# Patient Record
Sex: FEMALE | Race: WHITE | Employment: OTHER | URBAN - METROPOLITAN AREA
[De-identification: names, ages, dates, MRNs, and addresses within clinical notes are randomized per-mention and may not be internally consistent; named-entity substitution may affect disease eponyms.]

---

## 2017-01-03 DIAGNOSIS — F41.9 ANXIETY: ICD-10-CM

## 2017-01-04 DIAGNOSIS — F41.9 ANXIETY: ICD-10-CM

## 2017-01-05 RX ORDER — ESCITALOPRAM OXALATE 5 MG/1
TABLET ORAL
Qty: 30 TAB | Refills: 5 | Status: SHIPPED | OUTPATIENT
Start: 2017-01-05 | End: 2017-05-04 | Stop reason: SDUPTHER

## 2017-01-12 RX ORDER — ESCITALOPRAM OXALATE 5 MG/1
TABLET ORAL
Qty: 30 TAB | Refills: 3 | Status: SHIPPED | OUTPATIENT
Start: 2017-01-12 | End: 2017-01-24 | Stop reason: ALTCHOICE

## 2017-01-24 ENCOUNTER — HOSPITAL ENCOUNTER (OUTPATIENT)
Dept: LAB | Age: 72
Discharge: HOME | End: 2017-01-24

## 2017-01-24 ENCOUNTER — OFFICE VISIT (OUTPATIENT)
Dept: FAMILY MEDICINE CLINIC | Age: 72
End: 2017-01-24

## 2017-01-24 VITALS
HEIGHT: 61 IN | OXYGEN SATURATION: 96 % | WEIGHT: 146 LBS | HEART RATE: 90 BPM | DIASTOLIC BLOOD PRESSURE: 85 MMHG | BODY MASS INDEX: 27.56 KG/M2 | SYSTOLIC BLOOD PRESSURE: 146 MMHG | RESPIRATION RATE: 16 BRPM | TEMPERATURE: 97.4 F

## 2017-01-24 DIAGNOSIS — G25.81 RLS (RESTLESS LEGS SYNDROME): ICD-10-CM

## 2017-01-24 DIAGNOSIS — M81.0 OSTEOPOROSIS: Primary | ICD-10-CM

## 2017-01-24 DIAGNOSIS — M62.830 MUSCLE SPASM OF BACK: ICD-10-CM

## 2017-01-24 PROCEDURE — 99001 SPECIMEN HANDLING PT-LAB: CPT | Performed by: FAMILY MEDICINE

## 2017-01-24 RX ORDER — CYCLOBENZAPRINE HCL 10 MG
5 TABLET ORAL
Qty: 30 TAB | Refills: 5 | Status: SHIPPED | OUTPATIENT
Start: 2017-01-24 | End: 2017-06-14 | Stop reason: SDUPTHER

## 2017-01-24 RX ORDER — ROPINIROLE 0.5 MG/1
0.5 TABLET, FILM COATED ORAL
Qty: 90 TAB | Refills: 1 | Status: SHIPPED | OUTPATIENT
Start: 2017-01-24 | End: 2017-05-04 | Stop reason: SDUPTHER

## 2017-01-24 NOTE — MR AVS SNAPSHOT
Visit Information Date & Time Provider Department Dept. Phone Encounter #  
 1/24/2017 11:40 AM Lisy Johnson[address]  [id number] Follow-up Instructions Return in about 3 months (around 4/24/2017). Your Appointments 2/6/2017 11:30 AM  
URODYNAMICS with [facility] URODYNAMICS [facility]. Thomas [address] ([address]) Appt Note: Per Yevgeniy sampson/ George, no auth is required for urodynamics. Referral is on file. No deductible. $4500 OOP, $0 met. $45 copay. Calendar year policy. Call ref # [id number]  -mcf 1/10/17  
 [address] [place] [address] [address] [id number]  
  
    
 2/22/2017  1:00 PM  
ESTABLISHED PATIENT with Clarissa Sethi MD  
[facility]. [address] [facility] [facility]) Appt Note: fu review UDS; rescheduled UDS and f/u due to weather [address] [address]  
[phone]  
  
   
 [place] [id number] Upcoming Health Maintenance Date Due DTaP/Tdap/Td series (1 - Tdap) 8/9/1964 BREAST CANCER SCRN MAMMOGRAM 8/9/1993 ZOSTER VACCINE AGE 60> 8/9/2003 GLAUCOMA SCREENING Q2Y 8/9/2008 INFLUENZA AGE 9 TO ADULT 8/1/2016 FOBT Q 1 YEAR AGE 50-75 3/27/2017 MEDICARE YEARLY EXAM 9/13/2017 Allergies as of 1/24/2017  Review Complete On: 1/24/2017 By: Lisy Johnson, DO No Known Allergies Current Immunizations  Reviewed on 10/11/2016 Name Date Pneumococcal Conjugate (PCV-13) 10/11/2016 Pneumococcal Vaccine (Unspecified Type) 9/1/2014 Not reviewed this visit You Were Diagnosed With   
  
 Codes Comments Osteoporosis    -  Primary ICD-10-CM: M81.0 ICD-9-CM: 733.00   
 RLS (restless legs syndrome)     ICD-10-CM: G25.81 ICD-9-CM: 333.94 Muscle spasm of back     ICD-10-CM: I86.987 ICD-9-CM: 724.8 Vitals BP Pulse Temp Resp Height(growth percentile) Weight(growth percentile) 146/85 (BP 1 Location: Right arm, BP Patient Position: Sitting) 90 97.4 °F (36.3 °C) (Oral) 16 5' 1\" (1.549 m) 146 lb (66.2 kg) SpO2 BMI OB Status Smoking Status 96% 27.59 kg/m2 Postmenopausal Former Smoker BMI and BSA Data Body Mass Index Body Surface Area  
 27.59 kg/m 2 1.69 m 2 Preferred Pharmacy Pharmacy Name Phone Lefty Cortes [address] [phone] Your Updated Medication List  
  
   
This list is accurate as of: 1/24/17 12:47 PM.  Always use your most recent med list.  
  
  
  
  
 clonazePAM 0.5 mg tablet Commonly known as:  Dorcas Kava Take 1 Tab by mouth nightly. Max Daily Amount: 0.5 mg.  
  
 cyclobenzaprine 10 mg tablet Commonly known as:  FLEXERIL Take 0.5 Tabs by mouth three (3) times daily as needed for Muscle Spasm(s). docusate sodium 100 mg capsule Commonly known as:  Luwayvonne Ocampo Take 100 mg by mouth two (2) times a day. escitalopram oxalate 5 mg tablet Commonly known as:  Kingsley Montana TAKE 1 TABLET BY MOUTH EVERY EVENING  
  
 ferrous gluconate 324 mg (38 mg iron) tablet Take 1 Tab by mouth Every Mon, Wed & Sun.  
  
 gabapentin 100 mg capsule Commonly known as:  NEURONTIN Take 2 Caps by mouth three (3) times daily. lidocaine 5 % Commonly known as:  Alphonso Gomez Attach one to lower back and leg daily NYSTOP powder Generic drug:  nystatin  
  
 rOPINIRole 0.5 mg tablet Commonly known as:  Kendra Boyle Take 1 Tab by mouth nightly. Prescriptions Sent to Pharmacy Refills  
 rOPINIRole (REQUIP) 0.5 mg tablet 1 Sig: Take 1 Tab by mouth nightly. Class: Normal  
 Pharmacy: [facility] [address]. [place] Ph #: [phone]  Route: Oral  
 cyclobenzaprine (FLEXERIL) 10 mg tablet 5  
 Sig: Take 0.5 Tabs by mouth three (3) times daily as needed for Muscle Spasm(s). Class: Normal  
 Pharmacy: [facility] [address] [place] [address] [address]  #: [phone] Route: Oral  
  
Follow-up Instructions Return in about 3 months (around 4/24/2017). To-Do List   
 01/24/2017 Lab:  VITAMIN D, 25 HYDROXY Patient Instructions Dr. Colleen Maurer  
[phone] Introducing [facility] & [facility] SERVICES! Dear Neisha Love: Thank you for requesting a [employer] account. Our records indicate that you already have an active [employer] account. You can access your account anytime at [email or web address] Did you know that you can access your hospital and ER discharge instructions at any time in [employer]? You can also review all of your test results from your hospital stay or ER visit. Additional Information If you have questions, please visit the Frequently Asked Questions section of the [employer] website at [email or web address] Remember, [employer] is NOT to be used for urgent needs. For medical emergencies, dial 911. Now available from your iPhone and Android! Please provide this summary of care documentation to your next provider. Your primary care clinician is listed as Veronica Bourgeois. If you have any questions after today's visit, please call [phone].

## 2017-01-24 NOTE — PROGRESS NOTES
1. Have you been to the ER, urgent care clinic since your last visit? Hospitalized since your last visit? No    2. Have you seen or consulted any other health care providers outside of the [address] [place] [address] since your last visit? Include any pap smears or colon screening.  No

## 2017-01-24 NOTE — PROGRESS NOTES
Subjective:     HPI:  Martha Bautista is a 66 y.o. female who presents to the office for follow up on restless leg syndrome, muscle spasms, vision changes, and weight gain. Restless Leg Syndrome: Patient requests refill on Requip. She reports good relief of restless leg syndrome with Requip. No medication side effects noted. Muscle Spasms: Patient reports good relief of upper thigh muscle spasms with taking Flexeril. No medication side effects noted. Vision Changes: Patient reports limited field of vision in her left eye. She requests referral to Opthalmology. Weight Gain: Patient reports that since she was released from the hospital she has finally been able to gain weight. Patient has gained 33 lbs since last office visit on 10/11/16 and reached weight goal. She will begin working on weight maintenance. Wt Readings from Last 3 Encounters:   01/24/17 146 lb (66.2 kg)   10/11/16 113 lb (51.3 kg)   09/12/16 113 lb (51.3 kg)     Patient has started taking a calcium and vitamin D supplement for osteopenia and osteoporosis. Patient request ear flush at next office visit. Patient reports she has a nurse coming to her home to collect a urine culture on Thursday and ensure the UTI has resolved. She has appointments with the urologist and urodynamics in February. Current Outpatient Prescriptions   Medication Sig Dispense Refill    rOPINIRole (REQUIP) 0.5 mg tablet Take 1 Tab by mouth nightly. 90 Tab 1    cyclobenzaprine (FLEXERIL) 10 mg tablet Take 0.5 Tabs by mouth three (3) times daily as needed for Muscle Spasm(s). 30 Tab 5    escitalopram oxalate (LEXAPRO) 5 mg tablet TAKE 1 TABLET BY MOUTH EVERY EVENING 30 Tab 5    gabapentin (NEURONTIN) 100 mg capsule Take 2 Caps by mouth three (3) times daily. 180 Cap 5    clonazePAM (KLONOPIN) 0.5 mg tablet Take 1 Tab by mouth nightly.  Max Daily Amount: 0.5 mg. 30 Tab 0    ferrous gluconate 324 mg (38 mg iron) tablet Take 1 Tab by mouth Every Mon, Wed & Sun. 30 Tab 5    NYSTOP powder   0    docusate sodium (COLACE) 100 mg capsule Take 100 mg by mouth two (2) times a day. 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  Vascular surgery procedure unlist  march 2016     IVC filter. History reviewed. No pertinent family history. Social History     Social History    Marital status:      Spouse name: N/A    Number of children: N/A    Years of education: N/A     Occupational History    Not on file. Social History Main Topics    Smoking status: Former Smoker     Packs/day: 1.00     Years: 5.00    Smokeless tobacco: Never Used      Comment: quit in the 60's    Alcohol use No    Drug use: No    Sexual activity: Not Currently     Other Topics Concern    Not on file     Social History Narrative       REVIEW OF SYSTEM:  Review of Systems   Constitutional: Negative for chills and fever. Eyes: Negative for blurred vision. Respiratory: Negative for shortness of breath. Cardiovascular: Negative for chest pain, palpitations and leg swelling. Gastrointestinal: Negative for constipation, diarrhea, nausea and vomiting. Neurological: Negative for headaches.        Objective:     Visit Vitals    /85 (BP 1 Location: Right arm, BP Patient Position: Sitting)    Pulse 90    Temp 97.4 °F (36.3 °C) (Oral)    Resp 16    Ht 5' 1\" (1.549 m)    Wt 146 lb (66.2 kg)    SpO2 96%    BMI 27.59 kg/m2       PHYSICAL EXAM:  Physical Exam   Constitutional: She is oriented to person, place, and time and well-developed, well-nourished, and in no distress. HENT:   Right Ear: Tympanic membrane, external ear and ear canal normal.   Left Ear: Tympanic membrane, external ear and ear canal normal.   Ears:    Nose: Nose normal.   Mouth/Throat: Oropharynx is clear and moist.   Eyes: Pupils are equal, round, and reactive to light. Neck: Normal range of motion. Neck supple. No thyromegaly present. Cardiovascular: Normal rate, regular rhythm, normal heart sounds and intact distal pulses. No murmur heard. Pulmonary/Chest: Effort normal and breath sounds normal. She has no wheezes. Neurological: She is alert and oriented to person, place, and time. Skin: Skin is warm and dry. Vitals reviewed. Assessment/Plan:       ICD-10-CM ICD-9-CM    1. Osteoporosis M81.0 733.00 VITAMIN D, 25 HYDROXY   2. RLS (restless legs syndrome) G25.81 333.94 rOPINIRole (REQUIP) 0.5 mg tablet   3. Muscle spasm of back M62.830 724.8 cyclobenzaprine (FLEXERIL) 10 mg tablet      Patient will have non-fasting labs drawn in office today. Decrease salt in diet to help reduce blood pressure. Will continue to monitor blood pressure. Will perform ear flush at patient's next office visit. Continue with calcium and vitamin D supplement. 1500 mg Calcium and 500 mg Vitamin D. Patient was previously referred to an Opthalmologist. Patient given phone number to contact and schedule an appointment. Patient given opportunity to ask questions. Questions answered. Patient understands plan of care. Follow-up Disposition:  Return in about 3 months (around 4/24/2017).         Written by Nilda Hartman, as dictated by Belgica Parks DO.

## 2017-01-25 LAB
25(OH)D3+25(OH)D2 SERPL-MCNC: 77.1 NG/ML (ref 30–100)
ALBUMIN SERPL-MCNC: 4.3 G/DL (ref 3.5–4.8)
ALBUMIN/GLOB SERPL: 1.3 {RATIO} (ref 1.1–2.5)
ALP SERPL-CCNC: 90 IU/L (ref 39–117)
ALT SERPL-CCNC: 13 IU/L (ref 0–32)
AST SERPL-CCNC: 19 IU/L (ref 0–40)
BILIRUB SERPL-MCNC: 0.3 MG/DL (ref 0–1.2)
BUN SERPL-MCNC: 24 MG/DL (ref 8–27)
BUN/CREAT SERPL: 26 (ref 11–26)
CALCIUM SERPL-MCNC: 10 MG/DL (ref 8.7–10.3)
CHLORIDE SERPL-SCNC: 97 MMOL/L (ref 96–106)
CHOLEST SERPL-MCNC: 244 MG/DL (ref 100–199)
CO2 SERPL-SCNC: 27 MMOL/L (ref 18–29)
CREAT SERPL-MCNC: 0.94 MG/DL (ref 0.57–1)
GLOBULIN SER CALC-MCNC: 3.3 G/DL (ref 1.5–4.5)
GLUCOSE SERPL-MCNC: 87 MG/DL (ref 65–99)
HDLC SERPL-MCNC: 63 MG/DL
INTERPRETATION, 910389: NORMAL
LDLC SERPL CALC-MCNC: 146 MG/DL (ref 0–99)
POTASSIUM SERPL-SCNC: 4.4 MMOL/L (ref 3.5–5.2)
PROT SERPL-MCNC: 7.6 G/DL (ref 6–8.5)
SODIUM SERPL-SCNC: 140 MMOL/L (ref 134–144)
TRIGL SERPL-MCNC: 174 MG/DL (ref 0–149)
VLDLC SERPL CALC-MCNC: 35 MG/DL (ref 5–40)

## 2017-03-04 DIAGNOSIS — M62.830 MUSCLE SPASM OF BACK: ICD-10-CM

## 2017-03-07 RX ORDER — CYCLOBENZAPRINE HCL 10 MG
TABLET ORAL
Qty: 30 TAB | Refills: 2 | Status: SHIPPED | OUTPATIENT
Start: 2017-03-07 | End: 2017-06-14 | Stop reason: SDUPTHER

## 2017-04-17 DIAGNOSIS — M62.830 MUSCLE SPASM OF BACK: ICD-10-CM

## 2017-04-19 DIAGNOSIS — M46.20 PARASPINAL ABSCESS (HCC): ICD-10-CM

## 2017-04-19 DIAGNOSIS — G25.81 RLS (RESTLESS LEGS SYNDROME): ICD-10-CM

## 2017-04-20 RX ORDER — CYCLOBENZAPRINE HCL 10 MG
TABLET ORAL
Qty: 30 TAB | Refills: 1 | Status: SHIPPED | OUTPATIENT
Start: 2017-04-20 | End: 2017-06-14 | Stop reason: SDUPTHER

## 2017-04-22 RX ORDER — GABAPENTIN 100 MG/1
CAPSULE ORAL
Qty: 540 CAP | Refills: 0 | Status: SHIPPED | OUTPATIENT
Start: 2017-04-22 | End: 2017-07-27 | Stop reason: SDUPTHER

## 2017-05-04 ENCOUNTER — OFFICE VISIT (OUTPATIENT)
Dept: FAMILY MEDICINE CLINIC | Age: 72
End: 2017-05-04

## 2017-05-04 VITALS
BODY MASS INDEX: 28.66 KG/M2 | DIASTOLIC BLOOD PRESSURE: 66 MMHG | RESPIRATION RATE: 20 BRPM | TEMPERATURE: 97.3 F | WEIGHT: 151.8 LBS | SYSTOLIC BLOOD PRESSURE: 143 MMHG | OXYGEN SATURATION: 97 % | HEART RATE: 76 BPM | HEIGHT: 61 IN

## 2017-05-04 DIAGNOSIS — F41.9 ANXIETY: ICD-10-CM

## 2017-05-04 DIAGNOSIS — G25.81 RLS (RESTLESS LEGS SYNDROME): ICD-10-CM

## 2017-05-04 DIAGNOSIS — Z78.9 FULL CODE STATUS: Primary | ICD-10-CM

## 2017-05-04 RX ORDER — ROPINIROLE 1 MG/1
1 TABLET, FILM COATED ORAL
Qty: 90 TAB | Refills: 1 | Status: SHIPPED | OUTPATIENT
Start: 2017-05-04 | End: 2017-09-20 | Stop reason: SDUPTHER

## 2017-05-04 RX ORDER — ESCITALOPRAM OXALATE 5 MG/1
TABLET ORAL
Qty: 30 TAB | Refills: 5 | Status: SHIPPED | OUTPATIENT
Start: 2017-05-04 | End: 2017-05-10 | Stop reason: SDUPTHER

## 2017-05-04 NOTE — MR AVS SNAPSHOT
Visit Information Date & Time Provider Department Dept. Phone Encounter #  
 5/4/2017 12:40 PM Maria Del Carmen Alonzo [facility] [phone] [id number] Follow-up Instructions Return in about 2 months (around 7/4/2017). Your Appointments 5/24/2017 11:45 AM  
ESTABLISHED PATIENT with Bhavin Moreno MD  
[facility] ([address]) Appt Note: 3MO F/UP RETENTION  
 [address] [place] [address]  
[phone]  
  
   
 [place] [id number] Upcoming Health Maintenance Date Due DTaP/Tdap/Td series (1 - Tdap) 8/9/1964 BREAST CANCER SCRN MAMMOGRAM 8/9/1993 ZOSTER VACCINE AGE 60> 8/9/2003 FOBT Q 1 YEAR AGE 50-75 3/27/2017 INFLUENZA AGE 9 TO ADULT 8/1/2017 MEDICARE YEARLY EXAM 9/13/2017 GLAUCOMA SCREENING Q2Y 3/2/2019 Allergies as of 5/4/2017  Review Complete On: 5/4/2017 By: Keisha Quan LPN No Known Allergies Current Immunizations  Reviewed on 10/11/2016 Name Date Pneumococcal Conjugate (PCV-13) 10/11/2016 Pneumococcal Vaccine (Unspecified Type) 9/1/2014 Not reviewed this visit You Were Diagnosed With   
  
 Codes Comments Full code status    -  Primary ICD-10-CM: Z78.9 ICD-9-CM: V49.89 Anxiety     ICD-10-CM: F41.9 ICD-9-CM: 300.00   
 RLS (restless legs syndrome)     ICD-10-CM: G25.81 ICD-9-CM: 333.94 Vitals BP Pulse Temp Resp Height(growth percentile) Weight(growth percentile) 143/66 76 97.3 °F (36.3 °C) (Oral) 20 5' 1\" (1.549 m) 151 lb 12.8 oz (68.9 kg) SpO2 BMI OB Status Smoking Status 97% 28.68 kg/m2 Postmenopausal Former Smoker Vitals History BMI and BSA Data Body Mass Index Body Surface Area  
 28.68 kg/m 2 1.72 m 2 Preferred Pharmacy Pharmacy Name Phone [address] [phone] Your Updated Medication List  
  
   
This list is accurate as of: 5/4/17  2:03 PM.  Always use your most recent med list.  
  
  
  
  
 * cyclobenzaprine 10 mg tablet Commonly known as:  FLEXERIL Take 0.5 Tabs by mouth three (3) times daily as needed for Muscle Spasm(s). * cyclobenzaprine 10 mg tablet Commonly known as:  FLEXERIL  
TAKE 1/2 TABLET BY MOUTH THREE TIMES DAILY AS NEEDED FOR MUSCLE SPASMS * cyclobenzaprine 10 mg tablet Commonly known as:  FLEXERIL  
TAKE 1/2 TABLET BY MOUTH THREE TIMES DAILY AS NEEDED FOR MUSCLE SPASMS  
  
 docusate sodium 100 mg capsule Commonly known as:  Gemma Batters Take 100 mg by mouth two (2) times a day. escitalopram oxalate 5 mg tablet Commonly known as:  Kasey Peak TAKE 1 TABLET BY MOUTH EVERY EVENING  
  
 ferrous gluconate 324 mg (38 mg iron) tablet Take 1 Tab by mouth Every Mon, Wed & Sun.  
  
 gabapentin 100 mg capsule Commonly known as:  NEURONTIN  
TAKE 2 CAPSULES BY MOUTH THREE TIMES DAILY  
  
 gentamicin 80 mg/2ml Commonly known as:  GARAMYCIN  
80 mg by Bladder Instillation route every other day. lidocaine 5 % Commonly known as:  Channpatti Ask Attach one to lower back and leg daily PROBIOTIC 4X 10-15 mg Tbec Generic drug:  B.infantis-B.ani-B.long-B.bifi Take  by mouth. rOPINIRole 1 mg tablet Commonly known as:  [place] Jose Take 1 Tab by mouth nightly.  
  
 sodium chloride irrigation 0.9 % irrigation 1,000 mL by Irrigation route every fourty-eight (48) hours. * Notice: This list has 3 medication(s) that are the same as other medications prescribed for you. Read the directions carefully, and ask your doctor or other care provider to review them with you. Prescriptions Sent to Pharmacy Refills  
 escitalopram oxalate (LEXAPRO) 5 mg tablet 5 Sig: TAKE 1 TABLET BY MOUTH EVERY EVENING  Class: Normal  
 Pharmacy: [facility] [address] [place] [address] [address] Ph #: [phone]  
 rOPINIRole (REQUIP) 1 mg tablet 1 Sig: Take 1 Tab by mouth nightly. Class: Normal  
 Pharmacy: [facility] [address] [place] [address] [address] Ph #: [phone] Route: Oral  
  
We Performed the Following FULL CODE [COD2 Custom] Follow-up Instructions Return in about 2 months (around 7/4/2017). Introducing [facility] & [facility] SERVICES! Dear Guanakito Matters: Thank you for requesting a [employer] account. Our records indicate that you already have an active [employer] account. You can access your account anytime at [email or web address] Did you know that you can access your hospital and ER discharge instructions at any time in [employer]? You can also review all of your test results from your hospital stay or ER visit. Additional Information If you have questions, please visit the Frequently Asked Questions section of the [employer] website at [email or web address] Remember, [employer] is NOT to be used for urgent needs. For medical emergencies, dial 911. Now available from your iPhone and Android! Please provide this summary of care documentation to your next provider. Your primary care clinician is listed as Leatrinity Arenas. If you have any questions after today's visit, please call [phone].

## 2017-05-04 NOTE — PROGRESS NOTES
Subjective:     HPI:  Andreas Martinez is a 66 y.o. female who presents to the office for follow up on restless leg syndrome, anxiety, weight gain, urology treatments, and eye specialist treatments, c/o hearing loss. Restless Leg Syndrome: Patient reports her symptoms of restless leg syndrome have begun starting in the afternoon instead of just at night. She has been taking Ropinirole once daily at bedtime with good relief of restless leg symptoms while sleeping. Patient states she does not go to bed until around 5 am and sleeps until 2 pm.     Anxiety: Patient is taking Lexapro as prescribed with good relief of symptoms. No medication side effects noted. Weight Gain: Patient reports she is getting regular meals during the day and is trying to eat a balanced diet. She reports good appetite. Wt Readings from Last 3 Encounters:   05/04/17 151 lb 12.8 oz (68.9 kg)   02/17/17 146 lb (66.2 kg)   01/24/17 146 lb (66.2 kg)     Urology: Patient underwent Urodynamics testing on 2/17/17 which showed patient was not fully voiding her bladder. Urologist ordered self-catheterization and sent someone to teach patient and caregiver procedure. Patient started a 90 day treatment on 4/3/17 of injecting sterile water and gentamycin mixture into the bladder for 30 minutes every other day. Urologist ordered treatment in attempt to reduce UTI frequency. Eye Specialists: Patient reports she underwent surgery on 4/21/17 with retinal specialist, Dr. Alberto Clark, on her left eye to remove a blood clot in the eye. She states she still needs to undergo cataract [facility] on both [facility] Cerumen Build-up: Patient reports gradual decrease in hearing which she attributed to wax build-up in ears. Labs Reviewed:  Patient states she has modified her diet and started eating healthier foods since labs were drawn. She reports she was trying to reduce cholesterol with dietary changes.      Lab Results Component Value Date/Time    Sodium 140 01/24/2017 12:53 PM    Potassium 4.4 01/24/2017 12:53 PM    Chloride 97 01/24/2017 12:53 PM    CO2 27 01/24/2017 12:53 PM    Anion gap 13 05/21/2016 05:43 PM    Glucose 87 01/24/2017 12:53 PM    BUN 24 01/24/2017 12:53 PM    Creatinine 0.94 01/24/2017 12:53 PM    BUN/Creatinine ratio 26 01/24/2017 12:53 PM    GFR est AA 70 01/24/2017 12:53 PM    GFR est non-AA 60 01/24/2017 12:53 PM    Calcium 10.0 01/24/2017 12:53 PM    Bilirubin, total 0.3 01/24/2017 12:53 PM    AST (SGOT) 19 01/24/2017 12:53 PM    Alk. phosphatase 90 01/24/2017 12:53 PM    Protein, total 7.6 01/24/2017 12:53 PM    Albumin 4.3 01/24/2017 12:53 PM    Globulin 6.7 05/21/2016 05:43 PM    A-G Ratio 1.3 01/24/2017 12:53 PM    ALT (SGPT) 13 01/24/2017 12:53 PM     Lab Results   Component Value Date/Time    Cholesterol, total 244 01/24/2017 12:53 PM    HDL Cholesterol 63 01/24/2017 12:53 PM    LDL, calculated 146 01/24/2017 12:53 PM    VLDL, calculated 35 01/24/2017 12:53 PM    Triglyceride 174 01/24/2017 12:53 PM     Lab Results   Component Value Date/Time    Vitamin D 25-Hydroxy 72.0 03/22/2016 06:30 AM    VITAMIN D, 25-HYDROXY 77.1 01/24/2017 12:53 PM         Current Outpatient Prescriptions   Medication Sig Dispense Refill    escitalopram oxalate (LEXAPRO) 5 mg tablet TAKE 1 TABLET BY MOUTH EVERY EVENING 30 Tab 5    rOPINIRole (REQUIP) 1 mg tablet Take 1 Tab by mouth nightly. 90 Tab 1    gabapentin (NEURONTIN) 100 mg capsule TAKE 2 CAPSULES BY MOUTH THREE TIMES DAILY 540 Cap 0    cyclobenzaprine (FLEXERIL) 10 mg tablet TAKE 1/2 TABLET BY MOUTH THREE TIMES DAILY AS NEEDED FOR MUSCLE SPASMS 30 Tab 1    cyclobenzaprine (FLEXERIL) 10 mg tablet TAKE 1/2 TABLET BY MOUTH THREE TIMES DAILY AS NEEDED FOR MUSCLE SPASMS 30 Tab 2    B.infantis-B.ani-B.long-B.bifi (PROBIOTIC 4X) 10-15 mg TbEC Take  by mouth.       cyclobenzaprine (FLEXERIL) 10 mg tablet Take 0.5 Tabs by mouth three (3) times daily as needed for Muscle Spasm(s). 30 Tab 5    ferrous gluconate 324 mg (38 mg iron) tablet Take 1 Tab by mouth Every Mon, Wed & Sun. 30 Tab 5    gentamicin (GARAMYCIN) 80 mg/2ml 80 mg by Bladder Instillation route every other day. 4 Syringe 11    sodium chloride irrigation 0.9 % irrigation 1,000 mL by Irrigation route every fourty-eight (48) hours. 1000 mL 4    docusate sodium (COLACE) 100 mg capsule Take 100 mg by mouth two (2) times a day.  lidocaine (LIDODERM) 5 % Attach one to lower back and leg daily 1 Each 0        No Known Allergies    Past Medical History:   Diagnosis Date    Bladder stones     [facility] of leg (deep venous thrombosis) ([facility] [address]     Female genital prolapse     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  VASCULAR SURGERY PROCEDURE UNLIST  march 2016    IVC filter. History reviewed. No pertinent family history. Social History     Social History    Marital status:      Spouse name: N/A    Number of children: N/A    Years of education: N/A     Occupational History    Not on file. Social History Main Topics    Smoking status: Former Smoker     Packs/day: 1.00     Years: 5.00     Types: Cigarettes    Smokeless tobacco: Never Used      Comment: quit in the 60's    Alcohol use No    Drug use: No    Sexual activity: Not Currently     Other Topics Concern    Not on file     Social History Narrative       REVIEW OF SYSTEM:  Review of Systems   Constitutional: Negative for chills and fever. HENT: Positive for hearing loss (mild). Respiratory: Positive for cough (when lying flat on her back). Negative for shortness of breath. Cardiovascular: Negative for chest pain, palpitations and leg swelling.    Gastrointestinal: Negative for constipation, diarrhea, nausea and vomiting. Neurological: Negative for headaches. Objective:     Visit Vitals    /66    Pulse 76    Temp 97.3 °F (36.3 °C) (Oral)    Resp 20    Ht 5' 1\" (1.549 m)    Wt 151 lb 12.8 oz (68.9 kg)    SpO2 97%    BMI 28.68 kg/m2       PHYSICAL EXAM:  Physical Exam   Constitutional: She is oriented to person, place, and time and well-developed, well-nourished, and in no distress. HENT:   Right Ear: Tympanic membrane, external ear and ear canal normal.   Left Ear: Tympanic membrane, external ear and ear canal normal.   Ears:    Nose: Nose normal.       Mouth/Throat: Oropharynx is clear and moist.   Eyes: Pupils are equal, round, and reactive to light. Neck: Normal range of motion. Neck supple. No thyromegaly present. Cardiovascular: Normal rate, regular rhythm, normal heart sounds and intact distal pulses. No murmur heard. Pulmonary/Chest: Effort normal and breath sounds normal. She has no wheezes. Neurological: She is alert and oriented to person, place, and time. Skin: Skin is warm and dry. Vitals reviewed. Assessment/Plan:       ICD-10-CM ICD-9-CM    1. Full code status Z78.9 V49.89 FULL CODE   2. Anxiety F41.9 300.00 DISCONTINUED: escitalopram oxalate (LEXAPRO) 5 mg tablet   3. RLS (restless legs syndrome) G25.81 333.94 rOPINIRole (REQUIP) 1 mg tablet      Patient has a copy of medical directives with her today. Will scan to patient's chart. Patient desires to change code status to Full Code Status. Patient would like to continue with lifestyle changes for cholesterol and blood pressure management. Restless Leg Syndrome not fully controlled on current therapy. Dose of Ropinirole increased to 1 mg daily with instructions to increase to 2 mg daily if needed. Patient will have fasting labs drawn prior to next rov. Patient given opportunity to ask questions. Questions answered. Patient understands plan of care.    Follow-up Disposition:  Return in about 2 months (around 7/4/2017). Written by Miguel Barker, as dictated by Garfield Kim DO.    I, Dr. Garfield Kim, confirm that all documentation is accurate.

## 2017-05-04 NOTE — PROGRESS NOTES
Keisha Maxwell is a 66 y.o. female who presents today for/with c/o 3 month follow up    1. Have you been to the ER, urgent care clinic since your last visit? Hospitalized since your last visit? NO    2. Have you seen or consulted any other health care providers outside of the [address] [place] [address] since your last visit? Include any pap smears or colon screening.  No    Health Maintenance reviewed - Yes    Health Maintenance Due   Topic Date Due    DTaP/Tdap/Td series (1 - Tdap) 08/09/1964    BREAST CANCER SCRN MAMMOGRAM  08/09/1993    ZOSTER VACCINE AGE 60>  08/09/2003    FOBT Q 1 YEAR AGE 50-75  03/27/2017

## 2017-05-10 DIAGNOSIS — F41.9 ANXIETY: ICD-10-CM

## 2017-05-12 RX ORDER — ESCITALOPRAM OXALATE 5 MG/1
TABLET ORAL
Qty: 90 TAB | Refills: 2 | Status: SHIPPED | OUTPATIENT
Start: 2017-05-12 | End: 2017-06-14 | Stop reason: SDUPTHER

## 2017-05-19 DIAGNOSIS — F41.9 ANXIETY: ICD-10-CM

## 2017-05-23 RX ORDER — ESCITALOPRAM OXALATE 5 MG/1
TABLET ORAL
Qty: 90 TAB | Refills: 1 | Status: SHIPPED | OUTPATIENT
Start: 2017-05-23 | End: 2017-10-31 | Stop reason: SDUPTHER

## 2017-06-14 ENCOUNTER — OFFICE VISIT (OUTPATIENT)
Dept: FAMILY MEDICINE CLINIC | Age: 72
End: 2017-06-14

## 2017-06-14 VITALS
HEIGHT: 61 IN | WEIGHT: 153 LBS | BODY MASS INDEX: 28.89 KG/M2 | TEMPERATURE: 96.9 F | DIASTOLIC BLOOD PRESSURE: 68 MMHG | HEART RATE: 79 BPM | SYSTOLIC BLOOD PRESSURE: 120 MMHG | RESPIRATION RATE: 20 BRPM | OXYGEN SATURATION: 98 %

## 2017-06-14 DIAGNOSIS — Z01.818 PRE-OP EXAMINATION: ICD-10-CM

## 2017-06-14 DIAGNOSIS — H53.8 BLURRY VISION, LEFT EYE: Primary | ICD-10-CM

## 2017-06-14 DIAGNOSIS — M62.830 MUSCLE SPASM OF BACK: ICD-10-CM

## 2017-06-14 RX ORDER — CYCLOBENZAPRINE HCL 10 MG
TABLET ORAL
Qty: 45 TAB | Refills: 1 | Status: SHIPPED | OUTPATIENT
Start: 2017-06-14 | End: 2017-09-21 | Stop reason: SDUPTHER

## 2017-06-14 NOTE — PROGRESS NOTES
HISTORY OF PRESENT ILLNESS    Emily Nava is a 66 y.o.  female who presents today for surgical clearance for left cataract extraction with Dr. Serena Sears scheduled for 06/22/2017 at [facility]. Last seen in the office : 05/04/2017  States there have been no changes since last visit. EKG: unchanged from previous tracings, normal sinus rhythm, LBBB. Pt states that she had a blood clot around her left eye, which she describes as a \"black cloud\". Patient reports she underwent surgery on 4/21/17 with retinal specialist, Dr. Yuniel Walls, on her left eye to remove a blood clot in the eye. Current Outpatient Prescriptions   Medication Sig Dispense Refill    cyclobenzaprine (FLEXERIL) 10 mg tablet TAKE 1/2 TABLET BY MOUTH THREE TIMES DAILY AS NEEDED FOR MUSCLE SPASMS 45 Tab 1    escitalopram oxalate (LEXAPRO) 5 mg tablet TAKE 1 TABLET BY MOUTH EVERY EVENING 90 Tab 1    rOPINIRole (REQUIP) 1 mg tablet Take 1 Tab by mouth nightly. 90 Tab 1    gabapentin (NEURONTIN) 100 mg capsule TAKE 2 CAPSULES BY MOUTH THREE TIMES DAILY 540 Cap 0    gentamicin (GARAMYCIN) 80 mg/2ml 80 mg by Bladder Instillation route every other day. 4 Syringe 11    sodium chloride irrigation 0.9 % irrigation 1,000 mL by Irrigation route every fourty-eight (48) hours. 1000 mL 4    B.infantis-B.ani-B.long-B.bifi (PROBIOTIC 4X) 10-15 mg TbEC Take  by mouth.  ferrous gluconate 324 mg (38 mg iron) tablet Take 1 Tab by mouth Every Mon, Wed & Sun. 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      VASCULAR SURGERY PROCEDURE UNLIST  march 2016    IVC filter. No family history on file. Social History     Social History    Marital status:      Spouse name: N/A    Number of children: N/A    Years of education: N/A     Occupational History    Not on file. Social History Main Topics    Smoking status: Former Smoker     Packs/day: 1.00     Years: 5.00     Types: Cigarettes    Smokeless tobacco: Never Used      Comment: quit in the 60's    Alcohol use No    Drug use: No    Sexual activity: Not Currently     Other Topics Concern    Not on file     Social History Narrative       Review of Systems   Constitutional: Negative for chills and fever. Eyes: Positive for blurred vision. Respiratory: Negative for shortness of breath. Cardiovascular: Negative for chest pain, palpitations and leg swelling. Gastrointestinal: Negative for constipation, diarrhea, nausea and vomiting. Musculoskeletal: Negative for joint pain. Neurological: Negative for headaches. OBJECTIVE  Visit Vitals    /68  Comment: manually    Pulse 79    Temp 96.9 °F (36.1 °C) (Oral)    Resp 20    Ht 5' 1\" (1.549 m)    Wt 153 lb (69.4 kg)    SpO2 98%    BMI 28.91 kg/m2       Physical Exam   Constitutional: She is oriented to person, place, and time and well-developed, well-nourished, and in no distress. HENT:   Right Ear: Tympanic membrane, external ear and ear canal normal.   Left Ear: External ear and ear canal normal.   Nose: Nose normal.   Mouth/Throat: Oropharynx is clear and moist.   Cerumen build-up in left ear   Eyes: Pupils are equal, round, and reactive to light. Neck: Normal range of motion. Neck supple. No thyromegaly present. Cardiovascular: Normal rate, regular rhythm, normal heart sounds and intact distal pulses. No murmur heard. Pulmonary/Chest: Effort normal and breath sounds normal. She has no wheezes. Neurological: She is alert and oriented to person, place, and time. GCS score is 15.    Skin: Skin is warm and dry. Vitals reviewed. ASSESSMENT and PLAN    ICD-10-CM ICD-9-CM    1. Blurry vision, left eye H53.8 368.8    2. Pre-op examination Z01.818 V72.84    3. Muscle spasm of back M62.830 724.8 cyclobenzaprine (FLEXERIL) 10 mg tablet   Patient requests Flexeril prescription with 45 tabs. She takes 1/2 tab TID. Patient will return to office for left ear flush. Patient given opportunity to ask questions. Questions answered. Patient understands plan of care. Follow-up Disposition:  Return for as scheduled. .      Patient is mild surgical risk for the planned procedure: left cataract extraction with Dr. Jai Bar [facility]. Written by Christy Castro, as dictated by Aliya Son DO.    I, Dr. Aliya Son, confirm that all documentation is accurate.

## 2017-06-14 NOTE — MR AVS SNAPSHOT
Visit Information Date & Time Provider Department Dept. Phone Encounter #  
 6/14/2017 10:00 AM Erica Coyle, No [facility]  [id number] Follow-up Instructions Return for as scheduled. .  
  
Your Appointments 7/6/2017 12:40 PM  
Follow Up with [place]él[place] [address] [facility] [address] [place] [address]) Appt Note: Return in about 2 months (around 7/4/2017). [address] [facility] [address]  
  
   
 [address]  
  
    
 7/24/2017 11:45 AM  
ESTABLISHED PATIENT with Toy Felty, MD  
[facility] of [facility] [address] [facility]) Appt Note: 3MO F/UP RETENTION/ discuss bladder stone removal  
 [address] [place] [id number]  
[phone]  
  
   
 [place] [id number] Upcoming Health Maintenance Date Due DTaP/Tdap/Td series (1 - Tdap) 8/9/1964 BREAST CANCER SCRN MAMMOGRAM 8/9/1993 ZOSTER VACCINE AGE 60> 8/9/2003 FOBT Q 1 YEAR AGE 50-75 3/27/2017 INFLUENZA AGE 9 TO ADULT 8/1/2017 MEDICARE YEARLY EXAM 9/13/2017 GLAUCOMA SCREENING Q2Y 3/2/2019 Allergies as of 6/14/2017  Review Complete On: 6/14/2017 By: Erica Coyle DO No Known Allergies Current Immunizations  Reviewed on 10/11/2016 Name Date Pneumococcal Conjugate (PCV-13) 10/11/2016 Pneumococcal Vaccine (Unspecified Type) 9/1/2014 Not reviewed this visit You Were Diagnosed With   
  
 Codes Comments Blurry vision, left eye    -  Primary ICD-10-CM: H53.8 ICD-9-CM: 368.8 Pre-op examination     ICD-10-CM: Q48.335 ICD-9-CM: V72.84 Muscle spasm of back     ICD-10-CM: H82.340 ICD-9-CM: 724.8 Vitals BP Pulse Temp Resp Height(growth percentile) Weight(growth percentile) 120/68 79 96.9 °F (36.1 °C) (Oral) 20 5' 1\" (1.549 m) 153 lb (69.4 kg) SpO2 BMI OB Status Smoking Status 98% 28.91 kg/m2 Postmenopausal Former Smoker Vitals History BMI and BSA Data Body Mass Index Body Surface Area  
 28.91 kg/m 2 1.73 m 2 Preferred Pharmacy Pharmacy Name Phone [facility] [address] [place] [address]. [address] [phone] Your Updated Medication List  
  
   
This list is accurate as of: 6/14/17 10:41 AM.  Always use your most recent med list.  
  
  
  
  
 cyclobenzaprine 10 mg tablet Commonly known as:  FLEXERIL  
TAKE 1/2 TABLET BY MOUTH THREE TIMES DAILY AS NEEDED FOR MUSCLE SPASMS  
  
 escitalopram oxalate 5 mg tablet Commonly known as:  Keith Leeds TAKE 1 TABLET BY MOUTH EVERY EVENING  
  
 ferrous gluconate 324 mg (38 mg iron) tablet Take 1 Tab by mouth Every Mon, Wed & Sun.  
  
 gabapentin 100 mg capsule Commonly known as:  NEURONTIN  
TAKE 2 CAPSULES BY MOUTH THREE TIMES DAILY  
  
 gentamicin 80 mg/2ml Commonly known as:  GARAMYCIN  
80 mg by Bladder Instillation route every other day. PROBIOTIC 4X 10-15 mg Tbec Generic drug:  B.infantis-B.ani-B.long-B.bifi Take  by mouth. rOPINIRole 1 mg tablet Commonly known as:  Kyler Kocher Take 1 Tab by mouth nightly.  
  
 sodium chloride irrigation 0.9 % irrigation 1,000 mL by Irrigation route every fourty-eight (48) hours. Prescriptions Sent to Pharmacy Refills  
 cyclobenzaprine (FLEXERIL) 10 mg tablet 1 Sig: TAKE 1/2 TABLET BY MOUTH THREE TIMES DAILY AS NEEDED FOR MUSCLE SPASMS Class: Normal  
 Pharmacy: [facility] [address] [place] [address]. [address] Ph #: [phone] We Performed the Following AMB POC EKG ROUTINE W/ 12 LEADS, INTER & REP [06233 CPT(R)] Follow-up Instructions Return for as scheduled. .  
  
  
Introducing [facility]! Dear Davida Jones: Thank you for requesting a MyChart account.   Our records indicate that you already have an active [employer] account. You can access your account anytime at [email or web address] Did you know that you can access your hospital and ER discharge instructions at any time in [employer]? You can also review all of your test results from your hospital stay or ER visit. Additional Information If you have questions, please visit the Frequently Asked Questions section of the [employer] website at [email or web address] Remember, [employer] is NOT to be used for urgent needs. For medical emergencies, dial 911. Now available from your iPhone and Android! Please provide this summary of care documentation to your next provider. Your primary care clinician is listed as Destiny Mercado. If you have any questions after today's visit, please call [phone].

## 2017-06-14 NOTE — PROGRESS NOTES
1. Have you been to the ER, urgent care clinic since your last visit? Hospitalized since your last visit? No    2. Have you seen or consulted any other health care providers outside of the [address] [place] [address] since your last visit? Include any pap smears or colon screening.  No

## 2017-06-30 DIAGNOSIS — M62.830 MUSCLE SPASM OF BACK: ICD-10-CM

## 2017-07-03 RX ORDER — CYCLOBENZAPRINE HCL 10 MG
TABLET ORAL
Qty: 30 TAB | Refills: 3 | Status: SHIPPED | OUTPATIENT
Start: 2017-07-03 | End: 2017-10-31 | Stop reason: SDUPTHER

## 2017-07-06 ENCOUNTER — OFFICE VISIT (OUTPATIENT)
Dept: FAMILY MEDICINE CLINIC | Age: 72
End: 2017-07-06

## 2017-07-06 VITALS
HEIGHT: 60 IN | OXYGEN SATURATION: 96 % | RESPIRATION RATE: 20 BRPM | TEMPERATURE: 96.9 F | SYSTOLIC BLOOD PRESSURE: 138 MMHG | DIASTOLIC BLOOD PRESSURE: 67 MMHG | HEART RATE: 71 BPM

## 2017-07-06 DIAGNOSIS — H61.23 BILATERAL IMPACTED CERUMEN: Primary | ICD-10-CM

## 2017-07-06 DIAGNOSIS — H66.92 OTITIS, LEFT: ICD-10-CM

## 2017-07-06 RX ORDER — NEOMYCIN SULFATE, POLYMYXIN B SULFATE AND HYDROCORTISONE 10; 3.5; 1 MG/ML; MG/ML; [USP'U]/ML
3 SUSPENSION/ DROPS AURICULAR (OTIC) 3 TIMES DAILY
Qty: 10 ML | Refills: 0 | Status: SHIPPED | OUTPATIENT
Start: 2017-07-06 | End: 2017-07-11

## 2017-07-06 NOTE — PROGRESS NOTES
Subjective:     HPI:  Salvador Hartman is a 66 y.o. female who presents to the office today to follow up on  issues and her recent eye surgery. Left Eye cataract surgery: Pt had a surgery on 06/22/2017 for age-related nuclear cataract of left eye complex. Pt states that if the cataract is present in the central portion of the lens then it will have to be extracted but reports that if it is in the outer layer then the cataract will dissolve it self. Pt states that that if dissolved, her vision will improve itself but it will not be perfect. Pt has an appointment with her ophthalmologist on 07/18/2017 and another appointment with a retinal specialist on 07/19/2017 for further evaluation of her left eye.  issues: Pt is following up with urologist for urinary issues. Pt is currently on a catheter and is following a 90 day regimen with Azithromycin. Pt states that she is seeing urologist on 07/24/2017 of this month. Pt states that she is anxious to have a hysterectomy so that she can walk w/o worrying about her uterus falling out. Pt reports that the surgeon is not comfortable doing surgery with her urinary issues and the inserted catheter. Pt currently has a catheter due to reoccurring urinary tract infections. Of note,   Pt states that she used to eat soy. Pt is taking multivitamins. Pt states that when she returned from the hospital she was drinking ensure and eating yogurt but it gave her diarrhea. Pt states that she has bowel movement all the time. Current Outpatient Prescriptions   Medication Sig Dispense Refill    neomycin-polymyxin-hydrocortisone, buffered, (PEDIOTIC) 3.5-10,000-1 mg/mL-unit/mL-% otic suspension Administer 3 Drops in left ear three (3) times daily for 5 days.  10 mL 0    cyclobenzaprine (FLEXERIL) 10 mg tablet TAKE 1/2 TABLET BY MOUTH THREE TIMES DAILY AS NEEDED FOR MUSCLE SPASMS 30 Tab 3    cyclobenzaprine (FLEXERIL) 10 mg tablet TAKE 1/2 TABLET BY MOUTH THREE TIMES DAILY AS NEEDED FOR MUSCLE SPASMS 45 Tab 1    escitalopram oxalate (LEXAPRO) 5 mg tablet TAKE 1 TABLET BY MOUTH EVERY EVENING 90 Tab 1    rOPINIRole (REQUIP) 1 mg tablet Take 1 Tab by mouth nightly. 90 Tab 1    gabapentin (NEURONTIN) 100 mg capsule TAKE 2 CAPSULES BY MOUTH THREE TIMES DAILY 540 Cap 0    gentamicin (GARAMYCIN) 80 mg/2ml 80 mg by Bladder Instillation route every other day. 4 Syringe 11    B.infantis-B.ani-B.long-B.bifi (PROBIOTIC 4X) 10-15 mg TbEC Take  by mouth.  ferrous gluconate 324 mg (38 mg iron) tablet Take 1 Tab by mouth Every Mon, Wed & Sun. 30 Tab 5    sodium chloride irrigation 0.9 % irrigation 1,000 mL by Irrigation route every fourty-eight (48) hours. 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  VASCULAR SURGERY PROCEDURE UNLIST  march 2016    IVC filter. No family history on file. Social History     Social History    Marital status:      Spouse name: N/A    Number of children: N/A    Years of education: N/A     Occupational History    Not on file. Social History Main Topics    Smoking status: Former Smoker     Packs/day: 1.00     Years: 5.00     Types: Cigarettes    Smokeless tobacco: Never Used      Comment: quit in the 60's    Alcohol use No    Drug use: No    Sexual activity: Not Currently     Other Topics Concern    Not on file     Social History Narrative       REVIEW OF SYSTEM:  Review of Systems   Constitutional: Negative for chills and fever.    Eyes: Positive for blurred vision. Respiratory: Negative for shortness of breath. Cardiovascular: Negative for chest pain, palpitations and leg swelling. Gastrointestinal: Positive for diarrhea. Negative for constipation, nausea and vomiting. Genitourinary: Positive for frequency. Musculoskeletal: Negative for joint pain. Neurological: Negative for headaches. Objective:     Visit Vitals    /67    Pulse 71    Temp 96.9 °F (36.1 °C) (Oral)    Resp 20    Ht 5' (1.524 m)    SpO2 96%       PHYSICAL EXAM:  Physical Exam   Constitutional: She is oriented to person, place, and time and well-developed, well-nourished, and in no distress. HENT:   Right Ear: External ear normal.   Left Ear: External ear normal.   Ears:    Nose: Nose normal.   Mouth/Throat: Oropharynx is clear and moist.   Eyes: Pupils are equal, round, and reactive to light. Neck: Normal range of motion. Neck supple. No thyromegaly present. Cardiovascular: Normal rate, regular rhythm, normal heart sounds and intact distal pulses. No murmur heard. Pulmonary/Chest: Effort normal and breath sounds normal. She has no wheezes. Neurological: She is alert and oriented to person, place, and time. GCS score is 15. Skin: Skin is warm and dry. Vitals reviewed. Ear Irrigation: Cerumen successfully removed from both ears. Right ear was dislodged easily. Used Debrox to soften wax in left ear then lavage and currettage, . Injected left tympanic membrane noted after removal of earwax. antibiotic eardrops given. Assessment/Plan:       ICD-10-CM ICD-9-CM    1. Bilateral impacted cerumen H61.23 380.4 REMOVE IMPACTED EAR WAX      neomycin-polymyxin-hydrocortisone, buffered, (PEDIOTIC) 3.5-10,000-1 mg/mL-unit/mL-% otic suspension   2. Otitis, left H66.92 382.9 neomycin-polymyxin-hydrocortisone, buffered, (PEDIOTIC) 3.5-10,000-1 mg/mL-unit/mL-% otic suspension     Patient given opportunity to ask questions. Questions answered.   Patient understands plan of care. Follow-up Disposition:  Return in about 2 months (around 9/6/2017). Written by Anneliese Barone, as dictated by DO. FRIEDA Estrella, Dr. Rebeca Tadeo, confirm that all documentation is accurate.

## 2017-07-06 NOTE — PATIENT INSTRUCTIONS
Swimmer's Ear: Care Instructions  Your Care Instructions    Swimmer's ear (otitis externa) is inflammation or infection of the ear canal. This is the passage that leads from the outer ear to the eardrum. Any water, sand, or other debris that gets into the ear canal and stays there can cause swimmer's ear. Putting cotton swabs or other items in the ear to clean it can also cause this problem. Swimmer's ear can be very painful. But you can treat the pain and infection with medicines. You should feel better in a few days. Follow-up care is a key part of your treatment and safety. Be sure to make and go to all appointments, and call your doctor if you are having problems. It's also a good idea to know your test results and keep a list of the medicines you take. How can you care for yourself at home? Cleaning and care  · Use antibiotic drops as your doctor directs. · Do not insert ear drops (other than the antibiotic ear drops) or anything else into the ear unless your doctor has told you to. · Avoid getting water in the ear until the problem clears up. Use cotton lightly coated with petroleum jelly as an earplug. Do not use plastic earplugs. · Use a hair dryer set on low to carefully dry the ear after you shower. · To ease ear pain, hold a warm washcloth against your ear. · Take pain medicines exactly as directed. ¨ If the doctor gave you a prescription medicine for pain, take it as prescribed. ¨ If you are not taking a prescription pain medicine, ask your doctor if you can take an over-the-counter medicine. Inserting ear drops  · Warm the drops to body temperature by rolling the container in your hands. Or you can place it in a cup of warm water for a few minutes. · Lie down, with your ear facing up. · Place drops inside the ear. Follow your doctor's instructions (or the directions on the label) for how many drops to use.  Gently wiggle the outer ear or pull the ear up and back to help the drops get into the ear. · It's important to keep the liquid in the ear canal for 3 to 5 minutes. When should you call for help? Call your doctor now or seek immediate medical care if:  · You have a new or higher fever. · You have new or worse pain, swelling, warmth, or redness around or behind your ear. · You have new or increasing pus or blood draining from your ear. Watch closely for changes in your health, and be sure to contact your doctor if:  · You are not getting better after 2 days (48 hours). Where can you learn more? Go to http://reji-meir.info/. Enter C706 in the search box to learn more about \"Swimmer's Ear: Care Instructions. \"  Current as of: July 29, 2016  Content Version: 11.3  © 5627-5241 [employer]. Care instructions adapted under license by [employer] (which disclaims liability or warranty for this information). If you have questions about a medical condition or this instruction, always ask your healthcare professional. [place] [address] any warranty or liability for your use of this information. Earwax Blockage: Care Instructions  Your Care Instructions    Earwax is a natural substance that protects the ear canal. Normally, earwax drains from the ears and does not cause problems. Sometimes earwax builds up and hardens. Earwax blockage (also called cerumen impaction) can cause some loss of hearing and pain. When wax is tightly packed, you will need to have your doctor remove it. Follow-up care is a key part of your treatment and safety. Be sure to make and go to all appointments, and call your doctor if you are having problems. Its also a good idea to know your test results and keep a list of the medicines you take. How can you care for yourself at home? · Do not try to remove earwax with cotton swabs, fingers, or other objects. This can make the blockage worse and damage the eardrum.   · If your doctor recommends that you try to remove earwax at home:  ¨ Soften and loosen the earwax with warm mineral oil. You also can try hydrogen peroxide mixed with an equal amount of room temperature water. Place 2 drops of the fluid, warmed to body temperature, in the ear two times a day for up to 5 days. ¨ Once the wax is loose and soft, all that is usually needed to remove it from the ear canal is a gentle, warm shower. Direct the water into the ear, then tip your head to let the earwax drain out. Dry your ear thoroughly with a hair dryer set on low. Hold the dryer several inches from your ear. ¨ If the warm mineral oil and shower do not work, use an over-the-counter wax softener followed by gentle flushing with an ear syringe each night for a week or two. Make sure the flushing solution is body temperature. Cool or hot fluids in the ear can cause dizziness. When should you call for help? Call your doctor now or seek immediate medical care if:  · Pus or blood drains from your ear. · Your ears are ringing or feel full. · You have a loss of hearing. Watch closely for changes in your health, and be sure to contact your doctor if:  · You have pain or reduced hearing after 1 week of home treatment. · You have any new symptoms, such as nausea or balance problems. Where can you learn more? Go to http://reji-meir.info/. Enter X437 in the search box to learn more about \"Earwax Blockage: Care Instructions. \"  Current as of: March 20, 2017  Content Version: 11.3  © 9578-6540 [employer]. Care instructions adapted under license by [employer] (which disclaims liability or warranty for this information). If you have questions about a medical condition or this instruction, always ask your healthcare professional. [place] [address] any warranty or liability for your use of this information.

## 2017-07-06 NOTE — PROGRESS NOTES
1. Have you been to the ER, urgent care clinic since your last visit? Hospitalized since your last visit? No    2. Have you seen or consulted any other health care providers outside of the [address] [place] [address] since your last visit? Include any pap smears or colon screening.  No

## 2017-07-06 NOTE — MR AVS SNAPSHOT
Visit Information Date & Time Provider Department Dept. Phone Encounter #  
 7/6/2017 12:40 PM Saurav Melendez 6 [phone] [id number] Follow-up Instructions Return in about 2 months (around 9/6/2017). Your Appointments 7/24/2017 11:45 AM  
ESTABLISHED PATIENT with Rosita Silvestre MD  
[facility] [place]) Appt Note: 3MO F/UP RETENTION/ discuss bladder stone removal  
 [address] [place] [address]  
[phone]  
  
   
 [place] [address] Upcoming Health Maintenance Date Due DTaP/Tdap/Td series (1 - Tdap) 8/9/1964 BREAST CANCER SCRN MAMMOGRAM 8/9/1993 ZOSTER VACCINE AGE 60> 8/9/2003 FOBT Q 1 YEAR AGE 50-75 3/27/2017 INFLUENZA AGE 9 TO ADULT 8/1/2017 MEDICARE YEARLY EXAM 9/13/2017 GLAUCOMA SCREENING Q2Y 3/2/2019 Allergies as of 7/6/2017  Review Complete On: 7/6/2017 By: Karoline Porter, DO No Known Allergies Current Immunizations  Reviewed on 10/11/2016 Name Date Pneumococcal Conjugate (PCV-13) 10/11/2016 Pneumococcal Vaccine (Unspecified Type) 9/1/2014 Not reviewed this visit You Were Diagnosed With   
  
 Codes Comments Bilateral impacted cerumen    -  Primary ICD-10-CM: G93.50 
ICD-9-CM: 380.4 Otitis, left     ICD-10-CM: H66.92 
ICD-9-CM: 382. 9 Vitals BP Pulse Temp Resp Height(growth percentile) SpO2  
 138/67 71 96.9 °F (36.1 °C) (Oral) 20 5' (1.524 m) 96% OB Status Smoking Status Postmenopausal Former Smoker Preferred Pharmacy Pharmacy Name Phone [facility] [address] [place] [address]. [address] [phone] Your Updated Medication List  
  
   
This list is accurate as of: 7/6/17  2:25 PM.  Always use your most recent med list.  
  
  
  
  
 * cyclobenzaprine 10 mg tablet Commonly known as:  FLEXERIL  
 TAKE 1/2 TABLET BY MOUTH THREE TIMES DAILY AS NEEDED FOR MUSCLE SPASMS * cyclobenzaprine 10 mg tablet Commonly known as:  FLEXERIL  
TAKE 1/2 TABLET BY MOUTH THREE TIMES DAILY AS NEEDED FOR MUSCLE SPASMS  
  
 escitalopram oxalate 5 mg tablet Commonly known as:  Peñaloza Abts TAKE 1 TABLET BY MOUTH EVERY EVENING  
  
 ferrous gluconate 324 mg (38 mg iron) tablet Take 1 Tab by mouth Every Mon, Wed & Sun.  
  
 gabapentin 100 mg capsule Commonly known as:  NEURONTIN  
TAKE 2 CAPSULES BY MOUTH THREE TIMES DAILY  
  
 gentamicin 80 mg/2ml Commonly known as:  GARAMYCIN  
80 mg by Bladder Instillation route every other day. neomycin-polymyxin-hydrocortisone (buffered) 3.5-10,000-1 mg/mL-unit/mL-% otic suspension Commonly known as:  Coleman Chiquita Administer 3 Drops in left ear three (3) times daily for 5 days. PROBIOTIC 4X 10-15 mg Tbec Generic drug:  B.infantis-B.ani-B.long-B.bifi Take  by mouth. rOPINIRole 1 mg tablet Commonly known as:  Eran Starsofia Take 1 Tab by mouth nightly.  
  
 sodium chloride irrigation 0.9 % irrigation 1,000 mL by Irrigation route every fourty-eight (48) hours. * Notice: This list has 2 medication(s) that are the same as other medications prescribed for you. Read the directions carefully, and ask your doctor or other care provider to review them with you. Prescriptions Sent to Pharmacy Refills  
 neomycin-polymyxin-hydrocortisone, buffered, (PEDIOTIC) 3.5-10,000-1 mg/mL-unit/mL-% otic suspension 0 Sig: Administer 3 Drops in left ear three (3) times daily for 5 days. Class: Normal  
 Pharmacy: [facility] [address] [place] [address]. [address]  #: [phone] Route: Left Ear We Performed the Following REMOVE IMPACTED EAR WAX [13536 CPT(R)] Follow-up Instructions Return in about 2 months (around 9/6/2017). Patient Instructions Swimmer's Ear: Care Instructions Your Care Instructions Swimmer's ear (otitis externa) is inflammation or infection of the ear canal. This is the passage that leads from the outer ear to the eardrum. Any water, sand, or other debris that gets into the ear canal and stays there can cause swimmer's ear. Putting cotton swabs or other items in the ear to clean it can also cause this problem. Swimmer's ear can be very painful. But you can treat the pain and infection with medicines. You should feel better in a few days. Follow-up care is a key part of your treatment and safety. Be sure to make and go to all appointments, and call your doctor if you are having problems. It's also a good idea to know your test results and keep a list of the medicines you take. How can you care for yourself at home? Cleaning and care · Use antibiotic drops as your doctor directs. · Do not insert ear drops (other than the antibiotic ear drops) or anything else into the ear unless your doctor has told you to. · Avoid getting water in the ear until the problem clears up. Use cotton lightly coated with petroleum jelly as an earplug. Do not use plastic earplugs. · Use a hair dryer set on low to carefully dry the ear after you shower. · To ease ear pain, hold a warm washcloth against your ear. · Take pain medicines exactly as directed. ¨ If the doctor gave you a prescription medicine for pain, take it as prescribed. ¨ If you are not taking a prescription pain medicine, ask your doctor if you can take an over-the-counter medicine. Inserting ear drops · Warm the drops to body temperature by rolling the container in your hands. Or you can place it in a cup of warm water for a few minutes. · Lie down, with your ear facing up. · Place drops inside the ear. Follow your doctor's instructions (or the directions on the label) for how many drops to use.  Gently wiggle the outer ear or pull the ear up and back to help the drops get into the ear. · It's important to keep the liquid in the ear canal for 3 to 5 minutes. When should you call for help? Call your doctor now or seek immediate medical care if: 
· You have a new or higher fever. · You have new or worse pain, swelling, warmth, or redness around or behind your ear. · You have new or increasing pus or blood draining from your ear. Watch closely for changes in your health, and be sure to contact your doctor if: 
· You are not getting better after 2 days (48 hours). Where can you learn more? Go to http://reji-meir.info/. Enter C706 in the search box to learn more about \"Swimmer's Ear: Care Instructions. \" Current as of: July 29, 2016 Content Version: 11.3 © 7827-4767 [employer]. Care instructions adapted under license by [employer] (which disclaims liability or warranty for this information). If you have questions about a medical condition or this instruction, always ask your healthcare professional. [place] [address] any warranty or liability for your use of this information. Earwax Blockage: Care Instructions Your Care Instructions Earwax is a natural substance that protects the ear canal. Normally, earwax drains from the ears and does not cause problems. Sometimes earwax builds up and hardens. Earwax blockage (also called cerumen impaction) can cause some loss of hearing and pain. When wax is tightly packed, you will need to have your doctor remove it. Follow-up care is a key part of your treatment and safety. Be sure to make and go to all appointments, and call your doctor if you are having problems. Its also a good idea to know your test results and keep a list of the medicines you take. How can you care for yourself at home?  
· Do not try to remove earwax with cotton swabs, fingers, or other objects. This can make the blockage worse and damage the eardrum. · If your doctor recommends that you try to remove earwax at home: ¨ Soften and loosen the earwax with warm mineral oil. You also can try hydrogen peroxide mixed with an equal amount of room temperature water. Place 2 drops of the fluid, warmed to body temperature, in the ear two times a day for up to 5 days. ¨ Once the wax is loose and soft, all that is usually needed to remove it from the ear canal is a gentle, warm shower. Direct the water into the ear, then tip your head to let the earwax drain out. Dry your ear thoroughly with a hair dryer set on low. Hold the dryer several inches from your ear. ¨ If the warm mineral oil and shower do not work, use an over-the-counter wax softener followed by gentle flushing with an ear syringe each night for a week or two. Make sure the flushing solution is body temperature. Cool or hot fluids in the ear can cause dizziness. When should you call for help? Call your doctor now or seek immediate medical care if: · Pus or blood drains from your ear. · Your ears are ringing or feel full. · You have a loss of hearing. Watch closely for changes in your health, and be sure to contact your doctor if: 
· You have pain or reduced hearing after 1 week of home treatment. · You have any new symptoms, such as nausea or balance problems. Where can you learn more? Go to http://reji-meir.info/. Enter H557 in the search box to learn more about \"Earwax Blockage: Care Instructions. \" Current as of: March 20, 2017 Content Version: 11.3 © 3047-8278 [employer]. Care instructions adapted under license by [employer] (which disclaims liability or warranty for this information).  If you have questions about a medical condition or this instruction, always ask your healthcare professional. [address]  any warranty or liability for your use of this information. Introducing [facility] & HEALTH SERVICES! Dear Ami Castañeda: Thank you for requesting a [employer] account. Our records indicate that you already have an active [employer] account. You can access your account anytime at [email or web address]://[employer]. Where I've Been/[employer] Did you know that you can access your hospital and ER discharge instructions at any time in [employer]? You can also review all of your test results from your hospital stay or ER visit. Additional Information If you have questions, please visit the Frequently Asked Questions section of the [employer] website at [email or web address]. Where I've Been/[employer]/. Remember, [employer] is NOT to be used for urgent needs. For medical emergencies, dial 911. Now available from your iPhone and Android! Please provide this summary of care documentation to your next provider. Your primary care clinician is listed as Josette Rudolph. If you have any questions after today's visit, please call [phone].

## 2017-07-27 DIAGNOSIS — G25.81 RLS (RESTLESS LEGS SYNDROME): ICD-10-CM

## 2017-07-27 DIAGNOSIS — M46.20 PARASPINAL ABSCESS (HCC): ICD-10-CM

## 2017-07-30 RX ORDER — GABAPENTIN 100 MG/1
CAPSULE ORAL
Qty: 540 CAP | Refills: 1 | Status: SHIPPED | OUTPATIENT
Start: 2017-07-30 | End: 2018-04-12 | Stop reason: SDUPTHER

## 2017-08-31 RX ORDER — FERROUS GLUCONATE 324(38)MG
TABLET ORAL
Qty: 30 TAB | Refills: 0 | Status: SHIPPED | OUTPATIENT
Start: 2017-08-31 | End: 2017-09-27 | Stop reason: SDUPTHER

## 2017-08-31 RX ORDER — FERROUS GLUCONATE 324(38)MG
TABLET ORAL
Qty: 90 TAB | Refills: 3 | Status: SHIPPED | OUTPATIENT
Start: 2017-08-31 | End: 2018-11-24 | Stop reason: SDUPTHER

## 2017-09-13 ENCOUNTER — TELEPHONE (OUTPATIENT)
Dept: FAMILY MEDICINE CLINIC | Age: 72
End: 2017-09-13

## 2017-09-13 DIAGNOSIS — G25.81 RLS (RESTLESS LEGS SYNDROME): ICD-10-CM

## 2017-09-15 ENCOUNTER — TELEPHONE (OUTPATIENT)
Dept: FAMILY MEDICINE CLINIC | Age: 72
End: 2017-09-15

## 2017-09-15 NOTE — TELEPHONE ENCOUNTER
After hours contact from patient reporting that she has been requesting a medication refill all week with no response from PCP's office, wanting me to contact her PCP-advised that I have no way to do that, recommended she contact PCP's office Monday

## 2017-09-18 DIAGNOSIS — G25.81 RLS (RESTLESS LEGS SYNDROME): ICD-10-CM

## 2017-09-18 RX ORDER — ROPINIROLE 1 MG/1
1 TABLET, FILM COATED ORAL
Qty: 90 TAB | Refills: 1 | Status: CANCELLED | OUTPATIENT
Start: 2017-09-18

## 2017-09-18 NOTE — TELEPHONE ENCOUNTER
From: Mely Bravo  To: [address] [place] [address]  Sent: 9/18/2017 3:53 AM EDT  Subject: Medication Renewal Request    Original authorizing provider: [address] [place] [address]    Mely Bravo would like a refill of the following medications:  rOPINIRole (REQUIP) 1 mg tablet [address] [place] [address]]    Preferred pharmacy: [facility], [address] AT [address] [facility] [address]    Comment:

## 2017-09-19 DIAGNOSIS — G25.81 RLS (RESTLESS LEGS SYNDROME): ICD-10-CM

## 2017-09-19 DIAGNOSIS — M62.830 MUSCLE SPASM OF BACK: ICD-10-CM

## 2017-09-20 RX ORDER — ROPINIROLE 0.5 MG/1
TABLET, FILM COATED ORAL
Qty: 90 TAB | Refills: 0 | Status: SHIPPED | OUTPATIENT
Start: 2017-09-20 | End: 2017-11-30 | Stop reason: SDUPTHER

## 2017-09-20 RX ORDER — ROPINIROLE 0.5 MG/1
TABLET, FILM COATED ORAL
Qty: 90 TAB | Refills: 0 | OUTPATIENT
Start: 2017-09-20

## 2017-09-21 DIAGNOSIS — M62.830 MUSCLE SPASM OF BACK: ICD-10-CM

## 2017-09-21 RX ORDER — ROPINIROLE 1 MG/1
1 TABLET, FILM COATED ORAL
Qty: 90 TAB | Refills: 1 | Status: SHIPPED | OUTPATIENT
Start: 2017-09-21 | End: 2017-09-27 | Stop reason: DRUGHIGH

## 2017-09-21 RX ORDER — CYCLOBENZAPRINE HCL 10 MG
TABLET ORAL
Qty: 45 TAB | Refills: 0 | Status: SHIPPED | OUTPATIENT
Start: 2017-09-21 | End: 2017-09-27 | Stop reason: SDUPTHER

## 2017-09-21 RX ORDER — CYCLOBENZAPRINE HCL 10 MG
TABLET ORAL
Qty: 45 TAB | Refills: 0 | OUTPATIENT
Start: 2017-09-21

## 2017-10-31 ENCOUNTER — OFFICE VISIT (OUTPATIENT)
Dept: FAMILY MEDICINE CLINIC | Age: 72
End: 2017-10-31

## 2017-10-31 VITALS
SYSTOLIC BLOOD PRESSURE: 131 MMHG | OXYGEN SATURATION: 98 % | RESPIRATION RATE: 18 BRPM | WEIGHT: 153 LBS | DIASTOLIC BLOOD PRESSURE: 63 MMHG | HEIGHT: 60 IN | HEART RATE: 75 BPM | TEMPERATURE: 98 F | BODY MASS INDEX: 30.04 KG/M2

## 2017-10-31 DIAGNOSIS — G25.81 RLS (RESTLESS LEGS SYNDROME): ICD-10-CM

## 2017-10-31 DIAGNOSIS — F41.9 ANXIETY: ICD-10-CM

## 2017-10-31 DIAGNOSIS — I47.1 SVT (SUPRAVENTRICULAR TACHYCARDIA) (HCC): ICD-10-CM

## 2017-10-31 DIAGNOSIS — M62.830 MUSCLE SPASM OF BACK: ICD-10-CM

## 2017-10-31 DIAGNOSIS — R29.898 MUSCULAR DECONDITIONING: Primary | ICD-10-CM

## 2017-10-31 DIAGNOSIS — L89.600 DECUBITUS ULCER OF HEEL, UNSTAGEABLE, UNSPECIFIED LATERALITY: ICD-10-CM

## 2017-10-31 RX ORDER — CYCLOBENZAPRINE HCL 10 MG
TABLET ORAL
Qty: 30 TAB | Refills: 3 | Status: SHIPPED | OUTPATIENT
Start: 2017-10-31 | End: 2017-12-21 | Stop reason: SDUPTHER

## 2017-10-31 RX ORDER — ESCITALOPRAM OXALATE 5 MG/1
TABLET ORAL
Qty: 90 TAB | Refills: 1 | Status: SHIPPED | OUTPATIENT
Start: 2017-10-31 | End: 2018-05-03 | Stop reason: SDUPTHER

## 2017-10-31 NOTE — PROGRESS NOTES
Subjective:     HPI:  Radha George is a 74 y.o. female who presents to the office to follow up on RLS and anxiety. 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 She reports she was having multiple side effects. she then reduced her dose to 200 mg twice a day instead of three times a day. She states the symptoms gradually improved with that reduced dose. She was taking less pills as she was running out of the tablets. She started experiencing withdrawal symptoms including breast swelling, rapid weight gain, on and off cough, dry mouth, tremors, and involuntary jerky movements She states that the symptoms have subsided now. She desires to continue taking 400 mg Gabapentin. She reports that when when ran out of her requip for restless leg syndrome she noted that the gabapentin helped. Wt Readings from Last 3 Encounters:   10/31/17 153 lb (69.4 kg)   09/29/17 146 lb (66.2 kg)   09/27/17 146 lb (66.2 kg)      Follow-up: patient reports she was evaluated by uro/gyn. Pessary was placed. Patient states after that she was able to walk around without having to worry about her uterus falling through her vagina. She followed up with urology to have catheter removed, she was not able to master self catheterization. Also reports she now has a neurogenic bladder. Uro/Gyn reports since she has to have the catheter she will not do the pessary due to her risk of developing a uterovaginal fistula. Uro gyn did report if patient has another episode of uterine prolapse she will consider hysterectomy. However patient has not had prolapse with walking nor with using the bathroom. She is following up with Urologist on 11/07/2017. Physical deconditioning: Due to her uterine prolapse patient had spent majority of her time sitting in the wheel chair and moving only for transfers.   Since she is no longer having prolapse will start patient on home physical therapy to help strengthen her legs so that she can ambulate independently or with a Rolator. Heel ulcers:  Her heal ulcers have healed. NO further wound care has been needed. Ophthalmology FU: Patient has an appointment with Dr. Jadon Shukla, Ophthalmologist Tomorrow. She also has an appointment with Dr. Angel Walden Specialist on 11/20/2017    Anxiety: Pt states that Lexapro is working well to keep her anxiety under control. No medication side effects noted. SVTachycardia:  Patient states she has not had any symptoms with SVT's she reports she does have occasional palpitations but these symptoms are transient. Of note,   Pt states that she is able to stand up without her Uterus prolapsing. She is able to walk around the house. She does feel weak when standing for long time. She has lack for strength. Pt declines flu shot due to allergic reaction last year. Patient states that her nightly medications put her in deep sleep. Current Outpatient Prescriptions   Medication Sig Dispense Refill    escitalopram oxalate (LEXAPRO) 5 mg tablet TAKE 1 TABLET BY MOUTH EVERY EVENING 90 Tab 1    cyclobenzaprine (FLEXERIL) 10 mg tablet TAKE 1/2 TABLET BY MOUTH THREE TIMES DAILY AS NEEDED FOR MUSCLE SPASMS 30 Tab 3    rOPINIRole (REQUIP) 0.5 mg tablet TAKE 1 TABLET BY MOUTH EVERY NIGHT 90 Tab 0    ferrous gluconate 324 mg (38 mg iron) tablet TAKE ONE TABLET BY MOUTH EVERY MONDAY, WEDNESDAY, AND SUNDAY 90 Tab 3    gabapentin (NEURONTIN) 100 mg capsule TAKE 2 CAPSULES BY MOUTH THREE TIMES DAILY (Patient taking differently: TAKE 2 CAPSULES BY MOUTH two TIMES DAILY) 540 Cap 1    B.infantis-B.ani-B.long-B.bifi (PROBIOTIC 4X) 10-15 mg TbEC Take  by mouth.  sodium chloride irrigation 0.9 % irrigation 1,000 mL by Irrigation route every fourty-eight (48) hours.  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  HX TONSILLECTOMY      HX VITRECTOMY      VASCULAR SURGERY PROCEDURE UNLIST  march 2016    IVC filter. No family history on file. Social History     Social History    Marital status:      Spouse name: N/A    Number of children: N/A    Years of education: N/A     Occupational History    Not on file. Social History Main Topics    Smoking status: Former Smoker     Packs/day: 1.00     Years: 15.00     Types: Cigarettes    Smokeless tobacco: Never Used      Comment: quit in the 60's    Alcohol use No    Drug use: No    Sexual activity: Not Currently     Other Topics Concern    Not on file     Social History Narrative       REVIEW OF SYSTEM:  Review of Systems   Constitutional: Negative for chills and fever. Eyes: Positive for blurred vision. Respiratory: Negative for shortness of breath. Cardiovascular: Negative for chest pain, palpitations and leg swelling. Gastrointestinal: Negative for constipation, diarrhea, nausea and vomiting. Musculoskeletal: Negative for joint pain. Neurological: Negative for headaches.        Objective:     Visit Vitals    /63 (BP 1 Location: Right arm, BP Patient Position: Sitting)    Pulse 75    Temp 98 °F (36.7 °C) (Oral)    Resp 18    Ht 5' (1.524 m)    Wt 153 lb (69.4 kg)    SpO2 98%    BMI 29.88 kg/m2       PHYSICAL EXAM:  Physical Exam   Constitutional: She is oriented to person, place, and time and well-developed, well-nourished, and in no distress. HENT:   Right Ear: Tympanic membrane, external ear and ear canal normal.   Left Ear: Tympanic membrane, external ear and ear canal normal.   Nose: Nose normal.   Mouth/Throat: Oropharynx is clear and moist.   Eyes: Pupils are equal, round, and reactive to light. Neck: Normal range of motion. Neck supple. No thyromegaly present. Cardiovascular: Normal rate, regular rhythm, normal heart sounds and intact distal pulses. No murmur heard. Pulmonary/Chest: Effort normal and breath sounds normal. She has no wheezes. Neurological: She is alert and oriented to person, place, and time. GCS score is 15. Skin: Skin is warm and dry. Vitals reviewed. Assessment/Plan:       ICD-10-CM ICD-9-CM    1. Muscular deconditioning R29.898 781.99 REFERRAL TO HOME HEALTH   2. Anxiety F41.9 300.00 escitalopram oxalate (LEXAPRO) 5 mg tablet   3. Muscle spasm of back M62.830 724.8 cyclobenzaprine (FLEXERIL) 10 mg tablet   4. RLS (restless legs syndrome) G25.81 333.94    5. Decubitus ulcer of heel, unstageable, unspecified laterality ([facility]) L89.600 707.07      707.25    6. SVT (supraventricular tachycardia) ([facility]) I47.1 427.89      Patient given opportunity to ask questions. Questions answered. Patient will initially start with home care as she feels stronger with mobility she would like to transition to outpatient physical therapy to continue with strengthening. [facility] - [phone]  Patient understands plan of care. Follow-up Disposition:  Return in about 1 month (around 11/30/2017) for medicare wellness. .          Written by Sabine Diez, as dictated by Belgica Parks DO.    I, Dr. Belgica Parks, confirm that all documentation is accurate.

## 2017-10-31 NOTE — MR AVS SNAPSHOT
Visit Information Date & Time Provider Department Dept. Phone Encounter #  
 10/31/2017 10:40 AM Demetrius Rodriguez, [address] [place] [address]  [id number] Follow-up Instructions Return in about 1 month (around 11/30/2017) for medicare wellness. .  
  
Your Appointments 4/11/2018 11:15 AM  
ESTABLISHED PATIENT with Mary Lou Meraz MD  
[facility]. [place] [address] ([facility]) Appt Note: Return in about 6 months (around 3/27/2018) for follow up for [facility] , Urinary retention. [address] [place] [address]  
[phone]  
  
   
 [place] [id number] Upcoming Health Maintenance Date Due DTaP/Tdap/Td series (1 - Tdap) 8/9/1964 ZOSTER VACCINE AGE 60> 6/9/2003 FOBT Q 1 YEAR AGE 50-75 3/27/2017 INFLUENZA AGE 9 TO ADULT 8/1/2017 MEDICARE YEARLY EXAM 9/13/2017 GLAUCOMA SCREENING Q2Y 3/2/2019 Allergies as of 10/31/2017  Review Complete On: 10/31/2017 By: Demetrius Rodriguez, DO No Known Allergies Current Immunizations  Reviewed on 10/11/2016 Name Date Pneumococcal Conjugate (PCV-13) 10/11/2016 Pneumococcal Vaccine (Unspecified Type) 9/1/2014 Not reviewed this visit You Were Diagnosed With   
  
 Codes Comments Muscular deconditioning    -  Primary ICD-10-CM: L89.497 ICD-9-CM: 781.99 Anxiety     ICD-10-CM: F41.9 ICD-9-CM: 300.00 Muscle spasm of back     ICD-10-CM: K99.509 ICD-9-CM: 724.8   
 RLS (restless legs syndrome)     ICD-10-CM: G25.81 ICD-9-CM: 333.94 Vitals BP Pulse Temp Resp Height(growth percentile) Weight(growth percentile) 131/63 (BP 1 Location: Right arm, BP Patient Position: Sitting) 75 98 °F (36.7 °C) (Oral) 18 5' (1.524 m) 153 lb (69.4 kg) SpO2 BMI OB Status Smoking Status 98% 29.88 kg/m2 Postmenopausal Former Smoker BMI and BSA Data  Body Mass Index Body Surface Area  
 29.88 kg/m 2 1.71 m 2  
  
  
 Preferred Pharmacy Pharmacy Name Phone [facility] [address] [facility], [address] [place] [address]. [address] [phone] Your Updated Medication List  
  
   
This list is accurate as of: 10/31/17 11:48 AM.  Always use your most recent med list.  
  
  
  
  
 cyclobenzaprine 10 mg tablet Commonly known as:  FLEXERIL  
TAKE 1/2 TABLET BY MOUTH THREE TIMES DAILY AS NEEDED FOR MUSCLE SPASMS  
  
 escitalopram oxalate 5 mg tablet Commonly known as:  Georgiann Bares TAKE 1 TABLET BY MOUTH EVERY EVENING  
  
 ferrous gluconate 324 mg (38 mg iron) tablet TAKE ONE TABLET BY MOUTH EVERY MONDAY, WEDNESDAY, AND SUNDAY  
  
 gabapentin 100 mg capsule Commonly known as:  NEURONTIN  
TAKE 2 CAPSULES BY MOUTH THREE TIMES DAILY PROBIOTIC 4X 10-15 mg Tbec Generic drug:  B.infantis-B.ani-B.long-B.bifi Take  by mouth. rOPINIRole 0.5 mg tablet Commonly known as:  REQUIP  
TAKE 1 TABLET BY MOUTH EVERY NIGHT  
  
 sodium chloride irrigation 0.9 % irrigation 1,000 mL by Irrigation route every fourty-eight (48) hours. Prescriptions Sent to Pharmacy Refills  
 escitalopram oxalate (LEXAPRO) 5 mg tablet 1 Sig: TAKE 1 TABLET BY MOUTH EVERY EVENING Class: Normal  
 Pharmacy: [facility] [address] [place] [address], [address] [place] [address]  AT . [address] Ph #: [phone]  
 cyclobenzaprine (FLEXERIL) 10 mg tablet 3 Sig: TAKE 1/2 TABLET BY MOUTH THREE TIMES DAILY AS NEEDED FOR MUSCLE SPASMS Class: Normal  
 Pharmacy: [facility] [address] [place] [address]. [address] Ph #: [phone] We Performed the Following [address] [place] [address] Comments:  
 Please evaluate patient for physical therapy at home. [address] [phone] Follow-up Instructions Return in about 1 month (around 11/30/2017) for medicare wellness. .  
  
  
Referral Information Referral ID Referred By Referred To  
  
 [id number] Cali FIELD Not Available Visits Status Start Date End Date 1 New Request 10/31/17 10/31/18 If your referral has a status of pending review or denied, additional information will be sent to support the outcome of this decision. Introducing \[facility]\"" & HEALTH SERVICES! Dear Flako Schneider: Thank you for requesting a [employer] account. Our records indicate that you already have an active [employer] account. You can access your account anytime at https://[employer]) Did you know that you can access your hospital and ER discharge instructions at any time in [employer]? You can also review all of your test results from your hospital stay or ER visit. Additional Information If you have questions, please visit the Frequently Asked Questions section of the [employer] website at [email or web address])/. Remember, [employer] is NOT to be used for urgent needs. For medical emergencies, dial 911. Now available from your iPhone and Android! Please provide this summary of care documentation to your next provider. Your primary care clinician is listed as Jony Kramer. If you have any questions after today's visit, please call [phone].

## 2017-11-30 ENCOUNTER — TELEPHONE (OUTPATIENT)
Dept: FAMILY MEDICINE CLINIC | Age: 72
End: 2017-11-30

## 2017-11-30 ENCOUNTER — OFFICE VISIT (OUTPATIENT)
Dept: FAMILY MEDICINE CLINIC | Age: 72
End: 2017-11-30

## 2017-11-30 VITALS
RESPIRATION RATE: 16 BRPM | HEIGHT: 60 IN | TEMPERATURE: 97.1 F | OXYGEN SATURATION: 96 % | HEART RATE: 85 BPM | DIASTOLIC BLOOD PRESSURE: 77 MMHG | WEIGHT: 158 LBS | BODY MASS INDEX: 31.02 KG/M2 | SYSTOLIC BLOOD PRESSURE: 143 MMHG

## 2017-11-30 DIAGNOSIS — G25.81 RLS (RESTLESS LEGS SYNDROME): ICD-10-CM

## 2017-11-30 DIAGNOSIS — R53.81 PHYSICAL DECONDITIONING: ICD-10-CM

## 2017-11-30 DIAGNOSIS — N31.9 NEUROGENIC BLADDER: ICD-10-CM

## 2017-11-30 DIAGNOSIS — R19.5 POSITIVE FIT (FECAL IMMUNOCHEMICAL TEST): ICD-10-CM

## 2017-11-30 DIAGNOSIS — Z00.00 MEDICARE ANNUAL WELLNESS VISIT, SUBSEQUENT: Primary | ICD-10-CM

## 2017-11-30 RX ORDER — DORZOLAMIDE HYDROCHLORIDE AND TIMOLOL MALEATE 20; 5 MG/ML; MG/ML
1 SOLUTION/ DROPS OPHTHALMIC 2 TIMES DAILY
COMMUNITY
Start: 2017-11-20 | End: 2018-06-14 | Stop reason: ALTCHOICE

## 2017-11-30 RX ORDER — ROPINIROLE 0.5 MG/1
TABLET, FILM COATED ORAL
Qty: 90 TAB | Refills: 2 | Status: SHIPPED | OUTPATIENT
Start: 2017-11-30 | End: 2018-10-19 | Stop reason: SDUPTHER

## 2017-11-30 NOTE — PROGRESS NOTES
1. Have you been to the ER, urgent care clinic since your last visit? Hospitalized since your last visit? No    2. Have you seen or consulted any other health care providers outside of the [address] [place] [address] since your last visit? Include any pap smears or colon screening.  No

## 2017-11-30 NOTE — TELEPHONE ENCOUNTER
Patient just want to let Dr. Art Shafer know that she contacted her urologist about having pelvic floor thereapy. Patient was told that she will know within a week. Patient also stated that Dr. Art Shafer suppose to give her an eardrop (didnt know the name) but it was never receive by her [address]. Asking to be called back.

## 2017-11-30 NOTE — PATIENT INSTRUCTIONS
Medicare Part B Preventive Services Limitations Recommendation Scheduled   Bone Mass Measurement  (age 72 & older, biennial) Requires diagnosis related to osteoporosis or estrogen deficiency. Biennial benefit unless patient has history of long-term glucocorticoid tx or baseline is needed because initial test was by other method     Cardiovascular Screening Blood Tests (every 5 years)  Total cholesterol, HDL, Triglycerides Order as a panel if possible     Colorectal Cancer Screening  -Fecal occult blood test (annual)  -Flexible sigmoidoscopy (5y)  -Screening colonoscopy (10y)  -Barium Enema      Counseling to Prevent Tobacco Use (up to 8 sessions per year)  - Counseling greater than 3 and up to 10 minutes  - Counseling greater than 10 minutes Patients must be asymptomatic of tobacco-related conditions to receive as preventive service     Diabetes Screening Tests (at least every 3 years, Medicare covers annually or at 6-month intervals for prediabetic patients)    Fasting blood sugar (FBS) or glucose tolerance test (GTT) Patient must be diagnosed with one of the following:  -Hypertension, Dyslipidemia, obesity, previous impaired FBS or GTT  Or any two of the following: overweight, FH of diabetes, age ? 72, history of gestational diabetes, birth of baby weighing more than 9 pounds     Diabetes Self-Management Training (DSMT) (no USPSTF recommendation) Requires referral by treating physician for patient with diabetes or renal disease. 10 hours of initial DSMT session of no less than 30 minutes each in a continuous 12-month period. 2 hours of follow-up DSMT in subsequent years.      Glaucoma Screening (no USPSTF recommendation) Diabetes mellitus, family history, , age 48 or over,  American, age 72 or over     Human Immunodeficiency Virus (HIV) Screening (annually for increased risk patients)  HIV-1 and HIV-2 by EIA, HIREN, rapid antibody test, or oral mucosa transudate Patient must be at increased risk for HIV infection per USPSTF guidelines or pregnant. Tests covered annually for patients at increased risk. Pregnant patients may receive up to 3 test during pregnancy. Medical Nutrition Therapy (MNT) (for diabetes or renal disease not recommended schedule) Requires referral by treating physician for patient with diabetes or renal disease. Can be provided in same year as diabetes self-management training (DSMT), and CMS recommends medical nutrition therapy take place after DSMT. Up to 3 hours for initial year and 2 hours in subsequent years. Shingles Vaccination A shingles vaccine is also recommended once in a lifetime after age 61     Seasonal Influenza Vaccination (annually)      Pneumococcal Vaccination (once after 72)      Hepatitis B Vaccinations (if medium/high risk) Medium/high risk factors:  End-stage renal disease,  Hemophiliacs who received Factor VIII or IX concentrates, Clients of institutions for the mentally retarded, Persons who live in the same house as a HepB virus carrier, Homosexual men, Illicit injectable drug abusers. Screening Mammography (biennial age 54-69) Annually (age 36 or over)     Screening Pap Tests and Pelvic Examination (up to age 79 and after 79 if unknown history or abnormal study last 10 years) Every 25 months except high risk     Ultrasound Screening for Abdominal Aortic Aneurysm (AAA) (once) Patient must be referred through [facility] and not have had a screening for abdominal aortic aneurysm before under Medicare.   Limited to patients who meet one of the following criteria:  - Men who are 73-68 years old and have smoked more than 100 cigarettes in their lifetime.  -Anyone with a FH of AAA  -Anyone recommended for screening by USPSTF

## 2017-11-30 NOTE — MR AVS SNAPSHOT
Visit Information Date & Time Provider Department Dept. Phone Encounter #  
 11/30/2017 12:00 PM Misbah Whitlock, [address] [id number] Follow-up Instructions Return in about 2 months (around 1/30/2018) for fasting laabs. .  
  
Your Appointments 4/11/2018 11:15 AM  
ESTABLISHED PATIENT with Jemma Alves MD  
[facility] Appt Note: Return in about 6 months (around 3/27/2018) for follow up for [facility] , Urinary retention. [address] [place], [place] [id number]  
[phone]  
  
   
 [place] [id number] Upcoming Health Maintenance Date Due DTaP/Tdap/Td series (1 - Tdap) 8/9/1964 ZOSTER VACCINE AGE 60> 6/9/2003 FOBT Q 1 YEAR AGE 50-75 3/27/2017 Influenza Age 5 to Adult 8/1/2017 MEDICARE YEARLY EXAM 9/13/2017 GLAUCOMA SCREENING Q2Y 3/2/2019 Allergies as of 11/30/2017  Review Complete On: 11/30/2017 By: Misbah Whitlock, DO No Known Allergies Current Immunizations  Reviewed on 10/11/2016 Name Date Pneumococcal Conjugate (PCV-13) 10/11/2016 Pneumococcal Vaccine (Unspecified Type) 9/1/2014 Not reviewed this visit You Were Diagnosed With   
  
 Codes Comments Medicare annual wellness visit, subsequent    -  Primary ICD-10-CM: Z00.00 ICD-9-CM: V70.0 Physical deconditioning     ICD-10-CM: R53.81 ICD-9-CM: 799.3 Positive FIT (fecal immunochemical test)     ICD-10-CM: R19.5 ICD-9-CM: 792.1 RLS (restless legs syndrome)     ICD-10-CM: G25.81 ICD-9-CM: 333.94 Neurogenic bladder     ICD-10-CM: N31.9 ICD-9-CM: 596.54 Vitals BP Pulse Temp Resp Height(growth percentile) Weight(growth percentile) 143/77 (BP 1 Location: Right arm, BP Patient Position: Sitting) 85 97.1 °F (36.2 °C) (Oral) 16 5' (1.524 m) 158 lb (71.7 kg) SpO2 BMI OB Status Smoking Status 96% 30.86 kg/m2 Postmenopausal Former Smoker BMI and BSA Data Body Mass Index Body Surface Area  
 30.86 kg/m 2 1.74 m 2 Preferred Pharmacy Pharmacy Name Phone [facility] [address] [phone] Your Updated Medication List  
  
   
This list is accurate as of: 11/30/17  1:30 PM.  Always use your most recent med list.  
  
  
  
  
 cyclobenzaprine 10 mg tablet Commonly known as:  FLEXERIL  
TAKE 1/2 TABLET BY MOUTH THREE TIMES DAILY AS NEEDED FOR MUSCLE SPASMS  
  
 dorzolamide-timolol 22.3-6.8 mg/mL ophthalmic solution Commonly known as:  COSOPT Administer 1 Drop to left eye two (2) times a day. Indications: Ocular Hypertension  
  
 escitalopram oxalate 5 mg tablet Commonly known as:  Tuan Basket TAKE 1 TABLET BY MOUTH EVERY EVENING  
  
 ferrous gluconate 324 mg (38 mg iron) tablet TAKE ONE TABLET BY MOUTH EVERY MONDAY, WEDNESDAY, AND SUNDAY  
  
 gabapentin 100 mg capsule Commonly known as:  NEURONTIN  
TAKE 2 CAPSULES BY MOUTH THREE TIMES DAILY  
  
 lactobacillus comb no. 10 20 billion cell Cap Commonly known as:  PROBIOTIC Take 2 Caplet by mouth daily. PROBIOTIC 4X 10-15 mg Tbec Generic drug:  B.infantis-B.ani-B.long-B.bifi Take  by mouth. rOPINIRole 0.5 mg tablet Commonly known as:  REQUIP  
TAKE 1 TABLET BY MOUTH EVERY NIGHT Prescriptions Sent to Pharmacy Refills  
 rOPINIRole (REQUIP) 0.5 mg tablet 2 Sig: TAKE 1 TABLET BY MOUTH EVERY NIGHT Class: Normal  
 Pharmacy: [facility] [address] [place] [address], [address] [place] [address] AT . [address] Ph #: [phone]  
 lactobacillus comb no.10 (PROBIOTIC) 20 billion cell cap 5 Sig: Take 2 Caplet by mouth daily. Class: Normal  
 Pharmacy: [facility] [address] [place] [address]. [address] Ph #: [phone]  Route: Oral  
  
 We Performed the Following REFERRAL TO GASTROENTEROLOGY [[id number] Custom] [address] Comments:  
 Please evaluate patient for physical decondition for in home physical therapy and occupational therapy. Please evaluate fall risks and need for mobility devices [address], [place], [place] Phone: [phone] Follow-up Instructions Return in about 2 months (around 1/30/2018) for fasting laabs. .  
  
  
Referral Information Referral ID Referred By Referred To  
  
 [id number] Thalia Zimmermanvine [address] Phone: [phone] Visits Status Start Date End Date 1 New Request 11/30/17 11/30/18 If your referral has a status of pending review or denied, additional information will be sent to support the outcome of this decision. Referral ID Referred By Referred To  
 [id number] BENY NAYLOR MD  
   [address] [place] [address] Phone: [phone] Fax: [phone] Visits Status Start Date End Date 1 New Request 11/30/17 11/30/18 If your referral has a status of pending review or denied, additional information will be sent to support the outcome of this decision. Patient Instructions Medicare Part B Preventive Services Limitations Recommendation Scheduled Bone Mass Measurement 
(age 72 & older, biennial) Requires diagnosis related to osteoporosis or estrogen deficiency. Biennial benefit unless patient has history of long-term glucocorticoid tx or baseline is needed because initial test was by other method Cardiovascular Screening Blood Tests (every 5 years) Total cholesterol, HDL, Triglycerides Order as a panel if possible Colorectal Cancer Screening 
-Fecal occult blood test (annual) -Flexible sigmoidoscopy (5y) 
-Screening colonoscopy (10y) -Barium Enema Counseling to Prevent Tobacco Use (up to 8 sessions per year) - Counseling greater than 3 and up to 10 minutes - Counseling greater than 10 minutes Patients must be asymptomatic of tobacco-related conditions to receive as preventive service Diabetes Screening Tests (at least every 3 years, Medicare covers annually or at 6-month intervals for prediabetic patients) Fasting blood sugar (FBS) or glucose tolerance test (GTT) Patient must be diagnosed with one of the following: 
-Hypertension, Dyslipidemia, obesity, previous impaired FBS or GTT 
Or any two of the following: overweight, FH of diabetes, age ? 72, history of gestational diabetes, birth of baby weighing more than 9 pounds Diabetes Self-Management Training (DSMT) (no USPSTF recommendation) Requires referral by treating physician for patient with diabetes or renal disease. 10 hours of initial DSMT session of no less than 30 minutes each in a continuous 12-month period. 2 hours of follow-up DSMT in subsequent years. Glaucoma Screening (no USPSTF recommendation) Diabetes mellitus, family history, , age 48 or over,  American, age 72 or over Human Immunodeficiency Virus (HIV) Screening (annually for increased risk patients) HIV-1 and HIV-2 by EIA, HIREN, rapid antibody test, or oral mucosa transudate Patient must be at increased risk for HIV infection per USPSTF guidelines or pregnant. Tests covered annually for patients at increased risk. Pregnant patients may receive up to 3 test during pregnancy. Medical Nutrition Therapy (MNT) (for diabetes or renal disease not recommended schedule) Requires referral by treating physician for patient with diabetes or renal disease. Can be provided in same year as diabetes self-management training (DSMT), and CMS recommends medical nutrition therapy take place after DSMT. Up to 3 hours for initial year and 2 hours in subsequent years. Shingles Vaccination A shingles vaccine is also recommended once in a lifetime after age 61 Seasonal Influenza Vaccination (annually) Pneumococcal Vaccination (once after 65) Hepatitis B Vaccinations (if medium/high risk) Medium/high risk factors:  End-stage renal disease, Hemophiliacs who received Factor VIII or IX concentrates, Clients of institutions for the mentally retarded, Persons who live in the same house as a HepB virus carrier, Homosexual men, Illicit injectable drug abusers. Screening Mammography (biennial age 54-69) Annually (age 36 or over) Screening Pap Tests and Pelvic Examination (up to age 79 and after 79 if unknown history or abnormal study last 10 years) Every 24 months except high risk Ultrasound Screening for Abdominal Aortic Aneurysm (AAA) (once) Patient must be referred through [facility] and not have had a screening for abdominal aortic aneurysm before under Medicare. Limited to patients who meet one of the following criteria: 
- Men who are 73-68 years old and have smoked more than 100 cigarettes in their lifetime. 
-Anyone with a FH of AAA 
-Anyone recommended for screening by USPSTF Introducing [facility] SERVICES! Dear Kamini Franco: Thank you for requesting a [employer] account. Our records indicate that you already have an active [employer] account. You can access your account anytime at https://[facility]. [employer]/[facility] Did you know that you can access your hospital and ER discharge instructions at any time in [employer]? You can also review all of your test results from your hospital stay or ER visit. Additional Information If you have questions, please visit the Frequently Asked Questions section of the [employer] website at [email or web address]. [employer]/[facility]/. Remember, [employer] is NOT to be used for urgent needs. For medical emergencies, dial 911. Now available from your iPhone and Android! Please provide this summary of care documentation to your next provider. Your primary care clinician is listed as Ramona Lewis. If you have any questions after today's visit, please call [phone].

## 2017-11-30 NOTE — ACP (ADVANCE CARE PLANNING)
Advance Care Planning (ACP) Provider Conversation Snapshot    Date of ACP Conversation: 11/30/17  Persons included in Conversation:  patient and POA  Sister Daisy Patterson  Length of ACP Conversation in minutes:  16 minutes    Authorized Decision Maker (if patient is incapable of making informed decisions): This person is:   sister Yo Leaven          For Patients with Decision Making Capacity:   Values/Goals: Exploration of values, goals, and preferences if recovery is not expected, even with continued medical treatment in the event of:  Imminent death  Severe, permanent brain injury  \"In these circumstances, what matters most to you? \"  Care focused more on comfort or quality of life. discuss decisions with her sister.      Conversation Outcomes / Follow-Up Plan:   Reviewed existing Advance Directive

## 2017-11-30 NOTE — PROGRESS NOTES
Subjective:     HPI:  Radha George is a 74 y.o. female who presents to the office complaining of wheezing, physical deconditioning, and decreased hearing. Physical deconditioning: Due to her uterine prolapse patient had spent majority of her time sitting in the wheel chair and moving only for transfers. Since she is no longer having prolapse will start patient on home physical therapy to help strengthen her legs so that she can ambulate independently or with a Rolator. Patient reports that she has not heard back from [address] [place] [address] regarding the physical therapy referral.     Hearing: Patient reports gradual decrease in hearing which she attributed to wax build-up in ears. Ophthalmology FU: Pt followed-up with Dr. Alfa Ge on 11/20/2017. She reports that blood clot and cataract in there left eye was removed. Her vision in left eye is still not improved and she reports that it may not get better. 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 She is also taking Requip for restless leg syndrome. Pt states that Gabapentin and Requip is causing her to wheeze. She also reports that Gabapentin is causing her to gain weight but she doesn't want to stop taking it as it gives her good relief of neuropathic pain. She is currently taking 2 tablets of Gabapentin twice a day. She desires to use spirometer to improve functioning of her lungs. Pt does not have history of COPD or asthma. Urology FU: Patient states that she emailed and called Urology to consult about pelvic floor therapy. Patient has not heard back from Urology. Anxiety: Patient is taking Lexapro as prescribed with good relief of symptoms. No medication side effects noted. Of note,  Patient states that she was taking antibiotics and was also taking probiotics as prophylactic treatment. She desires to continue taking probiotics.  Pt complains of abdominal cramps similar to menstrual cramps, onset few months ago after she stopped taking her probiotics. Current Outpatient Prescriptions   Medication Sig Dispense Refill    rOPINIRole (REQUIP) 0.5 mg tablet TAKE 1 TABLET BY MOUTH EVERY NIGHT 90 Tab 2    lactobacillus comb no.10 (PROBIOTIC) 20 billion cell cap Take 2 Caplet by mouth daily. 60 Cap 5    escitalopram oxalate (LEXAPRO) 5 mg tablet TAKE 1 TABLET BY MOUTH EVERY EVENING 90 Tab 1    cyclobenzaprine (FLEXERIL) 10 mg tablet TAKE 1/2 TABLET BY MOUTH THREE TIMES DAILY AS NEEDED FOR MUSCLE SPASMS 30 Tab 3    ferrous gluconate 324 mg (38 mg iron) tablet TAKE ONE TABLET BY MOUTH EVERY MONDAY, WEDNESDAY, AND SUNDAY 90 Tab 3    gabapentin (NEURONTIN) 100 mg capsule TAKE 2 CAPSULES BY MOUTH THREE TIMES DAILY (Patient taking differently: TAKE 2 CAPSULES BY MOUTH two TIMES DAILY) 540 Cap 1    B.infantis-B.ani-B.long-B.bifi (PROBIOTIC 4X) 10-15 mg TbEC Take  by mouth.  dorzolamide-timolol (COSOPT) 22.3-6.8 mg/mL ophthalmic solution Administer 1 Drop to left eye two (2) times a day. 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  HX TONSILLECTOMY      HX VITRECTOMY      VASCULAR SURGERY PROCEDURE UNLIST  march 2016    IVC filter. History reviewed.  No pertinent family history. Social History     Social History    Marital status:      Spouse name: N/A    Number of children: N/A    Years of education: N/A     Occupational History    Not on file. Social History Main Topics    Smoking status: Former Smoker     Packs/day: 1.00     Years: 15.00     Types: Cigarettes    Smokeless tobacco: Never Used      Comment: quit in the 60's    Alcohol use No    Drug use: No    Sexual activity: Not Currently     Other Topics Concern    Not on file     Social History Narrative       REVIEW OF SYSTEM:  Review of Systems   Constitutional: Negative for chills and fever. Eyes: Positive for blurred vision. Respiratory: Positive for wheezing. Negative for shortness of breath. Cardiovascular: Negative for chest pain, palpitations and leg swelling. Gastrointestinal: Negative for constipation, diarrhea, nausea and vomiting. Musculoskeletal: Positive for myalgias. Negative for joint pain. Neurological: Negative for headaches. Objective:     Visit Vitals    /77 (BP 1 Location: Right arm, BP Patient Position: Sitting)    Pulse 85    Temp 97.1 °F (36.2 °C) (Oral)    Resp 16    Ht 5' (1.524 m)    Wt 158 lb (71.7 kg)    SpO2 96%    BMI 30.86 kg/m2       PHYSICAL EXAM:  Physical Exam   Constitutional: She is oriented to person, place, and time and well-developed, well-nourished, and in no distress. HENT:   Right Ear: Tympanic membrane, external ear and ear canal normal.   Left Ear: Tympanic membrane, external ear and ear canal normal.   Nose: Nose normal.   Mouth/Throat: Oropharynx is clear and moist.   Eyes: Pupils are equal, round, and reactive to light. Neck: Normal range of motion. Neck supple. No thyromegaly present. Cardiovascular: Normal rate, regular rhythm, normal heart sounds and intact distal pulses. No murmur heard. Pulmonary/Chest: Effort normal and breath sounds normal. She has no wheezes.    Neurological: She is alert and oriented to person, place, and time. GCS score is 15. Skin: Skin is warm and dry. Vitals reviewed. Pt ambulates in a wheel chair. Assessment/Plan:       ICD-10-CM ICD-9-CM    1. Medicare annual wellness visit, subsequent Z00.00 V70.0 ADVANCE CARE PLANNING FIRST 30 MINS   2. Physical deconditioning R53.81 799.3 REFERRAL TO [address]   3. Positive FIT (fecal immunochemical test) R19.5 792.1 REFERRAL TO GASTROENTEROLOGY   4. RLS (restless legs syndrome) G25.81 333.94 rOPINIRole (REQUIP) 0.5 mg tablet   5. Neurogenic bladder N31.9 596.54 lactobacillus comb no.10 (PROBIOTIC) 20 billion cell cap     Patient given opportunity to ask questions. Questions answered. [facility] for [facility]. Chata has had physical deconditioning secondary to uterine prolapse which had her wheel chair bound for nearly 16 months. Patient had a positive FIT. Will order colonoscopy. Suggested patient that she should be able to reach 1500 ml jr in the spirometer. Patient understands plan of care. Follow-up Disposition:  Return in about 2 months (around 1/30/2018) for fasting laabs. .      Written by Armaan Alvarado, as dictated by Radames Sandy DO.    I, Dr. Radames Sandy, confirm that all documentation is accurate.

## 2017-11-30 NOTE — PROGRESS NOTES
This is a Subsequent Medicare Annual Wellness Exam (AWV) (Performed 12 months after IPPE or effective date of Medicare Part B enrollment)    I have reviewed the patient's medical history in detail and updated the computerized patient record. 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  HX TONSILLECTOMY      HX VITRECTOMY      VASCULAR SURGERY PROCEDURE UNLIST  march 2016    IVC filter. Current Outpatient Prescriptions   Medication Sig Dispense Refill    rOPINIRole (REQUIP) 0.5 mg tablet TAKE 1 TABLET BY MOUTH EVERY NIGHT 90 Tab 2    escitalopram oxalate (LEXAPRO) 5 mg tablet TAKE 1 TABLET BY MOUTH EVERY EVENING 90 Tab 1    cyclobenzaprine (FLEXERIL) 10 mg tablet TAKE 1/2 TABLET BY MOUTH THREE TIMES DAILY AS NEEDED FOR MUSCLE SPASMS 30 Tab 3    ferrous gluconate 324 mg (38 mg iron) tablet TAKE ONE TABLET BY MOUTH EVERY MONDAY, WEDNESDAY, AND SUNDAY 90 Tab 3    gabapentin (NEURONTIN) 100 mg capsule TAKE 2 CAPSULES BY MOUTH THREE TIMES DAILY (Patient taking differently: TAKE 2 CAPSULES BY MOUTH two TIMES DAILY) 540 Cap 1    B.infantis-B.ani-B.long-B.bifi (PROBIOTIC 4X) 10-15 mg TbEC Take  by mouth.       dorzolamide-timolol (COSOPT) 22.3-6.8 mg/mL ophthalmic solution Administer 1 Drop to left eye two (2) times a day. Indications: Ocular Hypertension       No Known Allergies  History reviewed. No pertinent family history. Social History   Substance Use Topics    Smoking status: Former Smoker     Packs/day: 1.00     Years: 15.00     Types: Cigarettes    Smokeless tobacco: Never Used      Comment: quit in the 61's    Alcohol use No     Patient Active Problem List   Diagnosis Code    Anemia D64.9    Procidentia of uterus N81.3    Visual changes H53.9    Urinary retention R33.9    Back pain M54.9    RLS (restless legs syndrome) G25.81    Bladder stones N21.0    Uterus prolapse N81.4       Depression Risk Factor Screening:     PHQ over the last two weeks 10/31/2017   Little interest or pleasure in doing things Not at all   Feeling down, depressed or hopeless Not at all   Total Score PHQ 2 0     Alcohol Risk Factor Screening: You do not drink alcohol or very rarely. Functional Ability and Level of Safety:   Hearing Loss  Patient reports gradual decrease in hearing which she attributed to wax build-up in ears. She reports decreased sense of vision in left eye. Hearing Screening    125Hz 250Hz 500Hz 1000Hz 2000Hz 3000Hz 4000Hz 6000Hz 8000Hz   Right ear: Fail Fail Pass  Fail     Left ear: Fail Fail Fail  Pass        Visual Acuity Screening    Right eye Left eye Both eyes   Without correction: 2/30 20/200 20/30   With correction:          Activities of Daily Living  The home contains: no safety equipment. Pt lives with her sister. Sister accompanies her at appointments and takes care of her. Patient needs help with:  transportation, preparing meals and walking    Fall Risk  Fall Risk Assessment, last 12 mths 10/31/2017   Able to walk? Yes   Fall in past 12 months? No       Abuse Screen  Patient is not abused    Physical Exam   Constitutional: She is oriented to person, place, and time and well-developed, well-nourished, and in no distress.    HENT:   Right Ear: Tympanic membrane, external ear and ear canal normal.   Left Ear: Tympanic membrane, external ear and ear canal normal.   Nose: Nose normal.   Mouth/Throat: Oropharynx is clear and moist.   Eyes: Pupils are equal, round, and reactive to light. Neck: Normal range of motion. Neck supple. No thyromegaly present. Cardiovascular: Normal rate, regular rhythm, normal heart sounds and intact distal pulses. No murmur heard. Pulmonary/Chest: Effort normal and breath sounds normal. She has no wheezes. Neurological: She is alert and oriented to person, place, and time. GCS score is 15. Skin: Skin is warm and dry. Vitals reviewed. Cognitive Screening   Evaluation of Cognitive Function:  Has your family/caregiver stated any concerns about your memory: no  Normal    Patient Care Team   Patient Care Team:  Megan Neri DO as PCP - General (Family Practice)  Stefani Serrano as Nurse Judi Del Rosario MD as Physician (Urology)  Teresa Garcia MD as Physician (Obstetrics & Gynecology)  Erich Light NP as Nurse Practitioner (Nurse Practitioner)  Vj Moreno DPM as Physician ([facility])  Nghia Watkins MD as Physician (Neurosurgery)  Gillian Arora MD as Physician (Infectious Diseases)  Charli Chavez MD as Physician (Vascular Surgery)    Assessment/Plan   Education and counseling provided (15+ minutes):  End of Life Counseling (with patient's permission) - Advanced directives and code status discussed with patient. Advanced directive paperwork updated in the system. Pneumococcal Vaccine - up to date   Influenza Vaccine - Patient declines. She had asthma symptoms after taking it few years ago. Screening Pap and pelvic (covered once every 2 years)  Colorectal cancer screening tests - Pt had positive FIT. Will refer for regular colonoscopy.    Bone mass measurement (DEXA) - 10/7/2016, osteopenic at lumbar   Screening for glaucoma - Pt is following up with ophthalmologist.     Diagnoses and all orders for this visit: 1. Physical deconditioning  -     REFERRAL TO HOME HEALTH    2. Positive FIT (fecal immunochemical test)  -     [facility] [address]    3. Medicare annual wellness visit, subsequent    4. RLS (restless legs syndrome)  -     rOPINIRole (REQUIP) 0.5 mg tablet; TAKE 1 TABLET BY MOUTH EVERY NIGHT    5. Neurogenic bladder      Health Maintenance Due   Topic Date Due    DTaP/Tdap/Td series (1 - Tdap) 08/09/1964    ZOSTER VACCINE AGE 60>  06/09/2003    FOBT Q 1 YEAR AGE 50-75  03/27/2017    Influenza Age 9 to Adult  08/01/2017    MEDICARE YEARLY EXAM  09/13/2017       Patient given opportunity to ask questions. Questions answered. Pt had positive FIT. Will refer for regular colonoscopy. Patient understands plan of care. Follow-up Disposition: Not on File    Written by Delfino Valera, as dictated by Gillian Lo DO.    I, Dr. Gillian Lo, confirm that all documentation is accurate.

## 2017-12-21 DIAGNOSIS — M62.830 MUSCLE SPASM OF BACK: ICD-10-CM

## 2017-12-22 RX ORDER — CYCLOBENZAPRINE HCL 10 MG
TABLET ORAL
Qty: 30 TAB | Refills: 3 | Status: SHIPPED | OUTPATIENT
Start: 2017-12-22 | End: 2018-05-03 | Stop reason: SDUPTHER

## 2018-01-02 ENCOUNTER — OFFICE VISIT (OUTPATIENT)
Dept: OBGYN CLINIC | Age: 73
End: 2018-01-02

## 2018-01-02 VITALS
DIASTOLIC BLOOD PRESSURE: 87 MMHG | HEART RATE: 91 BPM | WEIGHT: 158 LBS | BODY MASS INDEX: 30.86 KG/M2 | SYSTOLIC BLOOD PRESSURE: 153 MMHG

## 2018-01-02 DIAGNOSIS — N93.9 VAGINAL BLEEDING: Primary | ICD-10-CM

## 2018-01-02 NOTE — PROGRESS NOTES
75 y/o G4 presents to the office for follow-up. She has many medical problems including procidentia. She has seen both urology and uro-genecology. She has a indwelling urinary catheter and the plan is to have it removed and have the patient self catherize. During self cath training, the team [facility] was unable to insert the temp catheter. Since that time, she has been fitted for a pessary by Dr. Henry Oakes at [facility]. The prolapse has now been taken care of, but she noted vaginal bleeding a week ago after the pessary was removed. She attempted to manually replace the uterus and noted bright red blood x 4 wipes. She hasn't had any since that time (12/20/17). She called Dr. Henry Oakes who recommended she have a EMB to r/o endometrial pathology. She denies any bleeding or pain since that time.     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    Visual changes 03/30/2016    Urinary retention 03/31/2016    Back pain 03/31/2016    RLS (restless legs syndrome) 04/04/2016    Bladder stones     Uterus prolapse      Resolved Ambulatory Problems     Diagnosis Date Noted    Paraspinal abscess ([address] 03/11/2016    UTI (urinary tract infection) 03/11/2016    GATO (acute kidney injury) ([address] 03/11/2016    Paraplegia ([address] 03/11/2016    CAP (community acquired pneumonia) 03/11/2016    Weight loss 03/11/2016    Hyponatremia 03/11/2016    Vaginal candida 03/16/2016    Protein calorie malnutrition ([address] 03/24/2016    Decubitus ulcer of buttock, stage 2 03/26/2016    Vertebral osteomyelitis ([address] 03/30/2016    DVT of lower extremity, bilateral ([address] 03/31/2016    UTI (urinary tract infection) 07/20/2016    DVT of leg (deep venous thrombosis) (HCC)     Vaginal candida 03/16/2016    Decubitus ulcer of heel, unstageable ([address] 10/31/2017     Past Medical History:   Diagnosis Date    Anemia march 2016    Arthritis march 2016    Arthropathy     Bladder stones     Cardiac arrhythmia     Chronic UTI (urinary tract infection)     [facility]) [facility]     Female genital prolapse     FHx: SVT (supraventricular tachycardia)     Frequent UTI     Hypocontractile bladder     Osteopenia     Osteoporosis     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 SVE: NEFG, cervix appears normal. No blood noted. BME: mobile uterus, small, NT    A/P:  75 y/o with symptomatic uterine prolapse. Pessary has been effective. Exam is normal, but difficult due to decreased mobility. Will order TV-U/S. If endometrium normal, will defer EMB,  Bleeding most likely due to pessary use. The plan of care has been discussed with the patient. She has been given the opportunity to ask questions, which seem to have been answered satisfactorily.

## 2018-01-02 NOTE — PATIENT INSTRUCTIONS
Endometrial Biopsy: About This Test  What is it? An endometrial biopsy is a way for your doctor to take a small sample of the lining of the uterus (endometrium). The sample is looked at under a microscope for abnormal cells. An endometrial biopsy helps your doctor find problems in the endometrium. Why is this test done? An endometrial biopsy is done to check for cancer of the uterus. The test is also done if you have abnormal bleeding from your uterus or are having problems getting pregnant. The test results show how your body's hormones are affecting the lining of the uterus. How can you prepare for the test?  Talk to your doctor about all your health conditions before the test. For example, tell your doctor if you:  · Are or might be pregnant. An endometrial biopsy is not done during pregnancy. · Are taking any medicines. · Are allergic to any medicines. · Have had bleeding problems, or if you take aspirin or some other blood thinner. · Have been treated for an infection in your pelvic area. · Have any heart or lung problems. Other ways to prepare:  · Do not douche, use tampons, or use vaginal medicines for 24 hours before the test.  · Ask your doctor if you should take a pain reliever, such as ibuprofen (Advil or Motrin), 30 to 60 minutes before the test. This can help reduce any cramping pain that the test can cause. · Talk to your doctor if you have concerns about the need for the test, its risks, how it will be done, or what the results may mean. What happens before the test?  · You will empty your bladder just before the test.  · You will be asked to sign a consent form that says you understand the risks of the test and agree to have it done. What happens during the test?  · You will lie on an exam table. Your feet will be in stirrups. · The doctor may use a spray or injection to numb your cervix. The cervix is the opening to the uterus.   · The doctor will use a tool called a speculum to see the cervix. · Then the doctor will pass a thin tube through the cervix to take a sample of the uterus lining. You may feel a sharp cramp when the doctor collects the sample. · The sample is sent to a lab. How long does the test take? The test will take about 5 to 15 minutes. What happens after the test?  · You will probably be able to go home right away. · You likely will have mild vaginal bleeding and may have cramps for a few days after the test. The cramps may feel like bad menstrual cramps. · Ask your doctor if you can take an over-the-counter pain medicine, such as acetaminophen (Tylenol), ibuprofen (Advil, Motrin), or naproxen (Aleve). Be safe with medicines. Read and follow all instructions on the label. · Do not have sex, use tampons, or douche until the spotting stops. Use pads for vaginal bleeding or discharge. · Do not do strenuous exercise or heavy lifting for one day after your biopsy. Follow-up care is a key part of your treatment and safety. Be sure to make and go to all appointments, and call your doctor if you are having problems. It's also a good idea to keep a list of the medicines you take. Ask your doctor when you can expect to have your test results. Where can you learn more? Go to http://reji-meir.info/. Enter [phone] in the search box to learn more about \"Endometrial Biopsy: About This Test.\"  Current as of: October 13, 2016  Content Version: 11.4  © 2863-9031 [employer]. Care instructions adapted under license by [employer] (which disclaims liability or warranty for this information). If you have questions about a medical condition or this instruction, always ask your healthcare professional. [address] any warranty or liability for your use of this information.

## 2018-01-15 ENCOUNTER — HOSPITAL ENCOUNTER (OUTPATIENT)
Dept: ULTRASOUND IMAGING | Age: 73
Discharge: HOME | End: 2018-01-15
Attending: OBSTETRICS & GYNECOLOGY
Payer: MEDICARE

## 2018-01-15 DIAGNOSIS — N93.9 VAGINAL BLEEDING: ICD-10-CM

## 2018-01-15 PROCEDURE — 76830 TRANSVAGINAL US NON-OB: CPT

## 2018-01-18 ENCOUNTER — TELEPHONE (OUTPATIENT)
Dept: OBGYN CLINIC | Age: 73
End: 2018-01-18

## 2018-02-27 ENCOUNTER — OFFICE VISIT (OUTPATIENT)
Dept: FAMILY MEDICINE CLINIC | Age: 73
End: 2018-02-27

## 2018-02-27 ENCOUNTER — HOSPITAL ENCOUNTER (OUTPATIENT)
Dept: LAB | Age: 73
Discharge: HOME | End: 2018-02-27

## 2018-02-27 VITALS
DIASTOLIC BLOOD PRESSURE: 80 MMHG | WEIGHT: 157 LBS | RESPIRATION RATE: 16 BRPM | BODY MASS INDEX: 30.82 KG/M2 | TEMPERATURE: 97.4 F | HEIGHT: 60 IN | HEART RATE: 77 BPM | OXYGEN SATURATION: 97 % | SYSTOLIC BLOOD PRESSURE: 138 MMHG

## 2018-02-27 DIAGNOSIS — Z13.220 SCREENING CHOLESTEROL LEVEL: ICD-10-CM

## 2018-02-27 DIAGNOSIS — N81.3 PROCIDENTIA OF UTERUS: ICD-10-CM

## 2018-02-27 DIAGNOSIS — G62.9 NEUROPATHY: ICD-10-CM

## 2018-02-27 DIAGNOSIS — R39.81 FUNCTIONAL URINARY INCONTINENCE: Primary | ICD-10-CM

## 2018-02-27 DIAGNOSIS — N81.4 UTERUS PROLAPSE: ICD-10-CM

## 2018-02-27 DIAGNOSIS — F41.9 ANXIETY: ICD-10-CM

## 2018-02-27 DIAGNOSIS — R03.0 ELEVATED BLOOD PRESSURE READING: ICD-10-CM

## 2018-02-27 PROCEDURE — 99001 SPECIMEN HANDLING PT-LAB: CPT | Performed by: FAMILY MEDICINE

## 2018-02-27 NOTE — PROGRESS NOTES
1. Have you been to the ER, urgent care clinic since your last visit? Hospitalized since your last visit? No    2. Have you seen or consulted any other health care providers outside of the [address] [place] [address] since your last visit? Include any pap smears or colon screening.  No

## 2018-02-27 NOTE — PROGRESS NOTES
Subjective:     HPI:  Anna Greenberg is a 74 y.o. female who presents to the office for routine follow-up. Urology FU: Pt has followed up with urology and uro-gynecology. She had pessary fitted for uterine prolapse by Dr. Yusra Spring at [facility]. With placement of the pessary the urinary catheter was removed. Uterine prolapse is taken care of, however patient is still using wheelchair to ambulate. Pt reports that she is having urinary incontinence every time she stands-up. She is walking around in the house, but does not leave house w/o wheel chair. She is wears a diaper all the time. Pt states that 50% of the time she has urge to urinate and other 50% of the time she voids without control. She report she says due to this she does not usually wake-up during the night. Reports many times she urinates through the adult diaper and onto the bed. She has not started physical therapy yet as she doesn't have control of bladder muscles. She is scheduled to start pelvic floor physical therapy and hopes after the completion of this she can better do physical therapy for her decreased endurance. Elevated blood pressure: The patient presents for follow up of hypertension. Pt's BP is 151/76 in the office today. Pt states that BP is well-controlled at home. Cardiovascular ROS: no current medications. no TIA's, no chest pain on exertion, no dyspnea on exertion, no swelling of ankles. Anxiety: Pt is taking Lexapro as needed for anxiety. Stable on current therapy. Neuropathy: Pt is taking Gabapentin BID for neuropathic pain. Side effects: weight gain. Of note,   Pt has appointment with Dr. Frederic Gomez at the [facility] of March for colonoscopy. Next appointment with Urology is on April 11. Next appointment with ophthalmologist is may 25. Pt states that she received blood transfusions at ED due to internal bleeding. Source of bleeding is still unknown.  She will schedule colonoscopy during next appointment with GI. Labs Reviewed:   Lab Results   Component Value Date/Time    Cholesterol, total 244 (H) 01/24/2017 12:53 PM    HDL Cholesterol 63 01/24/2017 12:53 PM    LDL, calculated 146 (H) 01/24/2017 12:53 PM    VLDL, calculated 35 01/24/2017 12:53 PM    Triglyceride 174 (H) 01/24/2017 12:53 PM     Lab Results   Component Value Date/Time    Vitamin D 25-Hydroxy 72.0 03/22/2016 06:30 AM    VITAMIN D, 25-HYDROXY 77.1 01/24/2017 12:53 PM      Lab Results   Component Value Date/Time    Sodium 140 01/24/2017 12:53 PM    Potassium 4.4 01/24/2017 12:53 PM    Chloride 97 01/24/2017 12:53 PM    CO2 27 01/24/2017 12:53 PM    Anion gap 13 05/21/2016 05:43 PM    Glucose 87 01/24/2017 12:53 PM    BUN 24 01/24/2017 12:53 PM    Creatinine 0.94 01/24/2017 12:53 PM    BUN/Creatinine ratio 26 01/24/2017 12:53 PM    GFR est AA 70 01/24/2017 12:53 PM    GFR est non-AA 60 01/24/2017 12:53 PM    Calcium 10.0 01/24/2017 12:53 PM    Bilirubin, total 0.3 01/24/2017 12:53 PM    AST (SGOT) 19 01/24/2017 12:53 PM    Alk. phosphatase 90 01/24/2017 12:53 PM    Protein, total 7.6 01/24/2017 12:53 PM    Albumin 4.3 01/24/2017 12:53 PM    Globulin 6.7 (H) 05/21/2016 05:43 PM    A-G Ratio 1.3 01/24/2017 12:53 PM    ALT (SGPT) 13 01/24/2017 12:53 PM         Current Outpatient Prescriptions   Medication Sig Dispense Refill    bimatoprost (LUMIGAN) 0.01 % ophthalmic drops Administer 1 Drop to both eyes every evening.  cyclobenzaprine (FLEXERIL) 10 mg tablet TAKE 1/2 TABLET BY MOUTH THREE TIMES DAILY AS NEEDED FOR MUSCLE SPASMS 30 Tab 3    rOPINIRole (REQUIP) 0.5 mg tablet TAKE 1 TABLET BY MOUTH EVERY NIGHT 90 Tab 2    dorzolamide-timolol (COSOPT) 22.3-6.8 mg/mL ophthalmic solution Administer 1 Drop to left eye two (2) times a day. Indications: Ocular Hypertension      lactobacillus comb no.10 (PROBIOTIC) 20 billion cell cap Take 2 Caplet by mouth daily.  60 Cap 5    escitalopram oxalate (LEXAPRO) 5 mg tablet TAKE 1 TABLET BY MOUTH EVERY EVENING 90 Tab 1    ferrous gluconate 324 mg (38 mg iron) tablet TAKE ONE TABLET BY MOUTH EVERY MONDAY, WEDNESDAY, AND SUNDAY 90 Tab 3    gabapentin (NEURONTIN) 100 mg capsule TAKE 2 CAPSULES BY MOUTH THREE TIMES DAILY (Patient taking differently: TAKE 2 CAPSULES BY MOUTH two TIMES DAILY) 540 Cap 1    B.infantis-B.ani-B.long-B.bifi (PROBIOTIC 4X) 10-15 mg TbEC Take  by mouth. 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  HX TONSILLECTOMY      HX VITRECTOMY      VASCULAR SURGERY PROCEDURE UNLIST  march 2016    IVC filter. No family history on file. Social History     Social History    Marital status:      Spouse name: N/A    Number of children: N/A    Years of education: N/A     Occupational History    Not on file. Social History Main Topics    Smoking status: Former Smoker     Packs/day: 1.00     Years: 15.00     Types: Cigarettes    Smokeless tobacco: Never Used      Comment: quit in the 60's    Alcohol use No    Drug use: No    Sexual activity: Not Currently     Other Topics Concern    Not on file     Social History Narrative       REVIEW OF SYSTEM:  Review of Systems   Constitutional: Negative for chills and fever. Eyes: Negative for blurred vision.    Respiratory: Negative for shortness of breath. Cardiovascular: Negative for chest pain, palpitations and leg swelling. Gastrointestinal: Negative for constipation, diarrhea, nausea and vomiting. Genitourinary:        Urinary incontinence. Musculoskeletal: Negative for joint pain. Neurological: Positive for tingling. Negative for headaches. Review of Systems -   General ROS: negative for - chills or fever  Respiratory ROS: negative for cough and shortness of breath  Cardiovascular ROS: no chest pain or dyspnea on exertion  Gastrointestinal ROS: no abdominal pain, no nausea or vomiting, change in bowel habits, or black or bloody stools  Genito-Urinary ROS: no dysuria, trouble voiding, or hematuria  Musculoskeletal ROS: negative for - gait disturbance or muscular weakness  Dermatological ROS: negative for - dry skin or rash  Neurological ROS: positive for - bowel and bladder control changes and numbness/tingling      Objective:     Visit Vitals    /76 (BP 1 Location: Right arm, BP Patient Position: Sitting)    Pulse 77    Temp 97.4 °F (36.3 °C) (Oral)    Resp 16    Ht 5' (1.524 m)    Wt 157 lb (71.2 kg)    SpO2 97%    BMI 30.66 kg/m2       PHYSICAL EXAM:  Physical Exam   Constitutional: She is oriented to person, place, and time and well-developed, well-nourished, and in no distress. HENT:   Head: Normocephalic and atraumatic. Right Ear: Tympanic membrane, external ear and ear canal normal.   Left Ear: Tympanic membrane, external ear and ear canal normal.   Nose: Nose normal.   Mouth/Throat: Uvula is midline, oropharynx is clear and moist and mucous membranes are normal.   Cardiovascular: Normal rate, regular rhythm and normal heart sounds. Pulmonary/Chest: Effort normal and breath sounds normal.   Musculoskeletal:        Legs:  Patient is still dependent on wheel chair for long distance ambulation. Neurological: She is alert and oriented to person, place, and time. Skin: Skin is warm and dry.    Stasis changes to right posterior lower leg. Psychiatric: Affect and judgment normal.       Assessment/Plan:       ICD-10-CM ICD-9-CM    1. Functional urinary incontinence R39.81 788.91    2. Screening cholesterol level Z13.220 V77.91 LIPID PANEL      METABOLIC PANEL, COMPREHENSIVE   3. Elevated blood pressure reading R03.0 796.2    4. Anxiety F41.9 300.00    5. Neuropathy G62.9 355.9      Patient given opportunity to ask questions. Questions answered. Supraventricular Tachycardia noted in patient's chart in October, 2017. Pt is not symptomatic and currently not taking any medications for heart arrhythmias. Fasting labs drawn in office today. Patient understands plan of care. Follow-up Disposition:  Return in about 3 months (around 5/27/2018). Written by Nohelia Lee, as dictated by Ya Ovalle DO.    FRIEDA, Dr. Ya Ovalle, confirm that all documentation is accurate.

## 2018-02-27 NOTE — MR AVS SNAPSHOT
[facility] 
 
 
 [address] [facility] [address] [place] [address] 
[phone] Patient: Jessica Magaña MRN: [id number] NJT:2/5/6776 Visit Information Date & Time Provider Department Dept. Phone Encounter #  
 2/27/2018  1:20 PM Franny Leroy[address]  [id number] Follow-up Instructions Return in about 3 months (around 5/27/2018). Your Appointments 4/11/2018 11:15 AM  
ESTABLISHED PATIENT with Kodak Patel MD  
[facility]. Thomas Shannon [facility] ([facility]) Appt Note: Return in about 6 months (around 3/27/2018) for follow up for [facility] , Urinary retention. [address] [place], [place] [id number]  
[phone]  
  
   
 [place] [id number] Upcoming Health Maintenance Date Due DTaP/Tdap/Td series (1 - Tdap) 8/9/1964 BREAST CANCER SCRN MAMMOGRAM 8/9/1993 ZOSTER VACCINE AGE 60> 6/9/2003 FOBT Q 1 YEAR AGE 50-75 11/20/2018 MEDICARE YEARLY EXAM 12/1/2018 GLAUCOMA SCREENING Q2Y 3/2/2019 Allergies as of 2/27/2018  Review Complete On: 2/27/2018 By: Franny Leroy, DO No Known Allergies Current Immunizations  Reviewed on 10/11/2016 Name Date Pneumococcal Conjugate (PCV-13) 10/11/2016 Pneumococcal Vaccine (Unspecified Type) 9/1/2014 Not reviewed this visit You Were Diagnosed With   
  
 Codes Comments Screening cholesterol level    -  Primary ICD-10-CM: M97.996 ICD-9-CM: V77.91 Vitals BP Pulse Temp Resp Height(growth percentile) Weight(growth percentile) 151/76 (BP 1 Location: Right arm, BP Patient Position: Sitting) 77 97.4 °F (36.3 °C) (Oral) 16 5' (1.524 m) 157 lb (71.2 kg) SpO2 BMI OB Status Smoking Status 97% 30.66 kg/m2 Postmenopausal Former Smoker Vitals History BMI and BSA Data Body Mass Index Body Surface Area  
 30.66 kg/m 2 1.74 m 2 Preferred Pharmacy Pharmacy Name Phone [facility] [address] [place], [place] [address] [phone] Your Updated Medication List  
  
   
This list is accurate as of 2/27/18  2:00 PM.  Always use your most recent med list.  
  
  
  
  
 bimatoprost 0.01 % ophthalmic drops Commonly known as:  LUMIGAN Administer 1 Drop to both eyes every evening. cyclobenzaprine 10 mg tablet Commonly known as:  FLEXERIL  
TAKE 1/2 TABLET BY MOUTH THREE TIMES DAILY AS NEEDED FOR MUSCLE SPASMS  
  
 dorzolamide-timolol 22.3-6.8 mg/mL ophthalmic solution Commonly known as:  COSOPT Administer 1 Drop to left eye two (2) times a day. Indications: Ocular Hypertension  
  
 escitalopram oxalate 5 mg tablet Commonly known as:  Shirley Apo TAKE 1 TABLET BY MOUTH EVERY EVENING  
  
 ferrous gluconate 324 mg (38 mg iron) tablet TAKE ONE TABLET BY MOUTH EVERY MONDAY, WEDNESDAY, AND SUNDAY  
  
 gabapentin 100 mg capsule Commonly known as:  NEURONTIN  
TAKE 2 CAPSULES BY MOUTH THREE TIMES DAILY  
  
 lactobacillus comb no. 10 20 billion cell Cap Commonly known as:  PROBIOTIC Take 2 Caplet by mouth daily. PROBIOTIC 4X 10-15 mg Tbec Generic drug:  B.infantis-B.ani-B.long-B.bifi Take  by mouth. rOPINIRole 0.5 mg tablet Commonly known as:  REQUIP  
TAKE 1 TABLET BY MOUTH EVERY NIGHT Follow-up Instructions Return in about 3 months (around 5/27/2018). To-Do List   
 02/27/2018 Lab:  LIPID PANEL   
  
 02/27/2018 Lab:  METABOLIC PANEL, COMPREHENSIVE [facility]! Dear Zohreh Hammer: Thank you for requesting a [employer] account. Our records indicate that you already have an active [employer] account. You can access your account anytime at [email or web address] Did you know that you can access your hospital and ER discharge instructions at any time in [employer]?   You can also review all of your test results from your hospital stay or ER visit. Additional Information If you have questions, please visit the Frequently Asked Questions section of the [email or web address] website at [email or web address] Remember, [email or web address] is NOT to be used for urgent needs. For medical emergencies, dial 911. Now available from your iPhone and Android! Please provide this summary of care documentation to your next provider. Your primary care clinician is listed as Elvia Dooley. If you have any questions after today's visit, please call [phone].

## 2018-02-28 LAB
ALBUMIN SERPL-MCNC: 4.5 G/DL (ref 3.5–4.8)
ALBUMIN/GLOB SERPL: 1.4 {RATIO} (ref 1.2–2.2)
ALP SERPL-CCNC: 84 IU/L (ref 39–117)
ALT SERPL-CCNC: 26 IU/L (ref 0–32)
AST SERPL-CCNC: 26 IU/L (ref 0–40)
BILIRUB SERPL-MCNC: 0.3 MG/DL (ref 0–1.2)
BUN SERPL-MCNC: 19 MG/DL (ref 8–27)
BUN/CREAT SERPL: 17 (ref 12–28)
CALCIUM SERPL-MCNC: 10 MG/DL (ref 8.7–10.3)
CHLORIDE SERPL-SCNC: 102 MMOL/L (ref 96–106)
CHOLEST SERPL-MCNC: 181 MG/DL (ref 100–199)
CO2 SERPL-SCNC: 25 MMOL/L (ref 18–29)
CREAT SERPL-MCNC: 1.15 MG/DL (ref 0.57–1)
GFR SERPLBLD CREATININE-BSD FMLA CKD-EPI: 47 ML/MIN/1.73
GFR SERPLBLD CREATININE-BSD FMLA CKD-EPI: 54 ML/MIN/1.73
GLOBULIN SER CALC-MCNC: 3.2 G/DL (ref 1.5–4.5)
GLUCOSE SERPL-MCNC: 84 MG/DL (ref 65–99)
HDLC SERPL-MCNC: 65 MG/DL
INTERPRETATION, 910389: NORMAL
INTERPRETATION: NORMAL
LDLC SERPL CALC-MCNC: 101 MG/DL (ref 0–99)
PDF IMAGE, 910387: NORMAL
POTASSIUM SERPL-SCNC: 4 MMOL/L (ref 3.5–5.2)
PROT SERPL-MCNC: 7.7 G/DL (ref 6–8.5)
SODIUM SERPL-SCNC: 143 MMOL/L (ref 134–144)
TRIGL SERPL-MCNC: 76 MG/DL (ref 0–149)
VLDLC SERPL CALC-MCNC: 15 MG/DL (ref 5–40)

## 2018-03-22 ENCOUNTER — TELEPHONE (OUTPATIENT)
Dept: FAMILY MEDICINE CLINIC | Age: 73
End: 2018-03-22

## 2018-03-22 PROBLEM — Z13.220 SCREENING CHOLESTEROL LEVEL: Status: ACTIVE | Noted: 2018-03-22

## 2018-03-22 NOTE — TELEPHONE ENCOUNTER
Vazquez Joe from [facility] called in and stated that pt. Is requesting physical therapy for strengthening for and upcoming urogynecology procedure. Freda Bowser is asking for an orde, but Dr. Noemi Park is currently out of office. Can another provider fax over the order? Fax number is [phone]. Freda Bowser is asking to be called back if there are any further questions.

## 2018-03-23 ENCOUNTER — HOME HEALTH ADMISSION (OUTPATIENT)
Dept: HOME HEALTH SERVICES | Facility: HOME HEALTH | Age: 73
End: 2018-03-23

## 2018-03-23 ENCOUNTER — TELEPHONE (OUTPATIENT)
Dept: FAMILY MEDICINE CLINIC | Age: 73
End: 2018-03-23

## 2018-03-23 NOTE — TELEPHONE ENCOUNTER
Veena from [facility] stated that they won't be able to treat her until Monday for PT. Wanted to know if it was ok.

## 2018-03-26 ENCOUNTER — TELEPHONE (OUTPATIENT)
Dept: OBGYN CLINIC | Age: 73
End: 2018-03-26

## 2018-03-26 ENCOUNTER — HOME CARE VISIT (OUTPATIENT)
Dept: HOME HEALTH SERVICES | Facility: HOME HEALTH | Age: 73
End: 2018-03-26

## 2018-03-26 NOTE — TELEPHONE ENCOUNTER
SPOKE WITH THIS PATIENT CONCERNING SCHEDULING A PAP SMEAR AND ENDOMETRIAL BIOPSY , PER DESHAUN  SHE WILL CALL THIS PATIENT BACK CONCERNING SCHEDULING APPOINTMENT

## 2018-04-12 DIAGNOSIS — G25.81 RLS (RESTLESS LEGS SYNDROME): ICD-10-CM

## 2018-04-12 DIAGNOSIS — M46.20 PARASPINAL ABSCESS (HCC): ICD-10-CM

## 2018-04-17 RX ORDER — GABAPENTIN 100 MG/1
CAPSULE ORAL
Qty: 540 CAP | Refills: 0 | Status: SHIPPED | OUTPATIENT
Start: 2018-04-17 | End: 2018-09-10 | Stop reason: SDUPTHER

## 2018-05-03 DIAGNOSIS — F41.9 ANXIETY: ICD-10-CM

## 2018-05-03 DIAGNOSIS — M62.830 MUSCLE SPASM OF BACK: ICD-10-CM

## 2018-05-09 ENCOUNTER — TELEPHONE (OUTPATIENT)
Dept: FAMILY MEDICINE CLINIC | Age: 73
End: 2018-05-09

## 2018-05-09 RX ORDER — ESCITALOPRAM OXALATE 5 MG/1
TABLET ORAL
Qty: 90 TAB | Refills: 1 | Status: SHIPPED | OUTPATIENT
Start: 2018-05-09 | End: 2018-09-07 | Stop reason: ALTCHOICE

## 2018-05-09 RX ORDER — CYCLOBENZAPRINE HCL 10 MG
TABLET ORAL
Qty: 45 TAB | Refills: 3 | Status: SHIPPED | OUTPATIENT
Start: 2018-05-09 | End: 2018-06-14 | Stop reason: SDUPTHER

## 2018-05-09 RX ORDER — CYCLOBENZAPRINE HCL 10 MG
TABLET ORAL
Qty: 45 TAB | Refills: 5 | Status: SHIPPED | OUTPATIENT
Start: 2018-05-09 | End: 2018-10-19 | Stop reason: SDUPTHER

## 2018-05-09 NOTE — TELEPHONE ENCOUNTER
Called patient on the listed number. Verified using two identifiers. Informed patient that both requested medications Flexeril and Lexapro have been sent to the listed [facility] in her chart. No other concerns at this time. All questions answered. Pt states understanding and will comply.

## 2018-05-09 NOTE — TELEPHONE ENCOUNTER
Pt calling asking to speak to Smitha Littlejohn. Pt put in a request for her Cyclobenzaprine to be refilled a week ago and she has now run out.  Can this please be called in?

## 2018-06-11 DIAGNOSIS — F41.9 ANXIETY: ICD-10-CM

## 2018-06-12 ENCOUNTER — OFFICE VISIT (OUTPATIENT)
Dept: FAMILY MEDICINE CLINIC | Age: 73
End: 2018-06-12

## 2018-06-12 ENCOUNTER — HOSPITAL ENCOUNTER (OUTPATIENT)
Dept: LAB | Age: 73
Discharge: HOME | End: 2018-06-12
Payer: MEDICARE

## 2018-06-12 VITALS
HEART RATE: 77 BPM | BODY MASS INDEX: 29.84 KG/M2 | SYSTOLIC BLOOD PRESSURE: 134 MMHG | HEIGHT: 60 IN | DIASTOLIC BLOOD PRESSURE: 76 MMHG | WEIGHT: 152 LBS | RESPIRATION RATE: 16 BRPM | OXYGEN SATURATION: 97 % | TEMPERATURE: 97.5 F

## 2018-06-12 DIAGNOSIS — Z01.818 PRE-OP EXAMINATION: ICD-10-CM

## 2018-06-12 DIAGNOSIS — N81.4 UTERUS PROLAPSE: Primary | ICD-10-CM

## 2018-06-12 DIAGNOSIS — I47.1 SVT (SUPRAVENTRICULAR TACHYCARDIA) (HCC): ICD-10-CM

## 2018-06-12 DIAGNOSIS — R94.31 ABNORMAL EKG: ICD-10-CM

## 2018-06-12 LAB
ALBUMIN SERPL-MCNC: 4.2 G/DL (ref 3.4–5)
ALBUMIN/GLOB SERPL: 1 {RATIO} (ref 0.8–1.7)
ALP SERPL-CCNC: 88 U/L (ref 45–117)
ALT SERPL-CCNC: 27 U/L (ref 13–56)
ANION GAP SERPL CALC-SCNC: 7 MMOL/L (ref 3–18)
AST SERPL-CCNC: 23 U/L (ref 15–37)
BASOPHILS # BLD: 0 K/UL (ref 0–0.06)
BASOPHILS NFR BLD: 0 % (ref 0–2)
BILIRUB SERPL-MCNC: 0.8 MG/DL (ref 0.2–1)
BUN SERPL-MCNC: 23 MG/DL (ref 7–18)
BUN/CREAT SERPL: 21 (ref 12–20)
CALCIUM SERPL-MCNC: 9.4 MG/DL (ref 8.5–10.1)
CHLORIDE SERPL-SCNC: 105 MMOL/L (ref 100–108)
CO2 SERPL-SCNC: 28 MMOL/L (ref 21–32)
CREAT SERPL-MCNC: 1.12 MG/DL (ref 0.6–1.3)
DIFFERENTIAL METHOD BLD: ABNORMAL
EOSINOPHIL # BLD: 0.1 K/UL (ref 0–0.4)
EOSINOPHIL NFR BLD: 2 % (ref 0–5)
ERYTHROCYTE [DISTWIDTH] IN BLOOD BY AUTOMATED COUNT: 13.6 % (ref 11.6–14.5)
GLOBULIN SER CALC-MCNC: 4.1 G/DL (ref 2–4)
GLUCOSE SERPL-MCNC: 101 MG/DL (ref 74–99)
HCT VFR BLD AUTO: 37.3 % (ref 35–45)
HGB BLD-MCNC: 12.1 G/DL (ref 12–16)
INR PPP: 1 (ref 0.8–1.2)
LYMPHOCYTES # BLD: 0.8 K/UL (ref 0.9–3.6)
LYMPHOCYTES NFR BLD: 30 % (ref 21–52)
MCH RBC QN AUTO: 30.6 PG (ref 24–34)
MCHC RBC AUTO-ENTMCNC: 32.4 G/DL (ref 31–37)
MCV RBC AUTO: 94.2 FL (ref 74–97)
MONOCYTES # BLD: 0.3 K/UL (ref 0.05–1.2)
MONOCYTES NFR BLD: 10 % (ref 3–10)
NEUTS SEG # BLD: 1.5 K/UL (ref 1.8–8)
NEUTS SEG NFR BLD: 58 % (ref 40–73)
PLATELET # BLD AUTO: 188 K/UL (ref 135–420)
PMV BLD AUTO: 11.9 FL (ref 9.2–11.8)
POTASSIUM SERPL-SCNC: 4 MMOL/L (ref 3.5–5.5)
PROT SERPL-MCNC: 8.3 G/DL (ref 6.4–8.2)
PROTHROMBIN TIME: 12.3 SEC (ref 11.5–15.2)
RBC # BLD AUTO: 3.96 M/UL (ref 4.2–5.3)
SODIUM SERPL-SCNC: 140 MMOL/L (ref 136–145)
WBC # BLD AUTO: 2.6 K/UL (ref 4.6–13.2)

## 2018-06-12 PROCEDURE — 85610 PROTHROMBIN TIME: CPT | Performed by: FAMILY MEDICINE

## 2018-06-12 PROCEDURE — 85025 COMPLETE CBC W/AUTO DIFF WBC: CPT | Performed by: FAMILY MEDICINE

## 2018-06-12 PROCEDURE — 36415 COLL VENOUS BLD VENIPUNCTURE: CPT | Performed by: FAMILY MEDICINE

## 2018-06-12 PROCEDURE — 80053 COMPREHEN METABOLIC PANEL: CPT | Performed by: FAMILY MEDICINE

## 2018-06-12 RX ORDER — ESCITALOPRAM OXALATE 5 MG/1
TABLET ORAL
Qty: 90 TAB | Refills: 1 | Status: SHIPPED | OUTPATIENT
Start: 2018-06-12 | End: 2018-06-14 | Stop reason: SDUPTHER

## 2018-06-12 NOTE — PROGRESS NOTES
HISTORY OF PRESENT ILLNESS    Bárbara Bhat is a 74 y.o.  female presents today for surgical clearance for GYN surgery colpocleisis surgery on July 3 2018    Last seen in the office : 2/27/2018  Changes since last visit:  As noted below. 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 She had a pessary placed and catheter removed and was able to walk with the assistance of a walker. She states while walking her BP was elevated. She states the more she exercised the more her BP went up. She states she had to discontinue exercise because her uterus prolapsed and her pessary fell out. She states now she is back with a indwelling catheter and is scheduled surgery to prevent prolapse. She states before her uterus fell she was doing well in physical therapy and was able to get to the bathroom by herself more. Current Outpatient Prescriptions   Medication Sig Dispense Refill    escitalopram oxalate (LEXAPRO) 5 mg tablet TAKE 1 TABLET BY MOUTH EVERY EVENING 90 Tab 1    cyclobenzaprine (FLEXERIL) 10 mg tablet TAKE 1/2 TABLET BY MOUTH THREE TIMES DAILY AS NEEDED FOR MUSCLE SPASMS 45 Tab 5    cyclobenzaprine (FLEXERIL) 10 mg tablet TAKE 1/2 TABLET BY MOUTH THREE TIMES DAILY AS NEEDED FOR MUSCLE SPASMS 45 Tab 3    escitalopram oxalate (LEXAPRO) 5 mg tablet TAKE 1 TABLET BY MOUTH EVERY EVENING 90 Tab 1    gabapentin (NEURONTIN) 100 mg capsule TAKE 2 CAPSULES BY MOUTH THREE TIMES DAILY 540 Cap 0    bimatoprost (LUMIGAN) 0.01 % ophthalmic drops Administer 1 Drop to both eyes every evening.  rOPINIRole (REQUIP) 0.5 mg tablet TAKE 1 TABLET BY MOUTH EVERY NIGHT 90 Tab 2    dorzolamide-timolol (COSOPT) 22.3-6.8 mg/mL ophthalmic solution Administer 1 Drop to left eye two (2) times a day. Indications: Ocular Hypertension      lactobacillus comb no.10 (PROBIOTIC) 20 billion cell cap Take 2 Caplet by mouth daily.  60 Cap 5    ferrous gluconate 324 mg (38 mg iron) tablet TAKE ONE TABLET BY MOUTH EVERY MONDAY, WEDNESDAY, AND SUNDAY 90 Tab 3    B.infantis-B.ani-B.long-B.bifi (PROBIOTIC 4X) 10-15 mg TbEC Take  by mouth. Allergies   Allergen Reactions    Bactrim [Sulfamethoprim] Nausea Only and Other (comments)     Headache, Joint pain, loss of appetite        Past Medical History:   Diagnosis Date    Anemia march 2016    in [address] [facility]ritis march 2016    in [facility]    Arthropathy     Bladder stones     Cardiac arrhythmia     Chronic UTI (urinary tract infection)     DVT of leg (deep venous thrombosis) ([facility] [address]     Female genital prolapse     FHx: SVT (supraventricular tachycardia)     Frequent UTI     Hypocontractile bladder     Osteopenia     Osteoporosis     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  HX TONSILLECTOMY      HX VITRECTOMY      VASCULAR SURGERY PROCEDURE UNLIST  march 2016    IVC filter. History reviewed. No pertinent family history. Social History     Social History    Marital status:      Spouse name: N/A    Number of children: N/A    Years of education: N/A     Occupational History    Not on file. Social History Main Topics    Smoking status: Former Smoker     Packs/day: 1.00     Years: 15.00     Types: Cigarettes    Smokeless tobacco: Never Used      Comment: quit in the 60's    Alcohol use No    Drug use: No    Sexual activity: Not Currently     Other Topics Concern    Not on file     Social History Narrative       Review of Systems   Constitutional: Negative for chills and fever. Eyes: Negative for blurred vision. Respiratory: Negative for shortness of breath. Cardiovascular: Negative for chest pain, palpitations and leg swelling. Gastrointestinal: Negative for constipation, diarrhea, nausea and vomiting. Musculoskeletal: Negative for joint pain. Neurological: Negative for headaches. OBJECTIVE  Visit Vitals    /76 (BP 1 Location: Right arm, BP Patient Position: Sitting)    Pulse 77    Temp 97.5 °F (36.4 °C) (Oral)    Resp 16    Ht 5' (1.524 m)    Wt 152 lb (68.9 kg)    SpO2 97%    BMI 29.69 kg/m2       Physical Exam   Constitutional: She is oriented to person, place, and time and well-developed, well-nourished, and in no distress. HENT:   Right Ear: Tympanic membrane, external ear and ear canal normal.   Left Ear: Tympanic membrane, external ear and ear canal normal.   Nose: Nose normal.   Mouth/Throat: Oropharynx is clear and moist.   Eyes: Pupils are equal, round, and reactive to light. Neck: Normal range of motion. Neck supple. No thyromegaly present. Cardiovascular: Normal rate, regular rhythm, normal heart sounds and intact distal pulses. No murmur heard. Pulmonary/Chest: Effort normal and breath sounds normal. She has no wheezes. Neurological: She is alert and oriented to person, place, and time. GCS score is 15. Skin: Skin is warm and dry. Vitals reviewed. EKG: LBBB. ASSESSMENT and PLAN    ICD-10-CM ICD-9-CM    1. Uterus prolapse N81.4 618.1    2. Pre-op examination Z01.818 V72.84 CBC WITH AUTOMATED DIFF      METABOLIC PANEL, COMPREHENSIVE      PROTHROMBIN TIME + INR      AMB POC EKG ROUTINE W/ 12 LEADS, INTER & REP   3. SVT (supraventricular tachycardia) (HCC) I47.1 427.89 AMB POC EKG ROUTINE W/ 12 LEADS, INTER & REP      REFERRAL TO CARDIOLOGY   4. Abnormal EKG R94.31 794.31 REFERRAL TO CARDIOLOGY       Patient is low surgical risk from a medical perspective for the planned procedure:  colpocleisis surgery on July 3 2018  However, Patient has history of SVT, there have been no recent complications. EKG today shows LBBB.     She will need cardiology perspective for surgical risk before proceeding. Written by Lidya Bang, as dictated by DO. FRIEDA Martinez, Dr. Carolyn Finn, confirm that all documentation is accurate.

## 2018-06-12 NOTE — MR AVS SNAPSHOT
[address] [place] [address] [address] [place] [address] [phone] Patient: Cipriano Ambriz MRN: [id number] EPW:2/[birth year]/0753 Visit Information Date & Time Provider Department Dept. Phone Encounter #  
 6/12/2018  1:00 PM Hortensia Lambert[address]  [id number] Upcoming Health Maintenance Date Due DTaP/Tdap/Td series (1 - Tdap) 8/9/1964 BREAST CANCER SCRN MAMMOGRAM 8/9/1993 ZOSTER VACCINE AGE 60> 6/9/2003 Influenza Age 5 to Adult 8/1/2018 FOBT Q 1 YEAR AGE 50-75 11/20/2018 MEDICARE YEARLY EXAM 12/1/2018 GLAUCOMA SCREENING Q2Y 3/2/2019 Allergies as of 6/12/2018  Review Complete On: 6/12/2018 By: Guerline Fuentes LPN Severity Noted Reaction Type Reactions Bactrim [Sulfamethoprim]  04/09/2018    Nausea Only, Other (comments) Headache, Joint pain, loss of appetite Current Immunizations  Reviewed on 10/11/2016 Name Date Pneumococcal Conjugate (PCV-13) 10/11/2016 Pneumococcal Vaccine (Unspecified Type) 9/1/2014 Not reviewed this visit You Were Diagnosed With   
  
 Codes Comments Uterus prolapse    -  Primary ICD-10-CM: N81.4 ICD-9-CM: 618.1 Pre-op examination     ICD-10-CM: K25.991 ICD-9-CM: V72.84 SVT (supraventricular tachycardia) (HCC)     ICD-10-CM: I47.1 ICD-9-CM: 427.89 Abnormal EKG     ICD-10-CM: R94.31 
ICD-9-CM: 794.31 Vitals BP Pulse Temp Resp Height(growth percentile) Weight(growth percentile) 134/76 (BP 1 Location: Right arm, BP Patient Position: Sitting) 77 97.5 °F (36.4 °C) (Oral) 16 5' (1.524 m) 152 lb (68.9 kg) SpO2 BMI OB Status Smoking Status 97% 29.69 kg/m2 Postmenopausal Former Smoker BMI and BSA Data Body Mass Index Body Surface Area  
 29.69 kg/m 2 1.71 m 2 Preferred Pharmacy Pharmacy Name Phone  [phone] [facility] [place], [address] [place] [address] AT [facility] OF [address] [phone] Your Updated Medication List  
  
   
This list is accurate as of 6/12/18  2:58 PM.  Always use your most recent med list.  
  
  
  
  
 bimatoprost 0.01 % ophthalmic drops Commonly known as:  LUMIGAN Administer 1 Drop to both eyes every evening. * cyclobenzaprine 10 mg tablet Commonly known as:  FLEXERIL  
TAKE 1/2 TABLET BY MOUTH THREE TIMES DAILY AS NEEDED FOR MUSCLE SPASMS * cyclobenzaprine 10 mg tablet Commonly known as:  FLEXERIL  
TAKE 1/2 TABLET BY MOUTH THREE TIMES DAILY AS NEEDED FOR MUSCLE SPASMS  
  
 dorzolamide-timolol 22.3-6.8 mg/mL ophthalmic solution Commonly known as:  COSOPT Administer 1 Drop to left eye two (2) times a day. Indications: Ocular Hypertension * escitalopram oxalate 5 mg tablet Commonly known as:  Louvella [place] TAKE 1 TABLET BY MOUTH EVERY EVENING  
  
 * escitalopram oxalate 5 mg tablet Commonly known as:  Louvella [place] TAKE 1 TABLET BY MOUTH EVERY EVENING  
  
 ferrous gluconate 324 mg (38 mg iron) tablet TAKE ONE TABLET BY MOUTH EVERY MONDAY, WEDNESDAY, AND SUNDAY  
  
 gabapentin 100 mg capsule Commonly known as:  NEURONTIN  
TAKE 2 CAPSULES BY MOUTH THREE TIMES DAILY  
  
 lactobacillus comb no. 10 20 billion cell Cap Commonly known as:  PROBIOTIC Take 2 Caplet by mouth daily. PROBIOTIC 4X 10-15 mg Tbec Generic drug:  B.infantis-B.ani-B.long-B.bifi Take  by mouth. rOPINIRole 0.5 mg tablet Commonly known as:  REQUIP  
TAKE 1 TABLET BY MOUTH EVERY NIGHT * Notice: This list has 4 medication(s) that are the same as other medications prescribed for you. Read the directions carefully, and ask your doctor or other care provider to review them with you. We Performed the Following AMB POC EKG ROUTINE W/ 12 LEADS, INTER & REP [44250 CPT(R)] REFERRAL TO CARDIOLOGY [UQA45 Custom] Comments:  
 Abnormal EKG for pre-op clearance. Hx SVT.   Please evaluate for gyn surgery scheduled for 7/3/2018 To-Do List   
 06/12/2018 Lab:  CBC WITH AUTOMATED DIFF   
  
 06/12/2018 Lab:  METABOLIC PANEL, COMPREHENSIVE   
  
 06/12/2018 Lab:  PROTHROMBIN TIME + INR Referral Information Referral ID Referred By Referred To  
  
 [id number] Mitchell Petty MD   
   [facility] [address] Phone: [phone] Fax: [phone] Visits Status Start Date End Date 1 Open 6/12/18 6/12/19 If your referral has a status of pending review or denied, additional information will be sent to support the outcome of this decision. Introducing [facility]! Dear Renee : Thank you for requesting a [email or web address] account. Our records indicate that you already have an active [email or web address] account. You can access your account anytime at [email or web address] Did you know that you can access your hospital and ER discharge instructions at any time in [email or web address]? You can also review all of your test results from your hospital stay or ER visit. Additional Information If you have questions, please visit the Frequently Asked Questions section of the [email or web address] website at [email or web address] Remember, [email or web address] is NOT to be used for urgent needs. For medical emergencies, dial 911. Now available from your iPhone and Android! Please provide this summary of care documentation to your next provider. Your primary care clinician is listed as Brown Markham. If you have any questions after today's visit, please call [phone].

## 2018-06-12 NOTE — PROGRESS NOTES
1. Have you been to the ER, urgent care clinic since your last visit? Hospitalized since your last visit? no    2. Have you seen or consulted any other health care providers outside of the [address] [place] [address] since your last visit? Include any pap smears or colon screening. Yes, Dr. Pramod Cabrera urogyn     Patient presents in office today for preop clearance  Patient concerns: preop clearance for bladder surgery 7/3/18

## 2018-06-12 NOTE — PROGRESS NOTES
Subjective:     HPI:  Marsha Pacheco is a 74 y.o. female who presents to the office for a routine follow-up. 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 She had a pessary placed and catheter removed and was able to walk with the assistance of a walker. She states while walking her BP was elevated. She states the more she exercised the more her BP went up. She states she had to discontinue exercise because her pessary fell out and \"scraped\" her uterus. She states now she is back with a catheter and is scheduled for surgery on July 3, 2018. She states before her uterus fell she was doing well in physical therapy and was able to get to the bathroom by herself more. Pt reports since having pessary removed she has experienced difficulty when replacing catheter. Left ear discomfort: Pt states there is a bump on her left ear. EKG: LBBB. Current Outpatient Prescriptions   Medication Sig Dispense Refill    escitalopram oxalate (LEXAPRO) 5 mg tablet TAKE 1 TABLET BY MOUTH EVERY EVENING 90 Tab 1    cyclobenzaprine (FLEXERIL) 10 mg tablet TAKE 1/2 TABLET BY MOUTH THREE TIMES DAILY AS NEEDED FOR MUSCLE SPASMS 45 Tab 5    cyclobenzaprine (FLEXERIL) 10 mg tablet TAKE 1/2 TABLET BY MOUTH THREE TIMES DAILY AS NEEDED FOR MUSCLE SPASMS 45 Tab 3    escitalopram oxalate (LEXAPRO) 5 mg tablet TAKE 1 TABLET BY MOUTH EVERY EVENING 90 Tab 1    gabapentin (NEURONTIN) 100 mg capsule TAKE 2 CAPSULES BY MOUTH THREE TIMES DAILY 540 Cap 0    bimatoprost (LUMIGAN) 0.01 % ophthalmic drops Administer 1 Drop to both eyes every evening.  rOPINIRole (REQUIP) 0.5 mg tablet TAKE 1 TABLET BY MOUTH EVERY NIGHT 90 Tab 2    dorzolamide-timolol (COSOPT) 22.3-6.8 mg/mL ophthalmic solution Administer 1 Drop to left eye two (2) times a day. Indications: Ocular Hypertension      lactobacillus comb no.10 (PROBIOTIC) 20 billion cell cap Take 2 Caplet by mouth daily.  60 Cap 5    ferrous gluconate 324 mg (38 mg iron) tablet TAKE ONE TABLET BY MOUTH EVERY MONDAY, WEDNESDAY, AND SUNDAY 90 Tab 3    B.infantis-B.ani-B.long-B.bifi (PROBIOTIC 4X) 10-15 mg TbEC Take  by mouth. Allergies   Allergen Reactions    Bactrim [Sulfamethoprim] Nausea Only and Other (comments)     Headache, Joint pain, loss of appetite        Past Medical History:   Diagnosis Date    Anemia march 2016    in [address] Arthritis march 2016    in [facility]    Arthropathy     Bladder stones     Cardiac arrhythmia     Chronic UTI (urinary tract infection)     DVT of leg ([facility] thrombosis) ([facility] [address]     Female genital prolapse     FHx: SVT (supraventricular tachycardia)     Frequent UTI     Hypocontractile bladder     Osteopenia     Osteoporosis     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  HX TONSILLECTOMY      HX VITRECTOMY      VASCULAR SURGERY PROCEDURE UNLIST  march 2016    IVC filter. History reviewed. No pertinent family history. Social History     Social History    Marital status:      Spouse name: N/A    Number of children: N/A    Years of education: N/A     Occupational History    Not on file. Social History Main Topics    Smoking status: Former Smoker     Packs/day: 1.00     Years: 15.00     Types: Cigarettes    Smokeless tobacco: Never Used      Comment: quit in the 60's    Alcohol use No    Drug use: No    Sexual activity: Not Currently     Other Topics Concern    Not on file     Social History Narrative       REVIEW OF SYSTEM:  Review of Systems   Constitutional: Negative for chills and fever. Eyes: Negative for blurred vision. Respiratory: Negative for shortness of breath.     Cardiovascular: Negative for chest pain, palpitations and leg swelling. Gastrointestinal: Negative for constipation, diarrhea, nausea and vomiting. Musculoskeletal: Negative for joint pain. Neurological: Negative for headaches. Objective:     Visit Vitals    /76 (BP 1 Location: Right arm, BP Patient Position: Sitting)    Pulse 77    Temp 97.5 °F (36.4 °C) (Oral)    Resp 16    Ht 5' (1.524 m)    Wt 152 lb (68.9 kg)    SpO2 97%    BMI 29.69 kg/m2       PHYSICAL EXAM:  Physical Exam   Constitutional: She is oriented to person, place, and time and well-developed, well-nourished, and in no distress. HENT:   Right Ear: Tympanic membrane, external ear and ear canal normal.   Left Ear: Tympanic membrane, external ear and ear canal normal.   Nose: Nose normal.   Mouth/Throat: Oropharynx is clear and moist.   Eyes: Pupils are equal, round, and reactive to light. Neck: Normal range of motion. Neck supple. No thyromegaly present. Cardiovascular: Normal rate, regular rhythm, normal heart sounds and intact distal pulses. No murmur heard. Pulmonary/Chest: Effort normal and breath sounds normal. She has no wheezes. Neurological: She is alert and oriented to person, place, and time. GCS score is 15. Skin: Skin is warm and dry. Vitals reviewed. Assessment/Plan:       ICD-10-CM ICD-9-CM    1. Uterus prolapse N81.4 618.1    2. Pre-op examination Z01.818 V72.84 CBC WITH AUTOMATED DIFF      METABOLIC PANEL, COMPREHENSIVE      PROTHROMBIN TIME + INR      AMB POC EKG ROUTINE W/ 12 LEADS, INTER & REP   3. SVT (supraventricular tachycardia) (HCC) I47.1 427.89 AMB POC EKG ROUTINE W/ 12 LEADS, INTER & REP      REFERRAL TO CARDIOLOGY   4. Abnormal EKG R94.31 794.31 REFERRAL TO CARDIOLOGY     Patient given opportunity to ask questions. Questions answered. Patient understands plan of care.    Follow-up Disposition: Not on File    Written by Yasmin Rodriguez, as dictated by Amber Rogers DO.    I, Dr. Madhu Nina, confirm that all documentation is accurate.

## 2018-06-14 ENCOUNTER — OFFICE VISIT (OUTPATIENT)
Dept: CARDIOLOGY CLINIC | Age: 73
End: 2018-06-14

## 2018-06-14 VITALS
OXYGEN SATURATION: 97 % | DIASTOLIC BLOOD PRESSURE: 77 MMHG | WEIGHT: 145 LBS | BODY MASS INDEX: 28.47 KG/M2 | SYSTOLIC BLOOD PRESSURE: 150 MMHG | HEART RATE: 78 BPM | HEIGHT: 60 IN

## 2018-06-14 DIAGNOSIS — I44.7 LBBB (LEFT BUNDLE BRANCH BLOCK): ICD-10-CM

## 2018-06-14 DIAGNOSIS — R94.31 ABNORMAL EKG: Primary | ICD-10-CM

## 2018-06-14 RX ORDER — LATANOPROST 50 UG/ML
1 SOLUTION/ DROPS OPHTHALMIC
COMMUNITY

## 2018-06-14 NOTE — PROGRESS NOTES
1. Have you been to the ER, urgent care clinic since your last visit? Hospitalized since your last visit? NO     2. Have you seen or consulted any other health care providers outside of the [facility] since your last visit? Include any pap smears or colon screening.   Yes; Dr Theresa Price (Eye)

## 2018-06-14 NOTE — PATIENT INSTRUCTIONS
Jacqueline Epstein will call to schedule your testing within 24 hours. If you do not hear from her, then please call her directly at [phone]. Done in [address] on [email or web address]. Call Cristy David [phone] for echo results about 1-2 days after completed.

## 2018-06-14 NOTE — MR AVS SNAPSHOT
Karyle Olp 
 
 
 [facility] [address] [address] 
[phone] Patient: Salvador Hartman MRN: [id number] Visit Information Date & Time Provider Department Dept. Phone Encounter #  
 2018  3:30 PM Tristen Tavarez MD [address] [place] [address] Specialist at [facility] [phone] [id number] Your Appointments 2018  1:00 PM  
Follow Up with Félix [place] [address] [address] [facility] App[facility] Note: Return in 3 months (2018). [address] [facility] [address] [place]  
  
   
 [address] [place] [address] [address] Upcoming Health Maintenance Date Due DTaP/Tdap/Td series (1 - Tdap) 1964 BREAST CANCER SCRN MAMMOGRAM 1993 ZOSTER VACCINE AGE 60> 2003 Influenza Age 5 to Adult 2018 FOBT Q 1 YEAR AGE 50-75 2018 MEDICARE YEARLY EXAM 2018 GLAUCOMA SCREENING Q2Y 3/2/2019 Allergies as of 2018  Review Complete On: 2018 By: Adeel Quiñones LPN Severity Noted Reaction Type Reactions Bactrim [Sulfamethoprim]  2018    Nausea Only, Other (comments) Headache, Joint pain, loss of appetite Current Immunizations  Reviewed on 10/11/2016 Name Date Pneumococcal Conjugate (PCV-13) 10/11/2016 Pneumococcal Vaccine (Unspecified Type) 2014 Not reviewed this visit You Were Diagnosed With   
  
 Codes Comments Abnormal EKG    -  Primary ICD-10-CM: R94.31 
ICD-9-CM: 794.31   
 LBBB (left bundle branch block)     ICD-10-CM: I44.7 ICD-9-CM: 426. 3 Vitals BP Pulse Height(growth percentile) Weight(growth percentile) SpO2 BMI  
 150/77 78 5' (1.524 m) 145 lb (65.8 kg) 97% 28.32 kg/m2 OB Status Smoking Status Postmenopausal Former Smoker BMI and BSA Data Body Mass Index Body Surface Area  
 28.32 kg/m 2 1.67 m 2 Preferred Pharmacy Pharmacy Name Phone [facility] [address] [place], [place] [address] [phone] Your Updated Medication List  
  
   
This list is accurate as of 6/14/18  3:51 PM.  Always use your most recent med list.  
  
  
  
  
 cyclobenzaprine 10 mg tablet Commonly known as:  FLEXERIL  
TAKE 1/2 TABLET BY MOUTH THREE TIMES DAILY AS NEEDED FOR MUSCLE SPASMS  
  
 escitalopram oxalate 5 mg tablet Commonly known as:  Alli Aas TAKE 1 TABLET BY MOUTH EVERY EVENING  
  
 ferrous gluconate 324 mg (38 mg iron) tablet TAKE ONE TABLET BY MOUTH EVERY MONDAY, WEDNESDAY, AND SUNDAY  
  
 gabapentin 100 mg capsule Commonly known as:  NEURONTIN  
TAKE 2 CAPSULES BY MOUTH THREE TIMES DAILY  
  
 lactobacillus comb no. 10 20 billion cell Cap Commonly known as:  PROBIOTIC Take 2 Caplet by mouth daily. latanoprost 0.005 % ophthalmic solution Commonly known as:  Garnell Anis Administer 2 Drops to both eyes nightly. rOPINIRole 0.5 mg tablet Commonly known as:  REQUIP  
TAKE 1 TABLET BY MOUTH EVERY NIGHT Patient Instructions Linda Ramos will call to schedule your testing within 24 hours. If you do not hear from her, then please call her directly at [phone]. Done in [address] on [employer]. Call Spenser Maldonado [phone] for echo results about 1-2 days after completed. Introducing [facility]! Dear Judit Valenzuela: Thank you for requesting a [employer] account. Our records indicate that you already have an active [employer] account. You can access your account anytime at [email or web address] Did you know that you can access your hospital and ER discharge instructions at any time in [employer]? You can also review all of your test results from your hospital stay or ER visit. Additional Information If you have questions, please visit the Frequently Asked Questions section of the [employer] website at [email or web address] Remember, [employer] is NOT to be used for urgent needs. For medical emergencies, dial 911. Now available from your iPhone and Android! Please provide this summary of care documentation to your next provider. Your primary care clinician is listed as Ottoniel Fofana. If you have any questions after today's visit, please call [phone].

## 2018-06-14 NOTE — PROGRESS NOTES
[facility]    Ms. Mehdi Arenas is a 74year old female with a history of left bundle branch block, uterine prolapse, other multiple medical problems. Ms. Mehdi Arenas was asked to come see me for cardiac evaluation and for abnormal EKG. According to Ms. Josephnayeli, she has a significant uterine prolapse, which requires surgery. This uterine prolapse is causing urinary obstruction and she has a catheter at this time. Her surgery is supposed to be scheduled in early part of July. Ms. Mehdi Arenas denies any prior history of myocardial infarction or congestive heart failure. She had an EKG performed which showed left bundle branch block. Because of abnormal EKG she was asked to come see me. She denies any prior MI or CHF. She denies any chest pain or chest tightness. 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 It did cause paraplegia temporarily. At the time she had a DVT, for which she required IVC filter. After that she was able to move her lower extremity once the spinal abscess resolved and she was going through rehab. However now she has a uterine prolapse up to the point that she's not able to walk, which causes worsening of the uterine prolapse. She denies chest pain or chest tightness. She denies PND. She uses one pillow at night. She does not have any lower extremity swelling. She denies palpitations. She said she was told in the past she has SVT, however she never had symptoms related to that. Denies any nausea, vomiting, abdominal pain, fever, chills, sputum production.  No hematuria or other bleeding complaints    Past Medical History:   Diagnosis Date    Anemia 03/2016    [facility] Arthritis     [facility]    Bladder stones     DVT of leg (deep venous thrombosis[facility])     Female genital prolapse     LBBB (left bundle branch block)     Osteopenia     Spinal abscess ([address] 03/2016    [facility]ra[facility]     Uterus prolapse     grade 5    Vaginal candida 03/16/2016         Past Surgical History:   Procedure Laterality Date    HX DILATION AND CURETTAGE      x 4 after having miscarriages.  HX TONSILLECTOMY      HX VITRECTOMY      VASCULAR SURGERY PROCEDURE UNLIST  march 2016    IVC filter. Current Outpatient Prescriptions   Medication Sig    latanoprost (XALATAN) 0.005 % ophthalmic solution Administer 2 Drops to both eyes nightly.  cyclobenzaprine (FLEXERIL) 10 mg tablet TAKE 1/2 TABLET BY MOUTH THREE TIMES DAILY AS NEEDED FOR MUSCLE SPASMS    escitalopram oxalate (LEXAPRO) 5 mg tablet TAKE 1 TABLET BY MOUTH EVERY EVENING    gabapentin (NEURONTIN) 100 mg capsule TAKE 2 CAPSULES BY MOUTH THREE TIMES DAILY    rOPINIRole (REQUIP) 0.5 mg tablet TAKE 1 TABLET BY MOUTH EVERY NIGHT    lactobacillus comb no.10 (PROBIOTIC) 20 billion cell cap Take 2 Caplet by mouth daily.  ferrous gluconate 324 mg (38 mg iron) tablet TAKE ONE TABLET BY MOUTH EVERY MONDAY, WEDNESDAY, AND SUNDAY     No current facility-administered medications for this visit. Allergies and Sensitivities:  Allergies   Allergen Reactions    Bactrim [Sulfamethoprim] Nausea Only and Other (comments)     Headache, Joint pain, loss of appetite        Family History:  No family history on file. Social History:  Social History   Substance Use Topics    Smoking status: Former Smoker     Packs/day: 1.00     Years: 15.00     Types: Cigarettes    Smokeless tobacco: Never Used      Comment: quit in the 60's    Alcohol use No     She  reports that she has quit smoking. Her smoking use included Cigarettes. She has a 15.00 pack-year smoking history. She has never used smokeless tobacco.  She  reports that she does not drink alcohol.     Review of Systems:  Cardiac symptoms as noted above in HPI. All others negative. Denies fatigue, malaise, skin rash, blurring vision, photophobia, neck pain, hemoptysis, chronic cough, nausea, vomiting, hematuria, burning micturition, BRBPR, chronic headaches. Physical Exam:  BP Readings from Last 3 Encounters:   06/14/18 150/77   06/12/18 134/76   02/27/18 138/80         Pulse Readings from Last 3 Encounters:   06/14/18 78   06/12/18 77   02/27/18 77          Wt Readings from Last 3 Encounters:   06/14/18 145 lb (65.8 kg)   06/12/18 152 lb (68.9 kg)   02/27/18 157 lb (71.2 kg)       Constitutional: Oriented to person, place, and time. HENT: Head: Normocephalic and atraumatic. Neck: No JVD present. Carotid bruit is not appreciated. Cardiovascular: Regular rhythm. No murmur, gallop or rubs appreciated  Lung: Breath sounds normal. No respiratory distress. No ronchi or rales appreciated  Abdominal: No tenderness. No rebound and no guarding. Musculoskeletal: There is no lower extremity edema. No cynosis  Lymphadenopathy:  No cervical or supraclavicular adenopathy appriciated. Neurological: No gross motor deficit noted. Skin: No visible skin rash noted. No Ear discharge noted  Psychiatric: Normal mood and affect.    Good distal pulse    Review of Data  LABS:   Lab Results   Component Value Date/Time    Sodium 140 06/12/2018 03:15 PM    Potassium 4.0 06/12/2018 03:15 PM    Chloride 105 06/12/2018 03:15 PM    CO2 28 06/12/2018 03:15 PM    Glucose 101 (H) 06/12/2018 03:15 PM    BUN 23 (H) 06/12/2018 03:15 PM    Creatinine 1.12 06/12/2018 03:15 PM     Lipids Latest Ref Rng & Units 2/27/2018 1/24/2017   Chol, Total 100 - 199 mg/dL 181 244(H)   HDL >39 mg/dL 65 63   LDL 0 - 99 mg/dL 101(H) 146(H)   Trig 0 - 149 mg/dL 76 174(H)     Lab Results   Component Value Date/Time    ALT (SGPT) 27 06/12/2018 03:15 PM     No results found for: HBA1C, HGBE8, GII9QOHG, IKK6DBVL    EKG  (2018) LBBB, sinus rhythm    ECHO      IMPRESSION & PLAN:  Ms. Amairani Nicole is a 74year old female with multiple medical problems. Left bundle branch block:  Ms. Carroll Nuñez had an EKG at PCP's office for the preoperative workup. It showed left bundle branch block. Her previous EKG in 2016 also showed left bundle branch block. Currently she does not have any fluid overload on exam.  An echocardiogram will be ordered because of abnormal EKG to make sure she does not have any LV dysfunction from left bundle branch block. Uterine prolapse:  Ms. Carroll Nuñez has uterine prolapse. She is supposed to undergo surgery for that. Because of abnormal EKG cardiology recommendation has been requested. An echocardiogram has been ordered to make sure she does not have any LV dysfunction in the setting of abnormal EKG. She does not appear to have any unstable coronary syndrome or decompensated heart failure with limited amount of exertion she can perform. If the echocardiogram is unremarkable she probably would not require any further cardiac workup. On exam she does not have any fluid overload. This plan was discussed with patient who is in agreement. Thank you for allowing me to participate in patient care. Please feel free to call me if you have any question or concern. Brennan Nelson MD  Please note: This document has been produced using voice recognition software. Unrecognized errors in transcription may be present.

## 2018-06-15 ENCOUNTER — TELEPHONE (OUTPATIENT)
Dept: CARDIOLOGY CLINIC | Age: 73
End: 2018-06-15

## 2018-06-15 NOTE — TELEPHONE ENCOUNTER
Incoming from pt. Two patient Identifiers confirmed. Pt stated she hap spoken with someone int he echo department and they had advised josefa had no opening for an echo around 7/3/2018 and they advised her to contact our office. Advised pt I did see where the  Conner Alvarado had already contact the ir dept and lvm inquiring and would call us if she had any issues. Pt verbalized understanding.

## 2018-06-20 ENCOUNTER — TELEPHONE (OUTPATIENT)
Dept: CARDIOLOGY CLINIC | Age: 73
End: 2018-06-20

## 2018-06-20 NOTE — TELEPHONE ENCOUNTER
Emma stephens from Dr. Anam Boogie (OBGYN) office to see if patient cleared for surgery. Her echo was moved up to June 25, awaiting echo results for clearance. Surgery date is July 3rd. Advised once we have report will send clearance if echo ok.    Fax # [phone]

## 2018-06-25 ENCOUNTER — HOSPITAL ENCOUNTER (OUTPATIENT)
Dept: NON INVASIVE DIAGNOSTICS | Age: 73
Discharge: HOME | End: 2018-06-25
Attending: INTERNAL MEDICINE
Payer: MEDICARE

## 2018-06-25 VITALS
WEIGHT: 156 LBS | SYSTOLIC BLOOD PRESSURE: 153 MMHG | HEIGHT: 60 IN | BODY MASS INDEX: 30.63 KG/M2 | DIASTOLIC BLOOD PRESSURE: 78 MMHG

## 2018-06-25 DIAGNOSIS — R94.31 ABNORMAL EKG: ICD-10-CM

## 2018-06-25 DIAGNOSIS — I44.7 LBBB (LEFT BUNDLE BRANCH BLOCK): ICD-10-CM

## 2018-06-25 PROCEDURE — 93306 TTE W/DOPPLER COMPLETE: CPT

## 2018-06-26 NOTE — TELEPHONE ENCOUNTER
Verbal order and read back per Brooke Gutierrez MD    Echo ok -patient may proceed with upcoming surgery     Patient aware of results and letter faxed

## 2018-06-27 ENCOUNTER — TELEPHONE (OUTPATIENT)
Dept: FAMILY MEDICINE CLINIC | Age: 73
End: 2018-06-27

## 2018-06-27 NOTE — TELEPHONE ENCOUNTER
Spoke with Surgeon. Regarding low WBC. This is a chronic problem for Ms. Patterson. Also discussed post of coagulation. Though no previous source of bleed was found. Patient okay to have post op anticoagulation until she is up and moving about.

## 2018-06-27 NOTE — TELEPHONE ENCOUNTER
Manuel Love from Dr. Argueta Began office is calling for update on patient's pre op clearance. They stated they received a clearance from cardiologist. Request return phone call as soon as possible, as patient is scheduled for surgery on July 3, 2018.

## 2018-09-07 ENCOUNTER — OFFICE VISIT (OUTPATIENT)
Dept: FAMILY MEDICINE CLINIC | Age: 73
End: 2018-09-07

## 2018-09-07 VITALS
WEIGHT: 152.2 LBS | RESPIRATION RATE: 16 BRPM | HEART RATE: 86 BPM | BODY MASS INDEX: 29.88 KG/M2 | TEMPERATURE: 97.6 F | OXYGEN SATURATION: 96 % | HEIGHT: 60 IN | DIASTOLIC BLOOD PRESSURE: 71 MMHG | SYSTOLIC BLOOD PRESSURE: 143 MMHG

## 2018-09-07 DIAGNOSIS — R05.9 COUGH: ICD-10-CM

## 2018-09-07 DIAGNOSIS — J45.30 MILD PERSISTENT REACTIVE AIRWAY DISEASE WITHOUT COMPLICATION: Primary | ICD-10-CM

## 2018-09-07 DIAGNOSIS — J45.30 MILD PERSISTENT REACTIVE AIRWAY DISEASE WITHOUT COMPLICATION: ICD-10-CM

## 2018-09-07 LAB — SPIROMETRY INTERPRETATION, SPITPOCT: NORMAL

## 2018-09-07 RX ORDER — ALBUTEROL SULFATE 90 UG/1
1 AEROSOL, METERED RESPIRATORY (INHALATION)
Qty: 1 INHALER | Refills: 0 | Status: SHIPPED | OUTPATIENT
Start: 2018-09-07 | End: 2018-10-19 | Stop reason: SDUPTHER

## 2018-09-07 RX ORDER — BENZONATATE 100 MG/1
100 CAPSULE ORAL
Qty: 30 CAP | Refills: 0 | Status: SHIPPED | OUTPATIENT
Start: 2018-09-07 | End: 2018-09-17

## 2018-09-07 RX ORDER — ALBUTEROL SULFATE 0.83 MG/ML
2.5 SOLUTION RESPIRATORY (INHALATION) ONCE
Qty: 1 EACH | Refills: 0
Start: 2018-09-07 | End: 2018-09-07

## 2018-09-07 RX ORDER — MONTELUKAST SODIUM 10 MG/1
10 TABLET ORAL DAILY
Qty: 30 TAB | Refills: 0 | Status: SHIPPED | OUTPATIENT
Start: 2018-09-07 | End: 2018-09-07 | Stop reason: SDUPTHER

## 2018-09-07 NOTE — PROGRESS NOTES
Janie Ordoñez is a 74 y.o. female presented to clinic for a routine f/u for post surgery. Pt denies any pain or concerns at this time. 1. Have you been to the ER, urgent care clinic since your last visit? Hospitalized since your last visit? No    2. Have you seen or consulted any other health care providers outside of the [address] [place] [address] since your last visit? Include any pap smears or colon screening. No     No flowsheet data found. Health Maintenance Due   Topic Date Due    DTaP/Tdap/Td series (1 - Tdap) 08/09/1964    BREAST CANCER SCRN MAMMOGRAM  08/09/1993    ZOSTER VACCINE AGE 60>  06/09/2003    Influenza Age 9 to Adult  08/01/2018        Patient verbally agrees to permit the Students working in [address] [place] [address] office to observe and participate in medical care during the appointment today, including, where appropriate, providing direct medical care to patient under the physicians direct supervision. Patient agrees that he/she have been given the opportunity to refuse to give such consent and may withdraw consent at any time during appointment.

## 2018-09-07 NOTE — MR AVS SNAPSHOT
[address] [facility] 
 
 
 [address] [facility] [address] [place] [phone] 
[phone] Patient: Darlene Anglin MRN: [id number] [id number] Visit Information Date & Time Provider Department Dept. Phone Encounter #  
 9/7/2018 11:40 AM Whit Medina[address]  [id number] Follow-up Instructions Return in about 1 month (around 10/7/2018). Your Appointments 9/27/2018  1:00 PM  
Follow Up with Félix Gregorio DO [address] [address] [facility]) Appt Note: Return in 3 months (9/27/2018). [address] [address] [place] [address] [facility] 83 222 [facility]  
  
   
 [address] [address] [place] [address] Upcoming Health Maintenance Date Due DTaP/Tdap/Td series (1 - Tdap) 8/9/1966 BREAST CANCER SCRN MAMMOGRAM 8/9/1993 ZOSTER VACCINE AGE 60> 6/9/2003 Influenza Age 5 to Adult 8/1/2018 FOBT Q 1 YEAR AGE 50-75 11/20/2018 MEDICARE YEARLY EXAM 12/1/2018 GLAUCOMA SCREENING Q2Y 3/2/2019 Allergies as of 9/7/2018  Review Complete On: 6/26/2018 By: Whit Medina DO Severity Noted Reaction Type Reactions Bactrim [Sulfamethoprim]  04/09/2018    Nausea Only, Other (comments) Headache, Joint pain, loss of appetite Current Immunizations  Reviewed on 10/11/2016 Name Date Pneumococcal Conjugate (PCV-13) 10/11/2016 Pneumococcal Vaccine (Unspecified Type) 9/1/2014 Not reviewed this visit You Were Diagnosed With   
  
 Codes Comments Mild persistent reactive airway disease without complication    -  Primary ICD-10-CM: J45.30 ICD-9-CM: 493.90 Cough     ICD-10-CM: R05 ICD-9-CM: 894. 2 Vitals BP Pulse Temp Resp Height(growth percentile) Weight(growth percentile) 143/71 (BP 1 Location: Right arm, BP Patient Position: Sitting) 86 97.6 °F (36.4 °C) (Oral) 16 5' (1.524 m) 152 lb 3.2 oz (69 kg) SpO2 BMI OB Status Smoking Status 96% 29.72 kg/m2 Postmenopausal Former Smoker Vitals History BMI and BSA Data Body Mass Index Body Surface Area  
 29.72 kg/m 2 1.71 m 2 Preferred Pharmacy Pharmacy Name Phone [facility] [address] [place], [place] [address] [phone] Your Updated Medication List  
  
   
This list is accurate as of 9/7/18  2:15 PM.  Always use your most recent med list.  
  
  
  
  
 * albuterol 2.5 mg /3 mL (0.083 %) nebulizer solution Commonly known as:  PROVENTIL VENTOLIN  
3 mL by Nebulization route once for 1 dose. * albuterol 90 mcg/actuation inhaler Commonly known as:  PROVENTIL HFA, VENTOLIN HFA, PROAIR HFA Take 1 Puff by inhalation every four (4) hours as needed (reactive airway symptoms including cough). benzonatate 100 mg capsule Commonly known as:  TESSALON Take 1 Cap by mouth three (3) times daily as needed for Cough for up to 10 days. cyclobenzaprine 10 mg tablet Commonly known as:  FLEXERIL  
TAKE 1/2 TABLET BY MOUTH THREE TIMES DAILY AS NEEDED FOR MUSCLE SPASMS  
  
 ferrous gluconate 324 mg (38 mg iron) tablet TAKE ONE TABLET BY MOUTH EVERY MONDAY, WEDNESDAY, AND SUNDAY  
  
 gabapentin 100 mg capsule Commonly known as:  NEURONTIN  
TAKE 2 CAPSULES BY MOUTH THREE TIMES DAILY  
  
 lactobacillus comb no. 10 20 billion cell Cap Commonly known as:  PROBIOTIC Take 2 Caplet by mouth daily. latanoprost 0.005 % ophthalmic solution Commonly known as:  Richardine Solum Administer 2 Drops to both eyes nightly. montelukast 10 mg tablet Commonly known as:  SINGULAIR Take 1 Tab by mouth daily. rOPINIRole 0.5 mg tablet Commonly known as:  REQUIP  
TAKE 1 TABLET BY MOUTH EVERY NIGHT * Notice: This list has 2 medication(s) that are the same as other medications prescribed for you. Read the directions carefully, and ask your doctor or other care provider to review them with you. Prescriptions Sent to Pharmacy Refills  
 benzonatate (TESSALON) 100 mg capsule 0 Sig: Take 1 Cap by mouth three (3) times daily as needed for Cough for up to 10 days. Class: Normal  
 Pharmacy: [address] Ph #: [phone] Route: Oral  
 montelukast (SINGULAIR) 10 mg tablet 0 Sig: Take 1 Tab by mouth daily. Class: Normal  
 Pharmacy: [address] Ph #: [phone] Route: Oral  
 albuterol (PROVENTIL HFA, VENTOLIN HFA, PROAIR HFA) 90 mcg/actuation inhaler 0 Sig: Take 1 Puff by inhalation every four (4) hours as needed (reactive airway symptoms including cough). Class: Normal  
 Pharmacy: [address] Ph #: [phone] Route: Inhalation We Performed the Following ALBUTEROL, INHAL. SOL., FDA-APPROVED FINAL, NON-COMPOUND UNIT DOSE, 1 MG [ [facility]] AMB POC SPIROMETRY REVIEW/INTERP [id number] CPT(R)] AMB POC SPIROMETRY W/BRONCHODILATOR [57054 CPT(R)] INHAL RX, AIRWAY OBST/DX SPUTUM INDUCT [id number] CPT(R)] Follow-up Instructions Return in about 1 month (around 10/7/2018). Introducing [facility]! Dear Bernard Led: Thank you for requesting a [employer] account. Our records indicate that you already have an active [email or web address] account. You can access your account anytime at [email or web address] Did you know that you can access your hospital and ER discharge instructions at any time in [email or web address]? You can also review all of your test results from your hospital stay or ER visit. Additional Information If you have questions, please visit the Frequently Asked Questions section of the [email or web address] website at [email or web address] Remember, MyChart is NOT to be used for urgent needs. For medical emergencies, dial 911. Now available from your iPhone and Android! Please provide this summary of care documentation to your next provider. Your primary care clinician is listed as Awilda Solis. If you have any questions after today's visit, please call [phone].

## 2018-09-07 NOTE — PROGRESS NOTES
Subjective:     HPI:  Raghav Castillo is a 74 y.o. female who presents to the office today for post surgery follow-up and routine care. Post surgery: Pt had colpopexy vaginal intra-peritoneal surgery on July 3, 2018. She reports that the surgery went well and she is feeling much better. She is no longer wearing the catheter. She is voiding on her own. She states she is not having episodes of the prolapse. Pt states she is very satisfied with the surgeon's performance. She reports that the surgeon had to do two sets of stitches due to the changes that had occurred due to the prolonged prolapse. She states due to the other part of the procedure she has delayed going to physical therapy so that she can finish healing. Pt reports she starts PT next week. She states she has been mobile around her house and using the walker. She reports to the office in a wheel chair today. Anxiety: Pt reports she discontinued taking Lexapro. She states that she has not noticed any worsening of her anxiety. She states she has noticed that her hot flashes have returned. She does not desire to restart the medication. She prefers homeopathic therapies. Cough: Pt complains of a cough that have started a week ago. Pt reports she has taken cough/asthma homeopathic and it helps the cough. She has also been doing breathing therapy with incentive spirometry at home. She states it is a dry, deep, spasmatic cough. She reports that she also has post-nasal drip. She states she was intubated during her surgery and wonders if that could be contributing. She reports that it is worse at night. Pt admits a history of reactive airway disease. Pt reports she feels as if her cough originates in her throat. Current Outpatient Prescriptions   Medication Sig Dispense Refill    albuterol (PROVENTIL VENTOLIN) 2.5 mg /3 mL (0.083 %) nebulizer solution 3 mL by Nebulization route once for 1 dose.  1 Each 0    benzonatate (TESSALON) 100 mg capsule Take 1 Cap by mouth three (3) times daily as needed for Cough for up to 10 days. 30 Cap 0    montelukast (SINGULAIR) 10 mg tablet Take 1 Tab by mouth daily. 30 Tab 0    albuterol (PROVENTIL HFA, VENTOLIN HFA, PROAIR HFA) 90 mcg/actuation inhaler Take 1 Puff by inhalation every four (4) hours as needed (reactive airway symptoms including cough). 1 Inhaler 0    latanoprost (XALATAN) 0.005 % ophthalmic solution Administer 2 Drops to both eyes nightly.  cyclobenzaprine (FLEXERIL) 10 mg tablet TAKE 1/2 TABLET BY MOUTH THREE TIMES DAILY AS NEEDED FOR MUSCLE SPASMS 45 Tab 5    gabapentin (NEURONTIN) 100 mg capsule TAKE 2 CAPSULES BY MOUTH THREE TIMES DAILY 540 Cap 0    rOPINIRole (REQUIP) 0.5 mg tablet TAKE 1 TABLET BY MOUTH EVERY NIGHT 90 Tab 2    lactobacillus comb no.10 (PROBIOTIC) 20 billion cell cap Take 2 Caplet by mouth daily. 60 Cap 5    ferrous gluconate 324 mg (38 mg iron) tablet TAKE ONE TABLET BY MOUTH EVERY MONDAY, WEDNESDAY, AND SUNDAY 90 Tab 3        Allergies   Allergen Reactions    Bactrim [Sulfamethoprim] Nausea Only and Other (comments)     Headache, Joint pain, loss of appetite        Past Medical History:   Diagnosis Date    Anemia 03/2016    in [address] [facility]     in [facility]    Bladder stones     DVT (deep venous thrombosis) ([facility] [address] [facility]     S/P IVC filter 2016. 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  [facility]     Uterus prolapse     grade 5    Vaginal candida 03/16/2016        Past Surgical History:   Procedure Laterality Date    HX DILATION AND CURETTAGE      x 4 after having miscarriages.  HX TONSILLECTOMY      HX VITRECTOMY      VASCULAR SURGERY PROCEDURE UNLIST  march 2016    IVC filter. No family history on file.     Social History     Social History    Marital status:      Spouse name: N/A    Number of children: N/A    Years of education: N/A     Occupational History    Not on file. Social History Main Topics    Smoking status: Former Smoker     Packs/day: 1.00     Years: 15.00     Types: Cigarettes    Smokeless tobacco: Never Used      Comment: quit in the 60's    Alcohol use No    Drug use: No    Sexual activity: Not Currently     Other Topics Concern    Not on file     Social History Narrative       REVIEW OF SYSTEM:  Review of Systems   Constitutional: Negative for chills and fever. Eyes: Negative for blurred vision. Respiratory: Positive for cough. Negative for shortness of breath. Cardiovascular: Negative for chest pain, palpitations and leg swelling. Gastrointestinal: Negative for constipation, diarrhea, nausea and vomiting. Genitourinary: Negative for dysuria and frequency. Musculoskeletal: Negative for joint pain. Neurological: Negative for headaches. Objective:     Visit Vitals    /71 (BP 1 Location: Right arm, BP Patient Position: Sitting)    Pulse 86    Temp 97.6 °F (36.4 °C) (Oral)    Resp 16    Ht 5' (1.524 m)    Wt 152 lb 3.2 oz (69 kg)    SpO2 96%    BMI 29.72 kg/m2       PHYSICAL EXAM:  Physical Exam   Constitutional: She is oriented to person, place, and time and well-developed, well-nourished, and in no distress. HENT:   Right Ear: Tympanic membrane, external ear and ear canal normal.   Left Ear: Tympanic membrane, external ear and ear canal normal.   Nose: Nose normal.   Mouth/Throat: Oropharynx is clear and moist.   Eyes: Pupils are equal, round, and reactive to light. Neck: Normal range of motion. Neck supple. No thyromegaly present. Cardiovascular: Normal rate, regular rhythm, normal heart sounds and intact distal pulses. No murmur heard. Pulmonary/Chest: Effort normal and breath sounds normal. She has no wheezes.    Neurological: She is alert and oriented to person, place, and time. GCS score is 15. Skin: Skin is warm and dry. Vitals reviewed. Assessment/Plan:       ICD-10-CM ICD-9-CM    1. Mild persistent reactive airway disease without complication H12.79 921.53 albuterol (PROVENTIL VENTOLIN) 2.5 mg /3 mL (0.083 %) nebulizer solution      ALBUTEROL, INHAL. SOL., FDA-APPROVED FINAL, NON-COMPOUND UNIT DOSE, 1 MG      INHAL RX, AIRWAY OBST/DX SPUTUM INDUCT      AMB POC SPIROMETRY W/BRONCHODILATOR      AMB POC SPIROMETRY REVIEW/INTERP      montelukast (SINGULAIR) 10 mg tablet      albuterol (PROVENTIL HFA, VENTOLIN HFA, PROAIR HFA) 90 mcg/actuation inhaler   2. Cough R05 786.2 benzonatate (TESSALON) 100 mg capsule      albuterol (PROVENTIL HFA, VENTOLIN HFA, PROAIR HFA) 90 mcg/actuation inhaler     Patient given opportunity to ask questions. Questions answered. Patient given spirometry, then bronchodilator therapy, and then spirometry again. She had some mild improvement in her FEV1 however she reports she does not really feel different. Discussed spirometry results with patient. She is reluctant to take antibiotics today. Will treat patient symptoms with cough medicine as needed and allergy medicine daily   More than 50% of today's office visit was spent with discussion of care and medications. Patient understands plan of care. Follow-up Disposition:  Return in about 1 month (around 10/7/2018). Written by Macey Lawrence, as dictated by Getachew Orosco DO.    I, Dr. Getachew Orosco, confirm that all documentation is accurate.

## 2018-09-10 DIAGNOSIS — G25.81 RLS (RESTLESS LEGS SYNDROME): ICD-10-CM

## 2018-09-10 DIAGNOSIS — M46.20 PARASPINAL ABSCESS (HCC): ICD-10-CM

## 2018-09-10 RX ORDER — MONTELUKAST SODIUM 10 MG/1
TABLET ORAL
Qty: 90 TAB | Refills: 0 | Status: SHIPPED | OUTPATIENT
Start: 2018-09-10 | End: 2018-11-15 | Stop reason: SDUPTHER

## 2018-09-11 ENCOUNTER — TELEPHONE (OUTPATIENT)
Dept: FAMILY MEDICINE CLINIC | Age: 73
End: 2018-09-11

## 2018-09-11 DIAGNOSIS — J40 BRONCHITIS: Primary | ICD-10-CM

## 2018-09-11 DIAGNOSIS — Z86.19 HISTORY OF CLOSTRIDIUM DIFFICILE COLITIS: ICD-10-CM

## 2018-09-11 RX ORDER — GABAPENTIN 100 MG/1
CAPSULE ORAL
Qty: 540 CAP | Refills: 1 | Status: SHIPPED | OUTPATIENT
Start: 2018-09-11 | End: 2018-10-19 | Stop reason: SDUPTHER

## 2018-09-11 NOTE — TELEPHONE ENCOUNTER
Patient requesting hutson in medication. States her cough seems to be increasing and asking for a cough suppressant.

## 2018-09-12 RX ORDER — SAME BUTANEDISULFONATE/BETAINE 400-600 MG
250 POWDER IN PACKET (EA) ORAL 2 TIMES DAILY
Qty: 14 CAP | Refills: 0 | Status: SHIPPED | OUTPATIENT
Start: 2018-09-12 | End: 2018-09-19

## 2018-09-12 RX ORDER — AZITHROMYCIN 250 MG/1
TABLET, FILM COATED ORAL
Qty: 6 TAB | Refills: 0 | Status: SHIPPED | OUTPATIENT
Start: 2018-09-12 | End: 2019-05-30 | Stop reason: SDUPTHER

## 2018-09-12 NOTE — TELEPHONE ENCOUNTER
Called patient she reports she continues to cough. Reports while taking singulair she developed a fever that resolved once she discontinued. Advised patient I will give abx and probiotic (due to her history of c. Diff) I strongly advised patient start medication due to the worsening of her symptoms. *will consider CXR if not improving.

## 2018-09-12 NOTE — TELEPHONE ENCOUNTER
Patient called in this morning to advise she is having a reaction to Singular. States she is lethargic, fever and sight headache.   Please advise

## 2018-10-19 ENCOUNTER — OFFICE VISIT (OUTPATIENT)
Dept: FAMILY MEDICINE CLINIC | Age: 73
End: 2018-10-19

## 2018-10-19 VITALS
BODY MASS INDEX: 29.41 KG/M2 | HEIGHT: 60 IN | OXYGEN SATURATION: 96 % | TEMPERATURE: 96.4 F | DIASTOLIC BLOOD PRESSURE: 73 MMHG | RESPIRATION RATE: 16 BRPM | HEART RATE: 77 BPM | WEIGHT: 149.8 LBS | SYSTOLIC BLOOD PRESSURE: 157 MMHG

## 2018-10-19 DIAGNOSIS — M46.20 PARASPINAL ABSCESS (HCC): ICD-10-CM

## 2018-10-19 DIAGNOSIS — R53.81 PHYSICAL DECONDITIONING: Primary | ICD-10-CM

## 2018-10-19 DIAGNOSIS — M62.830 MUSCLE SPASM OF BACK: ICD-10-CM

## 2018-10-19 DIAGNOSIS — J45.30 MILD PERSISTENT REACTIVE AIRWAY DISEASE WITHOUT COMPLICATION: ICD-10-CM

## 2018-10-19 DIAGNOSIS — N31.9 NEUROGENIC BLADDER: ICD-10-CM

## 2018-10-19 DIAGNOSIS — I44.7 LBBB (LEFT BUNDLE BRANCH BLOCK): ICD-10-CM

## 2018-10-19 DIAGNOSIS — R05.9 COUGH: ICD-10-CM

## 2018-10-19 DIAGNOSIS — G25.81 RLS (RESTLESS LEGS SYNDROME): ICD-10-CM

## 2018-10-19 PROBLEM — R19.7 DIARRHEA: Status: ACTIVE | Noted: 2018-10-19

## 2018-10-19 PROBLEM — N81.4 UTEROVAGINAL PROLAPSE: Status: ACTIVE | Noted: 2018-07-03

## 2018-10-19 PROBLEM — R52 PAIN AT REST: Status: ACTIVE | Noted: 2018-10-19

## 2018-10-19 PROBLEM — I82.409 DEEP VENOUS THROMBOSIS (HCC): Status: ACTIVE | Noted: 2018-10-19

## 2018-10-19 RX ORDER — LATANOPROST 50 UG/ML
2 SOLUTION/ DROPS OPHTHALMIC
Status: CANCELLED | OUTPATIENT
Start: 2018-10-19

## 2018-10-19 RX ORDER — GABAPENTIN 100 MG/1
CAPSULE ORAL
Qty: 540 CAP | Refills: 2 | Status: SHIPPED | OUTPATIENT
Start: 2018-10-19 | End: 2019-07-08 | Stop reason: SDUPTHER

## 2018-10-19 RX ORDER — ALBUTEROL SULFATE 90 UG/1
1 AEROSOL, METERED RESPIRATORY (INHALATION)
Qty: 1 INHALER | Refills: 0 | Status: SHIPPED | OUTPATIENT
Start: 2018-10-19 | End: 2019-10-28 | Stop reason: SDUPTHER

## 2018-10-19 RX ORDER — ROPINIROLE 0.5 MG/1
TABLET, FILM COATED ORAL
Qty: 90 TAB | Refills: 2 | Status: SHIPPED | OUTPATIENT
Start: 2018-10-19 | End: 2019-10-28 | Stop reason: SDUPTHER

## 2018-10-19 RX ORDER — CYCLOBENZAPRINE HCL 10 MG
TABLET ORAL
Qty: 45 TAB | Refills: 5 | Status: SHIPPED | OUTPATIENT
Start: 2018-10-19 | End: 2019-04-29 | Stop reason: ALTCHOICE

## 2018-10-19 RX ORDER — FERROUS GLUCONATE 324(38)MG
TABLET ORAL
Qty: 90 TAB | Refills: 3 | Status: CANCELLED | OUTPATIENT
Start: 2018-10-19

## 2018-10-19 RX ORDER — SAME BUTANEDISULFONATE/BETAINE 400-600 MG
250 POWDER IN PACKET (EA) ORAL 2 TIMES DAILY
Qty: 60 CAP | Refills: 2 | Status: SHIPPED | OUTPATIENT
Start: 2018-10-19 | End: 2018-10-26

## 2018-10-19 RX ORDER — MONTELUKAST SODIUM 10 MG/1
TABLET ORAL
Qty: 90 TAB | Refills: 0 | Status: CANCELLED | OUTPATIENT
Start: 2018-10-19

## 2018-10-19 NOTE — PROGRESS NOTES
Subjective:     HPI:  Carly Sandy is a 74 y.o. female who presents to the office today for routine care. Cough: Pt reports she is still coughing. She states that the cough is \"aggravating and agitating. \" She states she finished taking the probiotic, which seem to work the first day, but she didn't notice any affects after that day. She reports she has been taking Nyquill cold and flu at night before bed for 16 days, in order to receive a restful sleep. She states that her cough seems to be improving. She states that she has been using holistic methods which have been helping to relieve her symptoms. She reports when she takes her holistic medications they relieve her post-nasal drip and her cough. Pt has been taking two medications from [facility]: Hayfever and Cough Airway. She denies fevers Pt states she is no longer taking probiotics or vitamin c because of financial situations. Post surgery: Pt reports that she has been cleared to begin PT by her surgeon. She states that she does not normally have pain, but when she leans forward while standing up, she experiences pain in her lower back. Pt seen in office by me for this issue June 2016, problem appeared in March 2016. Pt has been diagnosed with a peraspinal abscess and a Uterine prolapse. This has been surgically corrected with vaginal intra-peritoneal surgery July 3, 2018. No complication during or after surgery. Pt has now healed from surgery and is ready for physical therapy for balance and core strengthening. Restless leg: Pt reports her medication has been working well to relieve her symptoms. No medication side effects. Patient reports she likes to take homeopathic medications for relief of her symptoms. Of note, pt followed up with ophthalmologist 10/12/2018.   Current Outpatient Medications   Medication Sig Dispense Refill    cyclobenzaprine (FLEXERIL) 10 mg tablet TAKE 1/2 TABLET BY MOUTH THREE TIMES DAILY AS NEEDED FOR MUSCLE SPASMS 45 Tab 5    rOPINIRole (REQUIP) 0.5 mg tablet TAKE 1 TABLET BY MOUTH EVERY NIGHT 90 Tab 2    lactobacillus comb no.10 (PROBIOTIC) 20 billion cell cap Take 2 Caplet by mouth daily. 60 Cap 5    gabapentin (NEURONTIN) 100 mg capsule TAKE 2 CAPSULES BY MOUTH THREE TIMES DAILY 540 Cap 2    albuterol (PROVENTIL HFA, VENTOLIN HFA, PROAIR HFA) 90 mcg/actuation inhaler Take 1 Puff by inhalation every four (4) hours as needed (reactive airway symptoms including cough). 1 Inhaler 0    Saccharomyces boulardii (FLORASTOR) 250 mg capsule Take 1 Cap by mouth two (2) times a day for 7 days. 60 Cap 2    montelukast (SINGULAIR) 10 mg tablet TAKE 1 TABLET BY MOUTH DAILY 90 Tab 0    latanoprost (XALATAN) 0.005 % ophthalmic solution Administer 2 Drops to both eyes nightly.  ferrous gluconate 324 mg (38 mg iron) tablet TAKE ONE TABLET BY MOUTH EVERY MONDAY, WEDNESDAY, AND SUNDAY 90 Tab 3        Allergies   Allergen Reactions    Bactrim [Sulfamethoprim] Nausea Only and Other (comments)     Headache, Joint pain, loss of appetite     Sulfamethoxazole-Trimethoprim Other (comments)       Past Medical History:   Diagnosis Date    [facility]emia 03/2016    in [facility] [address] [place] [address] - [facility]    Bladder stones     DVT (deep venous thrombosis) ([facility] [address]     S/P IVC filter 2016. 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  [facility]     Uterus prolapse     grade 5    Vaginal candida 03/16/2016        Past Surgical History:   Procedure Laterality Date    HX DILATION AND CURETTAGE      x 4 after having miscarriages.  HX TONSILLECTOMY      HX VITRECTOMY      VASCULAR SURGERY PROCEDURE UNLIST  march 2016    IVC filter. No family history on file.     Social History     Socioeconomic History    Marital status:      Spouse name: Not on file    Number of children: Not on file    Years of education: Not on file    Highest education level: Not on file   Social Needs    Financial resource strain: Not on file    Food insecurity - worry: Not on file    Food insecurity - inability: Not on file    Transportation needs - medical: Not on file   [employer] needs - non-medical: Not on file   Occupational History    Not on file   Tobacco Use    Smoking status: Former Smoker     Packs/day: 1.00     Years: 15.00     Pack years: 15.00     Types: Cigarettes    Smokeless tobacco: Never Used    Tobacco comment: quit in the 60's   Substance and Sexual Activity    Alcohol use: No     Alcohol/week: 0.0 oz    Drug use: No    Sexual activity: Not Currently   Other Topics Concern    Not on file   Social History Narrative    Not on file       REVIEW OF SYSTEM:  Review of Systems   Constitutional: Negative for chills and fever. Eyes: Negative for blurred vision. Respiratory: Positive for cough. Negative for shortness of breath. Cardiovascular: Negative for chest pain, palpitations and leg swelling. Gastrointestinal: Negative for constipation, diarrhea, nausea and vomiting. Musculoskeletal: Positive for back pain. Negative for joint pain. Neurological: Negative for headaches. Objective:     Visit Vitals  /73 (BP 1 Location: Right arm, BP Patient Position: Sitting)   Pulse 77   Temp 96.4 °F (35.8 °C) (Oral)   Resp 16   Ht 5' (1.524 m)   Wt 149 lb 12.8 oz (67.9 kg)   SpO2 96%   BMI 29.26 kg/m²       PHYSICAL EXAM:  Physical Exam   Constitutional: She is oriented to person, place, and time and well-developed, well-nourished, and in no distress.    HENT:   Right Ear: Tympanic membrane, external ear and ear canal normal.   Left Ear: Tympanic membrane, external ear and ear canal normal.   Nose: Nose normal.   Mouth/Throat: Oropharynx is clear and moist.   Eyes: Pupils are equal, round, and reactive to light. Neck: Normal range of motion. Neck supple. No thyromegaly present. Cardiovascular: Normal rate, regular rhythm, normal heart sounds and intact distal pulses. No murmur heard. Pulmonary/Chest: Effort normal and breath sounds normal. She has no wheezes. Neurological: She is alert and oriented to person, place, and time. GCS score is 15. Skin: Skin is warm and dry. Vitals reviewed. Assessment/Plan:       ICD-10-CM ICD-9-CM    1. Physical deconditioning R53.81 799.3 REFERRAL TO HOME HEALTH      AMB SUPPLY ORDER      REFERRAL TO PHYSICAL THERAPY   2. Muscle spasm of back M62.830 724.8 cyclobenzaprine (FLEXERIL) 10 mg tablet      REFERRAL TO HOME HEALTH      AMB SUPPLY ORDER      REFERRAL TO PHYSICAL THERAPY   3. RLS (restless legs syndrome) G25.81 333.94 rOPINIRole (REQUIP) 0.5 mg tablet      gabapentin (NEURONTIN) 100 mg capsule   4. Neurogenic bladder N31.9 596.54 lactobacillus comb no.10 (PROBIOTIC) 20 billion cell cap   5. Mild persistent reactive airway disease without complication A32.38 148.80 albuterol (PROVENTIL HFA, VENTOLIN HFA, PROAIR HFA) 90 mcg/actuation inhaler   6. 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 Cough R05 786.2 albuterol (PROVENTIL HFA, VENTOLIN HFA, PROAIR HFA) 90 mcg/actuation inhaler   8. LBBB (left bundle branch block) I44.7 426.3      Patient given opportunity to ask questions. Questions answered. Patient understands plan of care. Follow-up Disposition:  Return in about 5 months (around 3/19/2019), or sooner if any problems. .    Written by Chen Lemon, as dictated by DO. FRIEDA Prabhakar, Dr. Alli Evans, confirm that all documentation is accurate.

## 2018-10-19 NOTE — PROGRESS NOTES
Patsy Bonds is a 74 y.o. female presented to clinic via wheelchair accompanied by sister for a routine f/u for anemia, Restless leg syndrome, and anxiety. Pt denies any pain at this time but has concerns with a non productive persistent cough that keeps her up at night time. Pt states she takes jamie's night quil to help her sleep at night. 1. Have you been to the ER, urgent care clinic since your last visit? Hospitalized since your last visit? No    2. Have you seen or consulted any other health care providers outside of the [address] [place] [address] since your last visit? Include any pap smears or colon screening. No       No flowsheet data found.     Health Maintenance Due   Topic Date Due    DTaP/Tdap/Td series (1 - Tdap) 08/09/1964    Shingrix Vaccine Age 50> (1 of 2) 08/09/1993    Influenza Age 5 to Adult  08/01/2018

## 2018-11-15 DIAGNOSIS — J45.30 MILD PERSISTENT REACTIVE AIRWAY DISEASE WITHOUT COMPLICATION: ICD-10-CM

## 2018-11-18 RX ORDER — MONTELUKAST SODIUM 10 MG/1
TABLET ORAL
Qty: 90 TAB | Refills: 1 | Status: SHIPPED | OUTPATIENT
Start: 2018-11-18 | End: 2019-05-14 | Stop reason: SDUPTHER

## 2018-11-24 DIAGNOSIS — D50.8 IRON DEFICIENCY ANEMIA SECONDARY TO INADEQUATE DIETARY IRON INTAKE: Primary | ICD-10-CM

## 2018-11-25 RX ORDER — FERROUS GLUCONATE 324(38)MG
TABLET ORAL
Qty: 90 TAB | Refills: 3 | Status: SHIPPED | OUTPATIENT
Start: 2018-11-25 | End: 2019-10-28 | Stop reason: SDUPTHER

## 2018-11-26 RX ORDER — FERROUS GLUCONATE 324(38)MG
324 TABLET ORAL
Qty: 90 TAB | Refills: 3 | Status: SHIPPED | OUTPATIENT
Start: 2018-11-26 | End: 2019-04-29 | Stop reason: SDUPTHER

## 2018-12-11 ENCOUNTER — OFFICE VISIT (OUTPATIENT)
Dept: FAMILY MEDICINE CLINIC | Age: 73
End: 2018-12-11

## 2018-12-11 VITALS
SYSTOLIC BLOOD PRESSURE: 138 MMHG | HEIGHT: 60 IN | TEMPERATURE: 96 F | DIASTOLIC BLOOD PRESSURE: 72 MMHG | OXYGEN SATURATION: 95 % | WEIGHT: 145 LBS | BODY MASS INDEX: 28.47 KG/M2 | RESPIRATION RATE: 16 BRPM | HEART RATE: 74 BPM

## 2018-12-11 DIAGNOSIS — G89.29 CHRONIC MIDLINE LOW BACK PAIN WITHOUT SCIATICA: Primary | ICD-10-CM

## 2018-12-11 DIAGNOSIS — Z78.0 POST-MENOPAUSAL: ICD-10-CM

## 2018-12-11 DIAGNOSIS — M54.50 CHRONIC MIDLINE LOW BACK PAIN WITHOUT SCIATICA: Primary | ICD-10-CM

## 2018-12-11 DIAGNOSIS — M79.18 GLUTEAL PAIN: ICD-10-CM

## 2018-12-11 DIAGNOSIS — M81.0 AGE-RELATED OSTEOPOROSIS WITHOUT CURRENT PATHOLOGICAL FRACTURE: ICD-10-CM

## 2018-12-11 NOTE — PROGRESS NOTES
1. Have you been to the ER, urgent care clinic since your last visit? Hospitalized since your last visit? No    2. Have you seen or consulted any other health care providers outside of the [address] [place] [address] since your last visit? Include any pap smears or colon screening. No     Patient presents in office today for routine care. Patient concerns: can't stand up straight.

## 2018-12-11 NOTE — PROGRESS NOTES
Subjective:     HPI:  Sanchez Tao is a 74 y.o. female who presents to the office today for routine care. Abnormal posture: Pt complains that she is leaning forward whenever she stands. She states that when she walks she cannot stand up straight. She states if she goes to bend down she has pain in her lower back. She reports her left buttock is smaller than her right buttock, and she feels as if her left leg is shorter than her right leg. She states that her symptoms are similar to ones that she had previously when she had a perispinal abscess. Pt reports she has not attended physical therapy. Patient has not been mobile for nearly 2 years due recovering. She desires a referral for DEXA scan. Current Outpatient Medications   Medication Sig Dispense Refill    ferrous gluconate 324 mg (38 mg iron) tablet Take 1 Tab by mouth Daily (before breakfast). 90 Tab 3    ferrous gluconate 324 mg (38 mg iron) tablet TAKE 1 TABLET BY MOUTH EVERY MONDAY, WEDNESDAY, AND SUNDAY 90 Tab 3    montelukast (SINGULAIR) 10 mg tablet TAKE 1 TABLET BY MOUTH DAILY 90 Tab 1    cyclobenzaprine (FLEXERIL) 10 mg tablet TAKE 1/2 TABLET BY MOUTH THREE TIMES DAILY AS NEEDED FOR MUSCLE SPASMS 45 Tab 5    rOPINIRole (REQUIP) 0.5 mg tablet TAKE 1 TABLET BY MOUTH EVERY NIGHT 90 Tab 2    lactobacillus comb no.10 (PROBIOTIC) 20 billion cell cap Take 2 Caplet by mouth daily. 60 Cap 5    gabapentin (NEURONTIN) 100 mg capsule TAKE 2 CAPSULES BY MOUTH THREE TIMES DAILY 540 Cap 2    albuterol (PROVENTIL HFA, VENTOLIN HFA, PROAIR HFA) 90 mcg/actuation inhaler Take 1 Puff by inhalation every four (4) hours as needed (reactive airway symptoms including cough). 1 Inhaler 0    latanoprost (XALATAN) 0.005 % ophthalmic solution Administer 2 Drops to both eyes nightly.           Allergies   Allergen Reactions    Bactrim [Sulfamethoprim] Nausea Only and Other (comments)     Headache, Joint pain, loss of appetite     Sulfamethoxazole-Trimethoprim Other (comments)       Past Medical History:   Diagnosis Date    Anemia 03/2016    in hospital - [address]     in hospital - [facility]    Bladder stones     DVT (deep venous thrombosis) ([facility] [address]     S/P IVC filter 2016. In setting of paraplagia from Spinal abcess    Female genital prolapse     LBBB (left bundle branch block)     Osteopenia     Spinal abscess ([address] 03/2016    Causing paraplegia.  SVT (supraventricular tachycardia) (HCC)     Uterus prolapse     grade 5    Vaginal candida 03/16/2016        Past Surgical History:   Procedure Laterality Date    HX DILATION AND CURETTAGE      x 4 after having miscarriages.  HX TONSILLECTOMY      HX VITRECTOMY      VASCULAR SURGERY PROCEDURE UNLIST  march 2016    IVC filter. History reviewed. No pertinent family history. Social History     Socioeconomic History    Marital status:      Spouse name: Not on file    Number of children: Not on file    Years of education: Not on file    Highest education level: Not on file   Social Needs    Financial resource strain: Not on file    Food insecurity - worry: Not on file    Food insecurity - inability: Not on file    Transportation needs - medical: Not on file   [employer] needs - non-medical: Not on file   Occupational History    Not on file   Tobacco Use    Smoking status: Former Smoker     Packs/day: 1.00     Years: 15.00     Pack years: 15.00     Types: Cigarettes    Smokeless tobacco: Never Used    Tobacco comment: quit in the 60's   Substance and Sexual Activity    Alcohol use: No     Alcohol/week: 0.0 oz    Drug use: No    Sexual activity: Not Currently   Other Topics Concern    Not on file   Social History Narrative    Not on file       REVIEW OF SYSTEM:  Review of Systems   Constitutional: Negative for chills and fever. Eyes: Negative for blurred vision. Respiratory: Negative for shortness of breath.     Cardiovascular: Negative for chest pain, palpitations and leg swelling. Gastrointestinal: Negative for constipation, diarrhea, nausea and vomiting. Musculoskeletal: Positive for back pain. Negative for joint pain. Neurological: Negative for headaches. Objective:     Visit Vitals  /72 (BP 1 Location: Right arm, BP Patient Position: Sitting)   Pulse 74   Temp 96 °F (35.6 °C) (Oral)   Resp 16   Ht 5' (1.524 m)   Wt 145 lb (65.8 kg)   SpO2 95%   BMI 28.32 kg/m²       PHYSICAL EXAM:  Physical Exam   Constitutional: She is oriented to person, place, and time and well-developed, well-nourished, and in no distress. HENT:   Right Ear: Tympanic membrane, external ear and ear canal normal.   Left Ear: Tympanic membrane, external ear and ear canal normal.   Nose: Nose normal.   Mouth/Throat: Oropharynx is clear and moist.   Eyes: Pupils are equal, round, and reactive to light. Neck: Normal range of motion. Neck supple. No thyromegaly present. Cardiovascular: Normal rate, regular rhythm, normal heart sounds and intact distal pulses. No murmur heard. Pulmonary/Chest: Effort normal and breath sounds normal. She has no wheezes. Neurological: She is alert and oriented to person, place, and time. GCS score is 15. Skin: Skin is warm and dry. Vitals reviewed. Assessment/Plan:       ICD-10-CM ICD-9-CM    1. Chronic midline low back pain without sciatica M54.5 724.2 XR SPINE LUMB 2 OR 3 V    G89.29 338.29 XR SACRUM AND COCCYX   2. Gluteal pain M79.18 729.1 XR SPINE LUMB 2 OR 3 V      XR SACRUM AND COCCYX   3. Post-menopausal Z78.0 V49.81 DEXA BONE DENSITY STUDY AXIAL   4. Age-related osteoporosis without current pathological fracture M81.0 733.01 DEXA BONE DENSITY STUDY AXIAL     Patient given opportunity to ask questions. Questions answered. Patient understands plan of care. Follow-up Disposition:  Return for as scheduled or sooner if having problems.  .    Written by Baldo Alicea, as dictated by Hudson Lee DO.    I, Dr. Gupta Comes, confirm that all documentation is accurate.

## 2018-12-13 ENCOUNTER — TELEPHONE (OUTPATIENT)
Dept: FAMILY MEDICINE CLINIC | Age: 73
End: 2018-12-13

## 2018-12-18 ENCOUNTER — HOSPITAL ENCOUNTER (OUTPATIENT)
Dept: GENERAL RADIOLOGY | Age: 73
Discharge: HOME | End: 2018-12-18
Attending: FAMILY MEDICINE
Payer: MEDICARE

## 2018-12-18 DIAGNOSIS — M54.50 CHRONIC MIDLINE LOW BACK PAIN WITHOUT SCIATICA: ICD-10-CM

## 2018-12-18 DIAGNOSIS — M79.18 GLUTEAL PAIN: ICD-10-CM

## 2018-12-18 DIAGNOSIS — Z78.0 POST-MENOPAUSAL: ICD-10-CM

## 2018-12-18 DIAGNOSIS — G89.29 CHRONIC MIDLINE LOW BACK PAIN WITHOUT SCIATICA: ICD-10-CM

## 2018-12-18 DIAGNOSIS — M81.0 AGE-RELATED OSTEOPOROSIS WITHOUT CURRENT PATHOLOGICAL FRACTURE: ICD-10-CM

## 2018-12-18 PROCEDURE — 72220 X-RAY EXAM SACRUM TAILBONE: CPT

## 2018-12-18 PROCEDURE — 72100 X-RAY EXAM L-S SPINE 2/3 VWS: CPT

## 2018-12-18 PROCEDURE — 77080 DXA BONE DENSITY AXIAL: CPT

## 2018-12-24 ENCOUNTER — TELEPHONE (OUTPATIENT)
Dept: FAMILY MEDICINE CLINIC | Age: 73
End: 2018-12-24

## 2018-12-24 NOTE — TELEPHONE ENCOUNTER
Katie in [facility] sent a letter to inform about a delay in care. It states Patient requested would like to start care visits after the week of Tim. Patient requested a call back after the week of Tim to schedule SOC.

## 2019-02-07 ENCOUNTER — TELEPHONE (OUTPATIENT)
Dept: FAMILY MEDICINE CLINIC | Age: 74
End: 2019-02-07

## 2019-02-07 NOTE — TELEPHONE ENCOUNTER
Royer Castaneda from [facility] called stating the patient called this morning to set up an appointment for physical therapy but the order they received in December is no longer valid. Requesting an updated order and recent office visits notes faxed to them at [phone]. Please advise, thank you.

## 2019-04-29 ENCOUNTER — OFFICE VISIT (OUTPATIENT)
Dept: FAMILY MEDICINE CLINIC | Age: 74
End: 2019-04-29

## 2019-04-29 ENCOUNTER — HOSPITAL ENCOUNTER (OUTPATIENT)
Dept: LAB | Age: 74
Discharge: HOME | End: 2019-04-29
Payer: MEDICARE

## 2019-04-29 VITALS
BODY MASS INDEX: 27.48 KG/M2 | HEIGHT: 60 IN | RESPIRATION RATE: 16 BRPM | DIASTOLIC BLOOD PRESSURE: 70 MMHG | OXYGEN SATURATION: 96 % | HEART RATE: 67 BPM | SYSTOLIC BLOOD PRESSURE: 139 MMHG | WEIGHT: 140 LBS | TEMPERATURE: 97.5 F

## 2019-04-29 DIAGNOSIS — M46.20 PARASPINAL ABSCESS (HCC): ICD-10-CM

## 2019-04-29 DIAGNOSIS — Z13.220 SCREENING CHOLESTEROL LEVEL: ICD-10-CM

## 2019-04-29 DIAGNOSIS — R29.898 MUSCULAR DECONDITIONING: Primary | ICD-10-CM

## 2019-04-29 DIAGNOSIS — H91.92 HEARING LOSS OF LEFT EAR, UNSPECIFIED HEARING LOSS TYPE: ICD-10-CM

## 2019-04-29 PROBLEM — I82.409 DEEP VENOUS THROMBOSIS (HCC): Status: RESOLVED | Noted: 2018-10-19 | Resolved: 2019-04-29

## 2019-04-29 LAB
ALBUMIN SERPL-MCNC: 4.4 G/DL (ref 3.4–5)
ALBUMIN/GLOB SERPL: 1.1 {RATIO} (ref 0.8–1.7)
ALP SERPL-CCNC: 81 U/L (ref 45–117)
ALT SERPL-CCNC: 25 U/L (ref 13–56)
ANION GAP SERPL CALC-SCNC: 6 MMOL/L (ref 3–18)
AST SERPL-CCNC: 21 U/L (ref 15–37)
BILIRUB SERPL-MCNC: 0.4 MG/DL (ref 0.2–1)
BUN SERPL-MCNC: 24 MG/DL (ref 7–18)
BUN/CREAT SERPL: 21 (ref 12–20)
CALCIUM SERPL-MCNC: 10.6 MG/DL (ref 8.5–10.1)
CHLORIDE SERPL-SCNC: 103 MMOL/L (ref 100–108)
CHOLEST SERPL-MCNC: 190 MG/DL
CO2 SERPL-SCNC: 30 MMOL/L (ref 21–32)
CREAT SERPL-MCNC: 1.16 MG/DL (ref 0.6–1.3)
GLOBULIN SER CALC-MCNC: 3.9 G/DL (ref 2–4)
GLUCOSE SERPL-MCNC: 80 MG/DL (ref 74–99)
HDLC SERPL-MCNC: 95 MG/DL (ref 40–60)
HDLC SERPL: 2 {RATIO} (ref 0–5)
LDLC SERPL CALC-MCNC: 82.2 MG/DL (ref 0–100)
LIPID PROFILE,FLP: ABNORMAL
POTASSIUM SERPL-SCNC: 4.2 MMOL/L (ref 3.5–5.5)
PROT SERPL-MCNC: 8.3 G/DL (ref 6.4–8.2)
SODIUM SERPL-SCNC: 139 MMOL/L (ref 136–145)
TRIGL SERPL-MCNC: 64 MG/DL (ref ?–150)
VLDLC SERPL CALC-MCNC: 12.8 MG/DL

## 2019-04-29 PROCEDURE — 36415 COLL VENOUS BLD VENIPUNCTURE: CPT

## 2019-04-29 PROCEDURE — 80061 LIPID PANEL: CPT

## 2019-04-29 PROCEDURE — 80053 COMPREHEN METABOLIC PANEL: CPT

## 2019-04-29 NOTE — PROGRESS NOTES
1. Have you been to the ER, urgent care clinic since your last visit? Hospitalized since your last visit? No    2. Have you seen or consulted any other health care providers outside of the [address] [place] [address] since your last visit? Include any pap smears or colon screening. No     Patient presents in office today for routine care.   Patient concerns: check up

## 2019-04-29 NOTE — PROGRESS NOTES
Subjective:     HPI:  Angel Piper is a 74 y.o. female who presents to the office today with her sister for routine follow up. Mobility: Pt reports that she has completed at-home physical therapy. She needs a referral to outpatient PT. Pt has been told that her muscles are very tight and that it will take 6 months-1 year to work it out. She has transitioned from using her wheelchair to using the walker. Pt now uses a walker to move around. She states that walking without her walker hurts her back. Pt denies falls since over the last year. Hypertension  The patient presents for follow up of hypertension. Pt's BP is 152/72 (first reading), 139/70, HR 67 (second reading) in the office today. Pt reports that her BP is usually much lower, like 120s-130s. Home BP Monitoring: is well controlled at home. Cardiovascular ROS: taking medications as instructed, no medication side effects noted, no TIA's, no chest pain on exertion, no dyspnea on exertion, no swelling of ankles. Hearing loss: Pt complains of hearing loss in her left ear. Pt saw vascular surgery on 04/23/19 for follow up of DVT to ensure vessels were patent and it was okay for her to do PT. Pt went to ophthalmology earlier this month. She states that she has no vision in her left eye. Blood work ordered today. Pt ate Cheerios with mixed fruit.       Current Outpatient Medications   Medication Sig Dispense Refill    ferrous gluconate 324 mg (38 mg iron) tablet TAKE 1 TABLET BY MOUTH EVERY MONDAY, WEDNESDAY, AND SUNDAY 90 Tab 3    montelukast (SINGULAIR) 10 mg tablet TAKE 1 TABLET BY MOUTH DAILY 90 Tab 1    rOPINIRole (REQUIP) 0.5 mg tablet TAKE 1 TABLET BY MOUTH EVERY NIGHT 90 Tab 2    gabapentin (NEURONTIN) 100 mg capsule TAKE 2 CAPSULES BY MOUTH THREE TIMES DAILY 540 Cap 2    albuterol (PROVENTIL HFA, VENTOLIN HFA, PROAIR HFA) 90 mcg/actuation inhaler Take 1 Puff by inhalation every four (4) hours as needed (reactive airway symptoms including cough). 1 Inhaler 0    latanoprost (XALATAN) 0.005 % ophthalmic solution Administer 2 Drops to both eyes nightly. Allergies   Allergen Reactions    Bactrim [Sulfamethoprim] Nausea Only and Other (comments)     Headache, Joint pain, loss of appetite     Sulfamethoxazole-Trimethoprim Other (comments)       Past Medical History:   Diagnosis Date    Anemia 03/2016    in hospital - [facility]    Bladder stones     DVT (deep venous thrombosis) ([facility]     S/P IVC filter 2016. 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 patient ambulatory Summer 2018    SVT (supraventricular tachycardia) (HCC)     Uterus prolapse     grade 5. Surgically corrected 7/2018    Vaginal candida 03/16/2016        Past Surgical History:   Procedure Laterality Date    HX DILATION AND CURETTAGE      x 4 after having miscarriages.  HX GYN  07/2018    colpocleisis    HX TONSILLECTOMY      HX VITRECTOMY Left 2017    VASCULAR SURGERY PROCEDURE UNLIST  march 2016    IVC filter. History reviewed. No pertinent family history.     Social History     Socioeconomic History    Marital status:      Spouse name: Not on file    Number of children: Not on file    Years of education: Not on file    Highest education level: Not on file   Occupational History    Not on file   Social Needs    Financial resource strain: Not on file    Food insecurity:     Worry: Not on file     Inability: Not on file    Transportation needs:     Medical: Not on file     Non-medical: Not on file   Tobacco Use    Smoking status: Former Smoker     Packs/day: 1.00     Years: 15.00     Pack years: 15.00     Types: Cigarettes    Smokeless tobacco: Never Used    Tobacco comment: quit in the 70's   Substance and Sexual Activity    Alcohol use: No     Alcohol/week: 0.0 oz     Frequency: Never    Drug use: No    Sexual activity: Not Currently   Lifestyle    Physical activity:     Days per week: Not on file     Minutes per session: Not on file    Stress: Not on file   Relationships    Social connections:     Talks on phone: Not on file     Gets together: Not on file     Attends Sikh service: Not on file     Active member of club or organization: Not on file     Attends meetings of clubs or organizations: Not on file     Relationship status: Not on file    Intimate partner violence:     Fear of current or ex partner: Not on file     Emotionally abused: Not on file     Physically abused: Not on file     Forced sexual activity: Not on file   Other Topics Concern    Not on file   Social History Narrative    Not on file       REVIEW OF SYSTEM:  Review of Systems   Constitutional: Negative for chills and fever. Eyes: Negative for blurred vision. Respiratory: Negative for shortness of breath. Cardiovascular: Negative for chest pain, palpitations and leg swelling. Gastrointestinal: Negative for constipation, diarrhea, nausea and vomiting. Musculoskeletal: Positive for back pain. Negative for falls and joint pain. Neurological: Negative for headaches. Objective:     Visit Vitals  /70   Pulse 67   Temp 97.5 °F (36.4 °C) (Oral)   Resp 16   Ht 5' (1.524 m)   Wt 140 lb (63.5 kg)   SpO2 96%   BMI 27.34 kg/m²       PHYSICAL EXAM:  Physical Exam   Constitutional: She is oriented to person, place, and time and well-developed, well-nourished, and in no distress. HENT:   Right Ear: Tympanic membrane, external ear and ear canal normal.   Left Ear: Tympanic membrane, external ear and ear canal normal.   Nose: Nose normal.   Mouth/Throat: Oropharynx is clear and moist.   Eyes: Pupils are equal, round, and reactive to light. Neck: Normal range of motion. Neck supple. No thyromegaly present.    Cardiovascular: Normal rate, regular rhythm, normal heart sounds and intact distal pulses. No murmur heard. Pulmonary/Chest: Effort normal and breath sounds normal. She has no wheezes. Neurological: She is alert and oriented to person, place, and time. Skin: Skin is warm and dry. Vitals reviewed. Pt ambulates with a walker. Assessment/Plan:       ICD-10-CM ICD-9-CM    1. Muscular deconditioning R29.898 781.99 REFERRAL TO PHYSICAL THERAPY   2. 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 Screening cholesterol level Z13.220 V77.91 LIPID PANEL      METABOLIC PANEL, COMPREHENSIVE   4. Hearing loss of left ear, unspecified hearing loss type H91.92 389.9 REFERRAL TO AUDIOLOGY     Patient given opportunity to ask questions. Questions answered. Patient understands plan of care. Follow-up and Dispositions    · Return in about 1 month (around 5/29/2019) for medicare wellness physical. .       Written by Melissa Brock, as dictated by DO. FRIEDA Keyes, Dr. Cong Trevino, confirm that all documentation is accurate.

## 2019-05-13 ENCOUNTER — TELEPHONE (OUTPATIENT)
Dept: FAMILY MEDICINE CLINIC | Age: 74
End: 2019-05-13

## 2019-05-14 DIAGNOSIS — J45.30 MILD PERSISTENT REACTIVE AIRWAY DISEASE WITHOUT COMPLICATION: ICD-10-CM

## 2019-05-20 ENCOUNTER — HOSPITAL ENCOUNTER (OUTPATIENT)
Dept: PHYSICAL THERAPY | Age: 74
Discharge: HOME | End: 2019-05-20
Payer: MEDICARE

## 2019-05-20 PROCEDURE — 97161 PT EVAL LOW COMPLEX 20 MIN: CPT

## 2019-05-20 NOTE — PROGRESS NOTES
PHYSICAL THERAPY - DAILY TREATMENT NOTE  Patient Name: Rogelio Douglas        Date: 2019  : 1945   [x]  Patient  Verified  Visit #:     Insurance: Payor: Jens Rosen / Plan: [address] [place] [address] HMO / Product Type: Managed Care Medicare /      In time:   2:40          Out time:   3:30 Total Treatment Time (min):   30   Medicare Time Tracking (below)   Total Timed Codes (min):  0 1:1 Treatment Time:  0     SUBJECTIVE    Pain Level (on 0 to 10 scale):  0  / 10   Medication Changes/New allergies or changes in medical history, any new surgeries or procedures? []  No    []  Yes   If yes, update Summary List:    Subjective Functional Status/Changes:  []  No changes reported       HISTORY    Present Symptoms: no pain    Present since:  See chart 2016  [] Improving []  Unchanging []  Worsening        Commenced as a result of: paraspinal abscess, resulting in multiple fractures. All healed, currently deconditioned with pain upon rise without AD. Initially thought it was sciatica/bursitis. After falling 3-4 times, was hospitalized for ~1 month during which patient was dx'd with spinal abscess. Had been close to death. Was told initially would be bedridden, but 2nd opinion had been placed in a plastic cast (LS0). Acute care x 1 month, INR x 5 weeks. Was released to home, but no HHPT. Was in cast for 9 months. At end of 9 months, had fully healed. . 2017 had multiple comorbidity complications resulting in WC use. 2018 had surgeries as well to address uterus/catheter. Started walking with RW in end of  as she no longer had a fallen uterus or had a catheter.    Eventually strengthened with HHPT for 2 months 2019 , and is now in Outpatient PT.     or  []  No apparent reason    Symptoms at onset:  [x] Back/buttock []  Thigh []  Leg     Constant symptoms:  [] Back []  Thigh []  Leg       Intermittent symptoms:  [x] Back []  Thigh []  Leg     Worse:  [x] Stooping forward and sustaining position  [x]  Walking without walker- 10 steps max []  Lying   [] Sitting/ Rising [x]  Standing [] When still   []  Am/ as the day progresses/ pm []  On the move []Other:     Other:      Better:   [x] Bending  []  Walking []  Lying   [] Sitting/ Rising []  Standing [] When still   []  Am/ as the day progresses/ pm []  On the move []Other:       Other: walking with AD    Disturbed sleep: [x] No    [] Yes       Sleeping Postures:  []  Prone []  Supine   [x]  R side [] L side    Current Treatment: recent DC from PT. Gabapentin. SPECIFIC QUESTIONS    [] Cough []  Deep breath []  -ve   [] Sneeze []  + ve      Bladder:  [] Normal [x]  Abnormal     Gait:  [x] Normal []  Abnormal   Reliant on RW    General Health:  [] Good [x]  Fair []  Poor     Imaging:   [x] Yes []  No     Work:  Mechanical Stresses: retired    Leisure: Mechanical Stresses: recreational walking. Functional disability from present episode: pain/discomfort with stooping, walking without AD, standing without AD.       Functional disability score- See FS FOTO score       OBJECTIVE  EXAMINATION  Posture:  Sitting  [x] Good []  Fair []  Poor     Standing:  [] Good [x]  Fair []  Poor     Lordosis:  [x] Red []  Acc []  Normal       Lateral shift:  [] Right []  Left []  Nil   -Relevant ? no    Correction of posture:  [] Better [] Worse [x]  No effect     Other observations:      Neurological:    Motor deficit:   L(0-5) R (0-5) Pain   Hip Flexion (L1,2) 4 4 []   Knee Extension (L3,4) 5 5 []   Ankle Dorsiflexion (L4) 5 5 []   Great Toe Extension (L5) 5 5 []   Ankle Plantarflexion (S1) 5 5 []   Knee Flexion (S1,2) 4 4 []   Gluteus Ahsan 3 3 []   Gluteus Medius 3 3 []     Reflexes:NT  Sensory deficit:WNL    Movement loss Pj Mod Min Nil Pain   Flexion    x NE   Extension   x  NE   Side Gliding R    x NE   Side Gliding L    x NE       Post Treatment Pain Level (on 0 to 10) scale:   0  / 10     ASSESSMENT      min Patient Education:  Shanthi Knowles Reviewed HEP   []  Progressed/Changed HEP based on:          ASSESSMENT    Assessment  Justification for Eval Code Complexity:  Patient History (low 0, mod 1-2, high 3-4): high  Examination (low 1-2, mod 3+, high 4+): mod  Clinical Presentation (low stable or uncomplicated, mod evolving or changing, high unstable or unpredictable): low  Clinical Decision Making (low , mod 26-74, high 1-25): FOTO mod      []  See Progress Note/Recertification   Patient will continue to benefit from skilled PT services to : SEE POC   Progress toward goals / Updated goals:    See POC     PLAN    []  Upgrade activities as tolerated YES Continue plan of care   []  Discharge due to :    [x]  Other: Initiate POC     Therapist: Jozef Mendiola \"RENETTA\" RANULFO Sotomayor, Cert. MDT, CertCarmelina BANG, Cert.  SMT    Date: 5/20/2019 Time: 2:47 PM

## 2019-05-20 NOTE — TELEPHONE ENCOUNTER
Pt called in and was requesting this refill again. She informed me that she really needs it by tomorrow. Please advise.

## 2019-05-21 ENCOUNTER — TELEPHONE (OUTPATIENT)
Dept: FAMILY MEDICINE CLINIC | Age: 74
End: 2019-05-21

## 2019-05-21 NOTE — TELEPHONE ENCOUNTER
Returned call to [facility] w/t [address] [facility]. We have the paperwork and Dr. Bella Chong will fill out during patients next appointment 5/30/19.

## 2019-05-21 NOTE — PROGRESS NOTES
[address] [facility] PHYSICAL THERAPY  [address] [facility], [address] - Phone: [phone]  Fax: [phone] / [facility]  Patient Name: Uma Garcia : 1945   Medical   Diagnosis: Low back pain [M54.5] Treatment Diagnosis: LBP  BLE Deconditioning   Onset Date: 2016     Referral Source: Frankie Borges DO Start of Care [facility]): 2019   Prior Hospitalization: See medical history Provider #: [id number]   Prior Level of Function: Ambulatory without AD. Comorbidities: Heart disease, OA, Osteoporosis, Asthma, prolapsed uterus   Medications: Verified on Patient Summary List   The Plan of Care and following information is based on the information from the initial evaluation.   ===========================================================================================  Assessment / key information: Patient is a 74 y.o. female presenting to clinic with RW with CC LBP and limitations in standing/walking activity tolerance. She reports initial onset ~2016 during which she had a spinal abcsess that led to multiple medical complications that left her WC bound for ~2 years. She has since progressively improved despite other comorbidities that limited her ADL's (prolapsed uterus and catheter use). Patient denies any paraesthesia, having pain generalized around her L/S and buttocks. She is able to alleviate and abolish pain positionally. Pt  will benefit from physical therapist management to address her impairments (listed below),  educate her, and improve her level of function. Due to her deconditioned status, she would also benefit greatly from including aquatherapy in her POC.  Thanks for your referral.   ===========================================================================================  Eval Complexity: History: HIGH Complexity :3+ comorbidities / personal factors will impact the outcome/ POC Exam:MEDIUM Complexity : 3 Standardized tests and measures addressing body structure, function, activity limitation and / or participation in recreation  Presentation: LOW Complexity : Stable, uncomplicated  Clinical Decision Making:MEDIUM Complexity : FOTO score of 26-74Overall Complexity:LOW   Problem List: pain affecting function, decrease ROM, decrease strength, impaired gait/ balance, decrease ADL/ functional abilitiies, decrease activity tolerance, decrease flexibility/ joint mobility and decrease transfer abilities  FOTO score: 51 indicating 49% functional disability  Treatment Plan may include any combination of the following: Therapeutic exercise, Therapeutic activities, Neuromuscular re-education, Physical agent/modality, Gait/balance training, Manual therapy, Aquatic therapy, Patient education, Self Care training, Functional mobility training, Home safety training and Stair training  Patient / Family readiness to learn indicated by: asking questions, trying to perform skills and interest  Persons(s) to be included in education: patient (P)  Barriers to Learning/Limitations: yes;  financial  Measures taken: Early Progression in HEP   Patient Goal (s): \"Walk without the walker\"   Patient self reported health status: good  Rehabilitation Potential: excellent   Short Term Goals: To be accomplished in  4  weeks:  1. Patient to be adherent to HEP to facilitate pain control with ADL's.  2. Patient to tolerate > 30' aquatherapy to facilitate loaded activity tolerance on land for ADL's.   3. Patient to reduce 5 times sit <-->stand test to < 13 seconds to indicate reduced risk for falls    4. Patient to ambulate > 100' on even surfaces with SPC to improve househould ambulatory status and safety.  Long Term Goals: To be accomplished in  8  weeks:  1. Patient to be Safe and Independent with HEP to self-manage/prevent symptoms after DC.   2. Patient to reduce TUG score to < 20\" to indicate increased safety in community mobility. 3. Patient to increase FOTO score to > 60 to indicate improved functional independence. 4. Patient to initiate walking program from [address] [place] [address] with [employer] to promote wellness and community mobility. Frequency / Duration:   Patient to be seen  2  times per week for 8  weeks:  Patient / Caregiver education and instruction: activity modification and exercises. We reviewed our facility's Patient Personal Responsibilities (PPR) form, particularly in regards to compliance towards her appointment time, our attendance policy, and her home exercise program. Patient was informed of possible discharge for non-compliance to our attendance policy per PPR form. We also discussed her POC as deemed appropriate by the treating therapist and physician. Patient verbalized understanding that she must show objective and functional improvement in an appropriate time frame. Patient verbalized understanding that should progress or compliance be lacking, we will contact the referring physician for further consultation to address and attempt to establish alternate treatment strategies as necessary and/or possibly discharge. Therapist Signature: Kingsley Cole \"BJ\" Lidia Soriano DPT, Cert. MDT, Cert. DN, Cert. SMT Date: 6/07/7276   Certification Period: 5/20/19-8/19/19 Time: 9:33 AM   ===========================================================================================  I certify that the above Physical Therapy Services are being furnished while the patient is under my care. I agree with the treatment plan and certify that this therapy is necessary. Physician Signature:        Date:       Time:     Please sign and return to [facility] or you may fax the signed copy to [phone]. Thank you.

## 2019-05-22 ENCOUNTER — HOSPITAL ENCOUNTER (OUTPATIENT)
Dept: PHYSICAL THERAPY | Age: 74
Discharge: HOME | End: 2019-05-22
Payer: MEDICARE

## 2019-05-22 PROCEDURE — 97110 THERAPEUTIC EXERCISES: CPT

## 2019-05-22 RX ORDER — MONTELUKAST SODIUM 10 MG/1
TABLET ORAL
Qty: 90 TAB | Refills: 1 | Status: SHIPPED | OUTPATIENT
Start: 2019-05-22 | End: 2019-10-28 | Stop reason: SDUPTHER

## 2019-05-22 NOTE — PROGRESS NOTES
PHYSICAL THERAPY - DAILY TREATMENT NOTE    Patient Name: Lu Motta        Date: 2019  : 1945   YES Patient  Verified  Visit #:   2     Insurance: Payor: Robi Camara / Plan: [address] [place] [address] HMO / Product Type: Managed Care Medicare /      In time: 126 Out time: 239   Total Treatment Time: 73     Medicare/BCBS Time Tracking (below)   Total Timed Codes (min):  73 1:1 Treatment Time:  39     TREATMENT AREA =  Low back pain [M54.5]    SUBJECTIVE    Pain Level (on 0 to 10 scale):  0  / 10   Medication Changes/New allergies or changes in medical history, any new surgeries or procedures? NO    If yes, update Summary List   Subjective Functional Status/Changes:  []  No changes reported     Pt reports that she has discomfort in her L/S when lays supine with LE's extended. OBJECTIVE  Therapeutic Procedures:  Min Procedure Specifics + Rationale   73(39) [x] Therapeutic Exercise    See Flowsheet   Rationale: increase ROM and increase strength to improve the patients ability to participate in ADL's              min Patient Education:  YES Reviewed HEP         Other Objective/Functional Measures:    See flowsheet for more details  Difficulty with contracted rectus c SB Core 0  Increased fatigue with pallof press  P! with hookliying hip abduction and patient requested DC TB use due to pt reports of brusing easily. Increased reest required between exercises    Mod/Max VC and demos required throughout session for proper form and increased safety    Progressed therex per flowsheet     Post Treatment Pain Level (on 0 to 10) scale:   0  / 10     ASSESSMENT    Assessment/Changes in Function:     Patient tolerated treatment session well and is progressing well towards goals. Pt asked a lot of questions today regarding exercises and was concerned regarding performing exercises properly.       []  See Progress Note/Recertification   Patient will continue to benefit from skilled PT services to analyze,, cue,, progress,, modify,, demonstrate,, instruct, and address, movement patterns,, therapeutic interventions,, postural abnormalities,, soft tissue restrictions,, ROM,, strength,, functional mobility,, body mechanics/ergonomics, and home and community integration, to attain remaining goals. Progress toward goals / Updated goals:    1st session since initial evaluation, no notable progress yet.      PLAN    [x]  Upgrade activities as tolerated YES Continue plan of care   []  Discharge due to :    []  Other:      Therapist: Teresa Bowles DPT    Date: 5/22/2019 Time: 1:26 PM     Future Appointments   Date Time Provider Earnest Mosher   5/22/2019  1:30 PM [facility]   5/30/2019 10:20 AM Yoav Gregorio DO [facility] SCHED   6/6/2019 11:00 AM Radha Louis PT [facility]   6/13/2019  1:00 PM Jazmine Medina MD [address] [place] [address]

## 2019-05-23 ENCOUNTER — APPOINTMENT (OUTPATIENT)
Dept: PHYSICAL THERAPY | Age: 74
End: 2019-05-23
Payer: MEDICARE

## 2019-05-29 ENCOUNTER — APPOINTMENT (OUTPATIENT)
Dept: PHYSICAL THERAPY | Age: 74
End: 2019-05-29
Payer: MEDICARE

## 2019-05-30 ENCOUNTER — OFFICE VISIT (OUTPATIENT)
Dept: FAMILY MEDICINE CLINIC | Age: 74
End: 2019-05-30

## 2019-05-30 VITALS
TEMPERATURE: 96.2 F | OXYGEN SATURATION: 95 % | DIASTOLIC BLOOD PRESSURE: 71 MMHG | HEIGHT: 60 IN | WEIGHT: 138 LBS | HEART RATE: 70 BPM | RESPIRATION RATE: 16 BRPM | SYSTOLIC BLOOD PRESSURE: 146 MMHG | BODY MASS INDEX: 27.09 KG/M2

## 2019-05-30 DIAGNOSIS — Z78.9 FULL CODE STATUS: ICD-10-CM

## 2019-05-30 DIAGNOSIS — H91.92 DECREASED HEARING OF LEFT EAR: ICD-10-CM

## 2019-05-30 DIAGNOSIS — Z00.00 MEDICARE ANNUAL WELLNESS VISIT, SUBSEQUENT: Primary | ICD-10-CM

## 2019-05-30 DIAGNOSIS — Z86.19 HX OF CLOSTRIDIUM DIFFICILE INFECTION: ICD-10-CM

## 2019-05-30 DIAGNOSIS — Z12.11 SCREEN FOR COLON CANCER: ICD-10-CM

## 2019-05-30 DIAGNOSIS — J30.89 NON-SEASONAL ALLERGIC RHINITIS DUE TO OTHER ALLERGIC TRIGGER: ICD-10-CM

## 2019-05-30 DIAGNOSIS — J40 BRONCHITIS: ICD-10-CM

## 2019-05-30 RX ORDER — AZITHROMYCIN 250 MG/1
TABLET, FILM COATED ORAL
Qty: 6 TAB | Refills: 0 | Status: SHIPPED | OUTPATIENT
Start: 2019-05-30 | End: 2019-06-04

## 2019-05-30 RX ORDER — LORATADINE 10 MG/1
10 TABLET ORAL DAILY
Qty: 90 TAB | Refills: 1 | Status: SHIPPED | OUTPATIENT
Start: 2019-05-30 | End: 2019-10-28 | Stop reason: SDUPTHER

## 2019-05-30 NOTE — ACP (ADVANCE CARE PLANNING)
Advance Care Planning (ACP) Provider Conversation Snapshot    Date of ACP Conversation: 05/30/19  Persons included in Conversation:  patient and family  Length of ACP Conversation in minutes:  16 minutes    Authorized Decision Maker (if patient is incapable of making informed decisions): This person is:   Sister LelandJarek Jace            For Patients with Decision Making Capacity:   Values/Goals: Exploration of values, goals, and preferences if recovery is not expected, even with continued medical treatment in the event of:  Imminent death  Severe, permanent brain injury  care focused on quantity of life and changing to quality of life if no improvement after 2 weeks.      Conversation Outcomes / Follow-Up Plan:   Reviewed existing Advance Directive

## 2019-05-30 NOTE — PROGRESS NOTES
1. Have you been to the ER, urgent care clinic since your last visit? Hospitalized since your last visit? No    2. Have you seen or consulted any other health care providers outside of the [address] [place] [address] since your last visit? Include any pap smears or colon screening.  No     Patient presents in office today for annual medicare wellness exam.

## 2019-05-30 NOTE — PROGRESS NOTES
This is the Subsequent Medicare Annual Wellness Exam, performed 12 months or more after the Initial AWV or the last Subsequent AWV    I have reviewed the patient's medical history in detail and updated the computerized patient record. History     Past Medical History:   Diagnosis Date    Anemia 03/2016    in hospital - [address]     in hospital - [facility]    Bladder stones     DVT (deep venous thrombosis) ([facility] [address]     S/P IVC filter 2016. 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 patient ambulatory Summer 2018    SVT (supraventricular tachycardia) (HCC)     Uterus prolapse     grade 5. Surgically corrected 7/2018    Vaginal candida 03/16/2016      Past Surgical History:   Procedure Laterality Date    HX DILATION AND CURETTAGE      x 4 after having miscarriages.  HX GYN  07/2018    colpocleisis    HX TONSILLECTOMY      HX VITRECTOMY Left 2017    VASCULAR SURGERY PROCEDURE UNLIST  march 2016    IVC filter. Current Outpatient Medications   Medication Sig Dispense Refill    Lactobac 40-Bifido 3-S.thermop (PROBIOTIC) 100 billion cell cap Take 1 Cap by mouth daily. 90 Cap 1    montelukast (SINGULAIR) 10 mg tablet TAKE 1 TABLET BY MOUTH DAILY 90 Tab 1    ferrous gluconate 324 mg (38 mg iron) tablet TAKE 1 TABLET BY MOUTH EVERY MONDAY, WEDNESDAY, AND SUNDAY 90 Tab 3    rOPINIRole (REQUIP) 0.5 mg tablet TAKE 1 TABLET BY MOUTH EVERY NIGHT 90 Tab 2    gabapentin (NEURONTIN) 100 mg capsule TAKE 2 CAPSULES BY MOUTH THREE TIMES DAILY 540 Cap 2    albuterol (PROVENTIL HFA, VENTOLIN HFA, PROAIR HFA) 90 mcg/actuation inhaler Take 1 Puff by inhalation every four (4) hours as needed (reactive airway symptoms including cough). 1 Inhaler 0    latanoprost (XALATAN) 0.005 % ophthalmic solution Administer 2 Drops to both eyes nightly. Allergies   Allergen Reactions    Bactrim [Sulfamethoprim] Nausea Only and Other (comments)     Headache, Joint pain, loss of appetite     Sulfamethoxazole-Trimethoprim Other (comments)     History reviewed. No pertinent family history. Social History     Tobacco Use    Smoking status: Former Smoker     Packs/day: 1.00     Years: 15.00     Pack years: 15.00     Types: Cigarettes    Smokeless tobacco: Never Used    Tobacco comment: quit in the 70's   Substance Use Topics    Alcohol use: No     Alcohol/week: 0.0 oz     Frequency: Never     Patient Active Problem List   Diagnosis Code    Anemia D64.9    Procidentia of uterus N81.3    Visual changes H53.9    Back pain M54.9    RLS (restless legs syndrome) G25.81    Bladder stones N21.0    Uterus prolapse N81.4    Functional urinary incontinence R39.81    Screening cholesterol level Z13.220    LBBB (left bundle branch block) I44.7    Uterovaginal prolapse N81.4    Pain at rest R52    Diarrhea R19.7       Depression Risk Factor Screening:     3 most recent PHQ Screens 4/29/2019   Little interest or pleasure in doing things Not at all   Feeling down, depressed, irritable, or hopeless Not at all   Total Score PHQ 2 0     Alcohol Risk Factor Screening: You do not drink alcohol or very rarely. Functional Ability and Level of Safety:   Hearing Loss  Whisper Test done with abnormal results. Decrease hearing in left ear. 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 Activities of Daily Living  The home contains: no safety equipment. Patient does total self care    Fall Risk  Fall Risk Assessment, last 12 mths 10/19/2018   Able to walk? Yes   Fall in past 12 months?  No Abuse Screen  Patient is not abused    Cognitive Screening   Evaluation of Cognitive Function:  Has your family/caregiver stated any concerns about your memory: no  Normal    Patient Care Team   Patient Care Team:  Elvin Ariza DO as PCP - General (Family Practice)  Odell Corrales MD as Physician (Urology)  Zach Parker MD as Physician (Obstetrics & Gynecology)  Noemí German NP as Nurse Practitioner (Nurse Practitioner)  Deanna Stone DPM as Physician (Podiatry)  Eileen Oro MD as Physician (Neurosurgery)  Nemesio Vasquez MD as Physician (Infectious Diseases)  Anjum Hansen MD as Physician (Vascular Surgery)    Assessment/Plan   Education and counseling provided:  End-of-Life planning (with patient's consent)  Pneumococcal Vaccine  - up todate. Influenza Vaccine - declines flu vaccine. Screening Mammography - never. Refuses. Colorectal cancer screening tests - never screened referral completed today. Bone mass measurement (DEXA) - has osteoporosis. Screening for glaucoma - follows with eye specialist regularly due to occlusion of retinal vein in left eye. (central retinal vein occlusion)    Diagnoses and all orders for this visit:    1. Medicare annual wellness visit, subsequent  -     ADVANCE CARE PLANNING FIRST 30 MINS    2. Screen for colon cancer  -     REFERRAL TO GASTROENTEROLOGY    3. Full code status  -     FULL CODE    4. Hx of Clostridium difficile infection  -     Lactobac 40-Bifido 3-S.thermop (PROBIOTIC) 100 billion cell cap; Take 1 Cap by mouth daily. 5. Bronchitis  -     azithromycin (ZITHROMAX) 250 mg tablet; Take 2 tablets today, then take 1 tablet daily    6. Decreased hearing of left ear  -     REFERRAL TO ENT-OTOLARYNGOLOGY    7. Non-seasonal allergic rhinitis due to other allergic trigger  -     loratadine (CLARITIN) 10 mg tablet; Take 1 Tab by mouth daily.         Health Maintenance Due   Topic Date Due    DTaP/Tdap/Td series (1 - Tdap) 08/09/1964    Shingrix Vaccine Age 50> (1 of 2) 08/09/1993    GLAUCOMA SCREENING Q2Y  03/02/2019

## 2019-05-30 NOTE — PATIENT INSTRUCTIONS
Medicare Part B Preventive Services Limitations Recommendation Scheduled   Bone Mass Measurement  (age 72 & older, biennial) A bone mass density test is recommended when a woman turns 65 to screen for osteoporosis. This test is only recommended one time, as a screening. Some providers will use this same test as a disease monitoring tool if you already have osteoporosis. Cardiovascular Screening Blood Tests (every 5 years)  Total cholesterol, HDL, Triglycerides and ECG Order blood work  as a panel if possible and adults with routine risk  an electrocardiogram (ECG) at intervals determined by your doctor. Colorectal Cancer Screening  -Fecal occult blood test (annual)  -Flexible sigmoidoscopy (5y)  -Screening colonoscopy (10y)  -Barium Enema Colorectal cancer screenings should be done for adults age 54-65 with no increased risk factors for colorectal cancer. There are a number of acceptable methods of screening for this type of cancer. Each test has its own benefits and drawbacks. Discuss with your doctor what is most appropriate for you during your annual wellness visit. The different tests include: colonoscopy (considered the best screening method), a fecal occult blood test, a fecal DNA test, and sigmoidoscopy.      Counseling to Prevent Tobacco Use (up to 8 sessions per year)  - Counseling greater than 3 and up to 10 minutes  - Counseling greater than 10 minutes Patients must be asymptomatic of tobacco-related conditions to receive as preventive service     Diabetes Screening Tests (at least every 3 years, Medicare covers annually or at 6-month intervals for prediabetic patients)    Fasting blood sugar (FBS) or glucose tolerance test (GTT) -All adults age 38-68 who are overweight should have a diabetes screening test once every three years.   -Other screening tests and preventive services for persons with diabetes include: an eye exam to screen for diabetic retinopathy, a kidney function test, a foot exam, and stricter control over your cholesterol. Diabetes Self-Management Training (DSMT) (no USPSTF recommendation) Requires referral by treating physician for patient with diabetes or renal disease. 10 hours of initial DSMT session of no less than 30 minutes each in a continuous 12-month period. 2 hours of follow-up DSMT in subsequent years. Glaucoma Screening (no USPSTF recommendation) Diabetes mellitus, family history, , age 48 or over,  American, age 72 or over     Human Immunodeficiency Virus (HIV) Screening (annually for increased risk patients)  HIV-1 and HIV-2 by EIA, HIREN, rapid antibody test, or oral mucosa transudate Patient must be at increased risk for HIV infection per USPSTF guidelines or pregnant. Tests covered annually for patients at increased risk. Pregnant patients may receive up to 3 test during pregnancy. Medical Nutrition Therapy (MNT) (for diabetes or renal disease not recommended schedule) Requires referral by treating physician for patient with diabetes or renal disease. Can be provided in same year as diabetes self-management training (DSMT), and CMS recommends medical nutrition therapy take place after DSMT. Up to 3 hours for initial year and 2 hours in subsequent years. Shingles Vaccination A shingles vaccine is also recommended once in a lifetime after age 61     Seasonal Influenza Vaccination (annually) All adults should have a flu vaccine yearly      Pneumococcal Vaccination (once after 72) All adults over the age of 72 should receive the recommended pneumonia vaccines. Current USPSTF guidelines recommend a series of two vaccines for the best pneumonia protection.       Hepatitis B Vaccinations (if medium/high risk) Medium/high risk factors:  End-stage renal disease,  Hemophiliacs who received Factor VIII or IX concentrates, Clients of institutions for the mentally retarded, Persons who live in the same house as a HepB virus carrier, Homosexual men, Illicit injectable drug abusers. Screening Mammography (biennial age 54-69) Breast cancer screenings are recommended every other year for women of normal risk, age 54-69.      Screening Pap Tests and Pelvic Examination (up to age 79 and after 79 if unknown history or abnormal study last 10 years) Cervical cancer screenings for women over age 72 are only recommended with certain risk factors     Hepatitis C All adults born between 80 and 1965 should be screened once        Tetanus  All adults should have a tetanus vaccine every 10 years

## 2019-05-30 NOTE — PROGRESS NOTES
Subjective:     HPI:  Jose L Bo is a 74 y.o. female who presents for medicare wellness and routine follow up. Patient desires to restart probiotics due to her history of c diff infection. Denies any current symptoms of abdominal pain and diarrhea. Hearing Loss: Patient reports ears feels she can't hear well out of her left ear. She is requesting referral to ENT. Denies drainage and pain. She is concerned about a \"bump\" in the cup of the left ear. Chronic cough: Patient reports  Cough has been present for 8-9 months. She did not take abx when initally prescribed. She states she was concerned about side effects. She states the cough usually starts with phlegm in her throat and she continues to cough until this is cleared. She reports the color is from clear to brownish to blood tinged. She states she has been taking singulair. She thought it may be due to allergies due to mold in her crawl space. No fevers no chills no fatigue. Physical therapy is going well. She is ambulating with a walker. Labs reviewed. Current Outpatient Medications   Medication Sig Dispense Refill    Lactobac 40-Bifido 3-S.thermop (PROBIOTIC) 100 billion cell cap Take 1 Cap by mouth daily. 90 Cap 1    montelukast (SINGULAIR) 10 mg tablet TAKE 1 TABLET BY MOUTH DAILY 90 Tab 1    ferrous gluconate 324 mg (38 mg iron) tablet TAKE 1 TABLET BY MOUTH EVERY MONDAY, WEDNESDAY, AND SUNDAY 90 Tab 3    rOPINIRole (REQUIP) 0.5 mg tablet TAKE 1 TABLET BY MOUTH EVERY NIGHT 90 Tab 2    gabapentin (NEURONTIN) 100 mg capsule TAKE 2 CAPSULES BY MOUTH THREE TIMES DAILY 540 Cap 2    albuterol (PROVENTIL HFA, VENTOLIN HFA, PROAIR HFA) 90 mcg/actuation inhaler Take 1 Puff by inhalation every four (4) hours as needed (reactive airway symptoms including cough). 1 Inhaler 0    latanoprost (XALATAN) 0.005 % ophthalmic solution Administer 2 Drops to both eyes nightly.           Allergies   Allergen Reactions    Bactrim [Sulfamethoprim] Nausea Only and Other (comments)     Headache, Joint pain, loss of appetite     Sulfamethoxazole-Trimethoprim Other (comments)       Past Medical History:   Diagnosis Date    Anemia 03/2016    in hospital - [address] [place] [address]    Bladder stones     DVT (deep venous thrombosis) ([facility] [address]     S/P IVC filter 2016. In setting of paraplagia from Spinal abcess    Female genital prolapse     LBBB (left bundle branch block)     Osteopenia     Spinal abscess ([facility] [address] 03/2016    Caused paraplegia. patient ambulatory Summer 2018    [facility]     Uterus prolapse     grade 5. Surgically corrected 7/2018    Vaginal candida 03/16/2016        Past Surgical History:   Procedure Laterality Date    HX DILATION AND CURETTAGE      x 4 after having miscarriages.  HX GYN  07/2018    colpocleisis    HX TONSILLECTOMY      HX VITRECTOMY Left 2017    VASCULAR SURGERY PROCEDURE UNLIST  march 2016    IVC filter. History reviewed. No pertinent family history.     Social History     Socioeconomic History    Marital status:      Spouse name: Not on file    Number of children: Not on file    Years of education: Not on file    Highest education level: Not on file   Occupational History    Not on file   Social Needs    Financial resource strain: Not on file    Food insecurity:     Worry: Not on file     Inability: Not on file    Transportation needs:     Medical: Not on file     Non-medical: Not on file   Tobacco Use    Smoking status: Former Smoker     Packs/day: 1.00     Years: 15.00     Pack years: 15.00     Types: Cigarettes    Smokeless tobacco: Never Used    Tobacco comment: quit in the 70's   Substance and Sexual Activity    Alcohol use: No     Alcohol/week: 0.0 oz     Frequency: Never    Drug use: No    Sexual activity: Not Currently   Lifestyle    Physical activity:     Days per week: Not on file Minutes per session: Not on file    Stress: Not on file   Relationships    Social connections:     Talks on phone: Not on file     Gets together: Not on file     Attends [employer] service: Not on file     Active member of club or organization: Not on file     Attends meetings of clubs or organizations: Not on file     Relationship status: Not on file    Intimate partner violence:     Fear of current or ex partner: Not on file     Emotionally abused: Not on file     Physically abused: Not on file     Forced sexual activity: Not on file   Other Topics Concern    Not on file   Social History Narrative    Not on file       REVIEW OF SYSTEM:  Review of Systems   Constitutional: Negative for chills and fever. Eyes: Negative for blurred vision. Respiratory: Negative for shortness of breath. Cardiovascular: Negative for chest pain, palpitations and leg swelling. Gastrointestinal: Negative for constipation, diarrhea, nausea and vomiting. Musculoskeletal: Negative for joint pain. Neurological: Negative for headaches. Objective:     Visit Vitals  /71 (BP 1 Location: Right arm, BP Patient Position: Sitting)   Pulse 70   Temp 96.2 °F (35.7 °C) (Oral)   Resp 16   Ht 5' (1.524 m)   Wt 138 lb (62.6 kg)   SpO2 95%   BMI 26.95 kg/m²       PHYSICAL EXAM:  Physical Exam   Constitutional: She is oriented to person, place, and time and well-developed, well-nourished, and in no distress. HENT:   Head: Normocephalic and atraumatic. Right Ear: Tympanic membrane, external ear and ear canal normal.   Left Ear: Tympanic membrane, external ear and ear canal normal.   Nose: Nose normal.   Mouth/Throat: Uvula is midline, oropharynx is clear and moist and mucous membranes are normal.   Eyes: Pupils are equal, round, and reactive to light. Cardiovascular: Normal rate, regular rhythm and normal heart sounds.    Pulmonary/Chest: Effort normal and breath sounds normal.   Musculoskeletal:   Patient ambulates with a walker. Neurological: She is alert and oriented to person, place, and time. Skin: Skin is warm and dry. Lab Results   Component Value Date/Time    Sodium 139 04/29/2019 03:38 PM    Potassium 4.2 04/29/2019 03:38 PM    Chloride 103 04/29/2019 03:38 PM    CO2 30 04/29/2019 03:38 PM    Anion gap 6 04/29/2019 03:38 PM    Glucose 80 04/29/2019 03:38 PM    BUN 24 (H) 04/29/2019 03:38 PM    Creatinine 1.16 04/29/2019 03:38 PM    BUN/Creatinine ratio 21 (H) 04/29/2019 03:38 PM    GFR est AA 55 (L) 04/29/2019 03:38 PM    GFR est non-AA 46 (L) 04/29/2019 03:38 PM    Calcium 10.6 (H) 04/29/2019 03:38 PM    Bilirubin, total 0.4 04/29/2019 03:38 PM    AST (SGOT) 21 04/29/2019 03:38 PM    Alk. phosphatase 81 04/29/2019 03:38 PM    Protein, total 8.3 (H) 04/29/2019 03:38 PM    Albumin 4.4 04/29/2019 03:38 PM    Globulin 3.9 04/29/2019 03:38 PM    A-G Ratio 1.1 04/29/2019 03:38 PM    ALT (SGPT) 25 04/29/2019 03:38 PM     Lab Results   Component Value Date/Time    Cholesterol, total 190 04/29/2019 03:38 PM    HDL Cholesterol 95 (H) 04/29/2019 03:38 PM    LDL, calculated 82.2 04/29/2019 03:38 PM    VLDL, calculated 12.8 04/29/2019 03:38 PM    Triglyceride 64 04/29/2019 03:38 PM    CHOL/HDL Ratio 2.0 04/29/2019 03:38 PM       Assessment/Plan:       ICD-10-CM ICD-9-CM    1. Medicare annual wellness visit, subsequent Z00.00 V70.0 ADVANCE CARE PLANNING FIRST 30 MINS   2. Screen for colon cancer Z12.11 V76.51 REFERRAL TO GASTROENTEROLOGY   3. Full code status Z78.9 V49.89 FULL CODE   4. Hx of Clostridium difficile infection Z86.19 V12.09 Lactobac 40-Bifido 3-S.thermop (PROBIOTIC) 100 billion cell cap   5. Bronchitis J40 490 azithromycin (ZITHROMAX) 250 mg tablet   6. Decreased hearing of left ear H91.92 389.9 REFERRAL TO ENT-OTOLARYNGOLOGY   7. Non-seasonal allergic rhinitis due to other allergic trigger J30.89 477.8 loratadine (CLARITIN) 10 mg tablet     Patient given opportunity to ask questions. Questions answered.   Patient understands plan of care.

## 2019-06-05 ENCOUNTER — TELEPHONE (OUTPATIENT)
Dept: FAMILY MEDICINE CLINIC | Age: 74
End: 2019-06-05

## 2019-06-06 ENCOUNTER — HOSPITAL ENCOUNTER (OUTPATIENT)
Dept: PHYSICAL THERAPY | Age: 74
Discharge: HOME | End: 2019-06-06
Payer: MEDICARE

## 2019-06-06 PROCEDURE — 97110 THERAPEUTIC EXERCISES: CPT

## 2019-06-06 NOTE — PROGRESS NOTES
PHYSICAL THERAPY - DAILY TREATMENT NOTE    Patient Name: Nacho Boss        Date: 2019  : 1945   YES Patient  Verified  Visit #:   3     Insurance: Payor: Loni Hursta / Plan: [address] [place] [address] HMO / Product Type: Managed Care Medicare /      In time: 11:02 Out time: 12:10   Total Treatment Time: 68     Medicare/BCBS [place] Time Tracking (below)   Total Timed Codes (min):  68 1:1 Treatment Time:  25     TREATMENT AREA =  LBP, BLEs     SUBJECTIVE    Pain Level (on 0 to 10 scale):  0  / 10   Medication Changes/New allergies or changes in medical history, any new surgeries or procedures? NO    If yes, update Summary List   Subjective Functional Status/Changes:  []  No changes reported     \"I feel like my left buttock is larger and swollen compared to the right. I also feel like my legs are uneven and my home therapist said they were. What would cause that? \"          OBJECTIVE      68  (25) min Therapeutic Exercise:  [x]  See flow sheet   Rationale:      increase ROM, increase strength, improve balance and stbaility to improve the patients ability to perform ADLs and amb with increased ease      min Patient Education:  YES  Reviewed HEP   []  Progressed/Changed HEP based on:   Cont HEP     Other Objective/Functional Measures:  Assessed SIJ due to c/o asymmetrical LEs. Noted right anterior/left posterior rotation with (+) long sit test for left posterior rotation.  Pt reports improvement following MET  Pt unable to tolerate supine with LE extended due to pelvic/posterior hip   Changed to [facility] and pt able to tolerate supine exercises  Added new TE per flow sheet without c/o increased pain     Post Treatment Pain Level (on 0 to 10) scale:   0  / 10     ASSESSMENT    Assessment/Changes in Function:     Noted SIJ component, improved with MET     []  See Progress Note/Recertification   Patient will continue to benefit from skilled PT services to analyze, cue, progress, modify,, demonstrate, instruct, and address, movement patterns, therapeutic interventions, postural abnormalities, soft tissue restrictions, ROM, strength, functional mobility, body mechanics/ergonomics, and home and community integration, to attain remaining goals. Progress toward goals / Updated goals: · Short Term Goals: To be accomplished in  4  weeks:  1. Patient to be adherent to HEP to facilitate pain control with ADL's.  2. Patient to tolerate > 30' aquatherapy to facilitate loaded activity tolerance on land for ADL's.   3. Patient to reduce 5 times sit <-->stand test to < 13 seconds to indicate reduced risk for falls  Added mini squats today for sit<>stand strengthening   4. Patient to ambulate > 100' on even surfaces with SPC to improve househould ambulatory status and safety. · Long Term Goals: To be accomplished in  8  weeks:  1. Patient to be Safe and Independent with HEP to self-manage/prevent symptoms after DC. 2. Patient to reduce TUG score to < 20\" to indicate increased safety in community mobility. 3. Patient to increase FOTO score to > 60 to indicate improved functional independence.   4. Patient to initiate walking program from [address] [place] [address] with [facility] to promote wellness and community          PLAN    []  Upgrade activities as tolerated YES Continue plan of care   []  Discharge due to :    []  Other:      Therapist: Pat Sethi DPT    Date: 6/6/2019 Time: 11:48 AM     Future Appointments   Date Time Provider Earnest Mosher   6/11/2019  1:30 PM [address] [place] [address]   6/13/2019  1:00 PM Carmela Freeman MD [address] [place] [address]   6/14/2019  1:00 PM [facility]   6/18/2019  1:30 PM Desire Brunson PT [facility]   6/20/2019  2:00 PM Desire Brunson PT [facility]   6/24/2019  1:30 PM Bhavik Atkinson [address] [place] [address]   6/27/2019  2:00 PM Desire Brunson PT [facility]

## 2019-06-11 ENCOUNTER — HOSPITAL ENCOUNTER (OUTPATIENT)
Dept: PHYSICAL THERAPY | Age: 74
Discharge: HOME | End: 2019-06-11
Payer: MEDICARE

## 2019-06-11 PROCEDURE — 97110 THERAPEUTIC EXERCISES: CPT

## 2019-06-11 NOTE — PROGRESS NOTES
PHYSICAL THERAPY - DAILY TREATMENT NOTE    Patient Name: Samir Payer        Date: 2019  : 1945   YES Patient  Verified  Visit #:     Insurance: Payor: Rasheed Gauze / Plan: [address] [place] [address] HMO / Product Type: Managed Care Medicare /      In time: 125 Out time: 223   Total Treatment Time: 58     Medicare/BCBS Time Tracking (below)   Total Timed Codes (min):  58 1:1 Treatment Time:  40     TREATMENT AREA =  Low back pain [M54.5]    SUBJECTIVE    Pain Level (on 0 to 10 scale):  0  / 10   Medication Changes/New allergies or changes in medical history, any new surgeries or procedures? NO    If yes, update Summary List   Subjective Functional Status/Changes:  []  No changes reported     Pt requests to have her pelvis corrected again today. OBJECTIVE  Therapeutic Procedures:  Min Procedure Specifics + Rationale   58(40) [x] Therapeutic Exercise    See Flowsheet   Rationale: increase ROM and increase strength to improve the patients ability to participate in ADL's         min Patient Education:  YES Reviewed HEP         Other Objective/Functional Measures:    See flowsheet for more details  Performed right over left SIj correction MET 2 sets of 10 x 10\" in varying leg positions. Updated HEP with SIJ Met correction. Mod VC and demos required throughout session for proper form and increased safety    Progressed therex per flowsheet     Post Treatment Pain Level (on 0 to 10) scale:   0  / 10     ASSESSMENT    Assessment/Changes in Function:     Patient tolerated treatment session well and is progressing well towards goals.  Improving tolerance to treatment plan and requiring less rest.      []  See Progress Note/Recertification   Patient will continue to benefit from skilled PT services to analyze,, cue,, progress,, modify,, demonstrate,, instruct, and address, movement patterns,, therapeutic interventions,, postural abnormalities,, soft tissue restrictions,, ROM,, strength,, functional mobility,, body mechanics/ergonomics, and home and community integration, to attain remaining goals. Progress toward goals / Updated goals: · Short Term Goals: To be accomplished in  4  weeks:  1. Patient to be adherent to HEP to facilitate pain control with ADL's.  2. Patient to tolerate > 30' aquatherapy to facilitate loaded activity tolerance on land for ADL's.   3. Patient to reduce 5 times sit <-->stand test to < 13 seconds to indicate reduced risk for falls    -increased reps of mini squats today  4. Patient to ambulate > 100' on even surfaces with SPC to improve househould ambulatory status and safety. · Long Term Goals: To be accomplished in  8  weeks:  1. Patient to be Safe and Independent with HEP to self-manage/prevent symptoms after DC. 2. Patient to reduce TUG score to < 20\" to indicate increased safety in community mobility. 3. Patient to increase FOTO score to > 60 to indicate improved functional independence. 4. Patient to initiate walking program from [address] [place] [address] with LRAD to promote wellness and community mobility.       PLAN    [x]  Upgrade activities as tolerated YES Continue plan of care   []  Discharge due to :    []  Other:      Therapist: Lewis Pineda DPT    Date: 6/11/2019 Time: 1:22 PM     Future Appointments   Date Time Provider Earnest Mosher   6/11/2019  1:30 PM [address] [place] [address]   6/13/2019  1:00 PM Stefan Michelle MD [address] [place] [address]   6/14/2019  1:00 PM Dariana [facility]   6/18/2019  1:30 PM Faby Alcantar [facility]   6/20/2019  2:00 PM Faby Alcantar PT [facility]   6/24/2019  1:30 PM Esther Fraser, [facility]   6/27/2019  2:00 PM Faby Alcantar [facility]

## 2019-06-13 ENCOUNTER — OFFICE VISIT (OUTPATIENT)
Dept: CARDIOLOGY CLINIC | Age: 74
End: 2019-06-13

## 2019-06-13 ENCOUNTER — DOCUMENTATION ONLY (OUTPATIENT)
Dept: OBGYN CLINIC | Age: 74
End: 2019-06-13

## 2019-06-13 VITALS
BODY MASS INDEX: 26.56 KG/M2 | WEIGHT: 136 LBS | SYSTOLIC BLOOD PRESSURE: 150 MMHG | DIASTOLIC BLOOD PRESSURE: 70 MMHG | HEART RATE: 73 BPM | OXYGEN SATURATION: 94 %

## 2019-06-13 DIAGNOSIS — I44.7 LBBB (LEFT BUNDLE BRANCH BLOCK): Primary | ICD-10-CM

## 2019-06-13 NOTE — PROGRESS NOTES
[facility]    Ms. Mario Terrazas is a 74 y.o. female with a history of left bundle branch block, uterine prolapse, other multiple medical problems. Ms. Mario Terrazas is here today for follow-up appointment. She denies any cardiac symptoms since last visit. She has undergone surgery for uterine prolapse last year. She is slowly getting her strength back and going to a rehab treatment. She denies any chest pain or chest tightness she denies any palpitation, presyncope or syncope. She is using 1-2 pillows at night. She denies any lower extremity swelling. Overall she has no cardiac symptoms to report    Past Medical History:   Diagnosis Date    Anemia 03/2016    in [facility] - [address] [place] [address] - [facility]    Bladder stones     DVT (deep venous thrombosis) ([facility]     S/P IVC filter 2016. 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 patient ambulatory Summer 2018    SVT (supraventricular tachycardia) (HCC)     Uterus prolapse     grade 5. Surgically corrected 7/2018    Vaginal candida 03/16/2016         Past Surgical History:   Procedure Laterality Date    HX DILATION AND CURETTAGE      x 4 after having miscarriages.  HX GYN  07/2018    colpocleisis    HX TONSILLECTOMY      HX VITRECTOMY Left 2017    VASCULAR SURGERY PROCEDURE UNLIST  march 2016    IVC filter. Current Outpatient Medications   Medication Sig    loratadine (CLARITIN) 10 mg tablet Take 1 Tab by mouth daily.     montelukast (SINGULAIR) 10 mg tablet TAKE 1 TABLET BY MOUTH DAILY    ferrous gluconate 324 mg (38 mg iron) tablet TAKE 1 TABLET BY MOUTH EVERY MONDAY, WEDNESDAY, AND SUNDAY    rOPINIRole (REQUIP) 0.5 mg tablet TAKE 1 TABLET BY MOUTH EVERY NIGHT    gabapentin (NEURONTIN) 100 mg capsule TAKE 2 CAPSULES BY MOUTH THREE TIMES DAILY (Patient taking differently: two (2) times a day. TAKE 2 CAPSULES BY MOUTH bid)    albuterol (PROVENTIL HFA, VENTOLIN HFA, PROAIR HFA) 90 mcg/actuation inhaler Take 1 Puff by inhalation every four (4) hours as needed (reactive airway symptoms including cough).  latanoprost (XALATAN) 0.005 % ophthalmic solution Administer 2 Drops to both eyes nightly. No current facility-administered medications for this visit. Allergies and Sensitivities:  Allergies   Allergen Reactions    Bactrim [Sulfamethoprim] Nausea Only and Other (comments)     Headache, Joint pain, loss of appetite     Sulfamethoxazole-Trimethoprim Other (comments)       Family History:  No family history on file. Social History:  Social History     Tobacco Use    Smoking status: Former Smoker     Packs/day: 1.00     Years: 15.00     Pack years: 15.00     Types: Cigarettes    Smokeless tobacco: Never Used    Tobacco comment: quit in the 70's   Substance Use Topics    Alcohol use: No     Alcohol/week: 0.0 oz     Frequency: Never    Drug use: No     She  reports that she has quit smoking. Her smoking use included cigarettes. She has a 15.00 pack-year smoking history. She has never used smokeless tobacco.  She  reports that she does not drink alcohol. Review of Systems:  Cardiac symptoms as noted above in HPI. All others negative. Denies fatigue, malaise, skin rash, blurring vision, photophobia, neck pain, hemoptysis, chronic cough, nausea, vomiting, hematuria, burning micturition, BRBPR, chronic headaches. Physical Exam:  BP Readings from Last 3 Encounters:   06/13/19 150/70   05/30/19 146/71   04/29/19 139/70         Pulse Readings from Last 3 Encounters:   06/13/19 73   05/30/19 70   04/29/19 67          Wt Readings from Last 3 Encounters:   06/13/19 136 lb (61.7 kg)   05/30/19 138 lb (62.6 kg)   04/29/19 140 lb (63.5 kg)       Constitutional: Oriented to person, place, and time. HENT: Head: Normocephalic and atraumatic. Neck: No JVD present. Carotid bruit is not appreciated. Cardiovascular: Regular rhythm. No murmur, gallop or rubs appreciated  Lung: Breath sounds normal. No respiratory distress. No ronchi or rales appreciated  Abdominal: No tenderness. No rebound and no guarding. Musculoskeletal: There is no lower extremity edema. No cynosis      Review of Data  LABS:   Lab Results   Component Value Date/Time    Sodium 139 04/29/2019 03:38 PM    Potassium 4.2 04/29/2019 03:38 PM    Chloride 103 04/29/2019 03:38 PM    CO2 30 04/29/2019 03:38 PM    Glucose 80 04/29/2019 03:38 PM    BUN 24 (H) 04/29/2019 03:38 PM    Creatinine 1.16 04/29/2019 03:38 PM     Lipids Latest Ref Rng & Units 4/29/2019 2/27/2018 1/24/2017   Chol, Total <200 MG/ 181 244(H)   HDL 40 - 60 MG/DL 95(H) 65 63   LDL 0 - 100 MG/DL 82.2 101(H) 146(H)   Trig <150 MG/DL 64 76 174(H)   Chol/HDL Ratio 0 - 5.0   2.0 - -     Lab Results   Component Value Date/Time    ALT (SGPT) 25 04/29/2019 03:38 PM     No results found for: HBA1C, HGBE8, XMR0MTRK, DOP4QELI, FGO0LPIO    EKG  (2018) LBBB, sinus rhythm    ECHO (06/18)  · Left ventricular low normal systolic function. Estimated left ventricular ejection fraction is 51 - 55%. Left ventricular mild concentric hypertrophy observed. Abnormal left ventricular septal motion consistent with left bundle branch block. Mild (grade 1) left ventricular diastolic dysfunction. · Right ventricular global systolic function is borderline low. · Mild aortic valve sclerosis with no significant stenosis. Mild to moderate aortic valve regurgitation is present. · Trace mitral valve regurgitation. · Mild tricuspid valve regurgitation is present. Pulmonary arterial systolic pressure is 30 mmHg. ·   IMPRESSION & PLAN:  Ms. Maegan Boogie is a 74 y.o. female with multiple medical problems.     Left bundle branch block:    Chronic and asymptomatic   Echo with normal EF in 2018     Currently she denies any symptoms to suggest angina or heart failure. She has no evidence of fluid overload on exam.  Recommend to continue with clinical observation. This plan was discussed with patient who is in agreement. Thank you for allowing me to participate in patient care. Please feel free to call me if you have any question or concern. Maddie Enciso MD  Please note: This document has been produced using voice recognition software. Unrecognized errors in transcription may be present.

## 2019-06-14 ENCOUNTER — HOSPITAL ENCOUNTER (OUTPATIENT)
Dept: PHYSICAL THERAPY | Age: 74
Discharge: HOME | End: 2019-06-14
Payer: MEDICARE

## 2019-06-14 PROCEDURE — 97110 THERAPEUTIC EXERCISES: CPT

## 2019-06-14 NOTE — PROGRESS NOTES
PHYSICAL THERAPY - DAILY TREATMENT NOTE    Patient Name: Aurea Witt        Date: 2019  : 1945   YES Patient  Verified  Visit #:     Insurance: Payor: Jony Bo / Plan: [address] [place] [address] HMO / Product Type: Managed Care Medicare /      In time:  Out time: 153   Total Treatment Time: 56     Medicare/BC Time Tracking (below)   Total Timed Codes (min):  56 1:1 Treatment Time:  38     TREATMENT AREA =  Low back pain [M54.5]    SUBJECTIVE    Pain Level (on 0 to 10 scale):  0  / 10   Medication Changes/New allergies or changes in medical history, any new surgeries or procedures? NO    If yes, update Summary List   Subjective Functional Status/Changes:  []  No changes reported     Pt states she would like to start aquatic therapy, exercise DPT added last session has helped her symptoms a great deal.      OBJECTIVE  Therapeutic Procedures:  Min Procedure Specifics + Rationale   56(38) [x] Therapeutic Exercise    See Flowsheet   Rationale: increase ROM and increase strength to improve the patients ability to participate in ADL's         min Patient Education:  YES Reviewed HEP         Other Objective/Functional Measures:    See flowsheet for more details  Challenged heavily by DARSHAN gagnon/SUE  Updated hEP  Pt declined TB use  Mod/Min VC and demos required throughout session for proper form and increased safety    Progressed therex per flowsheet     Post Treatment Pain Level (on 0 to 10) scale:   0  / 10     ASSESSMENT    Assessment/Changes in Function:     Patient tolerated treatment session well and is progressing well towards goals. [place]en[place] appropriated by current POC.       []  See Progress Note/Recertification   Patient will continue to benefit from skilled PT services to analyze,, cue,, progress,, modify,, demonstrate,, instruct, and address, movement patterns,, therapeutic interventions,, postural abnormalities,, soft tissue restrictions,, ROM,, strength,, functional mobility,, body mechanics/ergonomics, and home and community integration, to attain remaining goals. Progress toward goals / Updated goals:     Will begin aquatic therapy once her functional ability improves     PLAN    [x]  Upgrade activities as tolerated YES Continue plan of care   []  Discharge due to :    []  Other:      Therapist: Cristian Villalobos DPT    Date: 6/14/2019 Time: 12:56 PM     Future Appointments   Date Time Provider Earnest Vidhi   6/14/2019  1:00 PM [address] [place] [address]   6/18/2019  1:30 PM Yahaira Stewart [facility]   6/20/2019  2:00 PM Yahaira Stewart PT [facility]   6/24/2019  1:30 PM Samy Howard, [address] [place] [address]   6/27/2019  2:00 PM Yahaira Stewart PT [facility]   6/18/2020  1:15 PM Shreya Toribio MD [address] [place] [address]

## 2019-06-18 ENCOUNTER — HOSPITAL ENCOUNTER (OUTPATIENT)
Dept: PHYSICAL THERAPY | Age: 74
Discharge: HOME | End: 2019-06-18
Payer: MEDICARE

## 2019-06-18 PROCEDURE — 97110 THERAPEUTIC EXERCISES: CPT

## 2019-06-18 NOTE — PROGRESS NOTES
PHYSICAL THERAPY - DAILY TREATMENT NOTE    Patient Name: Trupti Kim        Date: 2019  : 1945   YES Patient  Verified  Visit #:     Insurance: Payor: Ronnie Paradaamanda / Plan: [address] [place] [address] HMO / Product Type: Managed Care Medicare /      In time: 1:37 Out time: 2:37   Total Treatment Time: 60     Medicare/BCBS [place] Time Tracking (below)   Total Timed Codes (min):  60 1:1 Treatment Time:  25     TREATMENT AREA =  LBP    SUBJECTIVE    Pain Level (on 0 to 10 scale):  0  / 10   Medication Changes/New allergies or changes in medical history, any new surgeries or procedures? NO    If yes, update Summary List   Subjective Functional Status/Changes:  []  No changes reported     \"I feel like the correction helps but it doesn't last\"          OBJECTIVE      60  (25) min Therapeutic Exercise:  [x]  See flow sheet   Rationale:      increase ROM and increase strength to improve the patients ability to perform ADLs and amb with increased ease      min Patient Education:  YES  Reviewed HEP   []  Progressed/Changed HEP based on:   Cont HEP     Other Objective/Functional Measures: Added new TE per flow sheet today  Challenged with LLE stance during right LE hip abd. Required right UE assist on bar to maintain neutral trunk  VC to pull up in standing leg     Post Treatment Pain Level (on 0 to 10) scale:   0  / 10     ASSESSMENT    Assessment/Changes in Function:     Reassess NV for MD note     []  See Progress Note/Recertification   Patient will continue to benefit from skilled PT services to analyze, cue, progress, modify,, demonstrate, instruct, and address, movement patterns, therapeutic interventions, postural abnormalities, soft tissue restrictions, ROM, strength, functional mobility, body mechanics/ergonomics, and home and community integration, to attain remaining goals.    Progress toward goals / Updated goals:    Reassess NV for MD note     PLAN    []  Upgrade activities as tolerated YES Continue plan of care   []  Discharge due to :    []  Other:      Therapist: Thony Mora DPT    Date: 6/18/2019 Time: 2:49 PM     Future Appointments   Date Time Provider Earnest Vidhi   6/20/2019  2:00 PM Cate Malcolm, PT [facility] AT [address]   6/24/2019  1:30 PM Jonnie Scheuermann, PT [facility] AT [address]   6/27/2019  2:00 PM Cate Malcolm, PT [facility] AT [address]   7/1/2019  1:00 PM Jonnie Scheuermann, PT [facility] AT [address]   7/5/2019  1:00 PM Vanessa [facility] AT [address]   7/8/2019  1:00 PM Jonnie Scheuermann, PT [facility] AT [address]   7/11/2019  1:30 PM Jonnie Scheuermann, PT [facility] AT [address]   7/15/2019  1:00 PM Jonnie Scheuermann, PT [facility] AT [address]   7/18/2019  1:30 PM Cate Malcolm, PT [facility] AT [address]   7/22/2019  1:00 PM Cate Malcolm, PT [facility] AT [address]   7/25/2019  1:30 PM Jonnie Scheuermann, PT [facility] AT [address]   7/29/2019  1:00 PM Cate Malcolm, PT [facility] AT [address]   7/31/2019  1:00 PM Jonnie Scheuermann, PT [facility] AT [address]   6/18/2020  1:15 PM Bushra Mclaughlin MD [address] [place] [address]

## 2019-06-20 ENCOUNTER — HOSPITAL ENCOUNTER (OUTPATIENT)
Dept: PHYSICAL THERAPY | Age: 74
End: 2019-06-20
Payer: MEDICARE

## 2019-06-24 ENCOUNTER — HOSPITAL ENCOUNTER (OUTPATIENT)
Dept: PHYSICAL THERAPY | Age: 74
Discharge: HOME | End: 2019-06-24
Payer: MEDICARE

## 2019-06-24 PROCEDURE — 97110 THERAPEUTIC EXERCISES: CPT

## 2019-06-24 PROCEDURE — 97530 THERAPEUTIC ACTIVITIES: CPT

## 2019-06-24 NOTE — PROGRESS NOTES
[address]  [facility] PHYSICAL THERAPY  [address] [facility] [address], [address] - Phone: [phone]  Fax: [phone]  PROGRESS NOTE  Patient Name: oJse L Bo : 1945   Treatment/Medical Diagnosis: Low back pain [M54.5]   Referral Source: Lacie Lundberg DO     Date of Initial Visit: 19 Attended Visits: 7 Missed Visits: 1     SUMMARY OF TREATMENT  Patient's POC has consisted of therex, therapeutic activities, manual therapy prn, modalities prn, pt. education, and a comprehensive HEP. Treatment strategies used to address functional mobility deficits, ROM deficits, strength deficits, analyze and address soft tissue restrictions, analyze and cue movement patterns, analyze and modify body mechanics/ergonomics, assess and modify postural abnormalities and instruct in home and community integration. CURRENT STATUS  Patient making stready progress in recovery of strength and mobility. Unfortunately, her insurance [facility]) will not cover for aquatherapy despite its benefits. She currently ambulates with  without safety concerns, and is able to safely ambulate with [employer] x150' with step to pattern. Pain levels are minimal, with primary limitation during care her deconditioning and asthma. Goal/Measure of Progress Goal Met? 1. Patient to be adherent to HEP to facilitate pain control with ADL's.  2. Patient to tolerate > 30' aquatherapy to facilitate loaded activity tolerance on land for ADL's.   3. Patient to reduce 5 times sit <-->stand test to < 13 seconds to indicate reduced risk for falls    4. Patient to ambulate > 100' on even surfaces with SPC to improve househould ambulatory status and safety. MET    N/A      MET      SBQC x150'     New Goals to be achieved in __4__  weeks:  1. Patient to be Safe and Independent with HEP to self-manage/prevent symptoms after DC.   2. Patient to reduce TUG score to < 20\" to indicate increased safety in community mobility. 3. Patient to increase FOTO score to > 60 to indicate improved functional independence. 4. Patient to initiate walking program from [address] [place] [address] with LRAD to promote wellness and community mobility. Frequency / Duration:   Patient to be seen  2  times per week for 4-6  weeks:    RECOMMENDATIONS  Continue and progress functional therex/therapeutic activity as able, utilizing manual therapy and modalities prn. Progress patient towards independent HEP to facilitate self-management of symptoms and progress gains after PT. If you have any questions/comments please contact us directly at [phone]. Thank you for allowing us to assist in the care of your patient. Therapist Signature: Derrell Moss \"BJ\" Noemi Gorman DPT, Cert. MDT, Cert. DN, Cert. SMT Date: 9/13/2709   Certification Period: 5/21/19-8/20/19     Reporting Period 5/21/19-6/24/19   Time: 1:41 PM   NOTE TO PHYSICIAN:  PLEASE COMPLETE THE ORDERS BELOW AND FAX TO   [facility]: [phone]. If you are unable to process this request in 24 hours please contact our office: [phone].    ___ I have read the above report and request that my patient continue as recommended.   ___ I have read the above report and request that my patient continue therapy with the following changes/special instructions:_________________________________________________________   ___ I have read the above report and request that my patient be discharged from therapy.      Physician Signature:        Date:       Time:

## 2019-06-24 NOTE — PROGRESS NOTES
PHYSICAL THERAPY - DAILY TREATMENT NOTE    Patient Name: Mike Goldstein        Date: 2019  : 1945   YES Patient  Verified  Visit #:     Insurance: Payor: Dinora Marcos / Plan: [address] [place] [address] HMO / Product Type: Managed Care Medicare /      In time: 1:25 Out time: 2:15   Total Treatment Time: 50     Medicare/BCBS Time Tracking (below)   Total Timed Codes (min):  50 1:1 Treatment Time:  38     TREATMENT AREA = Low back pain [M54.5]    SUBJECTIVE    Pain Level (on 0 to 10 scale):  0  / 10   Medication Changes/New allergies or changes in medical history, any new surgeries or procedures? NO    If yes, update Summary List   Subjective Functional Status/Changes:  []  No changes reported     \"I'm doing fine right now with the back, but it's just my asthma acting up. Not sure about the lying down exercises today, but I'm willing to try what I can. \"   \"I'm walking more at home in the halls, about 40 times back and forth. Now I'm starting to feel my knees. OBJECTIVE     Therapeutic Procedures:  Min Procedure Specifics + Rationale   n/a [x]  Patient Education (performed throughout session) [x] Review HEP    [] Progressed/Changed HEP based on:   [] proper performance and advancement of Therex/TA   [] reduction in pain level    [] increased functional capacity       [] change in directional preference   40(28) [x] Therapeutic Exercise   [x]  See Flowsheet   Rationale: increase ROM and increase strength to improve the patients ability to participate in ADL's    10(10) [x] Therapeutic Activity   [x]  See Flowsheet  Gait training with SBQC, re-assessment 5x sit<-->stand, transfers  Rationale: To improve safety, proprioception, coordination, and efficiency with tasks       Other Objective/Functional Measures:    Modified therex to be performed in sitting due to patient's asthma   5x Sit <-->stand =12 seconds  SBQC x100 with step to pattern.        Post Treatment Pain Level (on 0 to 10) scale:   0  / 10     ASSESSMENT    Assessment/Changes in Function:       See PN         Patient will continue to benefit from skilled PT services to modify and progress therapeutic interventions, address functional mobility deficits, address ROM deficits, address strength deficits, analyze and address soft tissue restrictions, analyze and cue movement patterns, analyze and modify body mechanics/ergonomics and instruct in home and community integration  to attain remaining goals   Progress toward goals / Updated goals:    See PN     PLAN    [x]  Upgrade activities as tolerated  [x]  Update interventions per flow sheet YES Continue plan of care   []  Discharge due to :    []  Other:      Therapist: Kayy Nicholson \"BJ\" Wandy Whitten, DPT, Cert. MDT, Cert. DN, Cert.  SMT    Date: 6/24/2019 Time: 1:28 PM     Future Appointments   Date Time Provider Earnest Mosher   6/24/2019  1:30 PM Kathlean Hollow, [facility]   6/27/2019  2:00 PM Thelma Deiters, [facility]   7/1/2019  1:00 PM Kathlean Hollow, [facility]   7/5/2019  1:00 PM [address] [place] [address]   7/8/2019  1:00 PM Kathlean Hollow, [facility]   7/11/2019  1:30 PM Kathlean Hollow, PT [facility]   7/15/2019  1:00 PM Kathlean Hollow, [facility]   7/18/2019  1:30 PM Thelma Deiters, PT [facility]   7/22/2019  1:00 PM Thelma Deiters, [facility]   7/25/2019  1:30 PM Kathlean Hollow, [facility]   7/29/2019  1:00 PM Thelma Deiters, [facility]   7/31/2019  1:00 PM Kathlean Hollow, [facility]   6/18/2020  1:15 PM Hi Leung MD [address] [place] [address]

## 2019-06-25 ENCOUNTER — DOCUMENTATION ONLY (OUTPATIENT)
Dept: FAMILY MEDICINE CLINIC | Age: 74
End: 2019-06-25

## 2019-06-25 NOTE — PROGRESS NOTES
Spoke to Eryn 2 ([phone]) regarding her concern for face to face form she requested since May. I apologize to Ms. Proctor for the inconvenience and informed her that Dr. Tracie Quick is currently out of the office but will have her fill it out as soon as she gets back.  I also informed her that I will call her for update by Friday June 28 or Monday July 1st.

## 2019-06-27 ENCOUNTER — HOSPITAL ENCOUNTER (OUTPATIENT)
Dept: PHYSICAL THERAPY | Age: 74
Discharge: HOME | End: 2019-06-27
Payer: MEDICARE

## 2019-06-27 PROCEDURE — 97530 THERAPEUTIC ACTIVITIES: CPT

## 2019-06-27 PROCEDURE — 97110 THERAPEUTIC EXERCISES: CPT

## 2019-06-27 NOTE — PROGRESS NOTES
PHYSICAL THERAPY - DAILY TREATMENT NOTE    Patient Name: Josh Barnes        Date: 2019  : 1945   YES Patient  Verified  Visit #:     Insurance: Payor: Keisha Dahiana / Plan: [address] [place] [address] HMO / Product Type: Managed Care Medicare /      In time: 2:00 Out time: 2:45   Total Treatment Time: 45     Medicare/BCBS [place] Time Tracking (below)   Total Timed Codes (min):  45 1:1 Treatment Time:  25     TREATMENT AREA =  LBP    SUBJECTIVE    Pain Level (on 0 to 10 scale):  2  / 10   Medication Changes/New allergies or changes in medical history, any new surgeries or procedures? NO    If yes, update Summary List   Subjective Functional Status/Changes:  []  No changes reported     \"I brought my cane to practice\"          OBJECTIVE    15 min Therapeutic Exercise:  [x]  See flow sheet   Rationale:      increase ROM and increase strength to improve the patients ability to amb with increased ease     30 min Therapeutic Activity: Amb activities mini squats   Rationale:   Increase ease with amb     min Patient Education:  YES  Reviewed HEP   []  Progressed/Changed HEP based on:   Cont HEP, OK to amb in home qiht quad cane     Other Objective/Functional Measures:    VC for step through while amb with cane. SBA, improved balance with distance  Added unilateral kick back with band for glute control/strength     Post Treatment Pain Level (on 0 to 10) scale:   0  / 10     ASSESSMENT    Assessment/Changes in Function:     Safe for home quad cane amb     []  See Progress Note/Recertification   Patient will continue to benefit from skilled PT services to analyze, cue, progress, modify,, demonstrate, instruct, and address, movement patterns, therapeutic interventions, postural abnormalities, soft tissue restrictions, ROM, strength, functional mobility, body mechanics/ergonomics, and home and community integration, to attain remaining goals. Progress toward goals / Updated goals:     Increased distance with cane amb today     PLAN    []  Upgrade activities as tolerated YES Continue plan of care   []  Discharge due to :    []  Other:      Therapist: Eleni Hess DPT    Date: 6/27/2019 Time: 3:01 PM     Future Appointments   Date Time Provider Earnest Mosher   7/1/2019  1:00 PM Nga Mittal, [address] [place] [address]   7/5/2019  1:00 PM [facility]   7/8/2019  1:00 PM Nga Mittal [facility]   7/11/2019  1:30 PM Nga Mittal [facility]   7/15/2019  1:00 PM Nga Mittal [address] [place] [address]   7/18/2019  1:30 PM Nick Hansen [facility]   7/22/2019  1:00 PM Nick Hansen [facility]   7/25/2019  1:30 PM Nga Mittal [facility]   7/29/2019  1:00 PM Nick Hansen [facility]   7/31/2019  1:00 PM Nga Mittal [facility]   6/18/2020  1:15 PM Cintia Mckinnon MD [address] [place] [address]

## 2019-06-28 ENCOUNTER — TELEPHONE (OUTPATIENT)
Dept: FAMILY MEDICINE CLINIC | Age: 74
End: 2019-06-28

## 2019-06-28 NOTE — TELEPHONE ENCOUNTER
Dacia Neri from [facility] in [facility] called in and stated that they did physical therapy for the patient back in march and hasn't received back the fact to face documents so they can bill the patient if Dr. Cecil Shearer could please fax the paperwork back to [phone] she would appreciate it. She is re faxing paperwork today. Please advise.

## 2019-07-01 ENCOUNTER — HOSPITAL ENCOUNTER (OUTPATIENT)
Dept: PHYSICAL THERAPY | Age: 74
Discharge: HOME | End: 2019-07-01
Payer: MEDICARE

## 2019-07-01 ENCOUNTER — DOCUMENTATION ONLY (OUTPATIENT)
Dept: FAMILY MEDICINE CLINIC | Age: 74
End: 2019-07-01

## 2019-07-01 PROCEDURE — 97110 THERAPEUTIC EXERCISES: CPT

## 2019-07-01 NOTE — PROGRESS NOTES
Contacted Esthela from [facility] [employer] in [address] [phone] and informed her that forms have been filled out by Dr. Annabelle Wilder. I also informed her that the visit that was put in was 12/11/18 as it is the only visit that would fall into the requirements that they are asking. Ms. Audrey Hartman verbalized understanding and stated that she will call back if there's any issue.

## 2019-07-01 NOTE — PROGRESS NOTES
PHYSICAL THERAPY - DAILY TREATMENT NOTE    Patient Name: Jan Ayers        Date: 2019  : 1945   YES Patient  Verified  Visit #:     Insurance: Payor: Randi Shaffer / Plan: [address] [place] [address] HMO / Product Type: Managed Care Medicare /      In time: 103 Out time: 147   Total Treatment Time: 44     Medicare/BCBS Time Tracking (below)   Total Timed Codes (min):  44 1:1 Treatment Time:  44     TREATMENT AREA = Low back pain [M54.5]    SUBJECTIVE    Pain Level (on 0 to 10 scale):  1  / 10   Medication Changes/New allergies or changes in medical history, any new surgeries or procedures? NO    If yes, update Summary List   Subjective Functional Status/Changes:  []  No changes reported     \"I feel it in my left hip today. It is very light however, more of a nagging feeling. \"          OBJECTIVE     Therapeutic Procedures:  Min Procedure Specifics + Rationale   n/a [x]  Patient Education (performed throughout session) [x] Review HEP    [] Progressed/Changed HEP based on:   [] proper performance and advancement of Therex/TA   [] reduction in pain level    [] increased functional capacity       [] change in directional preference   44 [x] Therapeutic Exercise   [x]  See Flowsheet   Rationale: increase ROM and increase strength to improve the patients ability to participate in ADL's      Modality rationale: decrease inflammation, decrease pain, increase tissue extensibility and increase muscle contraction/control to improve the patients ability to perform ADL's with greater ease       Other Objective/Functional Measures:    PT added fwd step ups on 6\" step. Pt requires bilateral UE support for balance. Pt required min VCs when performing exercises. Pt felt \"more wobbly\" today on DD. Pt asked if she had to hold her breath during the 5\" hold when performing exercises. Pt increased sets/reps/resistance per flowsheet.    Post Treatment Pain Level (on 0 to 10) scale:     / 10 ASSESSMENT    Assessment/Changes in Function:     Pt able to endure more standing activities during therex. When PT told pt she did not need to hold her breath during exercises she reported wobbliness. This is due to the lack of core contraction, holding her breath influenced. Patient informed clinician that her PCP/referring MD will be leaving the practice to move [address] \"sometime in mid [place]", and has concerns re: her continuation of care. Patient will continue to benefit from skilled PT services to modify and progress therapeutic interventions, address functional mobility deficits, address ROM deficits, address strength deficits, analyze and address soft tissue restrictions, analyze and cue movement patterns, analyze and modify body mechanics/ergonomics, assess and modify postural abnormalities, address imbalance/dizziness and instruct in home and community integration  to attain remaining goals   Progress toward goals / Updated goals:    1. Patient to be Safe and Independent with HEP to self-manage/prevent symptoms after DC. 2. Patient to reduce TUG score to < 20\" to indicate increased safety in community mobility.  increasing in efficiency with transfers and speed. 3. Patient to increase FOTO score to > 60 to indicate improved functional independence. 4. Patient to initiate walking program from [address] [place] [address] with LR[employer] to promote wellness and community mobility.      PLAN    [x]  Upgrade activities as tolerated  [x]  Update interventions per flow sheet YES Continue plan of care   []  Discharge due to :    []  Other:      Therapist: Kayy Blum \"BJ\" RANULFO Sotomayor, Cert. MDT, Cert. DN, Cert.  SMT    Date: 7/1/2019 Time: 1:15 PM     Future Appointments   Date Time Provider Earnest Mosher   7/5/2019  1:00 PM Dominga [facility]   7/8/2019  1:00 PM Rajeev Chi PT [facility]   7/11/2019  1:30 PM Rajeev Chi PT [facility]   7/15/2019  1:00 PM Rajeev Chi, [address] [place] [address]   7/18/2019 1:30 PM Marilyn Wong, PT [facility]   7/22/2019  1:00 PM Marilyn Wong, PT [facility]   7/25/2019  1:30 PM Clifford Tejada, PT [facility]   7/29/2019  1:00 PM Marilyn Wong, PT [facility]   7/31/2019  1:00 PM Clifford Tejada, PT [facility]   6/18/2020  1:15 PM Brandee Mendes MD [address] [place] [address]

## 2019-07-02 ENCOUNTER — TELEPHONE (OUTPATIENT)
Dept: FAMILY MEDICINE CLINIC | Age: 74
End: 2019-07-02

## 2019-07-02 DIAGNOSIS — R29.898 MUSCULAR DECONDITIONING: Primary | ICD-10-CM

## 2019-07-02 NOTE — TELEPHONE ENCOUNTER
Pt called in and was wanting to know if Dr. Jaspal Del Angel could initiate the next six weeks before Dr. Renetta Sotelo as [place] is not fully credentialed with he [employer]ello. The name of the PT is  Kulwant Enriquez [facility] and the Physical therapist's name is a gentleman by the name of  RENETTA. She also gave me the phone number which is [phone]. Please advise.

## 2019-07-05 ENCOUNTER — HOSPITAL ENCOUNTER (OUTPATIENT)
Dept: PHYSICAL THERAPY | Age: 74
Discharge: HOME | End: 2019-07-05
Payer: MEDICARE

## 2019-07-05 PROCEDURE — 97110 THERAPEUTIC EXERCISES: CPT

## 2019-07-05 NOTE — PROGRESS NOTES
PHYSICAL THERAPY - DAILY TREATMENT NOTE    Patient Name: April Sam        Date: 2019  : 1945   YES Patient  Verified  Visit #:   10   of   18  Insurance: Payor: Erika Walter / Plan: [address] [place] [address] HMO / Product Type: Managed Care Medicare /      In time: 100 Out time: 202   Total Treatment Time: 62     Medicare/BCBS Time Tracking (below)   Total Timed Codes (min):  62 1:1 Treatment Time:  38     TREATMENT AREA =  Low back pain [M54.5]    SUBJECTIVE    Pain Level (on 0 to 10 scale):  0  / 10   Medication Changes/New allergies or changes in medical history, any new surgeries or procedures? NO    If yes, update Summary List   Subjective Functional Status/Changes:  []  No changes reported   Pt reports difficulty with using cane at home due to uneven floors. \"I cant lay on my back because of asthma\"  \"no bands, because I have varicose veins\"         OBJECTIVE  Therapeutic Procedures:  Min Procedure Specifics + Rationale   62(38) [x] Therapeutic Exercise    See Flowsheet   Rationale: increase ROM and increase strength to improve the patients ability to participate in ADL's              min Patient Education:  YES Reviewed HEP         Other Objective/Functional Measures:    See flowsheet for more details  GAIT SBQC x 300 feet with supervision assistance  Step ups 6in height with BUE support  SIT<>STANDS No UE support 2 x 10    Mod VC and demos required throughout session for proper form and increased safety    Progressed therex per flowsheet     Post Treatment Pain Level (on 0 to 10) scale:   0  / 10     ASSESSMENT    Assessment/Changes in Function:     Patient tolerated treatment session well and is progressing well towards goals. Pt demonstrates good effort and motivated during treatment session.  She does however demonstrates fear and apprehension towards certain positions      []  See Progress Note/Recertification   Patient will continue to benefit from skilled PT services to analyze,, cue,, progress,, modify,, demonstrate,, instruct, and address, movement patterns,, therapeutic interventions,, postural abnormalities,, soft tissue restrictions,, ROM,, strength,, functional mobility,, body mechanics/ergonomics, and home and community integration, to attain remaining goals. Progress toward goals / Updated goals:  1. Patient to be Safe and Independent with HEP to self-manage/prevent symptoms after DC. 2. Patient to reduce TUG score to < 20\" to indicate increased safety in community mobility.  increasing in efficiency with transfers and speed. -improved sit<>stands today c increased repititions  3. Patient to increase FOTO score to > 60 to indicate improved functional independence. 4. Patient to initiate walking program from [address] [place] [address] with LRAD to promote wellness and community mobility.      PLAN    [x]  Upgrade activities as tolerated YES Continue plan of care   []  Discharge due to :    []  Other:      Therapist: Ashley Slater DPT    Date: 7/5/2019 Time: 12:57 PM     Future Appointments   Date Time Provider Earnest Mosher   [facility]2019  [facility]   7/8/2019  1:00 PM Jv Huntley [facility]   7/11/2019  1:30 PM Jv Huntley [facility]   7/15/2019  1:00 PM Jv Huntley, [address] [place] [address]   7/18/2019  1:30 PM Asia Dawson [facility]   7/22/2019  1:00 PM Asia Dawson [facility]   7/25/2019  1:30 PM Jv Huntley [facility]   7/29/2019  1:00 PM Asia Dawson [facility]   7/31/2019  1:00 PM Jv Huntley [facility]   6/18/2020  1:15 PM Machelle Jean-Baptiste MD [address] [place] [address]

## 2019-07-08 ENCOUNTER — HOSPITAL ENCOUNTER (OUTPATIENT)
Dept: PHYSICAL THERAPY | Age: 74
Discharge: HOME | End: 2019-07-08
Payer: MEDICARE

## 2019-07-08 DIAGNOSIS — G25.81 RLS (RESTLESS LEGS SYNDROME): ICD-10-CM

## 2019-07-08 DIAGNOSIS — M46.20 PARASPINAL ABSCESS (HCC): ICD-10-CM

## 2019-07-08 PROCEDURE — 97110 THERAPEUTIC EXERCISES: CPT

## 2019-07-08 NOTE — PROGRESS NOTES
PHYSICAL THERAPY - DAILY TREATMENT NOTE    Patient Name: Ilia Metz        Date: 2019  : 1945   YES Patient  Verified  Visit #:     Insurance: Payor: Lesly Little / Plan: [address] [place] [address] HMO / Product Type: Managed Care Medicare /      In time: 70 Out time: 146   Total Treatment Time: 53     Medicare/BCBS Time Tracking (below)   Total Timed Codes (min):  53 1:1 Treatment Time:  53     TREATMENT AREA = Low back pain [M54.5]    SUBJECTIVE    Pain Level (on 0 to 10 scale):  0  / 10   Medication Changes/New allergies or changes in medical history, any new surgeries or procedures? NO    If yes, update Summary List   Subjective Functional Status/Changes:  []  No changes reported     \"I'm just tired today because I had to use my cane instead of my walker. \"          OBJECTIVE     Therapeutic Procedures:  Min Procedure Specifics + Rationale   n/a [x]  Patient Education (performed throughout session) [x] Review HEP    [] Progressed/Changed HEP based on:   [] proper performance and advancement of Therex/TA   [] reduction in pain level    [] increased functional capacity       [] change in directional preference   53(53) [x] Therapeutic Exercise   [x]  See Flowsheet   Rationale: increase ROM and increase strength to improve the patients ability to participate in ADL's      Modality rationale: decrease inflammation, decrease pain, increase tissue extensibility and increase muscle contraction/control to improve the patients ability to perform ADL's with greater ease       Other Objective/Functional Measures:    Pt requires Mod VCs when performing therex. PT added 3# weight to hold during sit <-> stand transfers     Post Treatment Pain Level (on 0 to 10) scale:   0  / 10     ASSESSMENT    Assessment/Changes in Function:     Pt endurance for activities with greater resistance has been progressing well.          Patient will continue to benefit from skilled PT services to modify and progress therapeutic interventions, address functional mobility deficits, address ROM deficits, address strength deficits, analyze and address soft tissue restrictions, analyze and cue movement patterns, analyze and modify body mechanics/ergonomics, assess and modify postural abnormalities, address imbalance/dizziness and instruct in home and community integration  to attain remaining goals   Progress toward goals / Updated goals:    1. Patient to be Safe and Independent with HEP to self-manage/prevent symptoms after DC. 2. Patient to reduce TUG score to < 20\" to indicate increased safety in community mobility.  increasing in efficiency with transfers and speed. Progressing  -improved sit<>stands today c increased repititions  3. Patient to increase FOTO score to > 60 to indicate improved functional independence. 4. Patient to initiate walking program from [address] [place] [address] with LRAD to promote wellness and community mobility. Progressing     PLAN    [x]  Upgrade activities as tolerated  [x]  Update interventions per flow sheet YES Continue plan of care   []  Discharge due to :    []  Other:      Therapist: Madeline Davenport \"BJ\" RANULFO Sotomayor, Cert. MDT, Cert. DN, Cert.  SMT    Date: 7/8/2019 Time: 12:58 PM     Future Appointments   Date Time Provider Earnest Mosher   7/8/2019  1:00 PM Lukas Peacock, PT [facility]   7/11/2019  1:30 PM Lukas Peacock, PT [facility]   7/15/2019  1:00 PM Lukas Peacock, PT [facility]   7/18/2019  1:30 PM Jessica Florence, PT [facility]   7/22/2019  1:00 PM Jessica Florence, PT [facility]   7/25/2019  1:30 PM Lukas Peacock, PT [facility]   7/29/2019  1:00 PM Jessica Florence, [facility]   7/31/2019  1:00 PM Lukas Peacock, [facility]   6/18/2020  1:15 PM Jacqueline Santiago MD [address] [place] [address]

## 2019-07-09 RX ORDER — GABAPENTIN 100 MG/1
CAPSULE ORAL
Qty: 540 CAP | Refills: 0 | Status: SHIPPED | OUTPATIENT
Start: 2019-07-09 | End: 2019-10-28 | Stop reason: SDUPTHER

## 2019-07-11 ENCOUNTER — APPOINTMENT (OUTPATIENT)
Dept: PHYSICAL THERAPY | Age: 74
End: 2019-07-11
Payer: MEDICARE

## 2019-07-15 ENCOUNTER — HOSPITAL ENCOUNTER (OUTPATIENT)
Dept: PHYSICAL THERAPY | Age: 74
Discharge: HOME | End: 2019-07-15
Payer: MEDICARE

## 2019-07-15 PROCEDURE — 97110 THERAPEUTIC EXERCISES: CPT

## 2019-07-15 NOTE — PROGRESS NOTES
PHYSICAL THERAPY - DAILY TREATMENT NOTE    Patient Name: Stacy Dale        Date: 7/15/2019  : 1945   YES Patient  Verified  Visit #:   15   of   18  Insurance: Payor: Glenna Guillen / Plan: [address] [place] [address] HMO / Product Type: Managed Care Medicare /      In time: 12:55 Out time: 1:55   Total Treatment Time: 60     Medicare/BCBS Time Tracking (below)   Total Timed Codes (min):  60 1:1 Treatment Time:  38     TREATMENT AREA = Low back pain [M54.5]    SUBJECTIVE    Pain Level (on 0 to 10 scale):  0  / 10   Medication Changes/New allergies or changes in medical history, any new surgeries or procedures? NO    If yes, update Summary List   Subjective Functional Status/Changes:  []  No changes reported     \"I'm doing pretty good. I have more energy. \"   \"I feel like I can walk faster than the [facility] allows, but I know I'm not yet ready for the cane. But at some point I would like to just try the cane out. OBJECTIVE     Therapeutic Procedures:  Min Procedure Specifics + Rationale   n/a [x]  Patient Education (performed throughout session) [x] Review HEP    [] Progressed/Changed HEP based on:   [] proper performance and advancement of Therex/TA   [] reduction in pain level    [] increased functional capacity       [] change in directional preference   55(38) [x] Therapeutic Exercise   [x]  See Flowsheet   Rationale: increase ROM and increase strength to improve the patients ability to participate in ADL's        Other Objective/Functional Measures:    Increased reps/sets/resistance per flow sheet. Added new therex per flow sheet to promote increased standing posture  Discussed with patient trial of SPC NV at start of treatment prior to fatigue setting.     Post Treatment Pain Level (on 0 to 10) scale:   0  / 10     ASSESSMENT    Assessment/Changes in Function:       Performed/participated in treatment well without complaints aside from muscular fatigue/deconditioning, indicating (+) response to current course of treatment to further improve functional status. Patient will continue to benefit from skilled PT services to modify and progress therapeutic interventions, address functional mobility deficits, address ROM deficits, address strength deficits, analyze and address soft tissue restrictions, analyze and cue movement patterns, analyze and modify body mechanics/ergonomics, assess and modify postural abnormalities and instruct in home and community integration  to attain remaining goals   Progress toward goals / Updated goals:    Slowly improving in ambulatory status as patient ambulates with [employer] [place] without safety concerns x 300' with follow through. PLAN    [x]  Upgrade activities as tolerated  [x]  Update interventions per flow sheet YES Continue plan of care   []  Discharge due to :    []  Other:      Therapist: Brittaney Rodriguez \"BJ\" RANULFO Sotomayor, Cert. MDT, Cert. DN, Cert.  SMT    Date: 7/15/2019 Time: 1:07 PM     Future Appointments   Date Time Provider Earnest Mosher   7/18/2019  1:30 PM Edd Gibbs PT [facility]   7/22/2019  1:00 PM Edd Gibbs PT [facility]   7/25/2019  1:30 PM Jing Arnold PT [facility]   7/29/2019  1:00 PM Edd Gibbs PT [facility]   7/31/2019  1:00 PM Jing Arnold PT [facility]   6/18/2020  1:15 PM Soniya Mercado MD [address] [place] [address]

## 2019-07-18 ENCOUNTER — HOSPITAL ENCOUNTER (OUTPATIENT)
Dept: PHYSICAL THERAPY | Age: 74
Discharge: HOME | End: 2019-07-18
Payer: MEDICARE

## 2019-07-18 PROCEDURE — 97530 THERAPEUTIC ACTIVITIES: CPT

## 2019-07-18 NOTE — PROGRESS NOTES
PHYSICAL THERAPY - DAILY TREATMENT NOTE    Patient Name: Anuradha Mike        Date: 2019  : 1945   YES Patient  Verified  Visit #:   15   of   18  Insurance: Payor: Jimy Granados / Plan: [address] [place] [address] HMO / Product Type: Managed Care Medicare /      In time: 1:28 Out time: 2:35   Total Treatment Time: 67     Medicare/BCBS [place] Time Tracking (below)   Total Timed Codes (min):  67 1:1 Treatment Time:  25     TREATMENT AREA =  L/S    SUBJECTIVE    Pain Level (on 0 to 10 scale):  0  / 10   Medication Changes/New allergies or changes in medical history, any new surgeries or procedures? NO    If yes, update Summary List   Subjective Functional Status/Changes:  []  No changes reported     \"This is my second time here without the walker. I also took the bus instead of the [place] so I had to go up an down a few steps. No pain just a little winded\"          OBJECTIVE      40  (0) min Therapeutic Exercise:  [x]  See flow sheet   Rationale:      increase ROM and increase strength to improve the patients ability to amb and perform ADLs with increased ease     27  (25) min Therapeutic Activity: Amb activities    Rationale:   Increase ease with amb and balance   min Patient Education:  YES  Reviewed HEP   []  Progressed/Changed HEP based on:   Cont HEP     Other Objective/Functional Measures:     Amb with SPC as pt states she has the tendency to just  the [employer] and walk with it  Challenged with right side steps, VC for decreased step length improved stance on the left     Post Treatment Pain Level (on 0 to 10) scale:  0  / 10     ASSESSMENT    Assessment/Changes in Function:   Noted increased amb and wealking toerance today      []  See Progress Note/Recertification   Patient will continue to benefit from skilled PT services to analyze, cue, progress, modify,, demonstrate, instruct, and address, movement patterns, therapeutic interventions, postural abnormalities, soft tissue restrictions, ROM, strength, functional mobility, body mechanics/ergonomics, and home and community integration, to attain remaining goals. Progress toward goals / Updated goals:  New Goals to be achieved in __4__  weeks:  1. Patient to be Safe and Independent with HEP to self-manage/prevent symptoms after DC. 2. Patient to reduce TUG score to < 20\" to indicate increased safety in community mobility.   3. Patient to increase FOTO score to > 60 to indicate improved functional independence. 4. Patient to initiate walking program from [address] [place] [address] with LRAD to promote wellness and community mobility.      PLAN    []  Upgrade activities as tolerated YES Continue plan of care   []  Discharge due to :    []  Other:      Therapist: Laura Méndez DPT    Date: 7/18/2019 Time: 1:30 PM     Future Appointments   Date Time Provider Earnest Mosher   7/22/2019  1:00 PM Paxton Rodríguez PT [facility]   7/25/2019  1:30 PM Ana Titus PT [facility]   7/29/2019  1:00 PM Paxton Rodríguez PT [facility]   7/31/2019  1:00 PM Ana Titus PT [facility]   8/5/2019  1:00 PM Ana Titus PT [facility]   8/8/2019  1:30 PM Ana Titus PT [facility]   6/18/2020  1:15 PM Mika Yo MD [address] [place] [address]

## 2019-07-22 ENCOUNTER — HOSPITAL ENCOUNTER (OUTPATIENT)
Dept: PHYSICAL THERAPY | Age: 74
Discharge: HOME | End: 2019-07-22
Payer: MEDICARE

## 2019-07-22 PROCEDURE — 97530 THERAPEUTIC ACTIVITIES: CPT

## 2019-07-22 PROCEDURE — 97110 THERAPEUTIC EXERCISES: CPT

## 2019-07-22 NOTE — PROGRESS NOTES
PHYSICAL THERAPY - DAILY TREATMENT NOTE    Patient Name: Marsha Tellez        Date: 2019  : 1945   YES Patient  Verified  Visit #:     Insurance: Payor: Glenna Guillen / Plan: [address] [place] [address] HMO / Product Type: Managed Care Medicare /      In time: 1:05 Out time: 1:55   Total Treatment Time: 50     Medicare/BCBS [place] Time Tracking (below)   Total Timed Codes (min):  50 1:1 Treatment Time:  40     TREATMENT AREA =  LBP, deconditioning    SUBJECTIVE    Pain Level (on 0 to 10 scale):  0  / 10   Medication Changes/New allergies or changes in medical history, any new surgeries or procedures? NO    If yes, update Summary List   Subjective Functional Status/Changes:  []  No changes reported     \"Im feeing a little weak today. The heat\"  Pt also reports decreased fluid intake today          OBJECTIVE      30 min Therapeutic Exercise:  [x]  See flow sheet   Rationale:      increase ROM and increase strength to improve the patients ability to amb with increased ease    20  (10) min Therapeutic Activity: Amb, mini squats, step ups   Rationale:   Increase ease with community negt     min Patient Education:  YES  Reviewed HEP   []  Progressed/Changed HEP based on:   Cont HEP     Other Objective/Functional Measures:    Amb 300 feet with SPC, required rest to follow  Added step-downs today       Post Treatment Pain Level (on 0 to 10) scale:   0  / 10     ASSESSMENT    Assessment/Changes in Function:     Reported weakness with LLE stance during kick backs but able to perform     []  See Progress Note/Recertification   Patient will continue to benefit from skilled PT services to analyze, cue, progress, modify,, demonstrate, instruct, and address, movement patterns, therapeutic interventions, postural abnormalities, soft tissue restrictions, ROM, strength, functional mobility, body mechanics/ergonomics, and home and community integration, to attain remaining goals.    Progress toward goals / Updated goals:    Reassess NV     PLAN    []  Upgrade activities as tolerated YES Continue plan of care   []  Discharge due to :    []  Other:      Therapist: Stevie Schultz DPT    Date: 7/22/2019 Time: 1:34 PM     Future Appointments   Date Time Provider Earnest Mosher   7/23/2019  1:15 PM Keisha Donald MD [facility]   7/25/2019  1:30 PM Estefany Clos, [address] [place] [address]   7/29/2019  1:00 PM Dee Speed, [facility]   7/31/2019  1:00 PM Estefany Clos, [facility]   8/5/2019  1:00 PM Estefany Clos, [facility]   8/8/2019  1:30 PM Estefany Clos, [facility]   8/12/2019  1:00 PM Estefany Clos, [facility]   8/14/2019  1:00 PM Estefany Clos, [address] [place] [address]   8/20/2019  1:30 PM Dee Speed, [facility]   8/22/2019  1:30 PM Estefany Clos, [facility]   8/26/2019  1:00 PM Estefany Clos, [facility]   8/28/2019  1:00 PM Estefany Clos, [facility]   6/18/2020  1:15 PM Darien Marley MD [address] [place] [address]

## 2019-07-25 ENCOUNTER — HOSPITAL ENCOUNTER (OUTPATIENT)
Dept: PHYSICAL THERAPY | Age: 74
Discharge: HOME | End: 2019-07-25
Payer: MEDICARE

## 2019-07-25 PROCEDURE — 97530 THERAPEUTIC ACTIVITIES: CPT

## 2019-07-25 PROCEDURE — 97110 THERAPEUTIC EXERCISES: CPT

## 2019-07-25 NOTE — PROGRESS NOTES
PHYSICAL THERAPY - DAILY TREATMENT NOTE    Patient Name: Nisreen Menendez        Date: 2019  : 1945   YES Patient  Verified  Visit #:   15   of   18  Insurance: Payor: Cholo Files / Plan: [address] [place] [address] HMO / Product Type: Managed Care Medicare /      In time: 1:23 Out time: 2:18   Total Treatment Time: 55     Medicare/BCBS Time Tracking (below)   Total Timed Codes (min):  55 1:1 Treatment Time:  55     TREATMENT AREA = Low back pain [M54.5]    SUBJECTIVE    Pain Level (on 0 to 10 scale):  0  / 10   Medication Changes/New allergies or changes in medical history, any new surgeries or procedures? NO    If yes, update Summary List   Subjective Functional Status/Changes:  []  No changes reported     \"I'm walking even better now. [place] started me on the cane. About how far can I go with therapy before I can walk without the cane outside? My biggest issue is the sidewalk. I can walk indoors but sidewalks concern me. \"          OBJECTIVE     Therapeutic Procedures:  Min Procedure Specifics + Rationale   n/a [x]  Patient Education (performed throughout session) [x] Review HEP    [] Progressed/Changed HEP based on:   [] proper performance and advancement of Therex/TA   [] reduction in pain level    [] increased functional capacity       [] change in directional preference   40(40) [x] Therapeutic Exercise   [x]  See Flowsheet   Rationale: increase ROM and increase strength to improve the patients ability to participate in ADL's    15 [x] Therapeutic Activity   [x]  See Flowsheet  Zig Zag, in/out sidestep, in/out forward, sidestepping, HK  HK c head turns, sidestepping with SPC  Rationale: To improve safety, proprioception, coordination, and efficiency with tasks         Other Objective/Functional Measures:    Educated patient on progression of stable surfaces/agility ladder kinesthetic awareness/balance and eventual ambulation on outdoor surfaces.       Post Treatment Pain Level (on 0 to 10) scale: 0  / 10     ASSESSMENT    Assessment/Changes in Function:       Minimal rest periods required for first 39' of rx. Patient will continue to benefit from skilled PT services to modify and progress therapeutic interventions, address functional mobility deficits, address ROM deficits, address strength deficits, analyze and address soft tissue restrictions, analyze and cue movement patterns, analyze and modify body mechanics/ergonomics and instruct in home and community integration  to attain remaining goals   Progress toward goals / Updated goals: Increased stability with gait noted with agility ladder. PLAN    [x]  Upgrade activities as tolerated  [x]  Update interventions per flow sheet YES Continue plan of care   []  Discharge due to :    []  Other:      Therapist: Idalia Cifuentes \"BJ\" RANULFO Sotomayor, Cert. MDT, Cert. DN, Cert.  SMT    Date: 7/25/2019 Time: 1:33 PM     Future Appointments   Date Time Provider Earnest Mosher   7/29/2019  1:00 PM [facility], PT [facility]   7/31/2019  1:00 PM Sury Rahman, PT [facility]   8/5/2019  1:00 PM Sury Rahman, PT [facility]   8/8/2019  1:30 PM Sury Rahman, PT [facility]   8/12/2019  1:00 PM Sury Rahman, PT [facility]   8/14/2019  1:00 PM Sury Rahman, PT [facility]   8/20/2019  1:30 PM [facility], PT [facility]   8/22/2019  1:30 PM Sury Rahmna, PT [facility]   8/26/2019  1:00 PM Sury Rahman, PT [facility]   8/28/2019  1:00 PM Sury Rahman, PT [facility]   6/18/2020  1:15 PM Lazarus Buckle,  [facility]   7/27/2020  1:30 PM [address] [facility]   7/27/2020  2:00 PM MD Rajesh Teague

## 2019-07-29 ENCOUNTER — HOSPITAL ENCOUNTER (OUTPATIENT)
Dept: PHYSICAL THERAPY | Age: 74
Discharge: HOME | End: 2019-07-29
Payer: MEDICARE

## 2019-07-29 PROCEDURE — 97110 THERAPEUTIC EXERCISES: CPT

## 2019-07-29 PROCEDURE — 97530 THERAPEUTIC ACTIVITIES: CPT

## 2019-07-29 NOTE — PROGRESS NOTES
PHYSICAL THERAPY - DAILY TREATMENT NOTE    Patient Name: Patel Awad        Date: 2019  : 1945   YES Patient  Verified  Visit #:     Insurance: Payor: Tim Nevarez / Plan: [address] [place] [address] HMO / Product Type: Managed Care Medicare /      In time: 1:00 Out time: 1:50   Total Treatment Time: 50     Medicare/BCBS [place] Time Tracking (below)   Total Timed Codes (min):  50 1:1 Treatment Time:  40     TREATMENT AREA =  L/S    SUBJECTIVE    Pain Level (on 0 to 10 scale):  0  / 10   Medication Changes/New allergies or changes in medical history, any new surgeries or procedures? NO    If yes, update Summary List   Subjective Functional Status/Changes:  []  No changes reported     \"I tried walking without the cane last time but that's what makes this hurt (points to left hip)          OBJECTIVE    25  (15) min Therapeutic Exercise:  [x]  See flow sheet   Rationale:      increase ROM and increase strength to improve the patients ability to amb with increased ease     25 min Therapeutic Activity: amb activities   Rationale:   Increase ease with amb   min Patient Education:  YES  Reviewed HEP   []  Progressed/Changed HEP based on:   Cont HEP     Other Objective/Functional Measures:    Amb 300feet with 1#  And intermixed with HKs and side steps  Amb second set 300feet without #     Post Treatment Pain Level (on 0 to 10) scale:   0  / 10     ASSESSMENT    Assessment/Changes in Function:     Progressing with amb tolerance   []  See Progress Note/Recertification   Patient will continue to benefit from skilled PT services to analyze, cue, progress, modify,, demonstrate, instruct, and address, movement patterns, therapeutic interventions, postural abnormalities, soft tissue restrictions, ROM, strength, functional mobility, body mechanics/ergonomics, and home and community integration, to attain remaining goals.    Progress toward goals / Updated goals:    First visit since last progress note PLAN    []  Upgrade activities as tolerated YES Continue plan of care   []  Discharge due to :    []  Other:      Therapist: Alon Gomez DPT    Date: 7/29/2019 Time: 1:04 PM     Future Appointments   Date Time Provider Earnest Vidhi   7/31/2019  1:00 PM Jing Arnold, [address] [place] [address]   8/5/2019  1:00 PM Jing Arnold, [address] [place] [address]   8/8/2019  1:30 PM Jing Arnold, [facility]   8/12/2019  1:00 PM Jing Arnold, [address] [place] [address]   8/14/2019  1:00 PM Jing Arnold, [address] [place] [address]   8/20/2019  1:30 PM Edd Gibbs, [facility]   8/22/2019  1:30 PM Jing Arnold, [facility]   8/26/2019  1:00 PM Jing Arnold, [facility]   8/28/2019  1:00 PM Jing Arnold, [facility]   6/18/2020  1:15 PM Soniya Mercado  [facility]   7/27/2020  1:30 PM [facility]   7/27/2020  2:00 PM Shawnee Fang MD [facility]

## 2019-07-30 NOTE — PROGRESS NOTES
Nyla Mckeon [address]  [facility]  [address] [facility], [address] [employer] [address] - Phone: [phone]  Fax: [phone]  PROGRESS NOTE  Patient Name: Clem Banks : 1945   Treatment/Medical Diagnosis: Low back pain [M54.5]   Referral Source: Robi Cordova DO     Date of Initial Visit: 19 Attended Visits: 15 Missed Visits: 0     SUMMARY OF TREATMENT  Patient's POC has consisted of therex, therapeutic activities, manual therapy prn, modalities prn, pt. education, and a comprehensive HEP. Treatment strategies used to address functional mobility deficits, ROM deficits, strength deficits, analyze and address soft tissue restrictions, analyze and cue movement patterns, analyze and modify body mechanics/ergonomics, assess and modify postural abnormalities and instruct in home and community integration. CURRENT STATUS  {atoenmt further improved ambulatory status as she now utilizes [facility] for even ground ambulation. She is currently working on weaning to no AD, but deconditioning hinders distance/time. She is now able to step up with 1UE/1AD in a reciprocal pattern x6 steps without rest.     Goal/Measure of Progress Goal Met? 1. Patient to be Safe and Independent with HEP to self-manage/prevent symptoms after DC. 2. Patient to reduce TUG score to < 20\" to indicate increased safety in community mobility.   3. Patient to increase FOTO score to > 60 to indicate improved functional independence. 4. Patient to initiate walking program from [address] [place] [address] with LRAD to promote wellness and community mobility.  Ongoing      YES      NO      SPC     New Goals to be achieved in __4__  weeks:  1. Patient to be Independent with HEP to self-manage/prevent symptoms after DC. 2. Patient to increase FOTO score to > 60 to indicate improved functional independence.    3. Patient to demonstrate ability to negotiate uneven ground/grass ambulation x > 200' without safety concerns with LRAD to promote community mobility. Frequency / Duration:   Patient to be seen  2-3  times per week for 4  weeks:    RECOMMENDATIONS  Continue and progress functional therex/therapeutic activity as able, utilizing manual therapy and modalities prn. Transition patient in ambulating/performing loaded activities without AD, trial grass negotiation without AD. If you have any questions/comments please contact us directly at [phone]. Thank you for allowing us to assist in the care of your patient. Therapist Signature: Princess Drake \"RENETTA\" Erica Holly, RANULFO, Cert. MDT, Cert. DN, Cert. SMT Date: 0/20/3708   Certification Period: 5/21/19-8/20/19     Reporting Period 6/24/19-7/25/19   Time: 10:17 AM   NOTE TO PHYSICIAN:  PLEASE COMPLETE THE ORDERS BELOW AND FAX TO   [facility]: [phone]. If you are unable to process this request in 24 hours please contact our office: [phone].    ___ I have read the above report and request that my patient continue as recommended.   ___ I have read the above report and request that my patient continue therapy with the following changes/special instructions:_________________________________________________________   ___ I have read the above report and request that my patient be discharged from therapy.      Physician Signature:        Date:       Time:

## 2019-07-31 ENCOUNTER — HOSPITAL ENCOUNTER (OUTPATIENT)
Dept: PHYSICAL THERAPY | Age: 74
Discharge: HOME | End: 2019-07-31
Payer: MEDICARE

## 2019-07-31 PROCEDURE — 97530 THERAPEUTIC ACTIVITIES: CPT

## 2019-07-31 PROCEDURE — 97110 THERAPEUTIC EXERCISES: CPT

## 2019-07-31 NOTE — PROGRESS NOTES
PHYSICAL THERAPY - DAILY TREATMENT NOTE    Patient Name: Maryana Best        Date: 2019  : 1945   YES Patient  Verified  Visit #:   +  Insurance: Payor: Randi Shaffer / Plan: [address] [place] [address] HMO / Product Type: Managed Care Medicare /      In time: 1:00 Out time: 1:50   Total Treatment Time: 50     Medicare/BCBS Time Tracking (below)   Total Timed Codes (min):  50 1:1 Treatment Time:  38     TREATMENT AREA = Low back pain [M54.5]    SUBJECTIVE    Pain Level (on 0 to 10 scale):  0  / 10   Medication Changes/New allergies or changes in medical history, any new surgeries or procedures? NO    If yes, update Summary List   Subjective Functional Status/Changes:  []  No changes reported     \"I feel like my mind is like road runner but my body isn't as fast. \"          OBJECTIVE     Therapeutic Procedures:  Min Procedure Specifics + Rationale   n/a [x]  Patient Education (performed throughout session) [x] Review HEP    [] Progressed/Changed HEP based on:   [] proper performance and advancement of Therex/TA   [] reduction in pain level    [] increased functional capacity       [] change in directional preference   15 [x] Therapeutic Exercise   [x]  See Flowsheet   Rationale: increase ROM and increase strength to improve the patients ability to participate in ADL's    23 [x] Therapeutic Activity   [x]  See Flowsheet  Zig Zag, in/out sidestep, in/out forward, sidestepping, HK  Ambulation with SPC x 2 x 300' with variations in head turns. Step ups  Rationale: To improve safety, proprioception, coordination, and efficiency with tasks         Other Objective/Functional Measures:    Increased reps/sets/resistance per flow sheet.        Post Treatment Pain Level (on 0 to 10) scale:   0  / 10     ASSESSMENT    Assessment/Changes in Function:       Reduced frequency of rest periods throughout rx session (2x total)         Patient will continue to benefit from skilled PT services to modify and progress therapeutic interventions, address functional mobility deficits, address ROM deficits, address strength deficits, analyze and address soft tissue restrictions, analyze and cue movement patterns, analyze and modify body mechanics/ergonomics, assess and modify postural abnormalities, address imbalance/dizziness and instruct in home and community integration  to attain remaining goals   Progress toward goals / Updated goals:    Progressing well in ability to sustain standing activities for ADL's     PLAN    [x]  Upgrade activities as tolerated  [x]  Update interventions per flow sheet YES Continue plan of care   []  Discharge due to :    []  Other:      Therapist: Radha [place] \"BJ\" Bran, LANCET, Cert. MDT, Cert. DN, Cert.  SMT    Date: 7/31/2019 Time: 1:07 PM     Future Appointments   Date Time Provider Earnest Mosher   8/5/2019  1:00 PM Krysten Garces, [facility]   8/8/2019  1:30 PM Krysten Garces [facility]   8/12/2019  1:00 PM Krysten Garces [facility]   8/14/2019  1:00 PM Krysten Garces [facility]   8/20/2019  1:30 PM Jorge L Akins [facility]   8/22/2019  1:30 PM Krysten Garces [facility]   8/26/2019  1:00 PM Krysten Garces [facility]   8/28/2019  1:00 PM Krysten Garces [facility]   6/18/2020  1:15 PM Teresa Youngblood  [facility]   7/27/2020  1:30 PM [facility]   7/27/2020  2:00 PM Chanel Harrington MD [address]

## 2019-08-05 ENCOUNTER — HOSPITAL ENCOUNTER (OUTPATIENT)
Dept: PHYSICAL THERAPY | Age: 74
Discharge: HOME | End: 2019-08-05
Payer: MEDICARE

## 2019-08-05 PROCEDURE — 97530 THERAPEUTIC ACTIVITIES: CPT

## 2019-08-05 NOTE — PROGRESS NOTES
PHYSICAL THERAPY - DAILY TREATMENT NOTE    Patient Name: Izabela Lopez        Date: 2019  : 1945   YES Patient  Verified  Visit #:      of   18+  Insurance: Payor: Mario Lopes / Plan: [address] [place] [address] HMO / Product Type: Managed Care Medicare /       In time: 1:05 Out time: 2:00   Total Treatment Time: 55     Medicare/BCBS Time Tracking (below)   Total Timed Codes (min):  55 1:1 Treatment Time:  25     TREATMENT AREA = Low back pain [M54.5]    SUBJECTIVE    Pain Level (on 0 to 10 scale):  0  / 10   Medication Changes/New allergies or changes in medical history, any new surgeries or procedures? NO    If yes, update Summary List   Subjective Functional Status/Changes:  []  No changes reported     \"I got a hurri-cane. Can you go over walking with it? It's different than the regular cane\"          OBJECTIVE     Therapeutic Procedures:  Min Procedure Specifics + Rationale   n/a [x]  Patient Education (performed throughout session) [x] Review HEP    [] Progressed/Changed HEP based on:   [] proper performance and advancement of Therex/TA   [] reduction in pain level    [] increased functional capacity       [] change in directional preference   30(15) [x] Therapeutic Exercise   [x]  See Flowsheet   Rationale: increase ROM and increase strength to improve the patients ability to participate in ADL's    25(25) [x] Therapeutic Activity   [x]  See Flowsheet  MSR series E0/EC flat/foam, gait training with hurri-cane  Rationale:  To improve safety, proprioception, coordination, and efficiency with tasks       Other Objective/Functional Measures:    Min VC's to utilize hurri-cane     Post Treatment Pain Level (on 0 to 10) scale:   0  / 10     ASSESSMENT    Assessment/Changes in Function:       Improved conditioning as patient did not require any rest periods         Patient will continue to benefit from skilled PT services to modify and progress therapeutic interventions, address functional mobility deficits, address ROM deficits, address strength deficits, analyze and address soft tissue restrictions, analyze and cue movement patterns, analyze and modify body mechanics/ergonomics, address imbalance/dizziness and instruct in home and community integration  to attain remaining goals   Progress toward goals / Updated goals:    Progressing in standing activity tolerance for ADL's     PLAN    [x]  Upgrade activities as tolerated  [x]  Update interventions per flow sheet YES Continue plan of care   []  Discharge due to :    []  Other:      Therapist: Madeline Davenport \"BJ\" RANULFO Sotomayor, Cert. MDT, Cert. DN, Cert.  SMT    Date: 8/5/2019 Time: 12:55 PM     Future Appointments   Date Time Provider Earnest Vidhi   8/5/2019  1:00 PM Lukas Peacock, [facility]   8/8/2019  1:30 PM Lukas Peacock, [facility]   8/12/2019  1:00 PM Lukas Peacock [facility]   8/14/2019  1:00 PM Lukas Peacock [facility]   8/20/2019  1:30 PM Jessica Florence, [facility]   8/22/2019  1:30 PM Lukas Peacock, [facility]   8/26/2019  1:00 PM Lukas Peacock, [facility]   8/28/2019  1:00 PM Lukas Peacock [facility]   6/18/2020  1:15 PM Jacqueline Santiago  [facility]   7/27/2020  1:30 PM [address] [place] [address]   7/27/2020  2:00 PM Beryle Michael, MD [address]

## 2019-08-08 ENCOUNTER — HOSPITAL ENCOUNTER (OUTPATIENT)
Dept: PHYSICAL THERAPY | Age: 74
Discharge: HOME | End: 2019-08-08
Payer: MEDICARE

## 2019-08-08 PROCEDURE — 97110 THERAPEUTIC EXERCISES: CPT

## 2019-08-08 PROCEDURE — 97530 THERAPEUTIC ACTIVITIES: CPT

## 2019-08-08 NOTE — PROGRESS NOTES
PHYSICAL THERAPY - DAILY TREATMENT NOTE    Patient Name: Van Pringle        Date: 2019  : 1945   YES Patient  Verified  Visit #:      18+  Insurance: Payor: Erikapatti Jackyovana / Plan: [address] [place] [address] HMO / Product Type: Managed Care Medicare /      In time: 1:15 Out time: 2:12   Total Treatment Time: 57     Medicare/BCBS Time Tracking (below)   Total Timed Codes (min):  57 1:1 Treatment Time:  53     TREATMENT AREA = Low back pain [M54.5]    SUBJECTIVE    Pain Level (on 0 to 10 scale):  0  / 10   Medication Changes/New allergies or changes in medical history, any new surgeries or procedures? NO    If yes, update Summary List   Subjective Functional Status/Changes:  []  No changes reported     \"I'm energetic but my ears are stuffed so I'm not sure how I'll do on the balance today. I'll do what I can though. \"          OBJECTIVE     Therapeutic Procedures:  Min Procedure Specifics + Rationale   n/a [x]  Patient Education (performed throughout session) [x] Review HEP    [] Progressed/Changed HEP based on:   [] proper performance and advancement of Therex/TA   [] reduction in pain level    [] increased functional capacity       [] change in directional preference   23(23) [x] Therapeutic Exercise   [x]  See Flowsheet   Rationale: increase ROM and increase strength to improve the patients ability to participate in ADL's    34(30) [x] Therapeutic Activity   [x]  See Flowsheet  MSR series on foam/off foam EO/EC, SLS  Rationale: To improve safety, proprioception, coordination, and efficiency with tasks         Other Objective/Functional Measures:    Increased reps/sets/resistance per flow sheet.   Performed pallof presses on DD     Post Treatment Pain Level (on 0 to 10) scale:   0  / 10     ASSESSMENT    Assessment/Changes in Function:       Increased fatigue but no balance concerns with weighted therex         Patient will continue to benefit from skilled PT services to modify and progress therapeutic interventions, address functional mobility deficits, address ROM deficits, address strength deficits, analyze and address soft tissue restrictions, analyze and cue movement patterns, analyze and modify body mechanics/ergonomics, assess and modify postural abnormalities, address imbalance/dizziness and instruct in home and community integration  to attain remaining goals   Progress toward goals / Updated goals:    Continued improvement in dynamic balance and stability on unstable surface     PLAN    [x]  Upgrade activities as tolerated  [x]  Update interventions per flow sheet YES Continue plan of care   []  Discharge due to :    []  Other:      Therapist: Meli Wahl \"BJ\" RANULFO Sotomayor, Cert. MDT, Cert. DN, Cert.  SMT    Date: 8/8/2019 Time: 1:23 PM     Future Appointments   Date Time Provider Earnest Vidhi   8/8/2019  1:30 PM Martina Hamm [facility]   8/12/2019  1:00 PM Martina Hamm [facility]   8/14/2019  1:00 PM Martina Hamm [facility]   8/20/2019  1:30 PM Gloria Rocha, [facility]   8/22/2019  1:30 PM Martina Hamm [facility]   8/26/2019  1:00 PM Martina Hamm [facility]   8/28/2019  1:00 PM Martina Hamm [facility]   6/18/2020  1:15 PM Ivonne Gandhi MD [address] [place] [address]   7/27/2020  1:30 PM [address] [place] [address]   7/27/2020  2:00 PM Steffanie Avilez MD [facility]

## 2019-08-12 ENCOUNTER — HOSPITAL ENCOUNTER (OUTPATIENT)
Dept: PHYSICAL THERAPY | Age: 74
Discharge: HOME | End: 2019-08-12
Payer: MEDICARE

## 2019-08-12 PROCEDURE — 97530 THERAPEUTIC ACTIVITIES: CPT

## 2019-08-12 PROCEDURE — 97110 THERAPEUTIC EXERCISES: CPT

## 2019-08-12 NOTE — PROGRESS NOTES
PHYSICAL THERAPY - DAILY TREATMENT NOTE    Patient Name: Amaury Wolff        Date: 2019  : 1945   YES Patient  Verified  Visit #:   +  Insurance: Payor: Angelica Iris / Plan: [address] [place] [address] HMO / Product Type: Managed Care Medicare /      In time: 1:06 Out time: 2:00   Total Treatment Time: 54     Medicare/BCBS Time Tracking (below)   Total Timed Codes (min):  54 1:1 Treatment Time:  54     TREATMENT AREA = Low back pain [M54.5]    SUBJECTIVE    Pain Level (on 0 to 10 scale):  0  / 10   Medication Changes/New allergies or changes in medical history, any new surgeries or procedures? NO    If yes, update Summary List   Subjective Functional Status/Changes:  []  No changes reported     \"When I have back aches at the house I stand by my walker and do my leg exercises (hip 3-way). The one's where I kick back are especially helpful. \"          OBJECTIVE     Therapeutic Procedures:  Min Procedure Specifics + Rationale   n/a [x]  Patient Education (performed throughout session) [x] Review HEP    [] Progressed/Changed HEP based on:   [] proper performance and advancement of Therex/TA   [] reduction in pain level    [] increased functional capacity       [] change in directional preference   30(30) [x] Therapeutic Exercise   [x]  See Flowsheet   Rationale: increase ROM and increase strength to improve the patients ability to participate in ADL's    24(24) [x] Therapeutic Activity   [x]  See Flowsheet  Grass ambulation with variations in ambulatory style  Laps around clinic with head turns  Figure 8 cone walk  MSR EO/EC  Rationale:  To improve safety, proprioception, coordination, and efficiency with tasks         Other Objective/Functional Measures:    Ambulated with SPC on grass     Post Treatment Pain Level (on 0 to 10) scale:   0  / 10     ASSESSMENT    Assessment/Changes in Function:     Improved safety with ambulating on even surfaces         Patient will continue to benefit from skilled PT services to modify and progress therapeutic interventions, address functional mobility deficits, address ROM deficits, address strength deficits, analyze and address soft tissue restrictions, analyze and cue movement patterns, analyze and modify body mechanics/ergonomics and instruct in home and community integration  to attain remaining goals   Progress toward goals / Updated goals:      Progressing with LTG #3       PLAN    [x]  Upgrade activities as tolerated  [x]  Update interventions per flow sheet YES Continue plan of care   []  Discharge due to :    []  Other: PN NV     Therapist: Radha Miller \"BJ\" RANULFO Sotomayor, Cert. MDT, Cert. DN, Cert.  SMT    Date: 8/12/2019 Time: 1:35 PM     Future Appointments   Date Time Provider Earnest Vidhi   8/14/2019  1:00 PM Krysten Garces [facility]   8/20/2019  1:30 PM Jorge L Akins [facility]   8/22/2019  1:30 PM Krysten Garces [facility]   8/26/2019  1:00 PM Krysten Garces [facility]   8/28/2019  1:00 PM Krysten Garces [facility]   6/18/2020  1:15 PM Teresa Youngblood  [facility]   7/27/2020  1:30 PM [address] [place] [address]   7/27/2020  2:00 PM Chanel Harrington MD [facility] [facility]

## 2019-08-12 NOTE — PROGRESS NOTES
N[address] Mckeon [address]  [facility] PHYSICAL THERAPY  [address] [facility], [address] - Phone: [phone]  Fax: [phone]  PROGRESS NOTE  Patient Name: German Bhatti : 1945   Treatment/Medical Diagnosis: Low back pain [M54.5]   Referral Source: Abbey Harry DO     Date of Initial Visit: 19 Attended Visits: 21 Missed Visits: 0     SUMMARY OF TREATMENT  Patient's POC has consisted of therex, therapeutic activities, manual therapy prn, modalities prn, pt. education, and a comprehensive HEP. Treatment strategies used to address functional mobility deficits, ROM deficits, strength deficits, analyze and address soft tissue restrictions, analyze and cue movement patterns, analyze and modify body mechanics/ergonomics, assess and modify postural abnormalities and instruct in home and community integration. CURRENT STATUS  Patient has transitioned to be able to ambulate without AD on uneven ground, though she uses a cane for comfort. She is now able to negotiate stairs in reciprocal pattern with 1 UE assist and no AD x6 without rest. She is currently ambulating on uneven ground with SPC for assistance    Goal/Measure of Progress Goal Met? 1. Patient to be Independent with HEP to self-manage/prevent symptoms after DC. 2. Patient to increase FOTO score to > 60 to indicate improved functional independence. 3. Patient to demonstrate ability to negotiate uneven ground/grass ambulation x > 200' without AD Ongoing    NO      SPC     New Goals to be achieved in __2-4__  weeks:  1. Patient to be Independent with HEP to self-manage/prevent symptoms after DC. 2. Patient to demonstrate SLS > 3\" to indicate reduced risk for falls.    3. Patient to ambulate > 200' on grass with supervision   Frequency / Duration:   Patient to be seen  2  times per week for 4  weeks:    RECOMMENDATIONS  Continue and progress functional therex/therapeutic activity as able, utilizing manual therapy and modalities prn. Progress patient towards independent HEP to facilitate self-management of symptoms and progress gains after PT. If you have any questions/comments please contact us directly at [phone]. Thank you for allowing us to assist in the care of your patient. Therapist Signature: Mahamed \"RENETTA\" Hermelindo Silva, RANULFO, Cert. MDT, Cert. DN, Cert. SMT Date: 0/89/0479   Certification Period: 8/14/19-9/13/19     Reporting Period 7/15/19-8/14/19   Time: 2:33 PM   NOTE TO PHYSICIAN:  PLEASE COMPLETE THE ORDERS BELOW AND FAX TO   [facility]: [phone]. If you are unable to process this request in 24 hours please contact our office: [phone].    ___ I have read the above report and request that my patient continue as recommended.   ___ I have read the above report and request that my patient continue therapy with the following changes/special instructions:_________________________________________________________   ___ I have read the above report and request that my patient be discharged from therapy.      Physician Signature:        Date:       Time:

## 2019-08-14 ENCOUNTER — HOSPITAL ENCOUNTER (OUTPATIENT)
Dept: PHYSICAL THERAPY | Age: 74
Discharge: HOME | End: 2019-08-14
Payer: MEDICARE

## 2019-08-14 PROCEDURE — 97110 THERAPEUTIC EXERCISES: CPT

## 2019-08-14 PROCEDURE — 97530 THERAPEUTIC ACTIVITIES: CPT

## 2019-08-14 NOTE — PROGRESS NOTES
PHYSICAL THERAPY - DAILY TREATMENT NOTE    Patient Name: Royal Webster        Date: 2019  : 1945   YES Patient  Verified  Visit #:   +  Insurance: Payor: Joe Franklin / Plan: [address] [place] [address] HMO / Product Type: Managed Care Medicare /      In time: 1:03 Out time: 2:07   Total Treatment Time: 64     Medicare/BCBS Time Tracking (below)   Total Timed Codes (min):  64 1:1 Treatment Time:  27     TREATMENT AREA = Low back pain [M54.5]    SUBJECTIVE    Pain Level (on 0 to 10 scale):  0  / 10   Medication Changes/New allergies or changes in medical history, any new surgeries or procedures? NO    If yes, update Summary List   Subjective Functional Status/Changes:  []  No changes reported     \"Did I get approved more? \"      OBJECTIVE     Therapeutic Procedures:  Min Procedure Specifics + Rationale   n/a [x]  Patient Education (performed throughout session) [x] Review HEP    [] Progressed/Changed HEP based on:   [] proper performance and advancement of Therex/TA   [] reduction in pain level    [] increased functional capacity       [] change in directional preference   42(12) [x] Therapeutic Exercise   [x]  See Flowsheet   Rationale: increase ROM and increase strength to improve the patients ability to participate in ADL's    15 [x] Therapeutic Activity   [x]  See Flowsheet  Grass ambulation HK/Retro/Sidestepping  Even ground ambulation around clinic x 300' with variations in head turns  Rationale:  To improve safety, proprioception, coordination, and efficiency with tasks     Modality rationale: decrease inflammation, decrease pain, increase tissue extensibility and increase muscle contraction/control to improve the patients ability to perform ADL's with greater ease       Min Type Additional Details   [x] Skin assessment post-treatment:  [x]intact [x]redness- no adverse reaction       []redness  adverse reaction:     Other Objective/Functional Measures:    See PN     Post Treatment Pain Level (on 0 to 10) scale:   0  / 10     ASSESSMENT    Assessment/Changes in Function:       See PN         Patient will continue to benefit from skilled PT services to modify and progress therapeutic interventions, address functional mobility deficits, address ROM deficits, address strength deficits, analyze and address soft tissue restrictions, analyze and cue movement patterns and instruct in home and community integration  to attain remaining goals   Progress toward goals / Updated goals:    See PN     PLAN    [x]  Upgrade activities as tolerated  [x]  Update interventions per flow sheet YES Continue plan of care   []  Discharge due to :    []  Other:      Therapist: Humphrey Pennington \"BJ\" Susana Rolon, DPT, Cert. MDT, Cert. DN, Cert.  SMT    Date: 8/14/2019 Time: 1:26 PM     Future Appointments   Date Time Provider Earnest Mosher   8/20/2019  1:30 PM Jessika Evans, PT [facility]   8/22/2019  1:30 PM [facility]   8/26/2019  1:00 PM [facility]   8/28/2019  1:00 PM [facility]   6/18/2020  1:15 PM Sangeeta Jacobo MD [address] [place] [address]   7/27/2020  1:30 PM [address] [place] [address]   7/27/2020  2:00 PM Cherry Murphy MD [facility]

## 2019-08-20 ENCOUNTER — HOSPITAL ENCOUNTER (OUTPATIENT)
Dept: PHYSICAL THERAPY | Age: 74
Discharge: HOME | End: 2019-08-20
Payer: MEDICARE

## 2019-08-20 PROCEDURE — 97530 THERAPEUTIC ACTIVITIES: CPT

## 2019-08-20 PROCEDURE — 97110 THERAPEUTIC EXERCISES: CPT

## 2019-08-20 NOTE — PROGRESS NOTES
PHYSICAL THERAPY - DAILY TREATMENT NOTE    Patient Name: Jacob         Date: 2019  : 1945   YES Patient  Verified  Visit #:   25   of   18+  Insurance: Payor: Laurel Rosenberg / Plan: [address] [place] [address] HMO / Product Type: Managed Care Medicare /      In time: 1:30 Out time: 2:15   Total Treatment Time: 45     Medicare/BCBS [place] Time Tracking (below)   Total Timed Codes (min):  45 1:1 Treatment Time:  45     TREATMENT AREA =  LBP, deconditioning    SUBJECTIVE    Pain Level (on 0 to 10 scale):  0  / 10   Medication Changes/New allergies or changes in medical history, any new surgeries or procedures? NO    If yes, update Summary List   Subjective Functional Status/Changes:  []  No changes reported     \"My asthma is acting up a little bit today\"          OBJECTIVE      20 min Therapeutic Exercise:  [x]  See flow sheet   Rationale:      increase ROM and increase strength to improve the patients ability to amb with increased ease     25 min Therapeutic Activity: Blance, amb, obstacle negt   Rationale:   Increase safety in community   min Patient Education:  YES  Reviewed HEP   []  Progressed/Changed HEP based on:   Cont HEP     Other Objective/Functional Measures:    Amb in clinic-alternating small and large hurdles then around cones. Amb x2 labs and up and down steps   Difficulty negt toe clearance over large hurdles  SLS on Right 6sec, Left 2 sec     Post Treatment Pain Level (on 0 to 10) scale:   0  / 10     ASSESSMENT    Assessment/Changes in Function:     Cont with poor balance on left     []  See Progress Note/Recertification   Patient will continue to benefit from skilled PT services to analyze, cue, progress, modify,, demonstrate, instruct, and address, movement patterns, therapeutic interventions, postural abnormalities, soft tissue restrictions, ROM, strength, functional mobility, body mechanics/ergonomics, and home and community integration, to attain remaining goals.    Progress toward goals / Updated goals:     Added SLS today     PLAN    []  Upgrade activities as tolerated YES Continue plan of care   []  Discharge due to :    []  Other:      Therapist: Logan Bautista DPT    Date: 8/20/2019 Time: 1:45 PM     Future Appointments   Date Time Provider Earnest Mosher   8/22/2019  1:30 PM Darlene Finch, [facility]   8/26/2019  1:00 PM Darleneenzo Finch, [address] [place] [address]   8/28/2019  1:00 PM Darleneenzo Finch, [address] [place] [address]   9/3/2019  1:30 PM Kathryn Jarrett [facility]   9/5/2019  1:30 PM Kathryn Jarrett, [facility]   6/18/2020  1:15 PM Catarino Gosselin, MD [address] [place] [address]   7/27/2020  1:30 PM [address] [place] [address]   7/27/2020  2:00 PM Brian Oscar MD [facility]

## 2019-08-22 ENCOUNTER — HOSPITAL ENCOUNTER (OUTPATIENT)
Dept: PHYSICAL THERAPY | Age: 74
Discharge: HOME | End: 2019-08-22
Payer: MEDICARE

## 2019-08-22 PROCEDURE — 97110 THERAPEUTIC EXERCISES: CPT

## 2019-08-22 PROCEDURE — 97530 THERAPEUTIC ACTIVITIES: CPT

## 2019-08-22 NOTE — PROGRESS NOTES
PHYSICAL THERAPY - DAILY TREATMENT NOTE    Patient Name: Royal Webster        Date: 2019  : 1945   YES Patient  Verified  Visit #:   +  Insurance: Payor: Joe Franklin / Plan: [address] [place] [address] HMO / Product Type: Managed Care Medicare /      In time: 1:22 Out time: 2:20   Total Treatment Time: 58     Medicare/BCBS Time Tracking (below)   Total Timed Codes (min):  58 1:1 Treatment Time:  53     TREATMENT AREA = Low back pain [M54.5]    SUBJECTIVE    Pain Level (on 0 to 10 scale):  0  / 10   Medication Changes/New allergies or changes in medical history, any new surgeries or procedures? NO    If yes, update Summary List   Subjective Functional Status/Changes:  []  No changes reported     \"Can we go outside today? \"          OBJECTIVE     Therapeutic Procedures:  Min Procedure Specifics + Rationale   n/a [x]  Patient Education (performed throughout session) [x] Review HEP    [] Progressed/Changed HEP based on:   [] proper performance and advancement of Therex/TA   [] reduction in pain level    [] increased functional capacity       [] change in directional preference   28(23) [x] Therapeutic Exercise   [x]  See Flowsheet   Rationale: increase ROM and increase strength to improve the patients ability to participate in ADL's    30 [x] Therapeutic Activity   [x]  See Flowsheet  Grass ambulation, walking as well on mulch and over roots. SLS  Even ground ambulation in clinic  Rationale: To improve safety, proprioception, coordination, and efficiency with tasks         Other Objective/Functional Measures:    Patient reported having a rock in her shoe after ambulating outside. Therapist was used by patient for support, and she independently took off her shoe (SLS), shook the shoe of debris, and donned it. All while standing on grass.       Post Treatment Pain Level (on 0 to 10) scale:   0  / 10     ASSESSMENT    Assessment/Changes in Function:       Excellent balance noted while ambulating in clinic, good balance on grass         Patient will continue to benefit from skilled PT services to modify and progress therapeutic interventions, address functional mobility deficits, address ROM deficits, address strength deficits, analyze and address soft tissue restrictions, analyze and cue movement patterns, analyze and modify body mechanics/ergonomics, assess and modify postural abnormalities, address imbalance/dizziness and instruct in home and community integration  to attain remaining goals   Progress toward goals / Updated goals:    Excellent progress towards community mobility. PLAN    [x]  Upgrade activities as tolerated  [x]  Update interventions per flow sheet YES Continue plan of care   []  Discharge due to :    []  Other:      Therapist: [facility] \"BJ\" RANULFO Sotomayor, Cert. MDT, Cert. DN, Cert.  SMT    Date: 8/22/2019 Time: 1:32 PM     Future Appointments   Date Time Provider Earnest Vidhi   8/26/2019  1:00 PM Ryan Houston, [facility]   8/28/2019  1:00 PM Ryan Houston, [address] [place] [address]   9/3/2019  1:30 PM Nohelia Seay, [facility]   9/5/2019  1:30 PM Nohelia Seay, [facility]   9/9/2019  1:00 PM Ryan Houston, [facility]   9/11/2019  1:00 PM Ryan Houston, [facility]   9/17/2019  1:30 PM Nohelia Seay, [facility]   9/19/2019  1:30 PM Nohelia Seay, [facility]   9/24/2019  1:30 PM Ryan Houston, [facility]   9/26/2019  1:30 PM Ryan Houston, [facility]   9/30/2019  1:00 PM Ryan Houston, [facility]   6/18/2020  1:15 PM Carloz Melendez  [facility]   7/27/2020  1:30 PM [facility]   7/27/2020  2:00 PM Trevor Wesley MD [facility]

## 2019-08-26 ENCOUNTER — HOSPITAL ENCOUNTER (OUTPATIENT)
Dept: PHYSICAL THERAPY | Age: 74
Discharge: HOME | End: 2019-08-26
Payer: MEDICARE

## 2019-08-26 PROCEDURE — 97110 THERAPEUTIC EXERCISES: CPT

## 2019-08-26 PROCEDURE — 97530 THERAPEUTIC ACTIVITIES: CPT

## 2019-08-26 NOTE — PROGRESS NOTES
PHYSICAL THERAPY - DAILY TREATMENT NOTE    Patient Name: Gui Pena        Date: 2019  : 1945   YES Patient  Verified  Visit #:   +  Insurance: Payor: Celia Flower / Plan: [address] [place] [address] HMO / Product Type: Managed Care Medicare /      In time: 12:53 Out time: 1:50   Total Treatment Time: 57     Medicare/BCBS Time Tracking (below)   Total Timed Codes (min):  57 1:1 Treatment Time:  57     TREATMENT AREA = Low back pain [M54.5]    SUBJECTIVE    Pain Level (on 0 to 10 scale):  0  / 10   Medication Changes/New allergies or changes in medical history, any new surgeries or procedures? 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 It feels more natural to close my eyes when I work on balance. \"          OBJECTIVE     Therapeutic Procedures:  Min Procedure Specifics + Rationale   n/a [x]  Patient Education (performed throughout session) [x] Review HEP    [] Progressed/Changed HEP based on:   [] proper performance and advancement of Therex/TA   [] reduction in pain level    [] increased functional capacity       [] change in directional preference   30(30) [x] Therapeutic Exercise   [x]  See Flowsheet   Rationale: increase ROM and increase strength to improve the patients ability to participate in ADL's    27(27) [x] Therapeutic Activity   [x]  See Flowsheet  SLS, Step up with foam, MSR EO/EC to GT, Stair negotiation, sit<-->stand, laps around clinic with variation in head turns  Rationale: To improve safety, proprioception, coordination, and efficiency with tasks       [x] Skin assessment post-treatment:  [x]intact [x]redness- no adverse reaction       []redness  adverse reaction:     Other Objective/Functional Measures:    Increased reps/sets/resistance per flow sheet.        Post Treatment Pain Level (on 0 to 10) scale:   0  / 10     ASSESSMENT    Assessment/Changes in Function:       Difficulty maintaining SLS on right > Left with 1 finger for support, but otherwise able to safely participate in ambulation activity         Patient will continue to benefit from skilled PT services to modify and progress therapeutic interventions, address functional mobility deficits, address ROM deficits, address strength deficits, analyze and address soft tissue restrictions, analyze and cue movement patterns, analyze and modify body mechanics/ergonomics, address imbalance/dizziness and instruct in home and community integration  to attain remaining goals   Progress toward goals / Updated goals:    Steady progress in conditioning for community mobility. PLAN    [x]  Upgrade activities as tolerated  [x]  Update interventions per flow sheet YES Continue plan of care   []  Discharge due to :    []  Other:      Therapist: Meli Wahl \"BJ\" RANULFO Sotomayor, Cert. MDT, Cert. DN, Cert.  SMT    Date: 8/26/2019 Time: 12:58 PM     Future Appointments   Date Time Provider Earnest Mosher   8/26/2019  1:00 PM Martina Hamm, [facility]   8/28/2019  1:00 PM Martina Hamm, PT [facility]   9/3/2019  1:30 PM Gloria Rocha, PT [facility]   9/5/2019  1:30 PM Gloria Rocha, PT [facility]   9/9/2019  1:00 PM Martina Hamm, [facility]   9/11/2019  1:00 PM Martina Hamm, PT [facility]   9/17/2019  1:30 PM Gloria Rocha, PT [facility]   9/19/2019  1:30 PM Gloria Rocha, PT [facility]   9/24/2019  1:30 PM Martina Hamm, PT [facility]   9/26/2019  1:30 PM Martina Hamm, [facility]   9/30/2019  1:00 PM Martina Hamm, [facility]   6/18/2020  1:15 PM Ivonne Gandhi  [facility]   7/27/2020  1:30 PM [address] [place] [address]   7/27/2020  2:00 PM Steffanie Avilez MD [address]

## 2019-08-28 ENCOUNTER — APPOINTMENT (OUTPATIENT)
Dept: PHYSICAL THERAPY | Age: 74
End: 2019-08-28
Payer: MEDICARE

## 2019-09-03 ENCOUNTER — APPOINTMENT (OUTPATIENT)
Dept: PHYSICAL THERAPY | Age: 74
End: 2019-09-03
Payer: MEDICARE

## 2019-09-05 ENCOUNTER — HOSPITAL ENCOUNTER (OUTPATIENT)
Dept: PHYSICAL THERAPY | Age: 74
Discharge: HOME | End: 2019-09-05
Payer: MEDICARE

## 2019-09-05 PROCEDURE — 97110 THERAPEUTIC EXERCISES: CPT

## 2019-09-05 NOTE — PROGRESS NOTES
PHYSICAL THERAPY - DAILY TREATMENT NOTE    Patient Name: Josh Barnes        Date: 2019  : 1945   YES Patient  Verified  Visit #:     Insurance: Payor: Keisha Dahiana / Plan: [address] [place] [address] HMO / Product Type: Managed Care Medicare /      In time: 1:32 Out time: 2:25   Total Treatment Time: 53     Medicare/BCBS [place] Time Tracking (below)   Total Timed Codes (min):  53 1:1 Treatment Time:  45     TREATMENT AREA =  L/S    SUBJECTIVE    Pain Level (on 0 to 10 scale):  0  / 10   Medication Changes/New allergies or changes in medical history, any new surgeries or procedures? NO    If yes, update Summary List   Subjective Functional Status/Changes:  []  No changes reported     \"I still can only walk very short distances without my cane\"          OBJECTIVE      53  (45) min Therapeutic Exercise:  [x]  See flow sheet   Rationale:      increase ROM, increase strength and amb tolerance to improve the patients ability to amb in home and community with increased ease      min Patient Education:  YES  Reviewed HEP   []  Progressed/Changed HEP based on:   Cont HEP     Other Objective/Functional Measures:    Interm VC for form during TE  Amb 220feet without SPC today     Post Treatment Pain Level (on 0 to 10) scale:   0  / 10     ASSESSMENT    Assessment/Changes in Function:     Progressing with amb tolerance     []  See Progress Note/Recertification   Patient will continue to benefit from skilled PT services to analyze, cue, progress, modify,, demonstrate, instruct, and address, movement patterns, therapeutic interventions, postural abnormalities, soft tissue restrictions, ROM, strength, functional mobility, body mechanics/ergonomics, and home and community integration, to attain remaining goals.    Progress toward goals / Updated goals:    Nearing independence     PLAN    []  Upgrade activities as tolerated YES Continue plan of care   []  Discharge due to :    []  Other:      Therapist: Destiny Chavez DPT    Date: 9/5/2019 Time: 1:58 PM     Future Appointments   Date Time Provider Earnest Mosher   9/9/2019  1:00 PM Yahaira Yun, [facility]   9/11/2019  1:00 PM Yahaira Yun, [address] [place] [address]   9/17/2019  1:30 PM Marian Heck, [facility]   9/19/2019  1:30 PM Marian Heck, [facility]   9/24/2019  1:30 PM Yahaira Yun, PT [facility]   9/26/2019  1:30 PM Yahaira Yun, [facility]   9/30/2019  1:00 PM Yahaira Yun, [address] [place] [address]   10/7/2019 12:30 PM Ash Saxena NP [facility]   6/18/2020  1:15 PM Dutch Flores  [facility]   7/27/2020  1:30 PM [address] [place] [address]   7/27/2020  2:00 PM Halie Comer MD [address]

## 2019-09-09 ENCOUNTER — HOSPITAL ENCOUNTER (OUTPATIENT)
Dept: PHYSICAL THERAPY | Age: 74
Discharge: HOME | End: 2019-09-09
Payer: MEDICARE

## 2019-09-09 PROCEDURE — 97110 THERAPEUTIC EXERCISES: CPT

## 2019-09-09 PROCEDURE — 97530 THERAPEUTIC ACTIVITIES: CPT

## 2019-09-09 NOTE — PROGRESS NOTES
PHYSICAL THERAPY - DAILY TREATMENT NOTE    Patient Name: Chelsea Guardado        Date: 2019  : 1945   YES Patient  Verified  Visit #:     Insurance: Payor: Nat Hernandez / Plan: [address] [place] [address] HMO / Product Type: Managed Care Medicare /      In time: 12:58 Out time: 1:48   Total Treatment Time: 50     Medicare/BCBS Time Tracking (below)   Total Timed Codes (min):  50 1:1 Treatment Time:  38     TREATMENT AREA = Low back pain [M54.5]    SUBJECTIVE    Pain Level (on 0 to 10 scale):  0  / 10   Medication Changes/New allergies or changes in medical history, any new surgeries or procedures? NO    If yes, update Summary List   Subjective Functional Status/Changes:  []  No changes reported     \"I feel like when I'm in here I can do anything. At home it's a lot harder b/c I'm \"          OBJECTIVE     Therapeutic Procedures:  Min Procedure Specifics + Rationale   n/a [x]  Patient Education (performed throughout session) [x] Review HEP    [] Progressed/Changed HEP based on:   [] proper performance and advancement of Therex/TA   [] reduction in pain level    [] increased functional capacity       [] change in directional preference   20(8) [x] Therapeutic Exercise   [x]  See Flowsheet   Rationale: increase ROM and increase strength to improve the patients ability to participate in ADL's    30(30) [x] Therapeutic Activity   [x]  See Flowsheet  Grass ambulation without SPC, using supervision x200'   Clinic laps without AD  Rationale: To improve safety, proprioception, coordination, and efficiency with tasks       Other Objective/Functional Measures:    Discussed with patient GLO EPSTEIN. Patient agreed. Post Treatment Pain Level (on 0 to 10) scale:   0  / 10     ASSESSMENT    Assessment/Changes in Function:       Significant improvement in stamina and balance.           Patient will continue to benefit from skilled PT services to instruct in home and community integration  to attain remaining goals   Progress toward goals / Updated goals: Will DC NV     PLAN    [x]  Upgrade activities as tolerated  [x]  Update interventions per flow sheet YES Continue plan of care   []  Discharge due to :    []  Other:      Therapist: Brandon Pink \"BJ\" LANCE SotomayorT, Cert. MDT, Cert. DN, Cert.  SMT    Date: 9/9/2019 Time: 1:13 PM     Future Appointments   Date Time Provider Earnest Floresi   9/11/2019  1:00 PM Vic Myers, [address] [place] [address]   9/17/2019  1:30 PM Isma De La Rosa, [facility]   9/19/2019  1:30 PM Isma De La Rosa, [facility]   9/24/2019  1:30 PM Vic Myers, [facility]   9/26/2019  1:30 PM Vic Myers, [facility]   9/30/2019  1:00 PM Vic Myers, [address] [place] [address]   10/7/2019 12:30 PM Vidal Hall NP [facility]   6/18/2020  1:15 PM Britta Ku  [facility]   7/27/2020  1:30 PM [address] [place] [address]   7/27/2020  2:00 PM Blake Abdullahi MD [address]

## 2019-09-11 ENCOUNTER — HOSPITAL ENCOUNTER (OUTPATIENT)
Dept: PHYSICAL THERAPY | Age: 74
Discharge: HOME | End: 2019-09-11
Payer: MEDICARE

## 2019-09-11 PROCEDURE — 97530 THERAPEUTIC ACTIVITIES: CPT

## 2019-09-11 PROCEDURE — 97110 THERAPEUTIC EXERCISES: CPT

## 2019-09-11 NOTE — PROGRESS NOTES
PHYSICAL THERAPY - DAILY TREATMENT NOTE    Patient Name: Yoan Dooley        Date: 2019  : 1945   YES Patient  Verified  Visit #:     Insurance: Payor: Angelica Iris / Plan: [address] [place] [address] HMO / Product Type: Managed Care Medicare /      In time: 1:00 Out time: 1:53   Total Treatment Time: 53     Medicare/BCBS Time Tracking (below)   Total Timed Codes (min):  53 1:1 Treatment Time:  53     TREATMENT AREA = Low back pain [M54.5]    SUBJECTIVE    Pain Level (on 0 to 10 scale):  0  / 10   Medication Changes/New allergies or changes in medical history, any new surgeries or procedures? NO    If yes, update Summary List   Subjective Functional Status/Changes:  []  No changes reported     \"I appreciate all the help\"          OBJECTIVE     Therapeutic Procedures:  Min Procedure Specifics + Rationale   n/a [x]  Patient Education (performed throughout session) [x] Review HEP    [] Progressed/Changed HEP based on:   [] proper performance and advancement of Therex/TA   [] reduction in pain level    [] increased functional capacity       [] change in directional preference   45(45) [x] Therapeutic Exercise   [x]  See Flowsheet   Rationale: increase ROM and increase strength to improve the patients ability to participate in ADL's    8(8) [x] Therapeutic Activity   [x]  See Flowsheet  Grass ambulation, re-assessment of functional status and mobility  Rationale:  To improve safety, proprioception, coordination, and efficiency with tasks         Other Objective/Functional Measures:    See DC     Post Treatment Pain Level (on 0 to 10) scale:   0  / 10     ASSESSMENT    Assessment/Changes in Function:       See DC         Patient will continue to benefit from skilled PT services to n/a  to attain remaining goals   Progress toward goals / Updated goals:    See DC     PLAN    [x]  Upgrade activities as tolerated  [x]  Update interventions per flow sheet NO Continue plan of care   []  Discharge due to :    []  Other:      Therapist: Carmelina Estrada \"BJ\" LANCE SotomayorT, Cert. MDT, Cert. DN, Cert.  SMT    Date: 9/11/2019 Time: 1:04 PM     Future Appointments   Date Time Provider Earnest Mosher   10/7/2019 12:30 PM SUNDAR Chapman [facility]   6/18/2020  1:15 PM Pepper Esquivel MD [address] [place] [address]   7/27/2020  1:30 PM [address] [place] [address]   7/27/2020  2:00 PM Mertie Galeazzi, MD [facility]

## 2019-09-12 NOTE — PROGRESS NOTES
[address]  [facility] PHYSICAL THERAPY  [address] [facility] [address] [place], [address] - Phone: [phone]  Fax: [phone] SUMMARY  Patient Name: Jan Ayers : 1945   Treatment/Medical Diagnosis: Low back pain [M54.5]   Referral Source: Bebe Garrett NP     Date of Initial Visit: 19 Attended Visits: 21 Missed Visits: 0     SUMMARY OF TREATMENT  Patient's POC has consisted of therex, therapeutic activities, manual therapy prn, modalities prn, pt. education, and a comprehensive HEP. Treatment strategies used to address functional mobility deficits, ROM deficits, strength deficits, analyze and address soft tissue restrictions, analyze and cue movement patterns, analyze and modify body mechanics/ergonomics, assess and modify postural abnormalities and instruct in home and community integration. CURRENT STATUS  Patient able to ambulate > 500' on even surfaces without AD or safety concerns. She's able to ambulate on grass without AD x 300' and with supervision. She is able to ambulate on uneven ground safely with SPC. Her primary limitation against distance is her asthma, and not her L/S. ADL's are not limited by her L/S.     GOALS/MEASURE OF PROGRESS Goal Met? 1. Patient to be Independent with HEP to self-manage/prevent symptoms after DC. 2. Patient to demonstrate SLS > 3\" to indicate reduced risk for falls. 3. Patient to ambulate > 200' on grass with supervision  MET    MET    MET     RECOMMENDATIONS  Discontinue therapy. Progressing towards or have reached established goals. If you have any questions/comments please contact us directly at [phone]. Thank you for allowing us to assist in the care of your patient. Therapist Signature: Tin Godinez \"BJ\" Chelita Whalen DPT, Cert. JUAN, [place] DN, Cert.  SMT Date: 19   Reporting Period: -     Certification Period 19-19 Time: 10:36 AM     NOTE TO PHYSICIAN:  PLEASE COMPLETE THE ORDERS BELOW AND FAX TO   [facility]: [phone]  If you are unable to process this request in 24 hours please contact our office: [phone]    ___ I have read the above report and request that my patient continue as recommended.   ___ I have read the above report and request that my patient continue therapy with the following changes/special instructions:_________________________________________________________   ___ I have read the above report and request that my patient be discharged from therapy.      Physician Signature:        Date:     Time:

## 2019-09-17 ENCOUNTER — APPOINTMENT (OUTPATIENT)
Dept: PHYSICAL THERAPY | Age: 74
End: 2019-09-17
Payer: MEDICARE

## 2019-09-19 ENCOUNTER — APPOINTMENT (OUTPATIENT)
Dept: PHYSICAL THERAPY | Age: 74
End: 2019-09-19
Payer: MEDICARE

## 2019-09-24 ENCOUNTER — APPOINTMENT (OUTPATIENT)
Dept: PHYSICAL THERAPY | Age: 74
End: 2019-09-24
Payer: MEDICARE

## 2019-09-26 ENCOUNTER — APPOINTMENT (OUTPATIENT)
Dept: PHYSICAL THERAPY | Age: 74
End: 2019-09-26
Payer: MEDICARE

## 2019-09-30 ENCOUNTER — APPOINTMENT (OUTPATIENT)
Dept: PHYSICAL THERAPY | Age: 74
End: 2019-09-30
Payer: MEDICARE

## 2019-10-03 ENCOUNTER — TELEPHONE (OUTPATIENT)
Dept: FAMILY MEDICINE CLINIC | Age: 74
End: 2019-10-03

## 2019-10-28 ENCOUNTER — OFFICE VISIT (OUTPATIENT)
Dept: FAMILY MEDICINE CLINIC | Age: 74
End: 2019-10-28

## 2019-10-28 ENCOUNTER — HOSPITAL ENCOUNTER (OUTPATIENT)
Dept: LAB | Age: 74
Discharge: HOME | End: 2019-10-28
Payer: MEDICARE

## 2019-10-28 VITALS
TEMPERATURE: 98 F | HEIGHT: 60 IN | WEIGHT: 132 LBS | RESPIRATION RATE: 16 BRPM | OXYGEN SATURATION: 97 % | DIASTOLIC BLOOD PRESSURE: 79 MMHG | BODY MASS INDEX: 25.91 KG/M2 | HEART RATE: 71 BPM | SYSTOLIC BLOOD PRESSURE: 136 MMHG

## 2019-10-28 DIAGNOSIS — D50.8 IRON DEFICIENCY ANEMIA SECONDARY TO INADEQUATE DIETARY IRON INTAKE: ICD-10-CM

## 2019-10-28 DIAGNOSIS — G25.81 RLS (RESTLESS LEGS SYNDROME): ICD-10-CM

## 2019-10-28 DIAGNOSIS — J30.89 NON-SEASONAL ALLERGIC RHINITIS DUE TO OTHER ALLERGIC TRIGGER: ICD-10-CM

## 2019-10-28 DIAGNOSIS — J45.30 MILD PERSISTENT REACTIVE AIRWAY DISEASE WITHOUT COMPLICATION: ICD-10-CM

## 2019-10-28 DIAGNOSIS — R05.9 COUGH: ICD-10-CM

## 2019-10-28 DIAGNOSIS — M46.20 PARASPINAL ABSCESS (HCC): ICD-10-CM

## 2019-10-28 DIAGNOSIS — R10.13 EPIGASTRIC PAIN: Primary | ICD-10-CM

## 2019-10-28 LAB
ALBUMIN SERPL-MCNC: 4.2 G/DL (ref 3.4–5)
ALBUMIN/GLOB SERPL: 1.1 {RATIO} (ref 0.8–1.7)
ALP SERPL-CCNC: 85 U/L (ref 45–117)
ALT SERPL-CCNC: 27 U/L (ref 13–56)
ANION GAP SERPL CALC-SCNC: 7 MMOL/L (ref 3–18)
AST SERPL-CCNC: 22 U/L (ref 10–38)
BILIRUB SERPL-MCNC: 0.3 MG/DL (ref 0.2–1)
BUN SERPL-MCNC: 24 MG/DL (ref 7–18)
BUN/CREAT SERPL: 22 (ref 12–20)
CALCIUM SERPL-MCNC: 9.9 MG/DL (ref 8.5–10.1)
CHLORIDE SERPL-SCNC: 102 MMOL/L (ref 100–111)
CO2 SERPL-SCNC: 31 MMOL/L (ref 21–32)
CREAT SERPL-MCNC: 1.11 MG/DL (ref 0.6–1.3)
ERYTHROCYTE [DISTWIDTH] IN BLOOD BY AUTOMATED COUNT: 13.1 % (ref 11.6–14.5)
GLOBULIN SER CALC-MCNC: 4 G/DL (ref 2–4)
GLUCOSE SERPL-MCNC: 108 MG/DL (ref 74–99)
HCT VFR BLD AUTO: 36.3 % (ref 35–45)
HGB BLD-MCNC: 11.4 G/DL (ref 12–16)
MCH RBC QN AUTO: 30.1 PG (ref 24–34)
MCHC RBC AUTO-ENTMCNC: 31.4 G/DL (ref 31–37)
MCV RBC AUTO: 95.8 FL (ref 74–97)
PLATELET # BLD AUTO: 223 K/UL (ref 135–420)
PMV BLD AUTO: 11.3 FL (ref 9.2–11.8)
POTASSIUM SERPL-SCNC: 4.1 MMOL/L (ref 3.5–5.5)
PROT SERPL-MCNC: 8.2 G/DL (ref 6.4–8.2)
RBC # BLD AUTO: 3.79 M/UL (ref 4.2–5.3)
SODIUM SERPL-SCNC: 140 MMOL/L (ref 136–145)
WBC # BLD AUTO: 3.4 K/UL (ref 4.6–13.2)

## 2019-10-28 PROCEDURE — 80053 COMPREHEN METABOLIC PANEL: CPT

## 2019-10-28 PROCEDURE — 82728 ASSAY OF FERRITIN: CPT

## 2019-10-28 PROCEDURE — 83540 ASSAY OF IRON: CPT

## 2019-10-28 PROCEDURE — 36415 COLL VENOUS BLD VENIPUNCTURE: CPT

## 2019-10-28 PROCEDURE — 85027 COMPLETE CBC AUTOMATED: CPT

## 2019-10-28 RX ORDER — ROPINIROLE 0.5 MG/1
TABLET, FILM COATED ORAL
Qty: 90 TAB | Refills: 0 | Status: SHIPPED | OUTPATIENT
Start: 2019-10-28 | End: 2020-02-27 | Stop reason: SDUPTHER

## 2019-10-28 RX ORDER — ALBUTEROL SULFATE 90 UG/1
1 AEROSOL, METERED RESPIRATORY (INHALATION)
Qty: 1 INHALER | Refills: 0 | Status: SHIPPED | OUTPATIENT
Start: 2019-10-28 | End: 2020-01-21

## 2019-10-28 RX ORDER — MONTELUKAST SODIUM 10 MG/1
TABLET ORAL
Qty: 90 TAB | Refills: 0 | Status: SHIPPED | OUTPATIENT
Start: 2019-10-28 | End: 2020-02-12 | Stop reason: SDUPTHER

## 2019-10-28 RX ORDER — LORATADINE 10 MG/1
10 TABLET ORAL DAILY
Qty: 90 TAB | Refills: 0 | Status: SHIPPED | OUTPATIENT
Start: 2019-10-28 | End: 2020-02-12 | Stop reason: SDUPTHER

## 2019-10-28 RX ORDER — GABAPENTIN 100 MG/1
CAPSULE ORAL
Qty: 540 CAP | Refills: 0 | Status: SHIPPED | OUTPATIENT
Start: 2019-10-28 | End: 2020-02-27 | Stop reason: SDUPTHER

## 2019-10-28 RX ORDER — FERROUS GLUCONATE 324(38)MG
TABLET ORAL
Qty: 90 TAB | Refills: 0 | Status: SHIPPED | OUTPATIENT
Start: 2019-10-28 | End: 2019-12-18 | Stop reason: SDUPTHER

## 2019-10-28 NOTE — PATIENT INSTRUCTIONS

## 2019-10-28 NOTE — PROGRESS NOTES
Aubree Flores is a 66 y.o. female  Chief Complaint   Patient presents with    Medication Refill     1. Have you been to the ER, urgent care clinic since your last visit? Hospitalized since your last visit? No    2. Have you seen or consulted any other health care providers outside of the [address] [place] [address] since your last visit? Include any pap smears or colon screening.  No

## 2019-10-28 NOTE — PROGRESS NOTES
Tosha Lewis is a 66 y.o.  female and presents with    Chief Complaint   Patient presents with    Medication Refill       Subjective:  Ms. Osmar Stoddard is a former patient of Dr. Rajat Hillman. She does not wish to establish care today. 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 She is on Gabapentin 200mg TID. She also reports history of restless leg syndrome and is on requip every night- symptoms are stable with medication. She reports intermittent abdominal pain around the umbilical area. She reports discomfort when wearing clothes that are too tight around her abdomen. She denies bulging near her umbilicus. She reports normal bowel movements. Additional Concerns: No         Patient Active Problem List   Diagnosis Code    Anemia D64.9    Procidentia of uterus N81.3    Visual changes H53.9    Back pain M54.9    RLS (restless legs syndrome) G25.81    Bladder stones N21.0    Uterus prolapse N81.4    Functional urinary incontinence R39.81    Screening cholesterol level Z13.220    LBBB (left bundle branch block) I44.7    Uterovaginal prolapse N81.4    Pain at rest R52    Diarrhea R19.7     Current Outpatient Medications   Medication Sig Dispense Refill    gabapentin (NEURONTIN) 100 mg capsule TAKE 2 CAPSULES BY MOUTH THREE TIMES DAILY 540 Cap 0    loratadine (CLARITIN) 10 mg tablet Take 1 Tab by mouth daily. 90 Tab 1    montelukast (SINGULAIR) 10 mg tablet TAKE 1 TABLET BY MOUTH DAILY 90 Tab 1    ferrous gluconate 324 mg (38 mg iron) tablet TAKE 1 TABLET BY MOUTH EVERY MONDAY, WEDNESDAY, AND SUNDAY 90 Tab 3    rOPINIRole (REQUIP) 0.5 mg tablet TAKE 1 TABLET BY MOUTH EVERY NIGHT 90 Tab 2    latanoprost (XALATAN) 0.005 % ophthalmic solution Administer 2 Drops to both eyes nightly.  albuterol (PROVENTIL HFA, VENTOLIN HFA, PROAIR HFA) 90 mcg/actuation inhaler Take 1 Puff by inhalation every four (4) hours as needed (reactive airway symptoms including cough).  1 Inhaler 0     Allergies Allergen Reactions    Bactrim [Sulfamethoprim] Nausea Only and Other (comments)     Headache, Joint pain, loss of appetite     Sulfamethoxazole-Trimethoprim Other (comments)     Past Medical History:   Diagnosis Date    Anemia 03/2016    in hospital - [facility]     in hospital - [facility]    Bladder stones     DVT (deep venous thrombosis) ([facility] [address]     S/P IVC filter 2016. In setting of paraplagia from Spinal abcess    Female genital prolapse     Hypocontractile bladder     LBBB (left bundle branch block)     Osteopenia     Spinal abscess ([facility] [address] 03/2016    Caused paraplegia. patient ambulatory Summer 2018    SVT (supraventricular tachycardia) (HCC)     Urine retention     Uterus prolapse     grade 5. Surgically corrected 7/2018    Vaginal candida 03/16/2016    Voiding dysfunction      Past Surgical History:   Procedure Laterality Date    HX DILATION AND CURETTAGE      x 4 after having miscarriages.  HX GYN  07/2018    CYSTOURETHROSCOPY; POSTERIOR COLPORRHAPHY, CYSTOCELE REPAIR; COLPOPEXY VAGINAL INTRA-PERITONEAL APPROACH;    HX TONSILLECTOMY      HX VITRECTOMY Left 2017    VASCULAR SURGERY PROCEDURE UNLIST  march 2016    IVC filter. History reviewed. No pertinent family history.   Social History     Tobacco Use    Smoking status: Former Smoker     Packs/day: 1.00     Years: 15.00     Pack years: 15.00     Types: Cigarettes    Smokeless tobacco: Never Used    Tobacco comment: quit in the 70's   Substance Use Topics    Alcohol use: No     Alcohol/week: 0.0 standard drinks     Frequency: Never       ROS   History obtained from the patient  General ROS: negative for - chills or fever  Psychological ROS: negative for - anxiety or depression  Respiratory ROS: positive for - cough  Cardiovascular ROS: no chest pain or dyspnea on exertion  Gastrointestinal ROS: no abdominal pain, change in bowel habits, or black or bloody stools  Genito-Urinary ROS: no dysuria, trouble voiding, or hematuria  Musculoskeletal ROS: negative for - joint pain, joint stiffness or joint swelling  Neurological ROS: positive for - numbness/tingling and tremors    All other systems reviewed and are negative. Objective:  Vitals:    10/28/19 1313   BP: 136/79   Pulse: 71   Resp: 16   Temp: 98 °F (36.7 °C)   SpO2: 97%   Weight: 132 lb (59.9 kg)   Height: 5' (1.524 m)   PainSc:   0 - No pain       PE  General appearance - alert, well appearing, and in no distress  Mental status - normal mood, behavior, speech, dress, motor activity, and thought processes  Chest - clear to auscultation, no wheezes, rales or rhonchi, symmetric air entry  Heart - normal rate and regular rhythm  Abdomen - soft, nontender, nondistended, no masses or organomegaly  Neurological - alert, oriented, normal speech, no focal findings or movement disorder noted  Musculoskeletal - no joint tenderness, deformity or swelling  Extremities - peripheral pulses normal, no pedal edema, no clubbing or cyanosis          Assessment/Plan:    1. Epigastric pain  -     US ABD LTD; Future    2. Paraspinal abscess (HCC)  -     gabapentin (NEURONTIN) 100 mg capsule; TAKE 2 CAPSULES BY MOUTH THREE TIMES DAILY    3. RLS (restless legs syndrome)  -     rOPINIRole (REQUIP) 0.5 mg tablet; TAKE 1 TABLET BY MOUTH EVERY NIGHT  -     gabapentin (NEURONTIN) 100 mg capsule; TAKE 2 CAPSULES BY MOUTH THREE TIMES DAILY    4. Non-seasonal allergic rhinitis due to other allergic trigger  -     loratadine (CLARITIN) 10 mg tablet; Take 1 Tab by mouth daily. 5. Iron deficiency anemia secondary to inadequate dietary iron intake  -     IRON PROFILE; Future  -     CBC W/O DIFF; Future  -     FERRITIN; Future  -     METABOLIC PANEL, COMPREHENSIVE; Future    6.  Mild persistent reactive airway disease without complication  -     montelukast (SINGULAIR) 10 mg tablet; TAKE 1 TABLET BY MOUTH DAILY  -     albuterol (PROVENTIL HFA, VENTOLIN HFA, PROAIR HFA) 90 mcg/actuation inhaler; Take 1 Puff by inhalation every four (4) hours as needed (reactive airway symptoms including cough). 7. Cough  -     albuterol (PROVENTIL HFA, VENTOLIN HFA, PROAIR HFA) 90 mcg/actuation inhaler; Take 1 Puff by inhalation every four (4) hours as needed (reactive airway symptoms including cough). Other orders  -     ferrous gluconate 324 mg (38 mg iron) tablet; TAKE 1 TABLET BY MOUTH EVERY MONDAY, WEDNESDAY, AND SUNDAY        Lab review: orders written for new lab studies as appropriate; see orders        Health Maintenance:   Influenza Vaccine- declined     I have discussed the diagnosis with the patient and the intended plan as seen in the above orders. The patient has received an after-visit summary and questions were answered concerning future plans. I have discussed medication side effects and warnings with the patient as well. I have reviewed the plan of care with the patient, accepted their input and they are in agreement with the treatment goals. Follow-up and Dispositions    · Return in about 3 months (around 1/28/2020) for with PCP of choice . More than 1/2 of this 25 minute visit was spent in counseling and coordination of care, as described above.     Purnima Gomes DNP, FNP-C

## 2019-10-29 LAB
FERRITIN SERPL-MCNC: 341 NG/ML (ref 8–388)
IRON SATN MFR SERPL: 20 %
IRON SERPL-MCNC: 62 UG/DL (ref 50–175)
TIBC SERPL-MCNC: 311 UG/DL (ref 250–450)

## 2019-11-07 ENCOUNTER — HOSPITAL ENCOUNTER (OUTPATIENT)
Dept: ULTRASOUND IMAGING | Age: 74
Discharge: HOME | End: 2019-11-07
Attending: NURSE PRACTITIONER
Payer: MEDICARE

## 2019-11-07 DIAGNOSIS — R10.13 EPIGASTRIC PAIN: ICD-10-CM

## 2019-11-07 PROCEDURE — 76705 ECHO EXAM OF ABDOMEN: CPT

## 2019-12-18 DIAGNOSIS — D50.9 MICROCYTIC ANEMIA: Primary | ICD-10-CM

## 2019-12-18 RX ORDER — FERROUS GLUCONATE 324(38)MG
324 TABLET ORAL DAILY
Qty: 90 TAB | Refills: 1 | Status: SHIPPED | OUTPATIENT
Start: 2019-12-18 | End: 2020-07-27 | Stop reason: SDUPTHER

## 2019-12-23 ENCOUNTER — PATIENT MESSAGE (OUTPATIENT)
Dept: FAMILY MEDICINE CLINIC | Age: 74
End: 2019-12-23

## 2019-12-23 NOTE — TELEPHONE ENCOUNTER
From: Jun Meraz  To: Yoav Toribio NP  Sent: 12/23/2019 4:13 PM EST  Subject: Prescription Question    Arleen,    Thank you so much for your attention to my ferrous gluc prescription! The pharmacist called me this afternoon and said they had received the  prescription and that it was ready for pickup. I wish you a Very Happy Christmas Season!

## 2019-12-26 PROBLEM — N39.8 VOIDING DYSFUNCTION: Status: ACTIVE | Noted: 2019-12-26

## 2020-01-21 ENCOUNTER — ANESTHESIA EVENT (OUTPATIENT)
Dept: ENDOSCOPY | Age: 75
End: 2020-01-21
Payer: MEDICARE

## 2020-01-21 RX ORDER — MULTIVIT WITH MINERALS/HERBS
1 TABLET ORAL DAILY
COMMUNITY
End: 2022-03-03 | Stop reason: ALTCHOICE

## 2020-01-21 RX ORDER — BISMUTH SUBSALICYLATE 262 MG
1 TABLET,CHEWABLE ORAL DAILY
COMMUNITY

## 2020-01-21 RX ORDER — CHOLECALCIFEROL TAB 125 MCG (5000 UNIT) 125 MCG
TAB ORAL DAILY
COMMUNITY

## 2020-01-21 NOTE — PERIOP NOTES
PAT - SURGICAL PRE-ADMISSION INSTRUCTIONS    NAME:  Lu Motta                                                          TODAY'S DATE:  1/21/2020    SURGERY DATE:  1/22/2020                                  SURGERY ARRIVAL TIME:   0915    1. Do NOT eat or drink anything, including candy or gum, after MIDNIGHT on 01/21/2020 , unless you have specific instructions from your Surgeon or Anesthesia Provider to do so. 2. No smoking on the day of surgery. 3. No alcohol 24 hours prior to the day of surgery. 4. No recreational drugs for one week prior to the day of surgery. 5. Leave all valuables, including money/purse, at home. 6. Remove all jewelry, nail polish, makeup (including mascara); no lotions, powders, deodorant, or perfume/cologne/after shave. 7. Glasses/Contact lenses and Dentures may be worn to the hospital.  They will be removed prior to surgery. 8. Call your doctor if symptoms of a cold or illness develop within 24 ours prior to surgery. 9. AN ADULT MUST DRIVE YOU HOME AFTER OUTPATIENT SURGERY. 10. If you are having an OUTPATIENT procedure, please make arrangements for a responsible adult to be with you for 24 hours after your surgery. 11. If you are admitted to the hospital, you will be assigned to a bed after surgery is complete. Normally a family member will not be able to see you until you are in your assigned bed. 15. Family is encouraged to accompany you to the hospital.  We ask visitors in the treatment area to be limited to ONE person at a time to ensure patient privacy. EXCEPTIONS WILL BE MADE AS NEEDED. 15. Children under 12 are discouraged from entering the treatment area and need to be supervised by an adult when in the waiting room. Special Instructions:    Complete Bowel Prep Instructions according to directions. These surgical instructions were reviewed with Angelia White during the PAT phone call. Directions:   On the morning of surgery, please go to the [facility]. Enter the building from the [facility] entrance, 1st floor (next to the Emergency Room entrance). Take the elevator to the 2nd floor. Sign in at the Registration Desk.     If you have any questions and/or concerns, please do not hesitate to call:  (Prior to the day of surgery)  [facility] unit:  [phone]  (Day of surgery)  [facility] unit:  [phone]

## 2020-01-22 ENCOUNTER — ANESTHESIA (OUTPATIENT)
Dept: ENDOSCOPY | Age: 75
End: 2020-01-22
Payer: MEDICARE

## 2020-01-22 ENCOUNTER — HOSPITAL ENCOUNTER (OUTPATIENT)
Age: 75
Setting detail: OUTPATIENT SURGERY
Discharge: HOME | End: 2020-01-22
Attending: INTERNAL MEDICINE | Admitting: INTERNAL MEDICINE
Payer: MEDICARE

## 2020-01-22 VITALS
RESPIRATION RATE: 18 BRPM | DIASTOLIC BLOOD PRESSURE: 53 MMHG | WEIGHT: 125.5 LBS | HEART RATE: 63 BPM | OXYGEN SATURATION: 99 % | BODY MASS INDEX: 24.64 KG/M2 | HEIGHT: 60 IN | TEMPERATURE: 94.2 F | SYSTOLIC BLOOD PRESSURE: 109 MMHG

## 2020-01-22 PROBLEM — R19.5 HEME POSITIVE STOOL: Status: ACTIVE | Noted: 2020-01-22

## 2020-01-22 PROBLEM — Z12.11 SCREENING FOR COLON CANCER: Status: ACTIVE | Noted: 2018-03-22

## 2020-01-22 LAB
ATRIAL RATE: 66 BPM
CALCULATED R AXIS, ECG10: 53 DEGREES
CALCULATED T AXIS, ECG11: 155 DEGREES
DIAGNOSIS, 93000: NORMAL
P-R INTERVAL, ECG05: 146 MS
Q-T INTERVAL, ECG07: 440 MS
QRS DURATION, ECG06: 128 MS
QTC CALCULATION (BEZET), ECG08: 461 MS
VENTRICULAR RATE, ECG03: 66 BPM

## 2020-01-22 PROCEDURE — 74011000250 HC RX REV CODE- 250: Performed by: ANESTHESIOLOGY

## 2020-01-22 PROCEDURE — 74011250636 HC RX REV CODE- 250/636: Performed by: ANESTHESIOLOGY

## 2020-01-22 PROCEDURE — 74011250636 HC RX REV CODE- 250/636: Performed by: NURSE ANESTHETIST, CERTIFIED REGISTERED

## 2020-01-22 PROCEDURE — 77030031670 HC DEV INFL ENDOTEK BIG60 MRTM -B: Performed by: INTERNAL MEDICINE

## 2020-01-22 PROCEDURE — 76040000007: Performed by: INTERNAL MEDICINE

## 2020-01-22 PROCEDURE — 76060000032 HC ANESTHESIA 0.5 TO 1 HR: Performed by: INTERNAL MEDICINE

## 2020-01-22 PROCEDURE — 77030037186 HC VLV ENDOSC STRL DEFENDO DISP MVAT -A: Performed by: INTERNAL MEDICINE

## 2020-01-22 PROCEDURE — 88305 TISSUE EXAM BY PATHOLOGIST: CPT

## 2020-01-22 PROCEDURE — 77030021593 HC FCPS BIOP ENDOSC BSC -A: Performed by: INTERNAL MEDICINE

## 2020-01-22 PROCEDURE — 93005 ELECTROCARDIOGRAM TRACING: CPT

## 2020-01-22 RX ORDER — GLYCOPYRROLATE 0.2 MG/ML
INJECTION INTRAMUSCULAR; INTRAVENOUS AS NEEDED
Status: DISCONTINUED | OUTPATIENT
Start: 2020-01-22 | End: 2020-01-22 | Stop reason: HOSPADM

## 2020-01-22 RX ORDER — SODIUM CHLORIDE 0.9 % (FLUSH) 0.9 %
5-40 SYRINGE (ML) INJECTION EVERY 8 HOURS
Status: DISCONTINUED | OUTPATIENT
Start: 2020-01-22 | End: 2020-01-22 | Stop reason: HOSPADM

## 2020-01-22 RX ORDER — SODIUM CHLORIDE 0.9 % (FLUSH) 0.9 %
5-40 SYRINGE (ML) INJECTION AS NEEDED
Status: DISCONTINUED | OUTPATIENT
Start: 2020-01-22 | End: 2020-01-22 | Stop reason: HOSPADM

## 2020-01-22 RX ORDER — SODIUM CHLORIDE, SODIUM LACTATE, POTASSIUM CHLORIDE, CALCIUM CHLORIDE 600; 310; 30; 20 MG/100ML; MG/100ML; MG/100ML; MG/100ML
50 INJECTION, SOLUTION INTRAVENOUS CONTINUOUS
Status: DISCONTINUED | OUTPATIENT
Start: 2020-01-22 | End: 2020-01-22 | Stop reason: HOSPADM

## 2020-01-22 RX ORDER — LIDOCAINE HYDROCHLORIDE 10 MG/ML
0.1 INJECTION, SOLUTION EPIDURAL; INFILTRATION; INTRACAUDAL; PERINEURAL AS NEEDED
Status: DISCONTINUED | OUTPATIENT
Start: 2020-01-22 | End: 2020-01-22 | Stop reason: HOSPADM

## 2020-01-22 RX ORDER — PROPOFOL 10 MG/ML
INJECTION, EMULSION INTRAVENOUS AS NEEDED
Status: DISCONTINUED | OUTPATIENT
Start: 2020-01-22 | End: 2020-01-22 | Stop reason: HOSPADM

## 2020-01-22 RX ORDER — FAMOTIDINE 20 MG/1
20 TABLET, FILM COATED ORAL ONCE
Status: DISCONTINUED | OUTPATIENT
Start: 2020-01-22 | End: 2020-01-22 | Stop reason: HOSPADM

## 2020-01-22 RX ORDER — INSULIN LISPRO 100 [IU]/ML
INJECTION, SOLUTION INTRAVENOUS; SUBCUTANEOUS ONCE
Status: DISCONTINUED | OUTPATIENT
Start: 2020-01-22 | End: 2020-01-22 | Stop reason: HOSPADM

## 2020-01-22 RX ORDER — LIDOCAINE HYDROCHLORIDE 20 MG/ML
INJECTION, SOLUTION EPIDURAL; INFILTRATION; INTRACAUDAL; PERINEURAL AS NEEDED
Status: DISCONTINUED | OUTPATIENT
Start: 2020-01-22 | End: 2020-01-22 | Stop reason: HOSPADM

## 2020-01-22 RX ADMIN — PROPOFOL 150 MG: 10 INJECTION, EMULSION INTRAVENOUS at 11:30

## 2020-01-22 RX ADMIN — GLYCOPYRROLATE 0.2 MG: 0.2 INJECTION INTRAMUSCULAR; INTRAVENOUS at 11:30

## 2020-01-22 RX ADMIN — PROPOFOL 50 MG: 10 INJECTION, EMULSION INTRAVENOUS at 11:38

## 2020-01-22 RX ADMIN — LIDOCAINE HYDROCHLORIDE 100 MG: 20 INJECTION, SOLUTION INTRAVENOUS at 11:30

## 2020-01-22 RX ADMIN — SODIUM CHLORIDE, SODIUM LACTATE, POTASSIUM CHLORIDE, AND CALCIUM CHLORIDE 50 ML/HR: 600; 310; 30; 20 INJECTION, SOLUTION INTRAVENOUS at 10:42

## 2020-01-22 NOTE — ANESTHESIA PREPROCEDURE EVALUATION
Relevant Problems   No relevant active problems       Anesthetic History   No history of anesthetic complications            Review of Systems / Medical History  Patient summary reviewed, nursing notes reviewed and pertinent labs reviewed    Pulmonary            Asthma        Neuro/Psych   Within defined limits           Cardiovascular            Dysrhythmias : SVT    Pertinent negatives: Cardiac stents: LBBB. GI/Hepatic/Renal  Within defined limits              Endo/Other        Arthritis     Other Findings   Comments: Vaginal candida (B37.3) 03/16/2016   Spinal abscess ([address] (M46.20) 03/2016 Caused paraplegia. patient ambulatory Summer 2018  Uterus prolapse (N81.4)  grade 5.  Surgically corrected 7/2018  Bladder stones (N21.0)    Female genital prolapse (N81.9)    Osteopenia (M85.80)    Anemia (D64.9) 03/2016 in [facility] (M19.90)  in [facility]  SVT (supraventricular tachycardia) (HCC) (I47.1)    LBBB (left bundle branch block) (I44.7)    DVT (deep venous thrombosis) ([address] (I82.409)  S/P IVC filter 2016.  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  Followed by Dr. Nyasia Sainz  Environmental allergies (X59.32)    Umbilical hernia (J39.7             Physical Exam    Airway  Mallampati: I  TM Distance: > 6 cm  Neck ROM: normal range of motion   Mouth opening: Normal     Cardiovascular    Rhythm: regular  Rate: normal         Dental    Dentition: Upper partial plate     Pulmonary  Breath sounds clear to auscultation               Abdominal  GI exam deferred       Other Findings            Anesthetic Plan    ASA: 3  Anesthesia type: MAC          Induction: Intravenous  Anesthetic plan and risks discussed with: Patient

## 2020-01-22 NOTE — DISCHARGE INSTRUCTIONS
EGD/COLONSCOPY DISCHARGE    You have just had an examination of your esophagus (swallowing tube), stomach and duodenum (the first part of your small intestine) and of your colon (large intestine). The medication given to you during your procedure will be acting in your body for the next 12 hours, gradually wearing off. Your face may be flushed, skin may be warm and sweaty, you might feel a little bloated or nauseated and sleepy. 1. For the next 12 hours you SHOULD NOT:    a. Drive, operate any machinery. b. Drink alcohol.  c. Engage in activities that require mental sharpness or manual dexterity such as cooking. d. Take any medications other than prescribed by a physician.  e. Make any legal or financial decisions. 2. Call this office immediately, if:    a. Onset of new or increase of sylvia rectal bleeding.  b. Fever > 101  c. Increased abdominal pain    Additional instructions:    a. Diet as recommended  b. Due to risk of dehydration after colon prep and sedation, avoid extended periods of time outdoors if the day is hot and humid. c. If biopsies were taken, you will receive a post card or telephone call with results of your biopsies and suggestion for your next colonoscopy. Please allow us at least 3 weeks to get back with you about your results. If we have not contacted you by the, please call us for results. # [phone]  d. If you had polyp(s) removed DO NOT TAKE NSAIDS (Aspirin, Advil, Aleve, Naproxyn, Ibuprofen, etc) for 2 weeks. Tylenol is OK to use.       DISCHARGE SUMMARY from Nurse    PATIENT INSTRUCTIONS:    After general anesthesia or intravenous sedation, for 24 hours or while taking prescription Narcotics:  · Limit your activities  · Do not drive and operate hazardous machinery  · Do not make important personal or business decisions  · Do  not drink alcoholic beverages  · If you have not urinated within 8 hours after discharge, please contact your surgeon on call.    Report the following to your surgeon:  · Excessive pain, swelling, redness or odor of or around the surgical area  · Temperature over 100.5  · Nausea and vomiting lasting longer than 4 hours or if unable to take medications  · Any signs of decreased circulation or nerve impairment to extremity: change in color, persistent  numbness, tingling, coldness or increase pain  · Any questions      These are general instructions for a healthy lifestyle:    No smoking/ No tobacco products/ Avoid exposure to second hand smoke  Surgeon General's Warning:  Quitting smoking now greatly reduces serious risk to your health. Obesity, smoking, and sedentary lifestyle greatly increases your risk for illness    A healthy diet, regular physical exercise & weight monitoring are important for maintaining a healthy lifestyle    You may be retaining fluid if you have a history of heart failure or if you experience any of the following symptoms:  Weight gain of 3 pounds or more overnight or 5 pounds in a week, increased swelling in our hands or feet or shortness of breath while lying flat in bed. Please call your doctor as soon as you notice any of these symptoms; do not wait until your next office visit. The discharge information has been reviewed with the patient. The patient verbalized understanding. Discharge medications reviewed with the patient and appropriate educational materials and side effects teaching were provided. _____________Patient armband removed and given to patient to take home.   Patient was informed of the privacy risks if armband lost or stolen  ______________________________________________________________________________________________________________________  E

## 2020-01-22 NOTE — PERIOP NOTES
Phase 2 Recovery Summary    Report received from [facility], RN    Vitals:    01/21/20 1027 01/22/20 1013 01/22/20 1200 01/22/20 1201   BP:  163/71 109/53 109/53   Pulse:  70 63 63   Resp:  18 14 18   Temp:  (!) 94.2 °F (34.6 °C)     SpO2:  96% 100% 99%   Weight: 60.3 kg (133 lb) 56.9 kg (125 lb 8 oz)     Height: 5' (1.524 m) 5' (1.524 m)         oriented to time, place, person and situation    Lines and Drains  Peripheral Intravenous Line:  Removed prior to discharge    Patient assisted to chair with minimal assist. Pateint tolerating liquids well. Patient's family at bedside. Discharge discussed with patient and family. No questions or concerns at this time. Patient had time to ask any questions. Discharge medications reviewed with patient and caregiver and appropriate educational materials and side effects teaching were provided.     Patient discharged to home, with friend/.      Santi Dotson RN

## 2020-01-22 NOTE — H&P
Date of Surgery Update:  Stephanie Duenas was seen and examined. History and physical has been reviewed. The patient has been examined.  There have been no significant clinical changes since the completion of the originally dated History and Physical.    Signed By: Adenike Serrano MD     January 22, 2020 10:14 AM

## 2020-01-22 NOTE — PROCEDURES
EGD Procedure Note    Patient: Uma Garcia MRN: [id number]  SSN: [id number]    YOB: 1945  Age: 66 y.o. Sex: female      Date of Procedure: 1/22/2020      Procedures:  EGD :    HISTORY UPDATE: History and physical has been reviewed. The patient has been examined. There have been no significant clinical changes since the completion of the originally dated History and Physical.    INDICATION:  Heme positive stool    PROCEDURE PERFORMED: EGD    ENDOSCOPIST: Yasmine Patel MD    ASSISTANT:  Endoscopy Technician-1: Rea Ege  Endoscopy RN-1: Almeda Holter, RN    CLASSIFICATION OF PREOPERATIVE RISK: ASA 2 - Patient with mild systemic disease with no functional limitations    ANESTHESIA:  MAC anesthesia    ENDOSCOPE: [id number]                                                                                                              EXTENT OF EXAM: Second portion of the duodenum      DESCRIPTION OF PROCEDURE:   The procedure was discussed with the patient including purpose, risks, benefits and alternatives including but not limited to IV conscious sedation, bleeding, perforation and aspiration and the consent form was signed and witnessed. A safety timeout was performed. Procedure done with MAC. .  The patients vital signs were monitored at all times including heart rate and rhythm, oxygen saturation, and blood pressure. The patient was then placed into the left lateral decubitus position. The Olympus adult diagnostic endoscope was then passed under direct visualization to the second portion of the duodenum. The endoscope was then slowly withdrawn while closely visualizing the mucosa. In the stomach a retroflexion was performed and gastric fundus and cardia visualized. The scope was then removed. The patient was then transferred to the recovery room. FINDINGS:   Esophagus: The esophageal mucosa was normal with no ulceration, mass or stricture.   There was no evidence of Galdamez's esophagus or reflux esophagitis. Z line at 40 cm. Stomach: The gastric mucosa was normal with no ulceration, mass, stricture. Retroflexion revealed a normal cardia and fundus      Duodenum: The duodenum mucosa was normal with no ulceration, mass,  stricture and no evidence of villous atrophy. EBL: None      SPECIMENS:   ID Type Source Tests Collected by Time Destination   1 : bx polyp  Preservative Colon, Transverse  Stalin Carter MD 1/22/2020 1143 Pathology       IMPRESSION: Normal EGD    PLAN 1: Discharge when sedation criteria are met. 2.  Resume regular Diet as tolerated. Follow Up:  As scheduled      Cintia Fraser MD  1/22/2020                              Colonoscopy Procedure Note    Patient: Leonie Block MRN: [id number]  SSN: [id number]    YOB: 1945  Age: 66 y.o. Sex: female      Date of Procedure: 1/22/2020      Procedures:  COLONOSCOPY:   HISTORY UPDATE: History and physical has been reviewed. The patient has been examined. There have been no significant clinical changes since the completion of the originally dated History and Physical.   INDICATION:    Screening colonoscopy   PROCEDURE PERFORMED:Colonoscopy was complete to Terminal ileum    ENDOSCOPIST: Cintia Fraser MD   ASSISTANT:  Endoscopy Technician-1: Jennifer Martell  Endoscopy RN-1: Luda Peter RN   CLASSIFICATION OF PREOPERATIVE RISK: ASA 2 - Patient with mild systemic disease with no functional limitations   ANESTHESIA:  MAC anesthesia   ENDOSCOPE: [id number]                                                                                                        EXTENT OF EXAM: Terminal ileum     QUALITY OF COLON PREPARATION:  good     DESCRIPTION OF PROCEDURE:  The procedure was discussed with the     patient including but not limited to IV conscious sedation, bleeding, perforation and missed polyps and the consent form was signed and witnessed. A safety timeout was performed.  Procedure done with MAC. The patient's vitals were monitored at all times, including heart rate and rhythm, oxygen saturation, and blood pressure. The patient was then placed into the left lateral decubitus position. A rectal exam was performed. The Olympus Adult diagnostic colonscope was then passed under direct visualization with ease to the terminal ileum. .  The cecum was identified by landmarks including Ileocecal valve, appendiceal orifice and crow's foot. The scope was then slowly withdrawn while closely visualizing the mucosa in the rectum a retroflection was performed and distal rectum visualized. The scope was then removed. The patient was transferred to the recovery room in stable condition. FINDINGS:1. Terminal ileum normal to 10 cm 2. A 3 mm sessile polyp in transverse colon removed with cold biopsy forceps 3. Small internal hemorrhoids on retroflexion. EBL: None   SPECIMENS:   ID Type Source Tests Collected by Time Destination   1 : bx polyp  Preservative Colon, Transverse  Alka Carter MD 1/22/2020 1143 Pathology      IMPRESSION:1. Terminal ileum normal to 10 cm 2. A 3 mm sessile polyp in transverse colon removed with cold biopsy forceps 3. Small internal hemorrhoids on retroflexion.        RECOMMENDATIONS:               1.    Further recommendations will be made based on pathology results                 2.   High fibre diet                  Follow Up:   As scheduled      Rachel Raymond MD   1/22/2020

## 2020-01-22 NOTE — PERIOP NOTES
Pre-Op Summary    Pt arrived via car with family/friend and is oriented to time, place, person and situation. Patient with un steady gait with none assistive devices. Visit Vitals  /71 (BP 1 Location: Right arm, BP Patient Position: Sitting)   Pulse 70   Temp (!) 94.2 °F (34.6 °C)   Resp 18   Ht 5' (1.524 m)   Wt 56.9 kg (125 lb 8 oz)   SpO2 96%   Breastfeeding Yes   BMI 24.51 kg/m²       Peripheral IV located on Right hand . Patients belongings are located with sister. Patient's point of contact is sister- Abdulkadir Couch ride- is edward- [phone] will be in the waiting room. They are able to receive medication information. They will be their ride home.

## 2020-02-12 DIAGNOSIS — J30.89 NON-SEASONAL ALLERGIC RHINITIS DUE TO OTHER ALLERGIC TRIGGER: ICD-10-CM

## 2020-02-12 DIAGNOSIS — J45.30 MILD PERSISTENT REACTIVE AIRWAY DISEASE WITHOUT COMPLICATION: ICD-10-CM

## 2020-02-12 RX ORDER — LORATADINE 10 MG/1
10 TABLET ORAL DAILY
Qty: 90 TAB | Refills: 1 | Status: SHIPPED | OUTPATIENT
Start: 2020-02-12 | End: 2020-07-27 | Stop reason: SDUPTHER

## 2020-02-12 RX ORDER — MONTELUKAST SODIUM 10 MG/1
TABLET ORAL
Qty: 90 TAB | Refills: 1 | Status: SHIPPED | OUTPATIENT
Start: 2020-02-12 | End: 2020-07-27 | Stop reason: SDUPTHER

## 2020-02-27 ENCOUNTER — OFFICE VISIT (OUTPATIENT)
Dept: FAMILY MEDICINE CLINIC | Age: 75
End: 2020-02-27

## 2020-02-27 ENCOUNTER — HOSPITAL ENCOUNTER (OUTPATIENT)
Dept: LAB | Age: 75
Discharge: HOME | End: 2020-02-27
Payer: MEDICARE

## 2020-02-27 VITALS
HEART RATE: 64 BPM | DIASTOLIC BLOOD PRESSURE: 77 MMHG | HEIGHT: 60 IN | SYSTOLIC BLOOD PRESSURE: 133 MMHG | OXYGEN SATURATION: 98 % | BODY MASS INDEX: 24.54 KG/M2 | TEMPERATURE: 97.8 F | RESPIRATION RATE: 16 BRPM | WEIGHT: 125 LBS

## 2020-02-27 DIAGNOSIS — G25.81 RLS (RESTLESS LEGS SYNDROME): ICD-10-CM

## 2020-02-27 DIAGNOSIS — D72.819 LEUKOPENIA, UNSPECIFIED TYPE: Primary | ICD-10-CM

## 2020-02-27 DIAGNOSIS — D72.819 LEUKOPENIA, UNSPECIFIED TYPE: ICD-10-CM

## 2020-02-27 DIAGNOSIS — M46.20 PARASPINAL ABSCESS (HCC): ICD-10-CM

## 2020-02-27 LAB
ERYTHROCYTE [DISTWIDTH] IN BLOOD BY AUTOMATED COUNT: 14.4 % (ref 11.6–14.5)
HCT VFR BLD AUTO: 35.8 % (ref 35–45)
HGB BLD-MCNC: 11.5 G/DL (ref 12–16)
MCH RBC QN AUTO: 30.3 PG (ref 24–34)
MCHC RBC AUTO-ENTMCNC: 32.1 G/DL (ref 31–37)
MCV RBC AUTO: 94.5 FL (ref 74–97)
PLATELET # BLD AUTO: 165 K/UL (ref 135–420)
PMV BLD AUTO: 12.2 FL (ref 9.2–11.8)
RBC # BLD AUTO: 3.79 M/UL (ref 4.2–5.3)
WBC # BLD AUTO: 3.4 K/UL (ref 4.6–13.2)

## 2020-02-27 PROCEDURE — 85027 COMPLETE CBC AUTOMATED: CPT

## 2020-02-27 PROCEDURE — 36415 COLL VENOUS BLD VENIPUNCTURE: CPT

## 2020-02-27 RX ORDER — GABAPENTIN 100 MG/1
CAPSULE ORAL
Qty: 540 CAP | Refills: 1 | Status: SHIPPED | OUTPATIENT
Start: 2020-02-27 | End: 2020-08-10 | Stop reason: SDUPTHER

## 2020-02-27 RX ORDER — ROPINIROLE 0.5 MG/1
TABLET, FILM COATED ORAL
Qty: 90 TAB | Refills: 1 | Status: SHIPPED | OUTPATIENT
Start: 2020-02-27 | End: 2020-07-27 | Stop reason: SDUPTHER

## 2020-02-27 NOTE — PATIENT INSTRUCTIONS
Anemia: Care Instructions  Your Care Instructions    Anemia is a low level of red blood cells, which carry oxygen throughout your body. Many things can cause anemia. Lack of iron is one of the most common causes. Your body needs iron to make hemoglobin, a substance in red blood cells that carries oxygen from the lungs to your body's cells. Without enough iron, the body produces fewer and smaller red blood cells. As a result, your body's cells do not get enough oxygen, and you feel tired and weak. And you may have trouble concentrating. Bleeding is the most common cause of a lack of iron. You may have heavy menstrual bleeding or bleeding caused by conditions such as ulcers, hemorrhoids, or cancer. Regular use of aspirin or other anti-inflammatory medicines (such as ibuprofen) also can cause bleeding in some people. A lack of iron in your diet also can cause anemia, especially at times when the body needs more iron, such as during pregnancy, infancy, and the teen years. Your doctor may have prescribed iron pills. It may take several months of treatment for your iron levels to return to normal. Your doctor also may suggest that you eat foods that are rich in iron, such as meat and beans. There are many other causes of anemia. It is not always due to a lack of iron. Finding the specific cause of your anemia will help your doctor find the right treatment for you. Follow-up care is a key part of your treatment and safety. Be sure to make and go to all appointments, and call your doctor if you are having problems. It's also a good idea to know your test results and keep a list of the medicines you take. How can you care for yourself at home? · Take your medicines exactly as prescribed. Call your doctor if you think you are having a problem with your medicine. · If your doctor recommends iron pills, take them as directed:  ? Try to take the pills on an empty stomach about 1 hour before or 2 hours after meals. But you may need to take iron with food to avoid an upset stomach. ? Do not take antacids or drink milk or caffeine drinks (such as coffee, tea, or cola) at the same time or within 2 hours of the time that you take your iron. They can make it hard for your body to absorb the iron. ? Vitamin C (from food or supplements) helps your body absorb iron. Try taking iron pills with a glass of orange juice or some other food that is high in vitamin C, such as citrus fruits. ? Iron pills may cause stomach problems, such as heartburn, nausea, diarrhea, constipation, and cramps. Be sure to drink plenty of fluids, and include fruits, vegetables, and fiber in your diet each day. Iron pills often make your bowel movements dark or green. ? If you forget to take an iron pill, do not take a double dose of iron the next time you take a pill. ? Keep iron pills out of the reach of small children. An overdose of iron can be very dangerous. · Follow your doctor's advice about eating iron-rich foods. These include red meat, shellfish, poultry, eggs, beans, raisins, whole-grain bread, and leafy green vegetables. · Steam vegetables to help them keep their iron content. When should you call for help? Call 911 anytime you think you may need emergency care. For example, call if:    · You have symptoms of a heart attack. These may include:  ? Chest pain or pressure, or a strange feeling in the chest.  ? Sweating. ? Shortness of breath. ? Nausea or vomiting. ? Pain, pressure, or a strange feeling in the back, neck, jaw, or upper belly or in one or both shoulders or arms. ? Lightheadedness or sudden weakness. ? A fast or irregular heartbeat. After you call 911, the  may tell you to chew 1 adult-strength or 2 to 4 low-dose aspirin. Wait for an ambulance.  Do not try to drive yourself.     · You passed out (lost consciousness).    Call your doctor now or seek immediate medical care if:    · You have new or increased shortness of breath.     · You are dizzy or lightheaded, or you feel like you may faint.     · Your fatigue and weakness continue or get worse.     · You have any abnormal bleeding, such as:  ? Nosebleeds. ? Vaginal bleeding that is different (heavier, more frequent, at a different time of the month) than what you are used to.  ? Bloody or black stools, or rectal bleeding. ? Bloody or pink urine.    Watch closely for changes in your health, and be sure to contact your doctor if:    · You do not get better as expected. Where can you learn more? Go to http://reji-meir.info/. Enter R301 in the search box to learn more about \"Anemia: Care Instructions. \"  Current as of: March 28, 2019  Content Version: 12.2  © 6700-0161 [employer]. Care instructions adapted under license by [employer] (which disclaims liability or warranty for this information). If you have questions about a medical condition or this instruction, always ask your healthcare professional. [address] any warranty or liability for your use of this information.

## 2020-03-02 DIAGNOSIS — D72.819 LEUKOPENIA, UNSPECIFIED TYPE: Primary | ICD-10-CM

## 2020-03-02 DIAGNOSIS — D50.8 IRON DEFICIENCY ANEMIA SECONDARY TO INADEQUATE DIETARY IRON INTAKE: ICD-10-CM

## 2020-03-02 LAB
BASOPHILS # BLD: 0 K/UL (ref 0–0.1)
BASOPHILS NFR BLD: 0 % (ref 0–2)
DIFFERENTIAL METHOD BLD: ABNORMAL
EOSINOPHIL # BLD: 0.1 K/UL (ref 0–0.4)
EOSINOPHIL NFR BLD: 3 % (ref 0–5)
LYMPHOCYTES # BLD: 0.8 K/UL (ref 0.9–3.6)
LYMPHOCYTES NFR BLD: 23 % (ref 21–52)
MONOCYTES # BLD: 0.4 K/UL (ref 0.05–1.2)
MONOCYTES NFR BLD: 12 % (ref 3–10)
NEUTS SEG # BLD: 2.2 K/UL (ref 1.8–8)
NEUTS SEG NFR BLD: 62 % (ref 40–73)

## 2020-03-02 NOTE — PROGRESS NOTES
Please make patient aware that there has not been a significant change in her low WBC count or hemoglobin. I am unsure why her WBC's are decreased. I am placing a referral for her to be seen by hematology for further evaluation. The office is located on the first floor, Dr. Yane Morin (please give patient the phone number so she can make her own appointment).      Thanks

## 2020-03-17 ENCOUNTER — TELEPHONE (OUTPATIENT)
Dept: ONCOLOGY | Age: 75
End: 2020-03-17

## 2020-03-18 ENCOUNTER — TELEPHONE (OUTPATIENT)
Dept: ONCOLOGY | Age: 75
End: 2020-03-18

## 2020-03-18 NOTE — TELEPHONE ENCOUNTER
Patient is asking if she needs to come in for her appointment or can she get a phone call, this is a new patient

## 2020-04-02 DIAGNOSIS — R05.9 COUGH: ICD-10-CM

## 2020-04-02 DIAGNOSIS — J45.30 MILD PERSISTENT REACTIVE AIRWAY DISEASE WITHOUT COMPLICATION: ICD-10-CM

## 2020-04-02 RX ORDER — ALBUTEROL SULFATE 90 UG/1
1 AEROSOL, METERED RESPIRATORY (INHALATION)
Qty: 1 INHALER | Refills: 0 | Status: ON HOLD | OUTPATIENT
Start: 2020-04-02 | End: 2020-10-04 | Stop reason: SDUPTHER

## 2020-04-17 ENCOUNTER — TELEPHONE (OUTPATIENT)
Dept: ONCOLOGY | Age: 75
End: 2020-04-17

## 2020-04-17 NOTE — TELEPHONE ENCOUNTER
Please contact patient she would like to know if she can be seen virtual, I did advise 1st visit are seen in the office

## 2020-06-18 ENCOUNTER — VIRTUAL VISIT (OUTPATIENT)
Dept: CARDIOLOGY CLINIC | Age: 75
End: 2020-06-18

## 2020-06-18 DIAGNOSIS — R94.31 ABNORMAL EKG: ICD-10-CM

## 2020-06-18 DIAGNOSIS — I44.7 LBBB (LEFT BUNDLE BRANCH BLOCK): Primary | ICD-10-CM

## 2020-06-18 NOTE — PROGRESS NOTES
Alban Aguilar presents today for   Chief Complaint   Patient presents with   [address] [place] [address] preferred language for health care discussion is english/other. Is someone accompanying this pt? n    Is the patient using any DME equipment during OV? n    Depression Screening:  3 most recent PHQ Screens 2/27/2020   Little interest or pleasure in doing things Not at all   Feeling down, depressed, irritable, or hopeless Not at all   Total Score PHQ 2 0       Learning Assessment:  No flowsheet data found. Abuse Screening:  Abuse Screening Questionnaire 4/29/2019   Do you ever feel afraid of your partner? N   Are you in a relationship with someone who physically or mentally threatens you? N   Is it safe for you to go home? Y       Fall Risk  Fall Risk Assessment, last 12 mths 2/27/2020   Able to walk? Yes   Fall in past 12 months? No       Pt currently taking Anticoagulant therapy? n    Coordination of Care:  1. Have you been to the ER, urgent care clinic since your last visit? Hospitalized since your last visit? n    2. Have you seen or consulted any other health care providers outside of the [address] [place] [address] since your last visit? Include any pap smears or colon screening.  n

## 2020-06-18 NOTE — PROGRESS NOTES
[facility]    Ms. Halie Harry is a 66 y.o. female with a history of left bundle branch block, uterine prolapse, other multiple medical problems. Patient was seen in the virtual clinic because of COVID-19 situation  Ms. Halie Harry is here today for follow-up appointment. She denies any cardiac symptoms since last visit. She denies any chest pain or chest tightness she denies any palpitation, presyncope or syncope. She is using 1 pillows at night. She denies any lower extremity swelling. Overall she has no cardiac symptoms to report    Past Medical History:   Diagnosis Date    Anemia 03/2016    in [facility]    Bladder stones     DVT (deep venous thrombosis) ([facility] [address]     S/P IVC filter 2016. In setting of paraplagia from Spinal abcess    Environmental allergies     Female genital prolapse     Hypocontractile bladder     Ill-defined condition     Leak in valve. Followed by Dr. 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 patient ambulatory Summer 2018    SVT (supraventricular tachycardia) (HCC)     Umbilical hernia     Urine retention     Uterus prolapse     grade 5. Surgically corrected 7/2018    Vaginal candida 03/16/2016    Voiding dysfunction          Past Surgical History:   Procedure Laterality Date    COLONOSCOPY N/A 1/22/2020    COLONOSCOPY performed by Yony Salamanca MD at [address] [facility]      x 4 after having miscarriages.  HX GYN  07/2018    CYSTOURETHROSCOPY; POSTERIOR COLPORRHAPHY, CYSTOCELE REPAIR; COLPOPEXY VAGINAL INTRA-PERITONEAL APPROACH;    HX TONSILLECTOMY      HX VITRECTOMY Left 2017    VASCULAR SURGERY PROCEDURE UNLIST  march 2016    IVC filter.         Current Outpatient Medications   Medication Sig    albuterol (PROVENTIL HFA, VENTOLIN HFA, PROAIR HFA) 90 mcg/actuation inhaler Take 1 Puff by inhalation every four (4) hours as needed (reactive airway symptoms including cough).  rOPINIRole (REQUIP) 0.5 mg tablet TAKE 1 TABLET BY MOUTH EVERY NIGHT    gabapentin (NEURONTIN) 100 mg capsule TAKE 2 CAPSULES BY MOUTH THREE TIMES DAILY    loratadine (CLARITIN) 10 mg tablet Take 1 Tab by mouth daily.  montelukast (SINGULAIR) 10 mg tablet TAKE 1 TABLET BY MOUTH DAILY    multivitamin (ONE A DAY) tablet Take 1 Tab by mouth daily.  calcium-cholecalciferol, d3, (CALCIUM 600 + D) 600-125 mg-unit tab Take 2 Tabs by mouth.  cholecalciferol (VITAMIN D3) (5000 Units/125 mcg) tab tablet Take  by mouth daily.  b complex vitamins tablet Take 1 Tab by mouth daily.  ferrous gluconate 324 mg (38 mg iron) tablet Take 1 Tab by mouth daily. TAKE 1 TABLET BY MOUTH EVERY MONDAY, WEDNESDAY, AND SUNDAY    latanoprost (XALATAN) 0.005 % ophthalmic solution Administer 2 Drops to both eyes nightly. No current facility-administered medications for this visit. Allergies and Sensitivities:  Allergies   Allergen Reactions    Milk Containing Products Other (comments)     Mucous    Bactrim [Sulfamethoprim] Nausea Only and Other (comments)     Headache, Joint pain, loss of appetite     Mold Other (comments)    Pollen Extracts Other (comments)    Sulfamethoxazole-Trimethoprim Other (comments)       Family History:  No family history on file. Social History:  Social History     Tobacco Use    Smoking status: Former Smoker     Packs/day: 1.00     Years: 15.00     Pack years: 15.00     Types: Cigarettes    Smokeless tobacco: Never Used    Tobacco comment: quit in the 70's   Substance Use Topics    Alcohol use: No     Alcohol/week: 0.0 standard drinks     Frequency: Never    Drug use: No     She  reports that she has quit smoking. Her smoking use included cigarettes.  She has a 15.00 pack-year smoking history. She has never used smokeless tobacco.  She  reports no history of alcohol use. Review of Systems:  Cardiac symptoms as noted above in HPI. All others negative. Denies fatigue, malaise, skin rash, blurring vision, photophobia, neck pain, hemoptysis, chronic cough, nausea, vomiting, hematuria, burning micturition, BRBPR, chronic headaches. Physical Exam:  BP Readings from Last 3 Encounters:   02/27/20 133/77   01/22/20 109/53   10/28/19 136/79         Pulse Readings from Last 3 Encounters:   02/27/20 64   01/22/20 63   10/28/19 71          Wt Readings from Last 3 Encounters:   02/27/20 125 lb (56.7 kg)   01/22/20 125 lb 8 oz (56.9 kg)   10/28/19 132 lb (59.9 kg)         Objective:   Vital Signs: (As obtained by patient/caregiver at home)  There were no vitals taken for this visit.      Constitutional: [x] Appears well-developed and well-nourished [x] No apparent distress    Mental status: [x] Alert and awake  [x] Oriented to person/place/time [x] Able to follow commands    Eyes:   EOM    [x]  Normal      Sclera  [x]  Normal             Discharge [x]  None visible     HENT: [x] Normocephalic, atraumatic   [x] Mouth/Throat: Mucous membranes are moist  External Ears [x] Normal    Neck: [x] No visualized mass  Pulmonary/Chest: [x] Respiratory effort normal   [x] No visualized signs of difficulty breathing or respiratory distress  Musculoskeletal:   [x] Normal gait with no signs of ataxia         [x] Normal range of motion of neck  Neurological:        [x] No Facial Asymmetry (Cranial nerve 7 motor function) (limited exam due to video visit)          [x] No gaze palsy   Skin:        [x] No significant exanthematous lesions or discoloration noted on facial skin    Psychiatric:       [x] Normal Affect        [x] No Hallucinations    Review of Data  LABS:   Lab Results   Component Value Date/Time    Sodium 140 10/28/2019 01:47 PM    Potassium 4.1 10/28/2019 01:47 PM    Chloride 102 10/28/2019 01:47 PM    CO2 31 10/28/2019 01:47 PM    Glucose 108 (H) 10/28/2019 01:47 PM    BUN 24 (H) 10/28/2019 01:47 PM    Creatinine 1.11 10/28/2019 01:47 PM     Lipids Latest Ref Rng & Units 4/29/2019 2/27/2018 1/24/2017   Chol, Total <200 MG/ 181 244(H)   HDL 40 - 60 MG/DL 95(H) 65 63   LDL 0 - 100 MG/DL 82.2 101(H) 146(H)   Trig <150 MG/DL 64 76 174(H)   Chol/HDL Ratio 0 - 5.0   2.0 - -   Some recent data might be hidden     Lab Results   Component Value Date/Time    ALT (SGPT) 27 10/28/2019 01:47 PM     No results found for: HBA1C, HGBE8, FLZ4WMSR, NXB7IVLN, AXY0XJBT    EKG  (2018) LBBB, sinus rhythm    ECHO (06/18)  · Left ventricular low normal systolic function. Estimated left ventricular ejection fraction is 51 - 55%. Left ventricular mild concentric hypertrophy observed. Abnormal left ventricular septal motion consistent with left bundle branch block. Mild (grade 1) left ventricular diastolic dysfunction. · Right ventricular global systolic function is borderline low. · Mild aortic valve sclerosis with no significant stenosis. Mild to moderate aortic valve regurgitation is present. · Trace mitral valve regurgitation. · Mild tricuspid valve regurgitation is present. Pulmonary arterial systolic pressure is 30 mmHg. ·   IMPRESSION & PLAN:  Ms. Shlomo Galeano is a 66 y.o. female with multiple medical problems. Left bundle branch block:    Chronic and asymptomatic   Echo with normal EF in 2018     Currently she denies any symptoms to suggest angina or heart failure. Different cardiac symptoms were discussed with the patient and currently she does not have any cardiac symptoms. Recommend to continue with clinical observation. This plan was discussed with patient who is in agreement. Other pertinent observable physical exam findings:-        We discussed the expected course, resolution and complications of the diagnosis(es) in detail.   Medication risks, benefits, costs, interactions, and alternatives were discussed as indicated. I advised her to contact the office if her condition worsens, changes or fails to improve as anticipated. She expressed understanding with the diagnosis(es) and plan. Caleb Hassan is a 66 y.o. female being evaluated by a video visit encounter for concerns as above. A caregiver was present when appropriate. Due to this being a TeleHealth encounter ([facility] emergency), evaluation of the following organ systems was limited: Vitals/Constitutional/EENT/Resp/CV/GI//MS/Neuro/Skin/Heme-Lymph-Imm. Pursuant to the emergency declaration under the [address] [place], [place] [address] waiver authority and the [employer] and [employer] Act, this Virtual  Visit was conducted, with patient's (and/or legal guardian's) consent, to reduce the patient's risk of exposure to COVID-19 and provide necessary medical care. Consent: Caleb Hassan, who was seen by synchronous (real-time) audio-video technology, and/or her healthcare decision maker, is aware that this patient-initiated, Telehealth encounter on 6/18/2020 is a billable service, with coverage as determined by her insurance carrier. She is aware that she may receive a bill and has provided verbal consent to proceed: Yes. I spent at least 10 minutes with this established patient, and >50% of the time was spent counseling and/or coordinating care regarding Cardiac problem  [phone] [id number] were provided through a video synchronous discussion virtually to substitute for in-person clinic visit. Patient and provider were located at their individual homes. Perfecto Arreaga MD  Please note: This document has been produced using voice recognition software. Unrecognized errors in transcription may be present.

## 2020-07-13 ENCOUNTER — HOSPITAL ENCOUNTER (OUTPATIENT)
Dept: INFUSION THERAPY | Age: 75
Discharge: HOME | End: 2020-07-13
Payer: MEDICARE

## 2020-07-13 DIAGNOSIS — D50.9 IRON DEFICIENCY ANEMIA, UNSPECIFIED IRON DEFICIENCY ANEMIA TYPE: ICD-10-CM

## 2020-07-13 DIAGNOSIS — D70.9 NEUTROPENIA, UNSPECIFIED TYPE (HCC): ICD-10-CM

## 2020-07-13 DIAGNOSIS — D70.9 NEUTROPENIA, UNSPECIFIED TYPE (HCC): Primary | ICD-10-CM

## 2020-07-13 LAB
ALBUMIN SERPL-MCNC: 3.9 G/DL (ref 3.4–5)
ALBUMIN/GLOB SERPL: 0.9 {RATIO} (ref 0.8–1.7)
ALP SERPL-CCNC: 80 U/L (ref 45–117)
ALT SERPL-CCNC: 28 U/L (ref 13–56)
ANION GAP SERPL CALC-SCNC: 2 MMOL/L (ref 3–18)
AST SERPL-CCNC: 27 U/L (ref 10–38)
BASO+EOS+MONOS # BLD AUTO: 0.3 K/UL (ref 0–2.3)
BASO+EOS+MONOS NFR BLD AUTO: 8 % (ref 0.1–17)
BILIRUB SERPL-MCNC: 0.3 MG/DL (ref 0.2–1)
BUN SERPL-MCNC: 32 MG/DL (ref 7–18)
BUN/CREAT SERPL: 30 (ref 12–20)
CALCIUM SERPL-MCNC: 9.6 MG/DL (ref 8.5–10.1)
CHLORIDE SERPL-SCNC: 105 MMOL/L (ref 100–111)
CO2 SERPL-SCNC: 31 MMOL/L (ref 21–32)
CREAT SERPL-MCNC: 1.06 MG/DL (ref 0.6–1.3)
DIFFERENTIAL METHOD BLD: ABNORMAL
ERYTHROCYTE [DISTWIDTH] IN BLOOD BY AUTOMATED COUNT: 12.1 % (ref 11.5–14.5)
FERRITIN SERPL-MCNC: 379 NG/ML (ref 8–388)
FOLATE SERPL-MCNC: >20 NG/ML (ref 3.1–17.5)
GLOBULIN SER CALC-MCNC: 4.5 G/DL (ref 2–4)
GLUCOSE SERPL-MCNC: 87 MG/DL (ref 74–99)
HCT VFR BLD AUTO: 37.9 % (ref 36–48)
HGB BLD-MCNC: 12.3 G/DL (ref 12–16)
IRON SATN MFR SERPL: 22 % (ref 20–50)
IRON SERPL-MCNC: 73 UG/DL (ref 50–175)
LYMPHOCYTES # BLD: 1.4 K/UL (ref 1.1–5.9)
LYMPHOCYTES NFR BLD: 38 % (ref 14–44)
MCH RBC QN AUTO: 30.7 PG (ref 25–35)
MCHC RBC AUTO-ENTMCNC: 32.5 G/DL (ref 31–37)
MCV RBC AUTO: 94.5 FL (ref 78–102)
NEUTS SEG # BLD: 1.9 K/UL (ref 1.8–9.5)
NEUTS SEG NFR BLD: 55 % (ref 40–70)
PLATELET # BLD AUTO: 184 K/UL (ref 140–440)
POTASSIUM SERPL-SCNC: 4.4 MMOL/L (ref 3.5–5.5)
PROT SERPL-MCNC: 8.4 G/DL (ref 6.4–8.2)
RBC # BLD AUTO: 4.01 M/UL (ref 4.1–5.1)
SODIUM SERPL-SCNC: 138 MMOL/L (ref 136–145)
TIBC SERPL-MCNC: 334 UG/DL (ref 250–450)
TSH SERPL DL<=0.05 MIU/L-ACNC: 2.11 UIU/ML (ref 0.36–3.74)
VIT B12 SERPL-MCNC: 1939 PG/ML (ref 211–911)
WBC # BLD AUTO: 3.6 K/UL (ref 4.5–13)

## 2020-07-13 PROCEDURE — 83540 ASSAY OF IRON: CPT

## 2020-07-13 PROCEDURE — 82728 ASSAY OF FERRITIN: CPT

## 2020-07-13 PROCEDURE — 82607 VITAMIN B-12: CPT

## 2020-07-13 PROCEDURE — 36415 COLL VENOUS BLD VENIPUNCTURE: CPT

## 2020-07-13 PROCEDURE — 85025 COMPLETE CBC W/AUTO DIFF WBC: CPT

## 2020-07-13 PROCEDURE — 84443 ASSAY THYROID STIM HORMONE: CPT

## 2020-07-13 PROCEDURE — 80053 COMPREHEN METABOLIC PANEL: CPT

## 2020-07-14 LAB — PERIPHERAL SMEAR,PSM: NORMAL

## 2020-07-25 ENCOUNTER — PATIENT MESSAGE (OUTPATIENT)
Dept: FAMILY MEDICINE CLINIC | Age: 75
End: 2020-07-25

## 2020-07-25 DIAGNOSIS — J45.30 MILD PERSISTENT REACTIVE AIRWAY DISEASE WITHOUT COMPLICATION: ICD-10-CM

## 2020-07-25 DIAGNOSIS — D50.9 MICROCYTIC ANEMIA: ICD-10-CM

## 2020-07-25 DIAGNOSIS — G25.81 RLS (RESTLESS LEGS SYNDROME): ICD-10-CM

## 2020-07-25 DIAGNOSIS — J30.89 NON-SEASONAL ALLERGIC RHINITIS DUE TO OTHER ALLERGIC TRIGGER: ICD-10-CM

## 2020-07-27 RX ORDER — ROPINIROLE 0.5 MG/1
TABLET, FILM COATED ORAL
Qty: 90 TAB | Refills: 1 | Status: SHIPPED | OUTPATIENT
Start: 2020-07-27 | End: 2021-01-20 | Stop reason: SDUPTHER

## 2020-07-27 RX ORDER — FERROUS GLUCONATE 324(38)MG
324 TABLET ORAL DAILY
Qty: 90 TAB | Refills: 1 | Status: SHIPPED | OUTPATIENT
Start: 2020-07-27 | End: 2021-07-15 | Stop reason: SDUPTHER

## 2020-07-27 RX ORDER — MONTELUKAST SODIUM 10 MG/1
TABLET ORAL
Qty: 90 TAB | Refills: 1 | Status: SHIPPED | OUTPATIENT
Start: 2020-07-27 | End: 2021-02-05 | Stop reason: SDUPTHER

## 2020-07-27 RX ORDER — LORATADINE 10 MG/1
10 TABLET ORAL DAILY
Qty: 90 TAB | Refills: 1 | Status: SHIPPED | OUTPATIENT
Start: 2020-07-27 | End: 2021-02-05 | Stop reason: SDUPTHER

## 2020-08-03 ENCOUNTER — VIRTUAL VISIT (OUTPATIENT)
Dept: FAMILY MEDICINE CLINIC | Age: 75
End: 2020-08-03

## 2020-08-03 DIAGNOSIS — Z13.39 SCREENING FOR ALCOHOLISM: ICD-10-CM

## 2020-08-03 DIAGNOSIS — Z00.00 MEDICARE ANNUAL WELLNESS VISIT, INITIAL: ICD-10-CM

## 2020-08-03 DIAGNOSIS — R29.898 BILATERAL LEG WEAKNESS: Primary | ICD-10-CM

## 2020-08-03 DIAGNOSIS — J45.20 MILD INTERMITTENT ASTHMA WITHOUT COMPLICATION: ICD-10-CM

## 2020-08-03 DIAGNOSIS — Z74.09 IMPAIRED MOBILITY AND ENDURANCE: ICD-10-CM

## 2020-08-03 NOTE — PATIENT INSTRUCTIONS
Medicare Wellness Visit, Female The best way to live healthy is to have a lifestyle where you eat a well-balanced diet, exercise regularly, limit alcohol use, and quit all forms of tobacco/nicotine, if applicable. Regular preventive services are another way to keep healthy. Preventive services (vaccines, screening tests, monitoring & exams) can help personalize your care plan, which helps you manage your own care. Screening tests can find health problems at the earliest stages, when they are easiest to treat. [facility] follows the current, evidence-based guidelines published by the [facility] ([facility]) when recommending preventive services for our patients. Because we follow these guidelines, sometimes recommendations change over time as research supports it. (For example, mammograms used to be recommended annually. Even though Medicare will still pay for an annual mammogram, the newer guidelines recommend a mammogram every two years for women of average risk). Of course, you and your doctor may decide to screen more often for some diseases, based on your risk and your co-morbidities (chronic disease you are already diagnosed with). Preventive services for you include: - Medicare offers their members a free annual wellness visit, which is time for you and your primary care provider to discuss and plan for your preventive service needs. Take advantage of this benefit every year! 
-All adults over the age of 72 should receive the recommended pneumonia vaccines. Current USPSTF guidelines recommend a series of two vaccines for the best pneumonia protection.  
-All adults should have a flu vaccine yearly and a tetanus vaccine every 10 years.  
-All adults age 48 and older should receive the shingles vaccines (series of two vaccines). -All adults age 38-68 who are overweight should have a diabetes screening test once every three years. -All adults born between [birth year] and 1967 should be screened once for Hepatitis C. 
-Other screening tests and preventive services for persons with diabetes include: an eye exam to screen for diabetic retinopathy, a kidney function test, a foot exam, and stricter control over your cholesterol.  
-Cardiovascular screening for adults with routine risk involves an electrocardiogram (ECG) at intervals determined by your doctor.  
-Colorectal cancer screenings should be done for adults age 54-65 with no increased risk factors for colorectal cancer. There are a number of acceptable methods of screening for this type of cancer. Each test has its own benefits and drawbacks. Discuss with your doctor what is most appropriate for you during your annual wellness visit. The different tests include: colonoscopy (considered the best screening method), a fecal occult blood test, a fecal DNA test, and sigmoidoscopy. 
 
-A bone mass density test is recommended when a woman turns 65 to screen for osteoporosis. This test is only recommended one time, as a screening. Some providers will use this same test as a disease monitoring tool if you already have osteoporosis. -Breast cancer screenings are recommended every other year for women of normal risk, age 54-69. 
-Cervical cancer screenings for women over age 72 are only recommended with certain risk factors. Here is a list of your current Health Maintenance items (your personalized list of preventive services) with a due date: 
Health Maintenance Due Topic Date Due  
 DTaP/Tdap/Td  (1 - Tdap) 08/09/1964  Shingles Vaccine (1 of 2) 08/09/1993  Glaucoma Screening   03/02/2019 Selma Gutierres Annual Well Visit  05/30/2020  Flu Vaccine  08/01/2020

## 2020-08-03 NOTE — PROGRESS NOTES
Rickey Hannah is a 66 y.o. female evaluated via telephone on 8/3/2020. Location of the patient:  Home    Location of the provider: [facility]    Consent:  She and/or health care decision maker is aware that that she may receive a bill for this telephone service, depending on her insurance coverage, and has provided verbal consent to proceed: Yes    I affirm this is a Patient Initiated Episode with an Established Patient who has not had a related appointment within my department in the past 7 days or scheduled within the next 24 hours. Total Time: minutes: 11-20 minutes    History of Present Illness  Rickey Hannah is a 66 y.o. female who presents today for management of    Chief Complaint   Patient presents with   [address] [facility] Annual Wellness Visit       Patient of SUNDAR Moreira    Patient reports of a history of chronic lower back pain and bilateral leg weakness. She has history of spinal abscess in 2016. She has impaired mobility and ambulates with a walker. She is requesting a DMV placard form completed. She has history of asthma. She can walk 0.5 block before she gets short of breath. 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  TAKE 1 TABLET BY MOUTH EVERY MONDAY, WEDNESDAY, AND SUNDAY    loratadine (CLARITIN) 10 mg tablet Take 1 Tab by mouth daily.  montelukast (SINGULAIR) 10 mg tablet TAKE 1 TABLET BY MOUTH DAILY    albuterol (PROVENTIL HFA, VENTOLIN HFA, PROAIR HFA) 90 mcg/actuation inhaler Take 1 Puff by inhalation every four (4) hours as needed (reactive airway symptoms including cough).  gabapentin (NEURONTIN) 100 mg capsule TAKE 2 CAPSULES BY MOUTH THREE TIMES DAILY    multivitamin (ONE A DAY) tablet Take 1 Tab by mouth daily.  calcium-cholecalciferol, d3, (CALCIUM 600 + D) 600-125 mg-unit tab Take 2 Tabs by mouth.  cholecalciferol (VITAMIN D3) (5000 Units/125 mcg) tab tablet Take  by mouth daily.  b complex vitamins tablet Take 1 Tab by mouth daily.  latanoprost (XALATAN) 0.005 % ophthalmic solution Administer 2 Drops to both eyes nightly. No current facility-administered medications for this visit. Assessment/Plan:      ICD-10-CM ICD-9-CM    1. Bilateral leg weakness  R29.898 729.89    2. Impaired mobility and endurance  Z74.09 V49.89    3. Mild intermittent asthma without complication  F31.31 162.12    4. Medicare annual wellness visit, initial  Z00.00 V70.0    5. Screening for alcoholism  Z13.39 V79.1 DE ANNUAL ALCOHOL SCREEN 15 MIN     History of paraspinal abscess with residual low back pain and leg weakness. Patient ambulates with a walker. DMV placard form will be completed. Patient will drop off the form in the office. Asthma - Stable, continue same medications. Fall precautions    Grisel Kovacs MD       This is an Initial Medicare Annual Wellness Exam (AWV) (Performed 12 months after IPPE or effective date of Medicare Part B enrollment, Once in a lifetime)    I have reviewed the patient's medical history in detail and updated the computerized patient record.      History     Patient Active Problem List   Diagnosis Code    Anemia D64.9    Procidentia of uterus N81.3    Visual changes H53.9    Back pain M54.9    RLS (restless legs syndrome) G25.81    Bladder stones N21.0    Uterus prolapse N81.4    Functional urinary incontinence R39.81    Screening for colon cancer Z12.11    LBBB (left bundle branch block) I44.7    Uterovaginal prolapse N81.4    Pain at rest R52    Diarrhea R19.7    Voiding dysfunction N39.8    Heme positive stool R19.5     Past Medical History:   Diagnosis Date    Anemia 03/2016    in hospital - [address]     [facility]    Asthma Sept. 2018    Asthma Dr. Monica Hicks    Bladder stones     Colon polyps 1-22-20    From colonoscopy     DVT (deep venous thrombosis) ([address]     S/P IVC filter 2016. In setting of paraplagia from Spinal abcess    Environmental allergies     Female genital prolapse     Glaucoma 2017    IsWuuvhgzwCqghumuqonyalUtOgauucamIpgGvdlwmcdRnxtwHkyiq1HVPP    Hypertension October 2018    Dr. Imani Santacruz diagnosis    Hypocontractile bladder     Ill-defined condition     Leak in valve. Followed by Dr. Guillermina Moreno Kidney stone 7-2019    KidneySXiang kidney-NoActionTaken    LBBB (left bundle branch block)     Leaky heart valve     Osteopenia     Small right kidney     Spinal abscess ([address] 03/2016    Caused paraplegia. patient ambulatory Summer 2018    SVT (supraventricular tachycardia) (HCC)     Umbilical hernia     Urine retention     Uterus prolapse     grade 5. Surgically corrected 7/2018    UTI (urinary tract infection) 5421-5162    Many UTIs-Had IndwellingCatheter for 2 plus yrs.  Vaginal candida 03/16/2016    Voiding dysfunction       Past Surgical History:   Procedure Laterality Date    COLONOSCOPY N/A 1/22/2020    COLONOSCOPY performed by Almita Bansal MD at [address] [facility]      x 4 after having miscarriages.      HX GYN  07/2018    CYSTOURETHROSCOPY; POSTERIOR COLPORRHAPHY, CYSTOCELE REPAIR; COLPOPEXY VAGINAL INTRA-PERITONEAL APPROACH;    HX TONSILLECTOMY      HX VITRECTOMY Left 2017    VASCULAR SURGERY PROCEDURE UNLIST  2016    IVC filter. Current Outpatient Medications   Medication Sig Dispense Refill    rOPINIRole (REQUIP) 0.5 mg tablet TAKE 1 TABLET BY MOUTH EVERY NIGHT 90 Tab 1    ferrous gluconate 324 mg (38 mg iron) tablet Take 1 Tab by mouth daily. TAKE 1 TABLET BY MOUTH EVERY MONDAY, WEDNESDAY, AND  90 Tab 1    loratadine (CLARITIN) 10 mg tablet Take 1 Tab by mouth daily. 90 Tab 1    montelukast (SINGULAIR) 10 mg tablet TAKE 1 TABLET BY MOUTH DAILY 90 Tab 1    albuterol (PROVENTIL HFA, VENTOLIN HFA, PROAIR HFA) 90 mcg/actuation inhaler Take 1 Puff by inhalation every four (4) hours as needed (reactive airway symptoms including cough). 1 Inhaler 0    gabapentin (NEURONTIN) 100 mg capsule TAKE 2 CAPSULES BY MOUTH THREE TIMES DAILY 540 Cap 1    multivitamin (ONE A DAY) tablet Take 1 Tab by mouth daily.  calcium-cholecalciferol, d3, (CALCIUM 600 + D) 600-125 mg-unit tab Take 2 Tabs by mouth.  cholecalciferol (VITAMIN D3) (5000 Units/125 mcg) tab tablet Take  by mouth daily.  b complex vitamins tablet Take 1 Tab by mouth daily.  latanoprost (XALATAN) 0.005 % ophthalmic solution Administer 2 Drops to both eyes nightly.        Allergies   Allergen Reactions    Milk Containing Products Other (comments)     Mucous    Bactrim [Sulfamethoprim] Nausea Only and Other (comments)     Headache, Joint pain, loss of appetite     Mold Other (comments)    Pollen Extracts Other (comments)    Sulfamethoxazole-Trimethoprim Other (comments)       Family History   Problem Relation Age of Onset    Cancer Mother         Stomach Cancer-     Heart Disease Mother         Nate[employer]    Cancer Father         Stomach Cancer-      Social History     Tobacco Use    Smoking status: Former Smoker     Packs/day: 1.00     Years: 8.00     Pack years: 8.00     Types: Cigarettes Last attempt to quit: 4/15/1970     Years since quittin.3    Smokeless tobacco: Never Used    Tobacco comment: no comments   Substance Use Topics    Alcohol use: No     Alcohol/week: 0.0 standard drinks     Frequency: Never       Depression Risk Factor Screening:     3 most recent PHQ Screens 2020   Little interest or pleasure in doing things Not at all   Feeling down, depressed, irritable, or hopeless Not at all   Total Score PHQ 2 0       Alcohol Risk Factor Screening:   Do you average 1 drink per night or more than 7 drinks a week:  No    On any one occasion in the past three months have you have had more than 3 drinks containing alcohol:  No      Functional Ability and Level of Safety:   Hearing: Hearing is good. Activities of Daily Living: The home contains: handrails and grab bars  Patient needs help with:  shopping, preparing meals, laundry and housework     Ambulation: with difficulty, uses a walker      Fall Risk:  Fall Risk Assessment, last 12 mths 2020   Able to walk? Yes   Fall in past 12 months? No     Abuse Screen:  Patient is not abused       Cognitive Screening   Has your family/caregiver stated any concerns about your memory: no     Cognitive Screening: Normal - Verbal Fluency Test    Patient Care Team   Patient Care Team:  Colette Alexis NP as PCP - General (Family Medicine)  Colette Alexis NP as PCP - REHABILITATION [facility] Empaneled Provider  Catrina Rodriguez MD as Physician (Urology)  Leeroy Page MD as Physician (Obstetrics & Gynecology)  Pb Baltazar NP as Nurse Practitioner (Nurse Practitioner)  Valentina Dyer DPM as Physician (Podiatry)  Monty Krause MD as Physician (Neurosurgery)  Isaac Lemos MD as Physician (Infectious Disease)  Abi Taveras MD as Physician (Vascular Surgery)    Assessment/Plan   Education and counseling provided:  Are appropriate based on today's review and evaluation    Diagnoses and all orders for this visit:    1. Bilateral leg weakness    2. Impaired mobility and endurance    3. Medicare annual wellness visit, initial    4. Screening for alcoholism  -     CO ANNUAL ALCOHOL SCREEN 15 MIN         Health Maintenance Due   Topic Date Due    DTaP/Tdap/Td series (1 - Tdap) 08/09/1964    Shingrix Vaccine Age 50> (1 of 2) 08/09/1993    GLAUCOMA SCREENING Q2Y  03/02/2019    Medicare Yearly Exam  05/30/2020    Influenza Age 5 to Adult  08/01/2020     Declined screening mammogram.    Mona Godwin, who was evaluated through a synchronous (real-time) audio only encounter, and/or her healthcare decision maker, is aware that it is a billable service, with coverage as determined by her insurance carrier. She provided verbal consent to proceed: Yes, and patient identification was verified. It was conducted pursuant to the emergency declaration under the [facility], [address] [place] [address] authority and the [employer] and [employer] General Act. A caregiver was present when appropriate. Ability to conduct physical exam was limited. I was in the office. The patient was at home.       Dariana Murray MD

## 2020-08-10 DIAGNOSIS — M46.20 PARASPINAL ABSCESS (HCC): ICD-10-CM

## 2020-08-10 DIAGNOSIS — G25.81 RLS (RESTLESS LEGS SYNDROME): ICD-10-CM

## 2020-08-10 RX ORDER — GABAPENTIN 100 MG/1
CAPSULE ORAL
Qty: 540 CAP | Refills: 0 | Status: SHIPPED | OUTPATIENT
Start: 2020-08-10 | End: 2021-07-15 | Stop reason: SDUPTHER

## 2020-08-13 ENCOUNTER — OFFICE VISIT (OUTPATIENT)
Dept: ONCOLOGY | Age: 75
End: 2020-08-13

## 2020-08-13 VITALS
TEMPERATURE: 97.5 F | HEART RATE: 69 BPM | WEIGHT: 125.6 LBS | OXYGEN SATURATION: 97 % | HEIGHT: 60 IN | BODY MASS INDEX: 24.66 KG/M2 | SYSTOLIC BLOOD PRESSURE: 145 MMHG | DIASTOLIC BLOOD PRESSURE: 74 MMHG

## 2020-08-13 DIAGNOSIS — D50.9 IRON DEFICIENCY ANEMIA, UNSPECIFIED IRON DEFICIENCY ANEMIA TYPE: ICD-10-CM

## 2020-08-13 DIAGNOSIS — D72.819 LEUKOPENIA, UNSPECIFIED TYPE: Primary | ICD-10-CM

## 2020-08-13 NOTE — PROGRESS NOTES
[facility]  [id number] [place] [address], [address]  [place], [facility]  Office Phone: [phone]  Fax: [phone]      Reason for visit:  New Patient      HPI:   Seferino Canela is a 66 y.o.  female who I was asked to see in consultation at the request of MILADY Garvey for evaluation for leukopenia. She is clinically doing very well. She says she has asked her PCP to refer her to me to make sure that all her blood looks good. She denies any fever, chills, SOB, NV or abdominal pain. No bleeding. Ambulates with wheeled walker. All other points of ROS have been reviewed and were negative. ECOG PS=0. Independent with ADLs and IADLs. DX   Encounter Diagnoses   Name Primary?  Leukopenia, unspecified type Yes    Iron deficiency anemia, unspecified iron deficiency anemia type               Past Medical History:   Diagnosis Date    [facility] 03/2016    [facility]    Asthma Sept. 2018    Asthma  Anders Mulling    Bladder stones     Colon polyps 1-22-20    From colonoscopy     D[facility] ([facility] thrombosis) [address]     S/P IVC filter 2016. In setting of paraplagia from Spinal abcess    Environmental allergies     Female genital prolapse     Glaucoma 2017    GrDeeanzsfWsbebweqedianLfUbelydxoZsiFryxrdzpVeoyhKxhep1AMFM    Hypertension October 2018    Dr. Ashley Ballesteros diagnosis    Hypocontractile bladder     Ill-defined condition     Leak in valve. Followed by Dr. Maryanne Morejon Kidney stone 7-2019    -KidneyStoneMidLeft kidney-NoActionTaken    LBBB (left bundle branch block)     Leaky heart valve     Osteopenia     Small right kidney     Spinal abscess ([address] 03/2016    Caused paraplegia. patient ambulatory Summer 2018    SVT (supraventricular tachycardia) (HCC)     Umbilical hernia     Urine retention     Uterus prolapse     grade 5.   Surgically corrected 7/2018    UTI (urinary tract infection) [id number]    Many UTIs-Had IndwellingCatheter for 2 plus yrs.  Vaginal candida 2016    Voiding dysfunction      Past Surgical History:   Procedure Laterality Date    COLONOSCOPY N/A 2020    COLONOSCOPY performed by Dez King MD at [address] [facility]      x 4 after having miscarriages.  HX GYN  2018    CYSTOURETHROSCOPY; POSTERIOR COLPORRHAPHY, CYSTOCELE REPAIR; COLPOPEXY VAGINAL INTRA-PERITONEAL APPROACH;    HX TONSILLECTOMY      HX VITRECTOMY Left 2017    VASCULAR SURGERY PROCEDURE UNLIST  2016    IVC filter. Social History     Socioeconomic History    Marital status:      Spouse name: Not on file    Number of children: Not on file    Years of education: Not on file    Highest education level: Not on file   Tobacco Use    Smoking status: Former Smoker     Packs/day: 1.00     Years: 8.00     Pack years: 8.00     Types: Cigarettes     Last attempt to quit: 4/15/1970     Years since quittin.3    Smokeless tobacco: Never Used    Tobacco comment: no comments   Substance and Sexual Activity    Alcohol use: No     Alcohol/week: 0.0 standard drinks     Frequency: Never    Drug use: Never    Sexual activity: Not Currently     Family History   Problem Relation Age of Onset    Cancer Mother         Stomach Cancer-     Heart Disease Mother         [email or web address]    Cancer Father         Stomach Cancer-     Asthma Father        Current Outpatient Medications   Medication Sig Dispense Refill    gabapentin (NEURONTIN) 100 mg capsule TAKE 2 CAPSULES BY MOUTH THREE TIMES DAILY 540 Cap 0    rOPINIRole (REQUIP) 0.5 mg tablet TAKE 1 TABLET BY MOUTH EVERY NIGHT 90 Tab 1    ferrous gluconate 324 mg (38 mg iron) tablet Take 1 Tab by mouth daily. TAKE 1 TABLET BY MOUTH EVERY MONDAY, WEDNESDAY, AND  90 Tab 1    loratadine (CLARITIN) 10 mg tablet Take 1 Tab by mouth daily.  90 Tab 1  montelukast (SINGULAIR) 10 mg tablet TAKE 1 TABLET BY MOUTH DAILY 90 Tab 1    albuterol (PROVENTIL HFA, VENTOLIN HFA, PROAIR HFA) 90 mcg/actuation inhaler Take 1 Puff by inhalation every four (4) hours as needed (reactive airway symptoms including cough). 1 Inhaler 0    multivitamin (ONE A DAY) tablet Take 1 Tab by mouth daily.  calcium-cholecalciferol, d3, (CALCIUM 600 + D) 600-125 mg-unit tab Take 2 Tabs by mouth.  cholecalciferol (VITAMIN D3) (5000 Units/125 mcg) tab tablet Take  by mouth daily.  b complex vitamins tablet Take 1 Tab by mouth daily.  latanoprost (XALATAN) 0.005 % ophthalmic solution Administer 2 Drops to both eyes nightly. Allergies   Allergen Reactions    Milk Containing Products Other (comments)     Mucous    Bactrim [Sulfamethoprim] Nausea Only and Other (comments)     Headache, Joint pain, loss of appetite     Mold Other (comments)    Pollen Extracts Other (comments)    Sulfamethoxazole-Trimethoprim Other (comments)       Review of Systems  As per HPI. Objective:  Physical Exam:  Visit Vitals  /74   Pulse 69   Temp 97.5 °F (36.4 °C)   Ht 5' (1.524 m)   Wt 57 kg (125 lb 9.6 oz)   SpO2 97%   BMI 24.53 kg/m²       General:  Alert, cooperative, no distress, appears stated age. Head:  Normocephalic, without obvious abnormality, atraumatic. Eyes:  Conjunctivae/corneas clear. PERRL, EOMs intact. Throat: Lips, mucosa, and tongue normal.    Neck: Supple, symmetrical, trachea midline, no adenopathy, thyroid: no enlargement/tenderness/nodules   Back:   Symmetric, no curvature. ROM normal. No CVA tenderness. Lungs:   Clear to auscultation bilaterally. Chest wall:  No tenderness or deformity. Heart:  Regular rate and rhythm, S1, S2 normal, no murmur, click, rub or gallop. Abdomen:   Soft, non-tender. Bowel sounds normal. No masses,  No organomegaly. Extremities: Extremities normal, atraumatic, no cyanosis or edema.    Skin: Skin color, texture, turgor normal. No rashes or lesions. Lymph nodes: Cervical, supraclavicular, and axillary nodes normal.   Neurologic: CNII-XII intact. Diagnostic Imaging     Results for orders placed during the hospital encounter of 03/11/16   CT HEAD WO CONT    Narrative History: Bacteremia, blurred vision    Comparison 3/11/2016    Noncontrast CT head performed with coronal and sagittal reconstructions  obtained. FINDINGS: No evidence of new cortical hypodensity or other new evidence of mass  hemorrhage edema or abnormal extra-axial fluid collection. Stable very mild  diffuse cortical volume loss. Ventricular system unremarkable. Incidental  physiologic basal ganglia calcification. No new skull abnormality with  visualized paranasal sinuses and mastoids clear. Impression IMPRESSION: No evidence of new acute or other significant intracranial finding,  no significant interval change. Lab Results  Lab Results   Component Value Date/Time    WBC 3.6 (L) 07/13/2020 01:48 PM    HGB 12.3 07/13/2020 01:48 PM    HCT 37.9 07/13/2020 01:48 PM    PLATELET 378 59/25/8611 01:48 PM    MCV 94.5 07/13/2020 01:48 PM       Lab Results   Component Value Date/Time    Sodium 138 07/13/2020 01:48 PM    Potassium 4.4 07/13/2020 01:48 PM    Chloride 105 07/13/2020 01:48 PM    CO2 31 07/13/2020 01:48 PM    Anion gap 2 (L) 07/13/2020 01:48 PM    Glucose 87 07/13/2020 01:48 PM    BUN 32 (H) 07/13/2020 01:48 PM    Creatinine 1.06 07/13/2020 01:48 PM    BUN/Creatinine ratio 30 (H) 07/13/2020 01:48 PM    GFR est AA >60 07/13/2020 01:48 PM    GFR est non-AA 50 (L) 07/13/2020 01:48 PM    Calcium 9.6 07/13/2020 01:48 PM    Alk. phosphatase 80 07/13/2020 01:48 PM    Protein, total 8.4 (H) 07/13/2020 01:48 PM    Albumin 3.9 07/13/2020 01:48 PM    Globulin 4.5 (H) 07/13/2020 01:48 PM    A-G Ratio 0.9 07/13/2020 01:48 PM    ALT (SGPT) 28 07/13/2020 01:48 PM     Follow-up with PCP for health maintenance  Assessment/Plan:  66 y.o. female with  1. Leukopenia, unspecified type  I had a long discussion with patient regarding her leukopenia. I told her that although the total number of WBC was slightly low at 3.6 on 7/13/2020, differential was normal with normal ANC and ALC. She has no repeated infections. TSH, B12 and folate were adequate. Ferritin was 379. Transferrin saturation 22%. Mrs. Patterson's leukopenia is mild with normal differential and no repeated infection. I will recheck few labs today but I am convinced that she has no need after this for further investigation. 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 - CBC WITH AUTOMATED DIFF; Future  - ANTINUCLEAR ANTIBODIES, IFA; Future  - HIV 1/2 AG/AB, 4TH GENERATION,W RFLX CONFIRM; Future    2. Iron deficiency anemia, unspecified iron deficiency anemia type  Resolved. No further investigations needed. CBC on 7/13/2020 showed WBC 3.6, H&H 12.3/37.9, MCV 94.5, platelet 616. Normal differential.  - CBC WITH AUTOMATED DIFF; Future  - FERRITIN; Future  - IRON PROFILE; Future  - METABOLIC PANEL, COMPREHENSIVE; Future  - SED RATE (ESR);  Future  - C REACTIVE PROTEIN, QT; Future    Return in  1 week-virtual    Arita Essex

## 2020-08-13 NOTE — LETTER
8/13/20 Patient: Liza Meraz YOB: 1945 Date of Visit: 8/13/2020 Roberto Vale NP 
[address] [id number] VIA In Basket Dear Roberto Vale NP, Thank you for referring Ms. Tea Hines to [address] [place] [address] for evaluation. My notes for this consultation are attached. If you have questions, please do not hesitate to call me. I look forward to following your patient along with you.  
 
 
Sincerely, 
 
Jillian Low MD

## 2020-08-17 ENCOUNTER — HOSPITAL ENCOUNTER (OUTPATIENT)
Dept: INFUSION THERAPY | Age: 75
Discharge: HOME | End: 2020-08-17
Payer: MEDICARE

## 2020-08-17 DIAGNOSIS — D72.819 LEUKOPENIA, UNSPECIFIED TYPE: ICD-10-CM

## 2020-08-17 DIAGNOSIS — D50.9 IRON DEFICIENCY ANEMIA, UNSPECIFIED IRON DEFICIENCY ANEMIA TYPE: ICD-10-CM

## 2020-08-17 LAB
ALBUMIN SERPL-MCNC: 4.1 G/DL (ref 3.4–5)
ALBUMIN/GLOB SERPL: 1 {RATIO} (ref 0.8–1.7)
ALP SERPL-CCNC: 86 U/L (ref 45–117)
ALT SERPL-CCNC: 24 U/L (ref 13–56)
ANION GAP SERPL CALC-SCNC: 6 MMOL/L (ref 3–18)
AST SERPL-CCNC: 24 U/L (ref 10–38)
BASOPHILS # BLD: 0 K/UL (ref 0–0.1)
BASOPHILS NFR BLD: 0 % (ref 0–2)
BILIRUB SERPL-MCNC: 0.4 MG/DL (ref 0.2–1)
BUN SERPL-MCNC: 24 MG/DL (ref 7–18)
BUN/CREAT SERPL: 22 (ref 12–20)
CALCIUM SERPL-MCNC: 9.8 MG/DL (ref 8.5–10.1)
CHLORIDE SERPL-SCNC: 103 MMOL/L (ref 100–111)
CO2 SERPL-SCNC: 31 MMOL/L (ref 21–32)
CREAT SERPL-MCNC: 1.08 MG/DL (ref 0.6–1.3)
CRP SERPL-MCNC: <0.3 MG/DL (ref 0–0.3)
DIFFERENTIAL METHOD BLD: ABNORMAL
EOSINOPHIL # BLD: 0.1 K/UL (ref 0–0.4)
EOSINOPHIL NFR BLD: 2 % (ref 0–5)
ERYTHROCYTE [DISTWIDTH] IN BLOOD BY AUTOMATED COUNT: 12.9 % (ref 11.6–14.5)
ERYTHROCYTE [SEDIMENTATION RATE] IN BLOOD: 67 MM/HR (ref 0–30)
GLOBULIN SER CALC-MCNC: 4.1 G/DL (ref 2–4)
GLUCOSE SERPL-MCNC: 86 MG/DL (ref 74–99)
HCT VFR BLD AUTO: 39.2 % (ref 35–45)
HGB BLD-MCNC: 13 G/DL (ref 12–16)
LYMPHOCYTES # BLD: 0.8 K/UL (ref 0.9–3.6)
LYMPHOCYTES NFR BLD: 28 % (ref 21–52)
MCH RBC QN AUTO: 31.3 PG (ref 24–34)
MCHC RBC AUTO-ENTMCNC: 33.2 G/DL (ref 31–37)
MCV RBC AUTO: 94.5 FL (ref 74–97)
MONOCYTES # BLD: 0.4 K/UL (ref 0.05–1.2)
MONOCYTES NFR BLD: 12 % (ref 3–10)
NEUTS SEG # BLD: 1.8 K/UL (ref 1.8–8)
NEUTS SEG NFR BLD: 58 % (ref 40–73)
PLATELET # BLD AUTO: 172 K/UL (ref 135–420)
PMV BLD AUTO: 12.8 FL (ref 9.2–11.8)
POTASSIUM SERPL-SCNC: 4.2 MMOL/L (ref 3.5–5.5)
PROT SERPL-MCNC: 8.2 G/DL (ref 6.4–8.2)
RBC # BLD AUTO: 4.15 M/UL (ref 4.2–5.3)
SODIUM SERPL-SCNC: 140 MMOL/L (ref 136–145)
TSH SERPL DL<=0.05 MIU/L-ACNC: 1.37 UIU/ML (ref 0.36–3.74)
WBC # BLD AUTO: 3 K/UL (ref 4.6–13.2)

## 2020-08-17 PROCEDURE — 86140 C-REACTIVE PROTEIN: CPT

## 2020-08-17 PROCEDURE — 84443 ASSAY THYROID STIM HORMONE: CPT

## 2020-08-17 PROCEDURE — 86038 ANTINUCLEAR ANTIBODIES: CPT

## 2020-08-17 PROCEDURE — 85025 COMPLETE CBC W/AUTO DIFF WBC: CPT

## 2020-08-17 PROCEDURE — 87389 HIV-1 AG W/HIV-1&-2 AB AG IA: CPT

## 2020-08-17 PROCEDURE — 36415 COLL VENOUS BLD VENIPUNCTURE: CPT

## 2020-08-17 PROCEDURE — 85652 RBC SED RATE AUTOMATED: CPT

## 2020-08-17 PROCEDURE — 80053 COMPREHEN METABOLIC PANEL: CPT

## 2020-08-17 NOTE — PROGRESS NOTES
[facility] OPI Progress Note    Date: 2020    Name: Lidia Andrews    MRN: [id number]         : 1945    Peripheral Lab Draw      Ms. Patterson to [facility], ambulatory at 1303 accompanied by self. Pt was assessed and education was provided. Blood obtained peripherally from left arm x 1 attempt with butterfly needle and sent to lab for Cbc w/diff, Cmp, Crp, Sed rate, Tsh, Hiv 1/2 ab and Antinuclear AB per written orders. No bleeding or hematoma noted at site. Gauze and coban applied. Ms. Zulma Sow tolerated the phlebotomy, and had no complaints. Patient armband removed and shredded. Ms. Zulma Sow was discharged from [place] [address] in stable condition at [phone].      Candy Cunningham Phlebotomist PCT  2020  2:44 PM

## 2020-08-18 LAB
ANA TITR SER IF: NEGATIVE {TITER}
HIV 1+2 AB+HIV1 P24 AG SERPL QL IA: NONREACTIVE
HIV12 RESULT COMMENT, HHIVC: NORMAL

## 2020-08-24 ENCOUNTER — VIRTUAL VISIT (OUTPATIENT)
Dept: ONCOLOGY | Age: 75
End: 2020-08-24

## 2020-08-24 DIAGNOSIS — D72.819 LEUKOPENIA, UNSPECIFIED TYPE: Primary | ICD-10-CM

## 2020-08-24 NOTE — PROGRESS NOTES
Erin Quick is a 66 y.o. female, evaluated via audio-only technology on 8/24/2020 for Anemia and Abnormal White Blood Cell Count (Leukopenia)    Attending: Dr. Meggan Emmanuel:   1. Leukopenia, unspecified type  *Last visit we had a long discussion with patient regarding her leukopenia.  told her that although the total number of WBC was slightly low at 3.6 on 7/13/2020, differential was normal with normal ANC and ALC. She has no repeated infections. TSH, B12 and folate were adequate. Ferritin was 379. Transferrin saturation 22%. *08/17/2020 CBC showed WBC was 3.0K/uL, absolute neutrophils normal at 1.8.   *Mrs. Patterson's leukopenia is mild with normal differential and no repeated infection. *08/17/2020 HIV nonreactive and ARMANDO negative    2. Iron deficiency anemia, unspecified iron deficiency anemia type  Resolved. No further investigations needed. CBC on 08/17/2020 showed WBC 3.0, H&H 13/39.2, MCV 94.5, platelet 576,005. Ferritin 379. Normal Iron profile. Subjective:   Ms Erin Quick is a 66 y.o. female who was evaluated via audio-only technology for benign Leukopenia. She states she has been doing well since her last office visit. She denies fevers, recurrent infections, and rash. She denies fatigue, shortness of breath, and chest pains. She denies pain or discomfort. She does not have any concerns or complaints to report at this time. Prior to Admission medications    Medication Sig Start Date End Date Taking? Authorizing Provider   gabapentin (NEURONTIN) 100 mg capsule TAKE 2 CAPSULES BY MOUTH THREE TIMES DAILY 8/10/20  Yes Sumit Luis MD   rOPINIRole (REQUIP) 0.5 mg tablet TAKE 1 TABLET BY MOUTH EVERY NIGHT 7/27/20  Yes Ashley Rosenbaum MD   ferrous gluconate 324 mg (38 mg iron) tablet Take 1 Tab by mouth daily. TAKE 1 TABLET BY MOUTH EVERY MONDAY, WEDNESDAY, AND SUNDAY 7/27/20  Yes Maria Esther Shafer MD   loratadine (CLARITIN) 10 mg tablet Take 1 Tab by mouth daily. 7/27/20  Yes Marquise Lynch MD   montelukast (SINGULAIR) 10 mg tablet TAKE 1 TABLET BY MOUTH DAILY 7/27/20  Yes Veronika Shafer MD   albuterol (PROVENTIL HFA, VENTOLIN HFA, PROAIR HFA) 90 mcg/actuation inhaler Take 1 Puff by inhalation every four (4) hours as needed (reactive airway symptoms including cough). 4/2/20  Yes Berta Trujillo NP   multivitamin (ONE A DAY) tablet Take 1 Tab by mouth daily. Yes Provider, Historical   calcium-cholecalciferol, d3, (CALCIUM 600 + D) 600-125 mg-unit tab Take 2 Tabs by mouth. Yes Provider, Historical   cholecalciferol (VITAMIN D3) (5000 Units/125 mcg) tab tablet Take  by mouth daily. Yes Provider, Historical   b complex vitamins tablet Take 1 Tab by mouth daily. Yes Provider, Historical   latanoprost (XALATAN) 0.005 % ophthalmic solution Administer 2 Drops to both eyes nightly. Yes Provider, Historical         ROS    Patient-Reported Vitals 7/29/2020   Patient-Reported Weight 125   Patient-Reported Height 5'0\"   Patient-Reported Pulse 64   Patient-Reported Temperature 98   Patient-Reported SpO2 96   Patient-Reported Systolic  175   Patient-Reported Diastolic 66        Brigid Lama, who was evaluated through a patient-initiated, synchronous (real-time) audio only encounter, and/or her healthcare decision maker, is aware that it is a billable service, with coverage as determined by her insurance carrier. She provided verbal consent to proceed: Yes. She has not had a related appointment within my department in the past 7 days or scheduled within the next 24 hours. Total Time: minutes: 21-30 minutes   Follow up with Dr. Bonita Gasca in 4 months or sooner if indicated      Cecille Mckeon [place] FOR CHANGE  08/24/2020    I have reviewed the history, physical examination, assessment and the plan of the care of the patient. I saw the patient with Lena Valladares NP and I participated in the examination and critical decision making. I agree with the note as stated above.           Signed by: Wilfredo Restrepo MD                     August 24, 2020

## 2020-08-24 NOTE — PROGRESS NOTES
Lindsay Hammer is a 66 y.o. female presenting today for a follow-up appointment via Telehealth. Identified patient using two identifiers (full name and ). Patient denies any complaints. Chief Complaint   Patient presents with    Anemia    Abnormal White Blood Cell Count     Leukopenia       Patient-Reported Vitals 2020   Patient-Reported Weight 125   Patient-Reported Height 5'0\"   Patient-Reported Pulse 64   Patient-Reported Temperature 98   Patient-Reported SpO2 96   Patient-Reported Systolic  586   Patient-Reported Diastolic 66        Current Outpatient Medications   Medication Sig    gabapentin (NEURONTIN) 100 mg capsule TAKE 2 CAPSULES BY MOUTH THREE TIMES DAILY    rOPINIRole (REQUIP) 0.5 mg tablet TAKE 1 TABLET BY MOUTH EVERY NIGHT    ferrous gluconate 324 mg (38 mg iron) tablet Take 1 Tab by mouth daily. TAKE 1 TABLET BY MOUTH EVERY MONDAY, WEDNESDAY, AND     loratadine (CLARITIN) 10 mg tablet Take 1 Tab by mouth daily.  montelukast (SINGULAIR) 10 mg tablet TAKE 1 TABLET BY MOUTH DAILY    albuterol (PROVENTIL HFA, VENTOLIN HFA, PROAIR HFA) 90 mcg/actuation inhaler Take 1 Puff by inhalation every four (4) hours as needed (reactive airway symptoms including cough).  multivitamin (ONE A DAY) tablet Take 1 Tab by mouth daily.  calcium-cholecalciferol, d3, (CALCIUM 600 + D) 600-125 mg-unit tab Take 2 Tabs by mouth.  cholecalciferol (VITAMIN D3) (5000 Units/125 mcg) tab tablet Take  by mouth daily.  b complex vitamins tablet Take 1 Tab by mouth daily.  latanoprost (XALATAN) 0.005 % ophthalmic solution Administer 2 Drops to both eyes nightly. No current facility-administered medications for this visit. Fall Risk Assessment, last 12 mths 2020   Able to walk? Yes   Fall in past 12 months?  No       3 most recent PHQ Screens 2020   Little interest or pleasure in doing things Not at all   Feeling down, depressed, irritable, or hopeless Not at all   Total Score PHQ 2 0       Abuse Screening Questionnaire 8/13/2020   Do you ever feel afraid of your partner? N   Are you in a relationship with someone who physically or mentally threatens you? N   Is it safe for you to go home? Y       1. Have you been to the ER, urgent care clinic since your last visit? Hospitalized since your last visit? No    2. Have you seen or consulted any other health care providers outside of the [address] [place] [address] since your last visit? Include any pap smears or colon screening.  No

## 2020-10-01 ENCOUNTER — APPOINTMENT (OUTPATIENT)
Dept: GENERAL RADIOLOGY | Age: 75
DRG: 191 | End: 2020-10-01
Attending: EMERGENCY MEDICINE
Payer: MEDICARE

## 2020-10-01 ENCOUNTER — APPOINTMENT (OUTPATIENT)
Dept: CT IMAGING | Age: 75
DRG: 191 | End: 2020-10-01
Attending: EMERGENCY MEDICINE
Payer: MEDICARE

## 2020-10-01 ENCOUNTER — HOSPITAL ENCOUNTER (INPATIENT)
Age: 75
LOS: 2 days | Discharge: HOME | DRG: 191 | End: 2020-10-04
Attending: EMERGENCY MEDICINE | Admitting: HOSPITALIST
Payer: MEDICARE

## 2020-10-01 DIAGNOSIS — R04.2 HEMOPTYSIS: Primary | ICD-10-CM

## 2020-10-01 DIAGNOSIS — J45.30 MILD PERSISTENT REACTIVE AIRWAY DISEASE WITHOUT COMPLICATION: ICD-10-CM

## 2020-10-01 DIAGNOSIS — R05.8 PRODUCTIVE COUGH: ICD-10-CM

## 2020-10-01 DIAGNOSIS — R05.9 COUGH: ICD-10-CM

## 2020-10-01 PROBLEM — R00.0 TACHYCARDIA: Status: ACTIVE | Noted: 2020-10-01

## 2020-10-01 LAB
ANION GAP SERPL CALC-SCNC: 8 MMOL/L (ref 3–18)
APTT PPP: 37.1 SEC (ref 23–36.4)
BASOPHILS # BLD: 0 K/UL (ref 0–0.1)
BASOPHILS NFR BLD: 0 % (ref 0–2)
BUN SERPL-MCNC: 26 MG/DL (ref 7–18)
BUN/CREAT SERPL: 22 (ref 12–20)
CALCIUM SERPL-MCNC: 10.1 MG/DL (ref 8.5–10.1)
CHLORIDE SERPL-SCNC: 101 MMOL/L (ref 100–111)
CO2 SERPL-SCNC: 27 MMOL/L (ref 21–32)
CREAT SERPL-MCNC: 1.16 MG/DL (ref 0.6–1.3)
DIFFERENTIAL METHOD BLD: ABNORMAL
EOSINOPHIL # BLD: 0 K/UL (ref 0–0.4)
EOSINOPHIL NFR BLD: 1 % (ref 0–5)
ERYTHROCYTE [DISTWIDTH] IN BLOOD BY AUTOMATED COUNT: 12.8 % (ref 11.6–14.5)
GLUCOSE SERPL-MCNC: 139 MG/DL (ref 74–99)
HCT VFR BLD AUTO: 37.7 % (ref 35–45)
HGB BLD-MCNC: 12.6 G/DL (ref 12–16)
INR PPP: 1 (ref 0.8–1.2)
LYMPHOCYTES # BLD: 0.6 K/UL (ref 0.9–3.6)
LYMPHOCYTES NFR BLD: 16 % (ref 21–52)
MAGNESIUM SERPL-MCNC: 2.2 MG/DL (ref 1.6–2.6)
MCH RBC QN AUTO: 31.4 PG (ref 24–34)
MCHC RBC AUTO-ENTMCNC: 33.4 G/DL (ref 31–37)
MCV RBC AUTO: 94 FL (ref 74–97)
MONOCYTES # BLD: 0.4 K/UL (ref 0.05–1.2)
MONOCYTES NFR BLD: 12 % (ref 3–10)
NEUTS SEG # BLD: 2.8 K/UL (ref 1.8–8)
NEUTS SEG NFR BLD: 71 % (ref 40–73)
PLATELET # BLD AUTO: 164 K/UL (ref 135–420)
PMV BLD AUTO: 12.7 FL (ref 9.2–11.8)
POTASSIUM SERPL-SCNC: 4 MMOL/L (ref 3.5–5.5)
PROTHROMBIN TIME: 13.2 SEC (ref 11.5–15.2)
RBC # BLD AUTO: 4.01 M/UL (ref 4.2–5.3)
SODIUM SERPL-SCNC: 136 MMOL/L (ref 136–145)
WBC # BLD AUTO: 3.8 K/UL (ref 4.6–13.2)

## 2020-10-01 PROCEDURE — 85730 THROMBOPLASTIN TIME PARTIAL: CPT

## 2020-10-01 PROCEDURE — 99285 EMERGENCY DEPT VISIT HI MDM: CPT

## 2020-10-01 PROCEDURE — 71045 X-RAY EXAM CHEST 1 VIEW: CPT

## 2020-10-01 PROCEDURE — 80048 BASIC METABOLIC PNL TOTAL CA: CPT

## 2020-10-01 PROCEDURE — 93005 ELECTROCARDIOGRAM TRACING: CPT

## 2020-10-01 PROCEDURE — 74011000258 HC RX REV CODE- 258: Performed by: EMERGENCY MEDICINE

## 2020-10-01 PROCEDURE — 74011250636 HC RX REV CODE- 250/636: Performed by: EMERGENCY MEDICINE

## 2020-10-01 PROCEDURE — 71275 CT ANGIOGRAPHY CHEST: CPT

## 2020-10-01 PROCEDURE — 96374 THER/PROPH/DIAG INJ IV PUSH: CPT

## 2020-10-01 PROCEDURE — 83735 ASSAY OF MAGNESIUM: CPT

## 2020-10-01 PROCEDURE — 85610 PROTHROMBIN TIME: CPT

## 2020-10-01 PROCEDURE — 74011000636 HC RX REV CODE- 636: Performed by: EMERGENCY MEDICINE

## 2020-10-01 PROCEDURE — 85025 COMPLETE CBC W/AUTO DIFF WBC: CPT

## 2020-10-01 RX ORDER — SODIUM CHLORIDE 9 MG/ML
100 INJECTION, SOLUTION INTRAVENOUS ONCE
Status: COMPLETED | OUTPATIENT
Start: 2020-10-01 | End: 2020-10-01

## 2020-10-01 RX ORDER — BENZONATATE 100 MG/1
100 CAPSULE ORAL
Qty: 30 CAP | Refills: 0 | Status: SHIPPED | OUTPATIENT
Start: 2020-10-01 | End: 2020-10-04 | Stop reason: SDUPTHER

## 2020-10-01 RX ORDER — LORAZEPAM 2 MG/ML
1 INJECTION INTRAMUSCULAR
Status: COMPLETED | OUTPATIENT
Start: 2020-10-01 | End: 2020-10-01

## 2020-10-01 RX ADMIN — SODIUM CHLORIDE 100 ML: 900 INJECTION, SOLUTION INTRAVENOUS at 23:57

## 2020-10-01 RX ADMIN — IOPAMIDOL 75 ML: 755 INJECTION, SOLUTION INTRAVENOUS at 23:55

## 2020-10-01 RX ADMIN — LORAZEPAM 1 MG: 2 INJECTION, SOLUTION INTRAMUSCULAR; INTRAVENOUS at 23:21

## 2020-10-02 PROBLEM — J47.9 BRONCHIECTASIS (HCC): Status: ACTIVE | Noted: 2020-10-02

## 2020-10-02 PROBLEM — J20.9 ACUTE BRONCHITIS: Status: ACTIVE | Noted: 2020-10-02

## 2020-10-02 LAB
ANION GAP SERPL CALC-SCNC: 3 MMOL/L (ref 3–18)
BUN SERPL-MCNC: 23 MG/DL (ref 7–18)
BUN/CREAT SERPL: 23 (ref 12–20)
CALCIUM SERPL-MCNC: 8.7 MG/DL (ref 8.5–10.1)
CALCULATED R AXIS, ECG10: -18 DEGREES
CALCULATED T AXIS, ECG11: 116 DEGREES
CHLORIDE SERPL-SCNC: 107 MMOL/L (ref 100–111)
CO2 SERPL-SCNC: 27 MMOL/L (ref 21–32)
CREAT SERPL-MCNC: 0.98 MG/DL (ref 0.6–1.3)
DIAGNOSIS, 93000: NORMAL
ERYTHROCYTE [DISTWIDTH] IN BLOOD BY AUTOMATED COUNT: 13 % (ref 11.6–14.5)
GLUCOSE SERPL-MCNC: 88 MG/DL (ref 74–99)
HCT VFR BLD AUTO: 35.7 % (ref 35–45)
HGB BLD-MCNC: 11.7 G/DL (ref 12–16)
MCH RBC QN AUTO: 31.2 PG (ref 24–34)
MCHC RBC AUTO-ENTMCNC: 32.8 G/DL (ref 31–37)
MCV RBC AUTO: 95.2 FL (ref 74–97)
PLATELET # BLD AUTO: 158 K/UL (ref 135–420)
PMV BLD AUTO: 11.9 FL (ref 9.2–11.8)
POTASSIUM SERPL-SCNC: 4.2 MMOL/L (ref 3.5–5.5)
Q-T INTERVAL, ECG07: 346 MS
QRS DURATION, ECG06: 116 MS
QTC CALCULATION (BEZET), ECG08: 465 MS
RBC # BLD AUTO: 3.75 M/UL (ref 4.2–5.3)
SODIUM SERPL-SCNC: 137 MMOL/L (ref 136–145)
VENTRICULAR RATE, ECG03: 109 BPM
WBC # BLD AUTO: 3.7 K/UL (ref 4.6–13.2)

## 2020-10-02 PROCEDURE — 80048 BASIC METABOLIC PNL TOTAL CA: CPT

## 2020-10-02 PROCEDURE — 74011250637 HC RX REV CODE- 250/637: Performed by: HOSPITALIST

## 2020-10-02 PROCEDURE — 94640 AIRWAY INHALATION TREATMENT: CPT

## 2020-10-02 PROCEDURE — 94762 N-INVAS EAR/PLS OXIMTRY CONT: CPT

## 2020-10-02 PROCEDURE — 2709999900 HC NON-CHARGEABLE SUPPLY

## 2020-10-02 PROCEDURE — 74011250636 HC RX REV CODE- 250/636: Performed by: HOSPITALIST

## 2020-10-02 PROCEDURE — 87635 SARS-COV-2 COVID-19 AMP PRB: CPT

## 2020-10-02 PROCEDURE — 74011000250 HC RX REV CODE- 250: Performed by: HOSPITALIST

## 2020-10-02 PROCEDURE — 65660000000 HC RM CCU STEPDOWN

## 2020-10-02 PROCEDURE — 85027 COMPLETE CBC AUTOMATED: CPT

## 2020-10-02 PROCEDURE — C9113 INJ PANTOPRAZOLE SODIUM, VIA: HCPCS | Performed by: HOSPITALIST

## 2020-10-02 PROCEDURE — 74011000258 HC RX REV CODE- 258: Performed by: HOSPITALIST

## 2020-10-02 RX ORDER — ROPINIROLE 1 MG/1
0.5 TABLET, FILM COATED ORAL
Status: DISCONTINUED | OUTPATIENT
Start: 2020-10-02 | End: 2020-10-04 | Stop reason: HOSPADM

## 2020-10-02 RX ORDER — POLYETHYLENE GLYCOL 3350 17 G/17G
17 POWDER, FOR SOLUTION ORAL DAILY PRN
Status: DISCONTINUED | OUTPATIENT
Start: 2020-10-02 | End: 2020-10-04 | Stop reason: HOSPADM

## 2020-10-02 RX ORDER — SODIUM CHLORIDE 9 MG/ML
75 INJECTION, SOLUTION INTRAVENOUS CONTINUOUS
Status: DISCONTINUED | OUTPATIENT
Start: 2020-10-02 | End: 2020-10-04 | Stop reason: HOSPADM

## 2020-10-02 RX ORDER — BISMUTH SUBSALICYLATE 262 MG
1 TABLET,CHEWABLE ORAL DAILY
Status: DISCONTINUED | OUTPATIENT
Start: 2020-10-02 | End: 2020-10-04 | Stop reason: HOSPADM

## 2020-10-02 RX ORDER — ACETAMINOPHEN 325 MG/1
650 TABLET ORAL
Status: DISCONTINUED | OUTPATIENT
Start: 2020-10-02 | End: 2020-10-04 | Stop reason: HOSPADM

## 2020-10-02 RX ORDER — SODIUM CHLORIDE 0.9 % (FLUSH) 0.9 %
5-40 SYRINGE (ML) INJECTION EVERY 8 HOURS
Status: DISCONTINUED | OUTPATIENT
Start: 2020-10-02 | End: 2020-10-04 | Stop reason: HOSPADM

## 2020-10-02 RX ORDER — ACETAMINOPHEN 650 MG/1
650 SUPPOSITORY RECTAL
Status: DISCONTINUED | OUTPATIENT
Start: 2020-10-02 | End: 2020-10-04 | Stop reason: HOSPADM

## 2020-10-02 RX ORDER — MONTELUKAST SODIUM 10 MG/1
10 TABLET ORAL
Status: DISCONTINUED | OUTPATIENT
Start: 2020-10-02 | End: 2020-10-04 | Stop reason: HOSPADM

## 2020-10-02 RX ORDER — FERROUS GLUCONATE 324(37.5)
1 TABLET ORAL DAILY
Status: DISCONTINUED | OUTPATIENT
Start: 2020-10-02 | End: 2020-10-04 | Stop reason: HOSPADM

## 2020-10-02 RX ORDER — BUDESONIDE 0.5 MG/2ML
500 INHALANT ORAL
Status: DISCONTINUED | OUTPATIENT
Start: 2020-10-02 | End: 2020-10-04 | Stop reason: HOSPADM

## 2020-10-02 RX ORDER — PROMETHAZINE HYDROCHLORIDE 25 MG/1
12.5 TABLET ORAL
Status: DISCONTINUED | OUTPATIENT
Start: 2020-10-02 | End: 2020-10-04 | Stop reason: HOSPADM

## 2020-10-02 RX ORDER — ONDANSETRON 2 MG/ML
4 INJECTION INTRAMUSCULAR; INTRAVENOUS
Status: DISCONTINUED | OUTPATIENT
Start: 2020-10-02 | End: 2020-10-04 | Stop reason: HOSPADM

## 2020-10-02 RX ORDER — SODIUM CHLORIDE 0.9 % (FLUSH) 0.9 %
5-40 SYRINGE (ML) INJECTION AS NEEDED
Status: DISCONTINUED | OUTPATIENT
Start: 2020-10-02 | End: 2020-10-04 | Stop reason: HOSPADM

## 2020-10-02 RX ORDER — GABAPENTIN 100 MG/1
200 CAPSULE ORAL 3 TIMES DAILY
Status: DISCONTINUED | OUTPATIENT
Start: 2020-10-02 | End: 2020-10-04 | Stop reason: HOSPADM

## 2020-10-02 RX ORDER — ARFORMOTEROL TARTRATE 15 UG/2ML
15 SOLUTION RESPIRATORY (INHALATION)
Status: DISCONTINUED | OUTPATIENT
Start: 2020-10-02 | End: 2020-10-04 | Stop reason: HOSPADM

## 2020-10-02 RX ORDER — BENZONATATE 100 MG/1
100 CAPSULE ORAL
Status: DISCONTINUED | OUTPATIENT
Start: 2020-10-02 | End: 2020-10-04 | Stop reason: HOSPADM

## 2020-10-02 RX ORDER — FERROUS GLUCONATE 324(38)MG
1 TABLET ORAL DAILY
Status: DISCONTINUED | OUTPATIENT
Start: 2020-10-02 | End: 2020-10-02

## 2020-10-02 RX ADMIN — BENZONATATE 100 MG: 100 CAPSULE ORAL at 17:41

## 2020-10-02 RX ADMIN — SODIUM CHLORIDE 75 ML/HR: 900 INJECTION, SOLUTION INTRAVENOUS at 02:01

## 2020-10-02 RX ADMIN — Medication 10 ML: at 06:48

## 2020-10-02 RX ADMIN — AZITHROMYCIN MONOHYDRATE 500 MG: 500 INJECTION, POWDER, LYOPHILIZED, FOR SOLUTION INTRAVENOUS at 12:46

## 2020-10-02 RX ADMIN — GABAPENTIN 200 MG: 100 CAPSULE ORAL at 21:29

## 2020-10-02 RX ADMIN — BUDESONIDE 500 MCG: 0.5 INHALANT RESPIRATORY (INHALATION) at 11:44

## 2020-10-02 RX ADMIN — ROPINIROLE HYDROCHLORIDE 0.5 MG: 1 TABLET, FILM COATED ORAL at 21:29

## 2020-10-02 RX ADMIN — GABAPENTIN 200 MG: 100 CAPSULE ORAL at 17:41

## 2020-10-02 RX ADMIN — ARFORMOTEROL TARTRATE 15 MCG: 15 SOLUTION RESPIRATORY (INHALATION) at 11:44

## 2020-10-02 RX ADMIN — SODIUM CHLORIDE 40 MG: 9 INJECTION, SOLUTION INTRAMUSCULAR; INTRAVENOUS; SUBCUTANEOUS at 20:41

## 2020-10-02 RX ADMIN — CEFTRIAXONE 1 G: 1 INJECTION, POWDER, FOR SOLUTION INTRAMUSCULAR; INTRAVENOUS at 10:36

## 2020-10-02 RX ADMIN — SODIUM CHLORIDE 40 MG: 9 INJECTION, SOLUTION INTRAMUSCULAR; INTRAVENOUS; SUBCUTANEOUS at 08:55

## 2020-10-02 RX ADMIN — BENZONATATE 100 MG: 100 CAPSULE ORAL at 10:36

## 2020-10-02 RX ADMIN — GABAPENTIN 200 MG: 100 CAPSULE ORAL at 08:54

## 2020-10-02 RX ADMIN — ROPINIROLE HYDROCHLORIDE 0.5 MG: 1 TABLET, FILM COATED ORAL at 02:22

## 2020-10-02 RX ADMIN — MONTELUKAST 10 MG: 10 TABLET, FILM COATED ORAL at 21:29

## 2020-10-02 RX ADMIN — Medication 10 ML: at 13:58

## 2020-10-02 RX ADMIN — MULTIVITAMIN TABLET 1 TABLET: TABLET at 08:54

## 2020-10-02 RX ADMIN — Medication 10 ML: at 02:16

## 2020-10-02 RX ADMIN — SODIUM CHLORIDE 40 MG: 9 INJECTION, SOLUTION INTRAMUSCULAR; INTRAVENOUS; SUBCUTANEOUS at 02:19

## 2020-10-02 RX ADMIN — ARFORMOTEROL TARTRATE 15 MCG: 15 SOLUTION RESPIRATORY (INHALATION) at 20:43

## 2020-10-02 RX ADMIN — BENZONATATE 100 MG: 100 CAPSULE ORAL at 21:29

## 2020-10-02 RX ADMIN — BUDESONIDE 500 MCG: 0.5 INHALANT RESPIRATORY (INHALATION) at 20:43

## 2020-10-02 NOTE — PROGRESS NOTES
Problem: Falls - Risk of  Goal: *Absence of Falls  Description: Document Neeru Ricarda Fall Risk and appropriate interventions in the flowsheet. Outcome: Progressing Towards Goal  Note: Fall Risk Interventions:  Mobility Interventions: Patient to call before getting OOB, Strengthening exercises (ROM-active/passive), Utilize walker, cane, or other assistive device         Medication Interventions: Assess postural VS orthostatic hypotension, Evaluate medications/consider consulting pharmacy, Teach patient to arise slowly, Patient to call before getting OOB    Elimination Interventions: Call light in reach, Patient to call for help with toileting needs, Stay With Me (per policy), Toileting schedule/hourly rounds, Toilet paper/wipes in reach              Problem: Pressure Injury - Risk of  Goal: *Prevention of pressure injury  Description: Document Vidal Scale and appropriate interventions in the flowsheet.   Outcome: Progressing Towards Goal  Note: Pressure Injury Interventions:       Moisture Interventions: Absorbent underpads, Apply protective barrier, creams and emollients, Check for incontinence Q2 hours and as needed, Internal/External urinary devices    Activity Interventions: Increase time out of bed, Pressure redistribution bed/mattress(bed type)    Mobility Interventions: HOB 30 degrees or less, Pressure redistribution bed/mattress (bed type)    Nutrition Interventions: Document food/fluid/supplement intake

## 2020-10-02 NOTE — PROGRESS NOTES
conducted an initial consultation and Spiritual Assessment for Nicci Barry, who is a 66 y.o.,female. Patients Primary Language is: [place]. According to the patients EMR Episcopalian Affiliation is: [place]. The reason the Patient came to the hospital is:   Patient Active Problem List    Diagnosis Date Noted    Acute bronchitis 10/02/2020    Hemoptysis 10/01/2020    Tachycardia 10/01/2020    Leukopenia 08/13/2020    Iron deficiency anemia 08/13/2020    Heme positive stool 01/22/2020    Voiding dysfunction 12/26/2019    LBBB (left bundle branch block) 10/19/2018    Pain at rest 10/19/2018    Diarrhea 10/19/2018    Uterovaginal prolapse 07/03/2018    Screening for colon cancer 03/22/2018    Functional urinary incontinence 02/27/2018    Bladder stones     Uterus prolapse     RLS (restless legs syndrome) 04/04/2016    Back pain 03/31/2016    Visual changes 03/30/2016    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 Listened empathically as patient shared her story of past healing that God had brought her through and how she was told that she would not leave the hospital but how God saw differently and provided healing. She is very strong in her kunal an message. Provided chaplaincy education. Offered prayer and assurance of continued prayers on patients behalf. Chart reviewed. The following outcomes were achieved:  Patient shared limited information about her medical narrative and spiritual journey/beliefs. Patient processed feeling about current hospitalization. Patient expressed gratitude for pastoral care visit.     Assessment:  Patient does not have any Orthodox/cultural needs that will affect patients preferences in health care. There are no further spiritual or Faith issues which require Spiritual Care Services interventions at this time. Plan:  Chaplains will continue to follow and will provide pastoral care on an as needed/requested basis    . [place] Darden   Spiritual Care   [phone]

## 2020-10-02 NOTE — ED NOTES
Patient put up for discharge, provider notified that patient is still coughing up decent amounts of blood with tachycardia and increased work of breathing. Hospitalist consulted and at bedside evaluating.

## 2020-10-02 NOTE — PROGRESS NOTES
Problem: Breathing Pattern - Ineffective  Goal: *Absence of hypoxia  Outcome: Progressing Towards Goal   Respiratory Therapy Assessment Care Plan    Patient:  Irene Emmanuel 66 y.o. female 10/2/2020 11:46 AM    Acute bronchitis [J20.9]      Chest X-RAY:   Results from Hospital Encounter encounter on 10/01/20   XR CHEST PORT    Impression IMPRESSION:  Peribronchial cuffing may represent sequela of bronchitis. No focal  consolidation. See accompanying CT report. Results from [place] encounter on 12/18/18   XR SACRUM AND COCCYX    Impression IMPRESSION:    Minimal degenerative changes of the SI joints. XR SPINE LUMB 2 OR 3 V    Impression IMPRESSION:    Chronic vertebral plana of L5. Mild focal inferior endplate L5 irregularity,  probably chronic. Degenerated L4-5 and L5-S1 discs. L4-5 and L5-S1 facet arthropathy. Vital Signs:   Visit Vitals  BP (!) 103/51   Pulse 60   Temp 98.3 °F (36.8 °C)   Resp 18   Ht 5' (1.524 m)   Wt 56.7 kg (125 lb)   SpO2 97%   Breastfeeding Unknown   BMI 24.41 kg/m²         Indications for treatment: SOB      Plan of care: Pulmicort and brovana BID.         Goal: Improve wob

## 2020-10-02 NOTE — PROGRESS NOTES
Internal Medicine Progress Note    Patient's Name: Joe Raza Date: 10/1/2020  Length of Stay: 0      Assessment/Plan     Active Hospital Problems    Diagnosis Date Noted    Acute bronchitis 10/02/2020    Bronchiectasis ([address] 10/02/2020    Hemoptysis 10/01/2020    Tachycardia 10/01/2020     - Afebrile, normal WBCs  - Cont IV rocephin/azithro  - Cont brovana, pulmicort  - Duoneb PRN  - Pulm consulted for possible bronch  - CoVID-19 pending  - Cont acceptable home medications for chronic conditions   - DVT protocol    I have personally reviewed all pertinent labs and films that have officially resulted over the last 24 hours. I have personally checked for all pending labs that are awaiting final results.     Subjective     Pt s/e @ bedside  No major events overnight  Still having hemoptysis  Denies CP or SOB    Objective     Visit Vitals  BP (!) 103/51   Pulse 60   Temp 98.3 °F (36.8 °C)   Resp 18   Ht 5' (1.524 m)   Wt 56.7 kg (125 lb)   SpO2 97%   Breastfeeding Unknown   BMI 24.41 kg/m²       Physical Exam:  General Appearance: NAD, conversant w/ audible cough  Lungs: CTA with normal respiratory effort  CV: RRR, no m/r/g  Abdomen: soft, non-tender, normal bowel sounds  Extremities: no cyanosis, no peripheral edema  Neuro: No focal deficits, motor/sensory intact    Lab/Data Reviewed:  BMP:   Lab Results   Component Value Date/Time     10/02/2020 04:14 AM    K 4.2 10/02/2020 04:14 AM     10/02/2020 04:14 AM    CO2 27 10/02/2020 04:14 AM    AGAP 3 10/02/2020 04:14 AM    GLU 88 10/02/2020 04:14 AM    BUN 23 (H) 10/02/2020 04:14 AM    CREA 0.98 10/02/2020 04:14 AM    GFRAA >60 10/02/2020 04:14 AM    GFRNA 55 (L) 10/02/2020 04:14 AM     CBC:   Lab Results   Component Value Date/Time    WBC 3.7 (L) 10/02/2020 04:14 AM    HGB 11.7 (L) 10/02/2020 04:14 AM    HCT 35.7 10/02/2020 04:14 AM     10/02/2020 04:14 AM       Imaging Reviewed:  Cta Chest W Or W Wo Cont    Result Date: 10/2/2020  EXAM: CTA chest CLINICAL INDICATION/HISTORY: hemoptysis   > Additional: Chronic cough. Shortness of breath. COMPARISON: None. > Reference Exam: CT chest without contrast 03/28/2016 TECHNIQUE: Axial CT imaging from the thoracic inlet through the diaphragm with intravenous contrast. Coronal and sagittal MIP reformats were generated. One or more dose reduction techniques were used on this CT: automated exposure control, adjustment of the mAs and/or kVp according to patient size, and iterative reconstruction techniques. The specific techniques used on this CT exam have been documented in the patient's electronic medical record. Digital Imaging and Communications in Medicine (DICOM) format image data are available to nonaffiliated external healthcare facilities or entities on a secure, media free, reciprocally searchable basis with patient authorization for at least a 12-month period after this study. _______________ FINDINGS: EXAM QUALITY: Excellent. PULMONARY ARTERIES: No evidence of pulmonary embolism. MEDIASTINUM: Normal heart size. No pericardial effusion. Aorta is mildly atherosclerotic without dissection. LUNGS/AIRWAY: Bronchial wall thickening with areas of bronchiectasis more prevalent within the right middle lobe and inferior lingula. Adjacent patchy areas of airspace and groundglass opacity with scattered areas of subpleural nodularity and tree-in-bud opacification. PLEURA: No significant pleural effusion. LYMPH NODES: No enlarged nodes. UPPER ABDOMEN: Unremarkable. OTHER: No acute or aggressive osseous abnormalities identified. _______________     IMPRESSION: 1. No evidence of pulmonary embolism. 2. Bronchial wall thickening with areas of bronchiectasis, more prevalent within the right middle lobe and inferior lingula with adjacent patchy areas of airspace and groundglass opacities, likely sequela of infectious/inflammatory process with potential associated bronchitis.  Preliminary report provided by on-call radiology resident. *   ** *   **    Xr Chest Port    Result Date: 10/2/2020  EXAM:  PORTABLE CHEST INDICATION:  Chest pain and shortness of breath. TECHNIQUE:  Portable, AP view. COMPARISON:  05/21/2016 ____________________ FINDINGS:  SUPPORT DEVICES: None. Interval removal of left arm PICC. HEART AND MEDIASTINUM: Cardiomediastinal silhouette appears within normal limits. Normal caliber thoracic aorta. LUNGS AND PLEURAL SPACES: Lungs are well expanded. Peribronchial cuffing noted within the central airways. No focal consolidation, pulmonary edema, pneumothorax or significant pleural effusion. BONY THORAX AND SOFT TISSUES: No acute osseous abnormality. ____________________     IMPRESSION:  Peribronchial cuffing may represent sequela of bronchitis. No focal consolidation. See accompanying CT report.        Medications Reviewed:  Current Facility-Administered Medications   Medication Dose Route Frequency    gabapentin (NEURONTIN) capsule 200 mg  200 mg Oral TID    montelukast (SINGULAIR) tablet 10 mg  10 mg Oral QHS    multivitamin (ONE A DAY) tablet 1 Tab  1 Tab Oral DAILY    rOPINIRole (REQUIP) tablet 0.5 mg  0.5 mg Oral QHS    sodium chloride (NS) flush 5-40 mL  5-40 mL IntraVENous Q8H    sodium chloride (NS) flush 5-40 mL  5-40 mL IntraVENous PRN    acetaminophen (TYLENOL) tablet 650 mg  650 mg Oral Q6H PRN    Or    acetaminophen (TYLENOL) suppository 650 mg  650 mg Rectal Q6H PRN    polyethylene glycol (MIRALAX) packet 17 g  17 g Oral DAILY PRN    promethazine (PHENERGAN) tablet 12.5 mg  12.5 mg Oral Q6H PRN    Or    ondansetron (ZOFRAN) injection 4 mg  4 mg IntraVENous Q6H PRN    0.9% sodium chloride infusion  75 mL/hr IntraVENous CONTINUOUS    pantoprazole (PROTONIX) 40 mg in 0.9% sodium chloride 10 mL injection  40 mg IntraVENous Q12H    benzonatate (TESSALON) capsule 100 mg  100 mg Oral TID PRN    azithromycin (ZITHROMAX) 500 mg in 0.9% sodium chloride 250 mL IVPB  500 mg IntraVENous Q24H    cefTRIAXone (ROCEPHIN) 1 g in 0.9% sodium chloride (MBP/ADV) 50 mL MBP  1 g IntraVENous Q24H    ferrous gluconate 324 mg (38 mg iron) tablet 1 Tab  1 Tab Oral DAILY    arformoteroL (BROVANA) neb solution 15 mcg  15 mcg Nebulization BID RT    And    budesonide (PULMICORT) 500 mcg/2 ml nebulizer suspension  500 mcg Nebulization BID RT           Cecilia Celestin DO  Internal Medicine, Hospitalist  Pager: [phone]  [address] [place] [address]

## 2020-10-02 NOTE — ED PROVIDER NOTES
[facility] EMERGENCY DEPT      8:32 PM    Date: 10/1/2020  Patient Name: Austin Salazar    History of Presenting Illness     Chief Complaint   Patient presents with    Shortness of Breath       66 y.o. female with noted past medical history who presents to the emergency department with a chronic cough for several months but now having blood-tinged sputum. Patient states she had a chronic cough for several months and she states that secondary to her asthma. She states she uses homeopathic medicine to help it but has not been prescribed medicine by her primary care doctor. She was otherwise doing fine today when starting some intermittent blood-tinged sputum cough that she said was several x1 today because that she came to the ER for evaluation and treatment. She denies any coagulation disorders, being on any anticoagulant medications, or heavy alcohol use. No other complaints. The patient denies recent travel outside [facility] and denies recent travel to areas large social gatherings. The patient denies any known history of Covid 19 exposure. Patient denies any other associated signs or symptoms. Patient denies any other complaints. Nursing notes regarding the HPI and triage nursing notes were reviewed. Prior medical records were reviewed. Current Outpatient Medications   Medication Sig Dispense Refill    benzonatate (Tessalon Perles) 100 mg capsule Take 1 Cap by mouth three (3) times daily as needed for Cough for up to 10 days. 30 Cap 0    gabapentin (NEURONTIN) 100 mg capsule TAKE 2 CAPSULES BY MOUTH THREE TIMES DAILY 540 Cap 0    rOPINIRole (REQUIP) 0.5 mg tablet TAKE 1 TABLET BY MOUTH EVERY NIGHT 90 Tab 1    ferrous gluconate 324 mg (38 mg iron) tablet Take 1 Tab by mouth daily. TAKE 1 TABLET BY MOUTH EVERY MONDAY, WEDNESDAY, AND SUNDAY 90 Tab 1    loratadine (CLARITIN) 10 mg tablet Take 1 Tab by mouth daily.  90 Tab 1    montelukast (SINGULAIR) 10 mg tablet TAKE 1 TABLET BY MOUTH DAILY 90 Tab 1    albuterol (PROVENTIL HFA, VENTOLIN HFA, PROAIR HFA) 90 mcg/actuation inhaler Take 1 Puff by inhalation every four (4) hours as needed (reactive airway symptoms including cough). 1 Inhaler 0    multivitamin (ONE A DAY) tablet Take 1 Tab by mouth daily.  calcium-cholecalciferol, d3, (CALCIUM 600 + D) 600-125 mg-unit tab Take 2 Tabs by mouth.  cholecalciferol (VITAMIN D3) (5000 Units/125 mcg) tab tablet Take  by mouth daily.  b complex vitamins tablet Take 1 Tab by mouth daily.  latanoprost (XALATAN) 0.005 % ophthalmic solution Administer 2 Drops to both eyes nightly. Past History     Past Medical History:  Past Medical History:   Diagnosis Date    Anemia 03/2016    in hospital - [facility] Arthritis     in \[facility] [place] [facility]"" - [facility]    Asthma Sept. 2018    Asthma Dr. Kendra Fritz    Bladder stones     Colon polyps 1-22-20    From colonoscopy     DVT (deep venous thrombosis) ([address]     S/P IVC filter 2016. In setting of paraplagia from Spinal abcess    Environmental allergies     Female genital prolapse     Glaucoma 2017    SgTcyzj[id number]    Hypertension October 2018    Dr. Garo Padron diagnosis    Hypocontractile bladder     Ill-defined condition     Leak in valve. Followed by Dr. Cortez Goel Kidney stone 7-2019    KidneySXiang kidney-NoActionTaken    LBBB (left bundle branch block)     Leaky heart valve     Osteopenia     Small right kidney     Spinal abscess ([address] 03/2016    Caused paraplegia. patient ambulatory Summer 2018    [facility]     Umbilical hernia     Urine retention     Uterus prolapse     grade 5. Surgically corrected 7/2018    UTI (urinary tract infection) 9494-1821    Many UTIs-Had IndwellingCatheter for 2 plus yrs.     Vaginal candida 03/16/2016    Voiding dysfunction        Past Surgical History:  Past Surgical History:   Procedure Laterality Date    COLONOSCOPY N/A 2020    COLONOSCOPY performed by Nilam Cruz MD at [address]      x 4 after having miscarriages.  HX GYN  2018    CYSTOURETHROSCOPY; POSTERIOR COLPORRHAPHY, CYSTOCELE REPAIR; COLPOPEXY VAGINAL INTRA-PERITONEAL APPROACH;    HX TONSILLECTOMY      HX VITRECTOMY Left 2017    VASCULAR SURGERY PROCEDURE UNLIST  2016    IVC filter. Family History:  Family History   Problem Relation Age of Onset    Cancer Mother         Stomach Cancer-     Heart Disease Mother         [email or web address]    Cancer Father         Stomach Cancer-     Asthma Father        Social History:  Social History     Tobacco Use    Smoking status: Former Smoker     Packs/day: 1.00     Years: 8.00     Pack years: 8.00     Types: Cigarettes     Last attempt to quit: 4/15/1970     Years since quittin.4    Smokeless tobacco: Never Used    Tobacco comment: no comments   Substance Use Topics    Alcohol use: No     Alcohol/week: 0.0 standard drinks     Frequency: Never    Drug use: Never       Allergies: Allergies   Allergen Reactions    Milk Containing Products Other (comments)     Mucous    Bactrim [Sulfamethoprim] Nausea Only and Other (comments)     Headache, Joint pain, loss of appetite     Mold Other (comments)    Pollen Extracts Other (comments)    Sulfamethoxazole-Trimethoprim Other (comments)       Patient's primary care provider (as noted in EPIC):  Diogenes Bustillo NP    Review of Systems   Constitutional: Negative for diaphoresis. HENT: Negative for congestion. Eyes: Negative for discharge. Respiratory: Positive for cough. Negative for shortness of breath and stridor. Cardiovascular: Negative for chest pain, palpitations and leg swelling. Gastrointestinal: Negative for diarrhea. Genitourinary: Negative for flank pain.    Musculoskeletal: Negative for back pain. Neurological: Negative for weakness. Psychiatric/Behavioral: Negative for hallucinations. All other systems reviewed and are negative. Visit Vitals  /69   Pulse (!) 116   Temp 98.2 °F (36.8 °C)   Resp 20   Ht 5' (1.524 m)   Wt 56.7 kg (125 lb)   SpO2 97%   BMI 24.41 kg/m²       PHYSICAL EXAM:    CONSTITUTIONAL:  Alert, in no apparent distress;  well developed;  well nourished. HEAD:  Normocephalic, atraumatic. EYES:  EOMI. Non-icteric sclera. Normal conjunctiva. ENTM:  Nose:  no rhinorrhea. Throat:  no erythema or exudate, mucous membranes moist.  NECK:  No JVD. Supple    RESPIRATORY:  Chest clear, equal breath sounds, good air movement. CARDIOVASCULAR:  Regular rate and rhythm. No murmurs, rubs, or gallops. GI:  Normal bowel sounds, abdomen soft and non-tender. No rebound or guarding. BACK:  Non-tender. UPPER EXT:  Normal inspection. LOWER EXT:  No edema, no calf tenderness. Distal pulses intact. NEURO:  Moves all four extremities, and grossly normal motor exam.  SKIN:  No rashes;  Normal for age. PSYCH:  Alert and normal affect. DIFFERENTIAL DIAGNOSES/ MEDICAL DECISION MAKING:  Cough etiologies include viral upper respiratory infection, acute bronchitis, influenza/ flu, pneumonia, new onset asthma, congestive heart failure, other etiologies versus a combination of the above (ex. uri on top of pneumonia). Diagnostic Study Results     Abnormal lab results from this emergency department encounter:  Labs Reviewed   CBC WITH AUTOMATED DIFF - Abnormal; Notable for the following components:       Result Value    WBC 3.8 (*)     RBC 4.01 (*)     MPV 12.7 (*)     LYMPHOCYTES 16 (*)     MONOCYTES 12 (*)     ABS.  LYMPHOCYTES 0.6 (*)     All other components within normal limits   METABOLIC PANEL, BASIC - Abnormal; Notable for the following components:    Glucose 139 (*)     BUN 26 (*)     BUN/Creatinine ratio 22 (*)     GFR est AA 55 (*)     GFR est non-AA 45 (*)     All other components within normal limits   PTT - Abnormal; Notable for the following components:    aPTT 37.1 (*)     All other components within normal limits   MAGNESIUM   PROTHROMBIN TIME + INR   NOVEL CORONAVIRUS (COVID-19)       Lab values for this patient within approximately the last 12 hours:  Recent Results (from the past 12 hour(s))   EKG, 12 LEAD, INITIAL    Collection Time: 10/01/20  8:28 PM   Result Value Ref Range    Ventricular Rate 109 BPM    QRS Duration 116 ms    Q-T Interval 346 ms    QTC Calculation (Bezet) 465 ms    Calculated R Axis -18 degrees    Calculated T Axis 116 degrees    Diagnosis       Accelerated Junctional rhythm  Incomplete left bundle branch block  Minimal voltage criteria for LVH, may be normal variant ( Chay product )  Marked ST abnormality, possible inferolateral subendocardial injury  Abnormal ECG  When compared with ECG of 22-JAN-2020 10:54,  Significant changes have occurred     CBC WITH AUTOMATED DIFF    Collection Time: 10/01/20  8:41 PM   Result Value Ref Range    WBC 3.8 (L) 4.6 - 13.2 K/uL    RBC 4.01 (L) 4.20 - 5.30 M/uL    HGB 12.6 12.0 - 16.0 g/dL    HCT 37.7 35.0 - 45.0 %    MCV 94.0 74.0 - 97.0 FL    MCH 31.4 24.0 - 34.0 PG    MCHC 33.4 31.0 - 37.0 g/dL    RDW 12.8 11.6 - 14.5 %    PLATELET 509 017 - 589 K/uL    MPV 12.7 (H) 9.2 - 11.8 FL    NEUTROPHILS 71 40 - 73 %    LYMPHOCYTES 16 (L) 21 - 52 %    MONOCYTES 12 (H) 3 - 10 %    EOSINOPHILS 1 0 - 5 %    BASOPHILS 0 0 - 2 %    ABS. NEUTROPHILS 2.8 1.8 - 8.0 K/UL    ABS. LYMPHOCYTES 0.6 (L) 0.9 - 3.6 K/UL    ABS. MONOCYTES 0.4 0.05 - 1.2 K/UL    ABS. EOSINOPHILS 0.0 0.0 - 0.4 K/UL    ABS.  BASOPHILS 0.0 0.0 - 0.1 K/UL    DF AUTOMATED     METABOLIC PANEL, BASIC    Collection Time: 10/01/20  8:41 PM   Result Value Ref Range    Sodium 136 136 - 145 mmol/L    Potassium 4.0 3.5 - 5.5 mmol/L    Chloride 101 100 - 111 mmol/L    CO2 27 21 - 32 mmol/L    Anion gap 8 3.0 - 18 mmol/L    Glucose 139 (H) 74 - 99 mg/dL    BUN 26 (H) 7.0 - 18 MG/DL    Creatinine 1.16 0.6 - 1.3 MG/DL    BUN/Creatinine ratio 22 (H) 12 - 20      GFR est AA 55 (L) >60 ml/min/1.73m2    GFR est non-AA 45 (L) >60 ml/min/1.73m2    Calcium 10.1 8.5 - 10.1 MG/DL   MAGNESIUM    Collection Time: 10/01/20  8:41 PM   Result Value Ref Range    Magnesium 2.2 1.6 - 2.6 mg/dL   PROTHROMBIN TIME + INR    Collection Time: 10/01/20  8:41 PM   Result Value Ref Range    Prothrombin time 13.2 11.5 - 15.2 sec    INR 1.0 0.8 - 1.2     PTT    Collection Time: 10/01/20  8:41 PM   Result Value Ref Range    aPTT 37.1 (H) 23.0 - 36.4 SEC       Radiologist and cardiologist interpretations if available at time of this note:  No results found. Portable (A-P view) CXR:  Preliminary review of x-rays by ED Physician. Interpretation of chest X-ray shows, no infiltrates, no pneumothorax, no CHF, no effusion. Medication(s) ordered for patient during this emergency visit encounter:  Medications - No data to display    Medical Decision Making     I am the first provider for this patient. I reviewed the vital signs, available nursing notes, past medical history, past surgical history, family history and social history. Vital Signs:  Reviewed the patient's vital signs. 9:38 PM  On serial reexamination the patient continues to have blood that he she is actually coughing up and this is consistent with hemoptysis. Given the persistent hemoptysis despite not being a lot, will admit to the hospitalist.      Admit to Hospitalist    The patient was presented to the accepting hospitalist, Dr. Jeffrey Morrison. The patient's primary doctor is Ruddy Tidwell NP, and admissions for this physician are with the hospitalist.  If the patient has no primary doctor, then admission is to the hospitalist as well. As the emergency physician, I wrote courtesy admission orders for the hospitalist physician.   The courtesy orders included explicit instructions for the floor nursing staff to call the admitting attending physician upon patient arrival on the floor. Critical Care Note: Hemoptysis    Critical care minutes: 36 MINUTES. Given patient's initial arrival presentation with gi bleed, numerous serial reevaluations in setting of patient with GI bleed and patient's response to various medical interventions. Given the patients underlying condition medical intervention(s) were needed, requiring numerous reevaluations of patient's vital signs and response to different emergency department therapies, total bedside time evaluating and/or treating the patient, not including procedures, is noted below. Dictation disclaimer:  Please note that this dictation was completed with [employer], the [employer] voice recognition software. Quite often unanticipated grammatical, syntax, homophones, and other interpretive errors are inadvertently transcribed by the computer software. Please disregard these errors. Please excuse any errors that have escaped final proofreading. Coding Diagnoses     Clinical Impression:   1. Hemoptysis    2. Productive cough        Disposition     Disposition: Discharge. OBINNA Gallegos Board Certified Emergency Physician    Provider Attestation:  If a scribe was utilized in generation of this patient record, I personally performed the services described in the documentation, reviewed the documentation, as recorded by the scribe in my presence, and it accurately records the patient's history of presenting illness, review of systems, patient physical examination, and procedures performed by me as the attending physician. OBINNA Gallegos Board Certified Emergency Physician  10/1/2020.  8:32 PM

## 2020-10-02 NOTE — ROUTINE PROCESS
TRANSFER - IN REPORT: 
 
Verbal report received from [address] [place] [address] RN(name) on Emmanuel Stoner  being received from ED(unit) for routine progression of care Report consisted of patients Situation, Background, Assessment and  
Recommendations(SBAR). Information from the following report(s) SBAR, Kardex, Intake/Output, MAR, Recent Results and Cardiac Rhythm Sinus Tach was reviewed with the receiving nurse. Opportunity for questions and clarification was provided. Assessment completed upon patients arrival to unit and care assumed. 0200> Patient arrived in the unit per stretcher accompanied by RN. Patient is alert and oriented x 4, denies any pain. She has positive cough but no hemoptysis noted. Kept HOB elevated as patient requested. VSS 
 
PIV inserted at this time. Tolerated well. Stress incontinence noted. Purewick applied. Explained procedure to patient, verbalized understanding. 0400> VSS, no signs of hemoptysis at this time. Morning labs completed. 0725> Bedside and Verbal shift change report given to Darcy Higgins RN (oncoming nurse) by Kathleen Pearce RN (offgoing nurse). Report included the following information SBAR, Kardex, Intake/Output, MAR, Recent Results and Cardiac Rhythm NSR.

## 2020-10-02 NOTE — DISCHARGE INSTRUCTIONS
SPECIFIC PATIENT INSTRUCTIONS FROM THE PHYSICIAN WHO TREATED YOU IN THE ER TODAY:  1. Return if any concerns or worsening of condition(s)  2. Tessalon perles as prescribed for cough. 3. Over the counter Tylenol for aches and pains and if fever develops. 4. FOLLOW UP APPOINTMENT:  Your primary doctor if still having symptoms after a total of 7-10 days. Patient Education        Cough: Care Instructions  Your Care Instructions     A cough is your body's response to something that bothers your throat or airways. Many things can cause a cough. You might cough because of a cold or the flu, bronchitis, or asthma. Smoking, postnasal drip, allergies, and stomach acid that backs up into your throat also can cause coughs. A cough is a symptom, not a disease. Most coughs stop when the cause, such as a cold, goes away. You can take a few steps at home to cough less and feel better. Follow-up care is a key part of your treatment and safety. Be sure to make and go to all appointments, and call your doctor if you are having problems. It's also a good idea to know your test results and keep a list of the medicines you take. How can you care for yourself at home? · Drink lots of water and other fluids. This helps thin the mucus and soothes a dry or sore throat. Honey or lemon juice in hot water or tea may ease a dry cough. · Take cough medicine as directed by your doctor. · Prop up your head on pillows to help you breathe and ease a dry cough. · Try cough drops to soothe a dry or sore throat. Cough drops don't stop a cough. Medicine-flavored cough drops are no better than candy-flavored drops or hard candy. · Do not smoke. Avoid secondhand smoke. If you need help quitting, talk to your doctor about stop-smoking programs and medicines. These can increase your chances of quitting for good. When should you call for help? Call 911 anytime you think you may need emergency care.  For example, call if:    · You have severe trouble breathing. Call your doctor now or seek immediate medical care if:    · You cough up blood.     · You have new or worse trouble breathing.     · You have a new or higher fever.     · You have a new rash. Watch closely for changes in your health, and be sure to contact your doctor if:    · You cough more deeply or more often, especially if you notice more mucus or a change in the color of your mucus.     · You have new symptoms, such as a sore throat, an earache, or sinus pain.     · You do not get better as expected. Where can you learn more? Go to http://www.gray.com/  Enter D279 in the search box to learn more about \"Cough: Care Instructions. \"  Current as of: February 24, 2020               Content Version: 12.6  © 2838-6024 [employer]. Care instructions adapted under license by [employer] (which disclaims liability or warranty for this information). If you have questions about a medical condition or this instruction, always ask your healthcare professional. [place] [address] any warranty or liability for your use of this information. Patient Education        Coronavirus (OXMSO-93): Care Instructions  Overview  The coronavirus disease (COVID-19) is caused by a virus. Symptoms may include a fever, a cough, and shortness of breath. It mainly spreads person-to-person through droplets from coughing and sneezing. The virus also can spread when people are in close contact with someone who is infected. Most people have mild symptoms and can take care of themselves at home. If their symptoms get worse, they may need care in a hospital. Treatment may include medicines to reduce symptoms, plus breathing support such as oxygen therapy or a ventilator. It's important to not spread the virus to others. If you have COVID-19, wear a face cover anytime you are around other people. You need to isolate yourself while you are sick. Leave your home only if you need to get medical care or testing. Follow-up care is a key part of your treatment and safety. Be sure to make and go to all appointments, and call your doctor if you are having problems. It's also a good idea to know your test results and keep a list of the medicines you take. How can you care for yourself at home? · Get extra rest. It can help you feel better. · Drink plenty of fluids. This helps replace fluids lost from fever. Fluids also help ease a scratchy throat. Water, soup, fruit juice, and hot tea with lemon are good choices. · Take acetaminophen (such as Tylenol) to reduce a fever. It may also help with muscle aches. Read and follow all instructions on the label. · Use petroleum jelly on sore skin. This can help if the skin around your nose and lips becomes sore from rubbing a lot with tissues. Tips for self-isolation  · Limit contact with people in your home. If possible, stay in a separate bedroom and use a separate bathroom. · Wear a cloth face cover when you are around other people. It can help stop the spread of the virus when you cough or sneeze. · If you have to leave home, avoid crowds and try to stay at least 6 feet away from other people. · Avoid contact with pets and other animals. · Cover your mouth and nose with a tissue when you cough or sneeze. Then throw it in the trash right away. · Wash your hands often, especially after you cough or sneeze. Use soap and water, and scrub for at least 20 seconds. If soap and water aren't available, use an alcohol-based hand . · Don't share personal household items. These include bedding, towels, cups and glasses, and eating utensils. · [address] in the warmest water allowed for the fabric type, and dry it completely. It's okay to wash other people's laundry with yours. · Clean and disinfect your home every day. Use household  and disinfectant wipes or sprays.  Take special care to clean things that you grab with your hands. These include doorknobs, remote controls, phones, and handles on your refrigerator and microwave. And don't forget countertops, tabletops, bathrooms, and computer keyboards. When you can end self-isolation  · If you know or suspect that you have COVID-19, stay in self-isolation until:  ? You haven't had a fever for 3 days, and  ? Your symptoms have improved, and  ? It's been at least 10 days since your symptoms started. · Talk to your doctor about whether you also need testing, especially if you have a weakened immune system. When should you call for help? Call 911 anytime you think you may need emergency care. For example, call if you have life-threatening symptoms, such as:    · You have severe trouble breathing. (You can't talk at all.)     · You have constant chest pain or pressure.     · You are severely dizzy or lightheaded.     · You are confused or can't think clearly.     · Your face and lips have a blue color.     · You pass out (lose consciousness) or are very hard to wake up. Call your doctor now or seek immediate medical care if:    · You have moderate trouble breathing. (You can't speak a full sentence.)     · You are coughing up blood (more than about 1 teaspoon).     · You have signs of low blood pressure. These include feeling lightheaded; being too weak to stand; and having cold, pale, clammy skin. Watch closely for changes in your health, and be sure to contact your doctor if:    · Your symptoms get worse.     · You are not getting better as expected. Call before you go to the doctor's office. Follow their instructions. And wear a cloth face cover. Current as of: July 10, 2020               Content Version: 12.6  © 4587-2694 [employer]. Care instructions adapted under license by [employer] (which disclaims liability or warranty for this information).  If you have questions about a medical condition or this instruction, always ask your healthcare professional. [place] [address] any warranty or liability for your use of this information. [employer] Activation    Thank you for requesting access to [employer]. Please follow the instructions below to securely access and download your online medical record. [employer] allows you to send messages to your doctor, view your test results, renew your prescriptions, schedule appointments, and more. How Do I Sign Up? In your internet browser, go to [email or web address]. "[employer]"/[email or web address]. Click on the First Time User? Click Here link in the Sign In box. You will see the New Member Sign Up page. Enter your [employer] Access Code exactly as it appears below. You will not need to use this code after you´ve completed the sign-up process. If you do not sign up before the expiration date, you must request a new code. [employer] Access Code: [email or web address]  Expires: 3/28/2019  2:27 PM (This is the date your [employer] access code will )    Enter the last four digits of your Social Security Number (xxxx) and Date of Birth (mm/dd/yyyy) as indicated and click Submit. You will be taken to the next sign-up page. Create a [employer] ID. This will be your [employer] login ID and cannot be changed, so think of one that is secure and easy to remember. Create a [employer] password. You can change your password at any time. Enter your Password Reset Question and Answer. This can be used at a later time if you forget your password. Enter your e-mail address. You will receive e-mail notification when new information is available in [address] Click Sign Up. You can now view and download portions of your medical record. Click the [employer] [place] [employer] [place] link to download a portable copy of your medical information. Additional Information    If you have questions, please visit the Frequently Asked Questions section of the [employer] website at [email or web address]. "[email or web address]"/[email or web address]/. Remember, [employer] is NOT to be used for urgent needs. For medical emergencies, dial 911.

## 2020-10-02 NOTE — PROGRESS NOTES
Problem: Discharge Planning  Goal: *Discharge to safe environment  Outcome: Progressing Towards Goal   Plan home    Reason for Admission:   hemoptysis                   RUR Score:          10%           Plan for utilizing home health: possible         PCP: First and Last name:  Dixie Maxwell   Name of Practice:    Are you a current patient: Yes/No: yes   Approximate date of last visit: feb 2020   Can you participate in a virtual visit with your PCP: no                    Current Advanced Directive/Advance Care Plan: on file, names sister Chioma Mendieta as agent                         Transition of Care Plan:   Spoke with pt over phone, as on covid isolation. Lives with sister. Independent with adls. amb with a walker. Demographics correct. No cell phone. Dme: walker   Has not used hh in past but has been to snf in 2016  She  States she does not feel like needs any services. Plan home. Cm to follow for needs. Patient has designated ____sister____________________ to participate in his/her discharge plan and to receive any needed information. Name: Chioma Mendieta   Address:  Phone number: [phone] c [phone]    Care Management Interventions  PCP Verified by CM: Yes  Palliative Care Criteria Met (RRAT>21 & CHF Dx)?: No  Transition of Care Consult (CM Consult):  Other  Discharge Durable Medical Equipment: No  Physical Therapy Consult: Yes  Occupational Therapy Consult: Yes  Speech Therapy Consult: No  Current Support Network: Relative's Home  Confirm Follow Up Transport: Family  The Patient and/or Patient Representative was Provided with a Choice of Provider and Agrees with the Discharge Plan?: No  Freedom of Choice List was Provided with Basic Dialogue that Supports the Patient's Individualized Plan of Care/Goals, Treatment Preferences and Shares the Quality Data Associated with the Providers?: No  Discharge Location  Discharge Placement: Home

## 2020-10-02 NOTE — PROGRESS NOTES
Problem: Falls - Risk of  Goal: *Absence of Falls  Description: Document Moreno Lyn Fall Risk and appropriate interventions in the flowsheet. Outcome: Progressing Towards Goal  Note: Fall Risk Interventions:  Mobility Interventions: Patient to call before getting OOB, Strengthening exercises (ROM-active/passive), Utilize walker, cane, or other assistive device         Medication Interventions: Assess postural VS orthostatic hypotension, Evaluate medications/consider consulting pharmacy, Teach patient to arise slowly, Patient to call before getting OOB    Elimination Interventions: Call light in reach, Patient to call for help with toileting needs, Stay With Me (per policy), Toileting schedule/hourly rounds, Toilet paper/wipes in reach              Problem: Pressure Injury - Risk of  Goal: *Prevention of pressure injury  Description: Document Vidal Scale and appropriate interventions in the flowsheet.   Outcome: Progressing Towards Goal  Note: Pressure Injury Interventions:       Moisture Interventions: Absorbent underpads, Apply protective barrier, creams and emollients, Check for incontinence Q2 hours and as needed, Internal/External urinary devices    Activity Interventions: Increase time out of bed, Pressure redistribution bed/mattress(bed type)    Mobility Interventions: HOB 30 degrees or less, Pressure redistribution bed/mattress (bed type)    Nutrition Interventions: Document food/fluid/supplement intake

## 2020-10-02 NOTE — H&P
Medicine History and Physical    Patient: Natalee Hernandez   Age:  66 y.o. Assessment   Principal Problem:    Hemoptysis (10/1/2020)    Active Problems:    Tachycardia (10/1/2020)          Plan     Hemoptysis   - xray looks ok, CTA final read pending- wet read--- acute bronchitis cannot r/o atypical pna/MAC   - if no improvement could consider GI course   - PPI BID    - NPO for now   - cbc in am   - monitor 02 sats   - rocephin, azithro    Tachycardia   - likely from active medical problem above + anxiety   - monitor tele      DISPO  -Pt to be admitted  at this time for reasons addressed above, continued hospitalization for ongoing assessment and treatment indicated     Anticipated Date of Discharge: 2-3 days  Anticipated Disposition (home, SNF) : home    Chief Complaint:   Chief Complaint   Patient presents with    Shortness of Breath         HPI:   Natalee Hernandez is a 66y.o. year old female who presents with  1 day of coughing up blood. Has had some cough in recent weeks but hemoptysis just started today. She states dark red with some small clots. She denies clotting/bleeding hx, no hx of lung cancer or copd. She denies taking nsaids, alcohol consumption, no hx of GI bleed. She continues to cough up this blood in ED, Hb is ok. Patient does state the blood seems to occur more when she tries to eat/drink something      Review of Systems - positive responses in bold type   Constitutional: Negative for fever, chills, diaphoresis and unexpected weight change. HENT: Negative for ear pain, congestion, sore throat, rhinorrhea, drooling, trouble swallowing, neck pain and tinnitus. Eyes: Negative for photophobia, pain, redness and visual disturbance. Respiratory: negative for shortness of breath, cough, choking, chest tightness, wheezing or stridor. + hemoptysis  Cardiovascular: Negative for chest pain, palpitations and leg swelling.    Gastrointestinal: Negative for nausea, vomiting, abdominal pain, diarrhea, constipation, blood in stool, abdominal distention and anal bleeding. Genitourinary: Negative for dysuria, urgency, frequency, hematuria, flank pain and difficulty urinating. Musculoskeletal: Negative for back pain and arthralgias. Skin: Negative for color change, rash and wound. Neurological: Negative for dizziness, seizures, syncope, speech difficulty, light-headedness or headaches. Hematological: Does not bruise/bleed easily. Psychiatric/Behavioral: Negative for suicidal ideas, hallucinations, behavioral problems, self-injury or agitation       Past Medical History:  Past Medical History:   Diagnosis Date    Anemia 03/2016    in hospital - [address]    Asthma Sept. 2018    Asthma Dr. Michael Sheppard Bladder stones     Colon polyps 1-22-20    From colonoscopy     DVT (deep venous thrombosis) ([facility] [address]     S/P IVC filter 2016. In setting of paraplagia from Spinal abcess    Environmental allergies     Female genital prolapse     Glaucoma 2017    RvCqxcntpdAmvnmqnozvwaeLzPulwvujuCgdOkarztfyUgtahEqqve7IIWG    Hypertension October 2018    Dr. Merlyn Ramos diagnosis    Hypocontractile bladder     Ill-defined condition     Leak in valve. Followed by Dr. 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 patient ambulatory Summer 2018    SVT (supraventricular tachycardia) (HCC)     Umbilical hernia     Urine retention     Uterus prolapse     grade 5. Surgically corrected 7/2018    UTI (urinary tract infection) 7900-9765    Many UTIs-Had IndwellingCatheter for 2 plus yrs.     Vaginal candida 03/16/2016    Voiding dysfunction        Past Surgical History:  Past Surgical History:   Procedure Laterality Date    COLONOSCOPY N/A 1/22/2020 COLONOSCOPY performed by Natacha Treviño MD at [address]      x 4 after having miscarriages.  HX GYN  2018    CYSTOURETHROSCOPY; POSTERIOR COLPORRHAPHY, CYSTOCELE REPAIR; COLPOPEXY VAGINAL INTRA-PERITONEAL APPROACH;    HX TONSILLECTOMY      HX VITRECTOMY Left 2017    VASCULAR SURGERY PROCEDURE UNLIST  2016    IVC filter. Family History:  Family History   Problem Relation Age of Onset    Cancer Mother         Stomach Cancer-     Heart Disease Mother         [email or web address]    Cancer Father         Stomach Cancer-     Asthma Father        Social History:  Social History     Socioeconomic History    Marital status:      Spouse name: Not on file    Number of children: Not on file    Years of education: Not on file    Highest education level: Not on file   Tobacco Use    Smoking status: Former Smoker     Packs/day: 1.00     Years: 8.00     Pack years: 8.00     Types: Cigarettes     Last attempt to quit: 4/15/1970     Years since quittin.4    Smokeless tobacco: Never Used    Tobacco comment: no comments   Substance and Sexual Activity    Alcohol use: No     Alcohol/week: 0.0 standard drinks     Frequency: Never    Drug use: Never    Sexual activity: Not Currently       Home Medications:  Prior to Admission medications    Medication Sig Start Date End Date Taking? Authorizing Provider   benzonatate (Tessalon Perles) 100 mg capsule Take 1 Cap by mouth three (3) times daily as needed for Cough for up to 10 days. 10/1/20 10/11/20 Yes Jerson Rosales MD   gabapentin (NEURONTIN) 100 mg capsule TAKE 2 CAPSULES BY MOUTH THREE TIMES DAILY 8/10/20   Africa Bell MD   rOPINIRole (REQUIP) 0.5 mg tablet TAKE 1 TABLET BY MOUTH EVERY NIGHT 20   Mile Moss MD   ferrous gluconate 324 mg (38 mg iron) tablet Take 1 Tab by mouth daily.  TAKE 1 TABLET BY MOUTH EVERY MONDAY, WEDNESDAY, AND  7/27/20   Anselmo Ortez MD   loratadine (CLARITIN) 10 mg tablet Take 1 Tab by mouth daily. 7/27/20   Anselmo Ortez MD   montelukast (SINGULAIR) 10 mg tablet TAKE 1 TABLET BY MOUTH DAILY 7/27/20   Anselmo Ortez MD   albuterol (PROVENTIL HFA, VENTOLIN HFA, PROAIR HFA) 90 mcg/actuation inhaler Take 1 Puff by inhalation every four (4) hours as needed (reactive airway symptoms including cough). 4/2/20   Xiao Garcia NP   multivitamin (ONE A DAY) tablet Take 1 Tab by mouth daily. Provider, Historical   calcium-cholecalciferol, d3, (CALCIUM 600 + D) 600-125 mg-unit tab Take 2 Tabs by mouth. Provider, Historical   cholecalciferol (VITAMIN D3) (5000 Units/125 mcg) tab tablet Take  by mouth daily. Provider, Historical   b complex vitamins tablet Take 1 Tab by mouth daily. Provider, Historical   latanoprost (XALATAN) 0.005 % ophthalmic solution Administer 2 Drops to both eyes nightly. Provider, Historical       Allergies: Allergies   Allergen Reactions    Milk Containing Products Other (comments)     Mucous    Bactrim [Sulfamethoprim] Nausea Only and Other (comments)     Headache, Joint pain, loss of appetite     Mold Other (comments)    Pollen Extracts Other (comments)    Sulfamethoxazole-Trimethoprim Other (comments)           Physical Exam:     Visit Vitals  /69   Pulse (!) 116   Temp 98.2 °F (36.8 °C)   Resp 20   Ht 5' (1.524 m)   Wt 56.7 kg (125 lb)   SpO2 97%   BMI 24.41 kg/m²       Physical Exam  General appearance: alert, cooperative, no distress, appears stated age  Head: Normocephalic, without obvious abnormality, atraumatic  Neck: supple, trachea midline  Lungs: clear to auscultation bilaterally  Heart: mild sinus tachy  Abdomen: soft, non-tender.  Bowel sounds normal. No masses,  no organomegaly  Extremities: extremities normal, atraumatic, no cyanosis or edema  Skin: Skin color, texture, turgor normal. No rashes or lesions  Neurologic: Grossly normal    Intake and Output:  Current Shift:  No intake/output data recorded. Last three shifts:  No intake/output data recorded.     Lab/Data Reviewed:  CMP:   Lab Results   Component Value Date/Time     10/01/2020 08:41 PM    K 4.0 10/01/2020 08:41 PM     10/01/2020 08:41 PM    CO2 27 10/01/2020 08:41 PM    AGAP 8 10/01/2020 08:41 PM     (H) 10/01/2020 08:41 PM    BUN 26 (H) 10/01/2020 08:41 PM    CREA 1.16 10/01/2020 08:41 PM    GFRAA 55 (L) 10/01/2020 08:41 PM    GFRNA 45 (L) 10/01/2020 08:41 PM    CA 10.1 10/01/2020 08:41 PM    MG 2.2 10/01/2020 08:41 PM     CBC:   Lab Results   Component Value Date/Time    WBC 3.8 (L) 10/01/2020 08:41 PM    HGB 12.6 10/01/2020 08:41 PM    HCT 37.7 10/01/2020 08:41 PM     10/01/2020 08:41 PM     All Cardiac Markers in the last 24 hours: No results found for: CPK, CK, CKMMB, CKMB, RCK3, CKMBT, CKNDX, CKND1, SHANITA, TROPT, TROIQ, COREY, TROPT, TNIPOC, BNP, BNPP    Chest X-Ray is obtained; CXR reviewed independently -appears WNL, read pending    CTA pending    Sravanthi James MD    October 1, 2020

## 2020-10-02 NOTE — PROGRESS NOTES
6083-  Bedside and Verbal shift change report given to Radha Marley RN (oncoming nurse) by Tim Ahumada RN (offgoing nurse). Report included the following information SBAR, Kardex, Intake/Output, MAR and Recent Results. 1030-  Patient asking for something to ease her cough. Dr. Cory Cogan notified and Ahsan Vasquez ordered. Queta Dick given to patient. 1530-  Patient sleeping.

## 2020-10-02 NOTE — ED NOTES
TRANSFER - ED to INPATIENT REPORT:    Verbal report given on Terre Cogan  being transferred to 2700(unit) for routine progression of care       Report consisted of patients Situation, Background, Assessment and   Recommendations(SBAR). SBAR report made available to receiving floor on this patient being transferred to  [phone] /2800)  for routine progression of care       Admitting diagnosis Acute bronchitis [J20.9]    Information from the following report(s) SBAR, ED Summary, MAR and Recent Results was made available to receiving floor. Lines:   Peripheral IV 10/01/20 Left;Upper Arm (Active)   Site Assessment Clean, dry, & intact 10/01/20 2050   Phlebitis Assessment 0 10/01/20 2050   Infiltration Assessment 0 10/01/20 2050   Dressing Status Clean, dry, & intact 10/01/20 2050   Hub Color/Line Status Green 10/01/20 2050        HCG Status for ALL Females under 53 y/o: NO     Medication list confirmed with patient    Opportunity for questions and clarification was provided. Patient is oriented to time, place, person and situation .   Patient is  continent and ambulatory with assist     Valuables transported with patient     Patient transported with:   Monitor  Registered Nurse    MAP (Monitor): 72 =Monitored (most recent)  Vitals w/ MEWS Score (last day)     Date/Time MEWS Score Pulse Resp Temp BP Level of Consciousness SpO2    10/01/20 2315    (!) 118      102/61    96 %    10/01/20 2114        98.2 °F (36.8 °C)          10/01/20 2106      20            10/01/20 2100    (!) 116      100/69    97 %    10/01/20 2029    (!) 113          98 %    10/01/20 2015    (!) 111      111/66    97 %

## 2020-10-03 LAB
ANION GAP SERPL CALC-SCNC: 5 MMOL/L (ref 3–18)
BUN SERPL-MCNC: 17 MG/DL (ref 7–18)
BUN/CREAT SERPL: 16 (ref 12–20)
CALCIUM SERPL-MCNC: 8 MG/DL (ref 8.5–10.1)
CHLORIDE SERPL-SCNC: 107 MMOL/L (ref 100–111)
CO2 SERPL-SCNC: 26 MMOL/L (ref 21–32)
CREAT SERPL-MCNC: 1.04 MG/DL (ref 0.6–1.3)
ERYTHROCYTE [DISTWIDTH] IN BLOOD BY AUTOMATED COUNT: 12.9 % (ref 11.6–14.5)
GLUCOSE SERPL-MCNC: 75 MG/DL (ref 74–99)
HCT VFR BLD AUTO: 34.3 % (ref 35–45)
HGB BLD-MCNC: 11.3 G/DL (ref 12–16)
MCH RBC QN AUTO: 31.6 PG (ref 24–34)
MCHC RBC AUTO-ENTMCNC: 32.9 G/DL (ref 31–37)
MCV RBC AUTO: 95.8 FL (ref 74–97)
PLATELET # BLD AUTO: 147 K/UL (ref 135–420)
PMV BLD AUTO: 11.8 FL (ref 9.2–11.8)
POTASSIUM SERPL-SCNC: 3.8 MMOL/L (ref 3.5–5.5)
RBC # BLD AUTO: 3.58 M/UL (ref 4.2–5.3)
SARS-COV-2, COV2NT: NOT DETECTED
SODIUM SERPL-SCNC: 138 MMOL/L (ref 136–145)
WBC # BLD AUTO: 2.5 K/UL (ref 4.6–13.2)

## 2020-10-03 PROCEDURE — 77030038269 HC DRN EXT URIN PURWCK BARD -A

## 2020-10-03 PROCEDURE — 74011000250 HC RX REV CODE- 250: Performed by: HOSPITALIST

## 2020-10-03 PROCEDURE — 99223 1ST HOSP IP/OBS HIGH 75: CPT

## 2020-10-03 PROCEDURE — 74011250636 HC RX REV CODE- 250/636: Performed by: HOSPITALIST

## 2020-10-03 PROCEDURE — 2709999900 HC NON-CHARGEABLE SUPPLY

## 2020-10-03 PROCEDURE — 74011250637 HC RX REV CODE- 250/637: Performed by: HOSPITALIST

## 2020-10-03 PROCEDURE — 74011000258 HC RX REV CODE- 258: Performed by: HOSPITALIST

## 2020-10-03 PROCEDURE — 85027 COMPLETE CBC AUTOMATED: CPT

## 2020-10-03 PROCEDURE — 80048 BASIC METABOLIC PNL TOTAL CA: CPT

## 2020-10-03 PROCEDURE — 94640 AIRWAY INHALATION TREATMENT: CPT

## 2020-10-03 PROCEDURE — C9113 INJ PANTOPRAZOLE SODIUM, VIA: HCPCS | Performed by: HOSPITALIST

## 2020-10-03 PROCEDURE — 94762 N-INVAS EAR/PLS OXIMTRY CONT: CPT

## 2020-10-03 PROCEDURE — 65660000000 HC RM CCU STEPDOWN

## 2020-10-03 PROCEDURE — 77030040361 HC SLV COMPR DVT MDII -B

## 2020-10-03 RX ORDER — GUAIFENESIN 600 MG/1
600 TABLET, EXTENDED RELEASE ORAL EVERY 12 HOURS
Status: DISCONTINUED | OUTPATIENT
Start: 2020-10-03 | End: 2020-10-04 | Stop reason: HOSPADM

## 2020-10-03 RX ADMIN — ARFORMOTEROL TARTRATE 15 MCG: 15 SOLUTION RESPIRATORY (INHALATION) at 20:04

## 2020-10-03 RX ADMIN — FERROUS GLUCONATE TAB 324 MG (37.5 MG ELEMENTAL IRON) 1 TABLET: 324 (37.5 FE) TAB at 17:02

## 2020-10-03 RX ADMIN — Medication 10 ML: at 08:41

## 2020-10-03 RX ADMIN — SODIUM CHLORIDE 75 ML/HR: 900 INJECTION, SOLUTION INTRAVENOUS at 15:53

## 2020-10-03 RX ADMIN — ROPINIROLE HYDROCHLORIDE 0.5 MG: 1 TABLET, FILM COATED ORAL at 21:03

## 2020-10-03 RX ADMIN — BUDESONIDE 500 MCG: 0.5 INHALANT RESPIRATORY (INHALATION) at 07:50

## 2020-10-03 RX ADMIN — BENZONATATE 100 MG: 100 CAPSULE ORAL at 17:01

## 2020-10-03 RX ADMIN — BENZONATATE 100 MG: 100 CAPSULE ORAL at 08:40

## 2020-10-03 RX ADMIN — CEFTRIAXONE 1 G: 1 INJECTION, POWDER, FOR SOLUTION INTRAMUSCULAR; INTRAVENOUS at 09:16

## 2020-10-03 RX ADMIN — BUDESONIDE 500 MCG: 0.5 INHALANT RESPIRATORY (INHALATION) at 20:04

## 2020-10-03 RX ADMIN — AZITHROMYCIN MONOHYDRATE 500 MG: 500 INJECTION, POWDER, LYOPHILIZED, FOR SOLUTION INTRAVENOUS at 12:34

## 2020-10-03 RX ADMIN — Medication 10 ML: at 21:04

## 2020-10-03 RX ADMIN — SODIUM CHLORIDE 40 MG: 9 INJECTION, SOLUTION INTRAMUSCULAR; INTRAVENOUS; SUBCUTANEOUS at 20:56

## 2020-10-03 RX ADMIN — MULTIVITAMIN TABLET 1 TABLET: TABLET at 08:22

## 2020-10-03 RX ADMIN — Medication 10 ML: at 15:54

## 2020-10-03 RX ADMIN — GABAPENTIN 200 MG: 100 CAPSULE ORAL at 21:03

## 2020-10-03 RX ADMIN — PROMETHAZINE HYDROCHLORIDE 12.5 MG: 25 TABLET ORAL at 21:04

## 2020-10-03 RX ADMIN — MONTELUKAST 10 MG: 10 TABLET, FILM COATED ORAL at 21:04

## 2020-10-03 RX ADMIN — SODIUM CHLORIDE 40 MG: 9 INJECTION, SOLUTION INTRAMUSCULAR; INTRAVENOUS; SUBCUTANEOUS at 08:17

## 2020-10-03 RX ADMIN — GABAPENTIN 200 MG: 100 CAPSULE ORAL at 17:01

## 2020-10-03 RX ADMIN — GUAIFENESIN 600 MG: 600 TABLET, EXTENDED RELEASE ORAL at 20:56

## 2020-10-03 RX ADMIN — ACETAMINOPHEN 650 MG: 325 TABLET, FILM COATED ORAL at 04:08

## 2020-10-03 RX ADMIN — ARFORMOTEROL TARTRATE 15 MCG: 15 SOLUTION RESPIRATORY (INHALATION) at 07:50

## 2020-10-03 RX ADMIN — GABAPENTIN 200 MG: 100 CAPSULE ORAL at 08:22

## 2020-10-03 NOTE — PROGRESS NOTES
Problem: Falls - Risk of  Goal: *Absence of Falls  Description: Document [place] Fall Risk and appropriate interventions in the flowsheet. Outcome: Progressing Towards Goal  Note: Fall Risk Interventions:  Mobility Interventions: Patient to call before getting OOB, OT consult for ADLs, PT Consult for mobility concerns, PT Consult for assist device competence, Utilize walker, cane, or other assistive device         Medication Interventions: Patient to call before getting OOB, Teach patient to arise slowly    Elimination Interventions: Call light in reach, Patient to call for help with toileting needs, Toileting schedule/hourly rounds              Problem: Pressure Injury - Risk of  Goal: *Prevention of pressure injury  Description: Document Vidal Scale and appropriate interventions in the flowsheet. Outcome: Progressing Towards Goal  Note: Pressure Injury Interventions:       Moisture Interventions: Internal/External urinary devices    Activity Interventions: Pressure redistribution bed/mattress(bed type)    Mobility Interventions: HOB 30 degrees or less, Pressure redistribution bed/mattress (bed type), PT/OT evaluation, Turn and reposition approx.  every two hours(pillow and wedges)    Nutrition Interventions: Document food/fluid/supplement intake                     Problem: Breathing Pattern - Ineffective  Goal: *Absence of hypoxia  Outcome: Progressing Towards Goal  Goal: *Use of effective breathing techniques  Outcome: Progressing Towards Goal

## 2020-10-03 NOTE — PROGRESS NOTES
Internal Medicine Progress Note    Patient's Name: Lidia Zaragoza Date: 10/1/2020  Length of Stay: 1      Assessment/Plan     Active Hospital Problems    Diagnosis Date Noted    Acute bronchitis 10/02/2020    Bronchiectasis ([address] 10/02/2020    Hemoptysis 10/01/2020    Tachycardia 10/01/2020     - Afebrile, normal WBCs  - Cont IV rocephin/azithro  - Cont brovana, pulmicort  - Duoneb PRN  - Holding off on bronch given improvement  - Cont tessalon and add mucinex  - CoVID-19 pending  - Cont acceptable home medications for chronic conditions   - DVT protocol    I have personally reviewed all pertinent labs and films that have officially resulted over the last 24 hours. I have personally checked for all pending labs that are awaiting final results.     Subjective     Pt s/e @ bedside  No major events overnight  No longer having hemoptysis  Cough still present, but improving  Denies CP or SOB    Objective     Visit Vitals  BP (!) 124/56   Pulse 74   Temp 97.6 °F (36.4 °C)   Resp 16   Ht 5' (1.524 m)   Wt 57.8 kg (127 lb 6.4 oz)   SpO2 92%   Breastfeeding Unknown   BMI 24.88 kg/m²       Physical Exam:  General Appearance: NAD, conversant w/ audible cough  Lungs: CTA with normal respiratory effort  CV: RRR, no m/r/g  Abdomen: soft, non-tender, normal bowel sounds  Extremities: no cyanosis, no peripheral edema  Neuro: No focal deficits, motor/sensory intact    Lab/Data Reviewed:  BMP:   Lab Results   Component Value Date/Time     10/03/2020 04:00 AM    K 3.8 10/03/2020 04:00 AM     10/03/2020 04:00 AM    CO2 26 10/03/2020 04:00 AM    AGAP 5 10/03/2020 04:00 AM    GLU 75 10/03/2020 04:00 AM    BUN 17 10/03/2020 04:00 AM    CREA 1.04 10/03/2020 04:00 AM    GFRAA >60 10/03/2020 04:00 AM    GFRNA 51 (L) 10/03/2020 04:00 AM     CBC:   Lab Results   Component Value Date/Time    WBC 2.5 (L) 10/03/2020 04:00 AM    HGB 11.3 (L) 10/03/2020 04:00 AM    HCT 34.3 (L) 10/03/2020 04:00 AM     10/03/2020 04:00 AM       Imaging Reviewed:  No results found.     Medications Reviewed:  Current Facility-Administered Medications   Medication Dose Route Frequency    gabapentin (NEURONTIN) capsule 200 mg  200 mg Oral TID    montelukast (SINGULAIR) tablet 10 mg  10 mg Oral QHS    multivitamin (ONE A DAY) tablet 1 Tab  1 Tab Oral DAILY    rOPINIRole (REQUIP) tablet 0.5 mg  0.5 mg Oral QHS    sodium chloride (NS) flush 5-40 mL  5-40 mL IntraVENous Q8H    sodium chloride (NS) flush 5-40 mL  5-40 mL IntraVENous PRN    acetaminophen (TYLENOL) tablet 650 mg  650 mg Oral Q6H PRN    Or    acetaminophen (TYLENOL) suppository 650 mg  650 mg Rectal Q6H PRN    polyethylene glycol (MIRALAX) packet 17 g  17 g Oral DAILY PRN    promethazine (PHENERGAN) tablet 12.5 mg  12.5 mg Oral Q6H PRN    Or    ondansetron (ZOFRAN) injection 4 mg  4 mg IntraVENous Q6H PRN    0.9% sodium chloride infusion  75 mL/hr IntraVENous CONTINUOUS    pantoprazole (PROTONIX) 40 mg in 0.9% sodium chloride 10 mL injection  40 mg IntraVENous Q12H    benzonatate (TESSALON) capsule 100 mg  100 mg Oral TID PRN    azithromycin (ZITHROMAX) 500 mg in 0.9% sodium chloride 250 mL IVPB  500 mg IntraVENous Q24H    cefTRIAXone (ROCEPHIN) 1 g in 0.9% sodium chloride (MBP/ADV) 50 mL MBP  1 g IntraVENous Q24H    ferrous gluconate 324 mg (37.5 mg iron) tablet 1 Tab  1 Tab Oral DAILY    arformoteroL (BROVANA) neb solution 15 mcg  15 mcg Nebulization BID RT    And    budesonide (PULMICORT) 500 mcg/2 ml nebulizer suspension  500 mcg Nebulization BID RT           Cecilia Celestin DO  Internal Medicine, Hospitalist  Pager: [phone] [facility]

## 2020-10-03 NOTE — PROGRESS NOTES
Respiratory Therapy Assessment Care Plan    Patient:  Yakov Escudero 66 y.o. female 10/3/2020 8:23 AM    Acute bronchitis [J20.9]      Chest X-RAY:   Results from Hospital Encounter encounter on 10/01/20   XR CHEST PORT    Impression IMPRESSION:  Peribronchial cuffing may represent sequela of bronchitis. No focal  consolidation. See accompanying CT report. Results from [place] encounter on 12/18/18   XR SACRUM AND COCCYX    Impression IMPRESSION:    Minimal degenerative changes of the SI joints. XR SPINE LUMB 2 OR 3 V    Impression IMPRESSION:    Chronic vertebral plana of L5. Mild focal inferior endplate L5 irregularity,  probably chronic. Degenerated L4-5 and L5-S1 discs. L4-5 and L5-S1 facet arthropathy.           Vital Signs:   Visit Vitals  BP (!) 143/54   Pulse 62   Temp 97.6 °F (36.4 °C)   Resp 18   Ht 5' (1.524 m)   Wt 56.7 kg (125 lb)   SpO2 99%   Breastfeeding Unknown   BMI 24.41 kg/m²         Indications for treatment:  SOB      Plan of care: Brovana and Pulmicort BID        Goal: Improve SOB

## 2020-10-03 NOTE — PROGRESS NOTES
[facility]  ICU Progress Note      Name: Lacey Brito   : 1945   MRN: [id number]   Date: 10/3/2020 10:02 AM     [x]I have reviewed the flowsheet and previous days notes. Events overnight reviewed and discussed with nursing staff. Vital signs and records reviewed. Subjective:  10/03/20:    Quiet night, no hemoptysis. Less coughing. She is afebrile with stable vital signs and resting comfortably. No bronchoscopy planned at this point as it appears that her cough suppressants and inhaled steroids are improving her bronchitis. []The patient is unable to give any meaningful history or review of systems because the patient is:  []Intubated [x]Sedated   []Unresponsive      []The patient is critically ill on      []Mechanical ventilation []Pressors   []BiPAP []                 ROS:Review of systems not obtained due to patient factors. Not required    Medication Review:  · Pressors -none  · Sedation -minimal  · Antibiotics -Rocephin and Zithromax  · Pain -none  · GI/ DVT -Protonix  · Others (other gtts)      Vital Signs:    Visit Vitals  BP (!) 143/54   Pulse 62   Temp 97.6 °F (36.4 °C)   Resp 18   Ht 5' (1.524 m)   Wt 56.7 kg (125 lb)   SpO2 99%   Breastfeeding Unknown   BMI 24.41 kg/m²       O2 Device: Room air       Temp (24hrs), Av °F (36.7 °C), Min:97.6 °F (36.4 °C), Max:98.3 °F (36.8 °C)       Intake/Output:   Last shift:      No intake/output data recorded.   Last 3 shifts: 10/01 1901 - 10/03 0700  In: 999.8 [I.V.:999.8]  Out: 2600 [Urine:2600]    Intake/Output Summary (Last 24 hours) at 10/3/2020 1002  Last data filed at 10/3/2020 0400  Gross per 24 hour   Intake 626 ml   Output 2100 ml   Net -1474 ml       Ventilator Settings:  Mode Rate Tidal Volume Pressure FiO2 PEEP                    Peak airway pressure:      Minute ventilation:        ARDS network Guidelines:   Lung protective strategy and Plateau  Pressure goal < 30 cm H2O goals  Oxygenation Goals PaO2 55-80 mm Hg or SaO2 88-95%  PH goal 7.30-7.45    VAP bundle:  Reviewed. Argyle tube to suction at 20-30 cm Hg.   Maintain Sariah tube with 5-10ml air every 4 hours  Routine oral care every 4 hours  Elevation of head > 45 degree  Daily sedation holiday and SBT evaluation starting at 6.00am.    Physical Exam:    General: Resting comfortably   HEENT:  Anicteric sclerae; pink palpebral conjunctivae; mucosa moist  Resp:  Symmetrical chest rise, no accessory muscle use; good AE Bilat; no rales/ wheezing/ rhonchi noted  CV:  S1, S2 present; RRR; no m/g/r  GI:  Abdomen soft, non-tender; (+) active bowel sounds  Extremities:  +2 pulses on all extremities; no edema/ cyanosis/ clubbing noted  Skin:  Warm; no rashes/ lesions noted  Neurologic:  Non-focal  Devices:     DATA:     Current Facility-Administered Medications   Medication Dose Route Frequency    gabapentin (NEURONTIN) capsule 200 mg  200 mg Oral TID    montelukast (SINGULAIR) tablet 10 mg  10 mg Oral QHS    multivitamin (ONE A DAY) tablet 1 Tab  1 Tab Oral DAILY    rOPINIRole (REQUIP) tablet 0.5 mg  0.5 mg Oral QHS    sodium chloride (NS) flush 5-40 mL  5-40 mL IntraVENous Q8H    sodium chloride (NS) flush 5-40 mL  5-40 mL IntraVENous PRN    acetaminophen (TYLENOL) tablet 650 mg  650 mg Oral Q6H PRN    Or    acetaminophen (TYLENOL) suppository 650 mg  650 mg Rectal Q6H PRN    polyethylene glycol (MIRALAX) packet 17 g  17 g Oral DAILY PRN    promethazine (PHENERGAN) tablet 12.5 mg  12.5 mg Oral Q6H PRN    Or    ondansetron (ZOFRAN) injection 4 mg  4 mg IntraVENous Q6H PRN    0.9% sodium chloride infusion  75 mL/hr IntraVENous CONTINUOUS    pantoprazole (PROTONIX) 40 mg in 0.9% sodium chloride 10 mL injection  40 mg IntraVENous Q12H    benzonatate (TESSALON) capsule 100 mg  100 mg Oral TID PRN    azithromycin (ZITHROMAX) 500 mg in 0.9% sodium chloride 250 mL IVPB  500 mg IntraVENous Q24H    cefTRIAXone (ROCEPHIN) 1 g in 0.9% sodium chloride (MBP/ADV) 50 mL MBP  1 g IntraVENous Q24H    ferrous gluconate 324 mg (37.5 mg iron) tablet 1 Tab  1 Tab Oral DAILY    arformoteroL (BROVANA) neb solution 15 mcg  15 mcg Nebulization BID RT    And    budesonide (PULMICORT) 500 mcg/2 ml nebulizer suspension  500 mcg Nebulization BID RT         Labs: Results:       Chemistry Recent Labs     10/03/20  0400 10/02/20  0414 10/01/20  2041   GLU 75 88 139*    137 136   K 3.8 4.2 4.0    107 101   CO2 26 27 27   BUN 17 23* 26*   CREA 1.04 0.98 1.16   CA 8.0* 8.7 10.1   AGAP 5 3 8   BUCR 16 23* 22*      CBC w/Diff Recent Labs     10/03/20  0400 10/02/20  0414 10/01/20  2041   WBC 2.5* 3.7* 3.8*   RBC 3.58* 3.75* 4.01*   HGB 11.3* 11.7* 12.6   HCT 34.3* 35.7 37.7    158 164   GRANS  --   --  71   LYMPH  --   --  16*   EOS  --   --  1      Coagulation Recent Labs     10/01/20  2041   PTP 13.2   INR 1.0   APTT 37.1*       Liver Enzymes No results for input(s): TP, ALB, TBIL, AP in the last 72 hours. No lab exists for component: SGOT, GPT, DBIL   ABG No results found for: PH, PHI, PCO2, PCO2I, PO2, PO2I, HCO3, HCO3I, FIO2, FIO2I   Microbiology No results for input(s): CULT in the last 72 hours. Telemetry: [x]Sinus []A-flutter []Paced    []A-fib []Multiple PVCs                  Imaging:    CXR [date]:    CT HEAD/CHEST/ABD/PELVIS [date]:    []I have personally reviewed the patients radiographs  []Radiographs reviewed with radiologist   []No change from prior, tubes and lines in adequate position  []Improved   []Worsening        IMPRESSION:   · Hemoptysis, improved  ·    RECOMMENDATIONS:   · Resp: Titrate FiO2/ supp O2 for SpO2 >90%;   · I/D: Afebrile; aleukocytosis;   · Hem/Onc: Daily CBC; H/H, and plts are stable  · CVS: HD stable; Actively titrate vasopressors aim MAP >65mmHg, Check CE's, ECHO results.    · Metabolic: Daily BMP; monitor e-lytes; replace PRN  · Renal: Trend Renal indices  · GI: SUP,  · Musc/Skin: No acute issues, wound care  · Neuro: PRN pain medications, +/- sedation  ·      Best Practices/ Safety Bundles:  · Sepsis Bundle per Hospital Protocol  · CAUTI Bundle  · Electrolyte Replacement Bundle  · Glycemic control goal <180; avoid Hypoglycemia  · Mech Vent patients:   · VAP bundle, Aim to keep peak plateau pressure 38-46UJ H2O  · Aspiration Precautions - HOB >30'  · Daily sedation holiday as indicated  · SBT as tolerated/appropriate  · Titrate FiO2 for SpO2 >94%  · Aggressive Pulmonary Hygeiene  · Need for Lines, burton assessed. · Restraints need.   ·       The patient is: [] acutely ill Risk of deterioration: [] moderate    [] critically ill  [] high     []See my orders for details    My assessment/plan was discussed with:  [x]Nursing []PT/OT    []Respiratory therapy []   []Family []     MD Leah Capellan

## 2020-10-03 NOTE — ROUTINE PROCESS
0910 Received report from [facility]. Patient alert and oriented. 0830 PIV accidentally pulled out. 0930 Incontinent care done. Periwick changed. 1230 PIV infiltrated. New PIV inserted to left wrist. IVF continued. Call bell and telephone placed within reach. 1500 Patient asleep, equal and non-labored breathing. 1856 Covid test negative per lab - Dr Dexter Castillo informed through [address] [place] [address]. 1930 Bedside and Verbal shift change report given to [address] [place] [address] (oncoming nurse) by Chris Tony RN (offgoing nurse). Report included the following information SBAR, Kardex, Intake/Output, MAR, Recent Results and Cardiac Rhythm NSR.

## 2020-10-03 NOTE — DIABETES MGMT
Nutrition Assessment/Glycemic Control    Type and Reason for Visit: Initial    Nutrition Recommendations/Plan: Encourage increased intake at meals to meet calorie and protein requirements. Nutrition Assessment:    DX: acute bronchitis  Pt is well nourished. Malnutrition Assessment:  Malnutrition Status:     none    Estimated Daily Nutrient Needs:  Energy (kcal):  1500  Protein (g):  68       Fluid (ml/day):  1800  Wounds:    None       Current Nutrition Therapies:  DIET REGULAR    Anthropometric Measures:  · Height:  5' (152.4 cm)  · Current Body Wt:  56.7 kg (125 lb)   · Ideal Body Wt:  100 lbs:  125 %   · BMI:     24.4 kg/m2  · BMI Category:     nl     Nutrition Diagnosis:   No nutrition diagnosis at this time     Nutrition Interventions:   Food and/or Nutrient Delivery: Continue current diet    Goals:      Intake will meet >75% of energy and protein requirements. Wt maintenance    Pt will select nutrient dense food choices.   Nutrition Monitoring and Evaluation:    Food/Nutrient Intake Outcomes: Food and nutrient intake  Discharge Planning:    No discharge needs at this time   Electronically signed by Grecia Peterson RD on 10/3/2020 at 11:15 AM

## 2020-10-04 VITALS
RESPIRATION RATE: 13 BRPM | WEIGHT: 127.4 LBS | HEIGHT: 60 IN | TEMPERATURE: 97.9 F | OXYGEN SATURATION: 98 % | DIASTOLIC BLOOD PRESSURE: 64 MMHG | HEART RATE: 74 BPM | SYSTOLIC BLOOD PRESSURE: 130 MMHG | BODY MASS INDEX: 25.01 KG/M2

## 2020-10-04 LAB
ANION GAP SERPL CALC-SCNC: 4 MMOL/L (ref 3–18)
BUN SERPL-MCNC: 14 MG/DL (ref 7–18)
BUN/CREAT SERPL: 15 (ref 12–20)
CALCIUM SERPL-MCNC: 7.6 MG/DL (ref 8.5–10.1)
CHLORIDE SERPL-SCNC: 114 MMOL/L (ref 100–111)
CO2 SERPL-SCNC: 25 MMOL/L (ref 21–32)
CREAT SERPL-MCNC: 0.91 MG/DL (ref 0.6–1.3)
ERYTHROCYTE [DISTWIDTH] IN BLOOD BY AUTOMATED COUNT: 12.9 % (ref 11.6–14.5)
GLUCOSE SERPL-MCNC: 81 MG/DL (ref 74–99)
HCT VFR BLD AUTO: 36.2 % (ref 35–45)
HGB BLD-MCNC: 11.9 G/DL (ref 12–16)
MCH RBC QN AUTO: 31.4 PG (ref 24–34)
MCHC RBC AUTO-ENTMCNC: 32.9 G/DL (ref 31–37)
MCV RBC AUTO: 95.5 FL (ref 74–97)
PLATELET # BLD AUTO: 122 K/UL (ref 135–420)
PMV BLD AUTO: 12.6 FL (ref 9.2–11.8)
POTASSIUM SERPL-SCNC: 4 MMOL/L (ref 3.5–5.5)
RBC # BLD AUTO: 3.79 M/UL (ref 4.2–5.3)
SODIUM SERPL-SCNC: 143 MMOL/L (ref 136–145)
WBC # BLD AUTO: 2.4 K/UL (ref 4.6–13.2)

## 2020-10-04 PROCEDURE — 74011000258 HC RX REV CODE- 258: Performed by: HOSPITALIST

## 2020-10-04 PROCEDURE — 74011250636 HC RX REV CODE- 250/636: Performed by: HOSPITALIST

## 2020-10-04 PROCEDURE — 80048 BASIC METABOLIC PNL TOTAL CA: CPT

## 2020-10-04 PROCEDURE — 74011000250 HC RX REV CODE- 250: Performed by: HOSPITALIST

## 2020-10-04 PROCEDURE — 74011250637 HC RX REV CODE- 250/637: Performed by: HOSPITALIST

## 2020-10-04 PROCEDURE — 94640 AIRWAY INHALATION TREATMENT: CPT

## 2020-10-04 PROCEDURE — 85027 COMPLETE CBC AUTOMATED: CPT

## 2020-10-04 PROCEDURE — C9113 INJ PANTOPRAZOLE SODIUM, VIA: HCPCS | Performed by: HOSPITALIST

## 2020-10-04 PROCEDURE — 2709999900 HC NON-CHARGEABLE SUPPLY

## 2020-10-04 RX ORDER — DOXYCYCLINE 100 MG/1
100 CAPSULE ORAL 2 TIMES DAILY
Qty: 10 CAP | Refills: 0 | Status: SHIPPED | OUTPATIENT
Start: 2020-10-04 | End: 2020-10-09

## 2020-10-04 RX ORDER — BENZONATATE 100 MG/1
100 CAPSULE ORAL
Qty: 30 CAP | Refills: 0 | Status: SHIPPED | OUTPATIENT
Start: 2020-10-04 | End: 2020-10-09 | Stop reason: SDUPTHER

## 2020-10-04 RX ORDER — ALBUTEROL SULFATE 90 UG/1
1 AEROSOL, METERED RESPIRATORY (INHALATION)
Qty: 1 INHALER | Refills: 0 | Status: SHIPPED | OUTPATIENT
Start: 2020-10-04 | End: 2020-10-15

## 2020-10-04 RX ADMIN — GABAPENTIN 200 MG: 100 CAPSULE ORAL at 09:46

## 2020-10-04 RX ADMIN — SODIUM CHLORIDE 75 ML/HR: 900 INJECTION, SOLUTION INTRAVENOUS at 07:45

## 2020-10-04 RX ADMIN — Medication 10 ML: at 06:14

## 2020-10-04 RX ADMIN — SODIUM CHLORIDE 40 MG: 9 INJECTION, SOLUTION INTRAMUSCULAR; INTRAVENOUS; SUBCUTANEOUS at 09:46

## 2020-10-04 RX ADMIN — BENZONATATE 100 MG: 100 CAPSULE ORAL at 09:49

## 2020-10-04 RX ADMIN — GUAIFENESIN 600 MG: 600 TABLET, EXTENDED RELEASE ORAL at 09:46

## 2020-10-04 RX ADMIN — CEFTRIAXONE 1 G: 1 INJECTION, POWDER, FOR SOLUTION INTRAMUSCULAR; INTRAVENOUS at 09:46

## 2020-10-04 RX ADMIN — MULTIVITAMIN TABLET 1 TABLET: TABLET at 09:46

## 2020-10-04 RX ADMIN — BUDESONIDE 500 MCG: 0.5 INHALANT RESPIRATORY (INHALATION) at 08:16

## 2020-10-04 RX ADMIN — BENZONATATE 100 MG: 100 CAPSULE ORAL at 01:45

## 2020-10-04 RX ADMIN — ARFORMOTEROL TARTRATE 15 MCG: 15 SOLUTION RESPIRATORY (INHALATION) at 08:16

## 2020-10-04 NOTE — DISCHARGE SUMMARY
Internal Medicine Discharge Summary        Patient: Rachel Ramirez    YOB: 1945    Age:  66 y.o. Admit Date: 10/1/2020    Discharge Date: 10/4/2020    LOS:  LOS: 2 days     Discharge To: Home    Consults: Pulmonary/Critical Care    Admission Diagnoses: Acute bronchitis [J20.9]    Discharge Diagnoses:    Problem List as of 10/4/2020 Date Reviewed: 8/13/2020          Codes Class Noted - Resolved    * (Principal) Acute bronchitis ICD-10-CM: J20.9  ICD-9-CM: 466.0  10/2/2020 - Present        Bronchiectasis ([address] ICD-10-CM: J47.9  ICD-9-CM: 494.0  10/2/2020 - Present        Hemoptysis ICD-10-CM: R04.2  ICD-9-CM: 786.30  10/1/2020 - Present        Tachycardia ICD-10-CM: R00.0  ICD-9-CM: 785.0  10/1/2020 - Present        Leukopenia ICD-10-CM: D72.819  ICD-9-CM: 288.50  8/13/2020 - Present        Iron deficiency anemia ICD-10-CM: D50.9  ICD-9-CM: 280.9  8/13/2020 - Present        Heme positive stool ICD-10-CM: R19.5  ICD-9-CM: 792.1  1/22/2020 - Present        Voiding dysfunction ICD-10-CM: N39.8  ICD-9-CM: 599.9  12/26/2019 - Present        LBBB (left bundle branch block) ICD-10-CM: I44.7  ICD-9-CM: 426.3  10/19/2018 - Present    Overview Signed 10/19/2018  2:01 PM by Mitchell Boogie     Seen by Dr. Isaias Burks.               Pain at rest ICD-10-CM: R52  ICD-9-CM: 780.96  10/19/2018 - Present        Diarrhea ICD-10-CM: R19.7  ICD-9-CM: 787.91  10/19/2018 - Present        Uterovaginal prolapse ICD-10-CM: N81.4  ICD-9-CM: 618.4  7/3/2018 - Present        Screening for colon cancer ICD-10-CM: Z12.11  ICD-9-CM: V76.51  3/22/2018 - Present        Functional urinary incontinence ICD-10-CM: R39.81  ICD-9-CM: 788.91  2/27/2018 - Present        Bladder stones ICD-10-CM: N21.0  ICD-9-CM: 594.1  Unknown - Present        Uterus prolapse ICD-10-CM: N81.4  ICD-9-CM: 618.1  Unknown - Present    Overview Signed 2/17/2017 12:08 PM by Gabriela Lutz., CMA     grade 5             RLS (restless legs syndrome) ICD-10-CM: G25.81  ICD-9-CM: 333.94  4/4/2016 - Present        Back pain ICD-10-CM: M54.9  ICD-9-CM: 724.5  3/31/2016 - Present        Visual changes ICD-10-CM: H53.9  ICD-9-CM: 368.9  3/30/2016 - Present        Procidentia of uterus ICD-10-CM: N81.3  ICD-9-CM: 618.3  3/16/2016 - Present        Anemia ICD-10-CM: D64.9  ICD-9-CM: 285.9  3/11/2016 - Present        RESOLVED: Deep venous thrombosis (HCC) ICD-10-CM: I82.409  ICD-9-CM: 453.40  10/19/2018 - 4/29/2019        RESOLVED: Decubitus ulcer of heel, unstageable ([facility] ICD-10-CM: L89.600  ICD-9-CM: 707.07, 707.25  10/31/2017 - 10/31/2017        RESOLVED: DVT of leg (deep venous thrombosis) ([facility] ICD-10-CM: I82.409  ICD-9-CM: 453.40  Unknown - 6/14/2017        RESOLVED: UTI (urinary tract infection) ICD-10-CM: N39.0  ICD-9-CM: 599.0  7/20/2016 - 6/14/2017        RESOLVED: DVT of lower extremity, bilateral ([facility] ICD-10-CM: I82.403  ICD-9-CM: 453.40  3/31/2016 - 6/14/2017        RESOLVED: Urinary retention ICD-10-CM: R33.9  ICD-9-CM: 788.20  3/31/2016 - 2/27/2018        RESOLVED: Vertebral osteomyelitis ([facility] ICD-10-CM: M46.20  ICD-9-CM: 730.28  3/30/2016 - 6/14/2017        RESOLVED: Decubitus ulcer of buttock, stage 2 ([facility] ICD-10-CM: L89.302  ICD-9-CM: 707.05, 707.22  3/26/2016 - 6/14/2017        RESOLVED: Protein calorie malnutrition ([facility] ICD-10-CM: E46  ICD-9-CM: 263.9  3/24/2016 - 6/14/2017        RESOLVED: Vaginal candida ICD-10-CM: B37.3  ICD-9-CM: 112.1  3/16/2016 - 6/16/2016        RESOLVED: Vaginal candida ICD-10-CM: B37.3  ICD-9-CM: 112.1  3/16/2016 - 6/14/2017        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  ICD-9-CM: 730.08  3/11/2016 - 6/14/2017        RESOLVED: UTI (urinary tract infection) ICD-10-CM: N39.0  ICD-9-CM: 599.0  3/11/2016 - 6/16/2016        RESOLVED: GATO (acute kidney injury) ([facility] ICD-10-CM: N17.9  ICD-9-CM: 584.9  3/11/2016 - 6/14/2017        RESOLVED: Paraplegia ([facility] ICD-10-CM: G82.20  ICD-9-CM: 344.1  3/11/2016 - 10/11/2016 RESOLVED: CAP (community acquired pneumonia) ICD-10-CM: J18.9  ICD-9-CM: 211  3/11/2016 - 6/16/2016        RESOLVED: Weight loss ICD-10-CM: R63.4  ICD-9-CM: 783.21  3/11/2016 - 6/14/2017        RESOLVED: Hyponatremia ICD-10-CM: E87.1  ICD-9-CM: 276.1  3/11/2016 - 6/14/2017              Discharge Condition:  Improved    Procedures: None         HPI: Terre Cogan is a 66y.o. year old female who presents with  1 day of coughing up blood. Has had some cough in recent weeks but hemoptysis just started today. She states dark red with some small clots. She denies clotting/bleeding hx, no hx of lung cancer or copd. She denies taking nsaids, alcohol consumption, no hx of GI bleed. She continues to cough up this blood in ED, Hb is ok. Patient does state the blood seems to occur more when she tries to [address] Course:    Hemoptysis likely 2/2 acute bronchitis vs atypical bronchitis - CTA was negative for PE, only bronchial wall thickening with area of bronchiectasis. Treated with IV azithromycin/rocephin, brovana/pulmicort and duonebs. She was aloso given mucinex and cough medicine suppressants. She improved over next 48 hours. She was no longer having hemoptysis and no complaints of SOB. Her O2 demand was normal on room air. Pulm was consulted and agreed with plan. Initially plans for bronchoscopy, but this was cancelled. She was doing great on the day of discharge without need for further workup. She was transitioned to oral doxycycline to complete treatment. The rest of the patient's chronic conditions were managed appropriately during their admission. They were medically stable at the time of discharge.     Visit Vitals  BP (!) 126/47   Pulse 65   Temp 97.9 °F (36.6 °C)   Resp 19   Ht 5' (1.524 m)   Wt 57.8 kg (127 lb 6.4 oz)   SpO2 99%   Breastfeeding Unknown   BMI 24.88 kg/m²       Physical Exam at Discharge:  General Appearance: NAD, conversant  HENT: normocephalic/atraumatic, moist mucus membranes  Lungs: CTA with normal respiratory effort  CV: RRR, no m/r/g  Abdomen: soft, non-tender, normal bowel sounds  Extremities: no cyanosis, no peripheral edema  Neuro: moves all extremities, no focal deficits  Psych: appropriate affect, alert and oriented to person, place and time    Labs Prior to Discharge:  Labs: Results:       Chemistry Recent Labs     10/04/20  0410 10/03/20  0400 10/02/20  0414   GLU 81 75 88    138 137   K 4.0 3.8 4.2   * 107 107   CO2 25 26 27   BUN 14 17 23*   CREA 0.91 1.04 0.98   CA 7.6* 8.0* 8.7   AGAP 4 5 3   BUCR 15 16 23*      CBC w/Diff Recent Labs     10/04/20  0410 10/03/20  0400 10/02/20  0414 10/01/20  2041   WBC 2.4* 2.5* 3.7* 3.8*   RBC 3.79* 3.58* 3.75* 4.01*   HGB 11.9* 11.3* 11.7* 12.6   HCT 36.2 34.3* 35.7 37.7   * 147 158 164   GRANS  --   --   --  71   LYMPH  --   --   --  16*   EOS  --   --   --  1      Cardiac Enzymes No results for input(s): CPK, CKND1, SHANITA in the last 72 hours. No lab exists for component: CKRMB, TROIP   Coagulation Recent Labs     10/01/20  2041   PTP 13.2   INR 1.0   APTT 37.1*       Lipid Panel Lab Results   Component Value Date/Time    Cholesterol, total 190 04/29/2019 03:38 PM    HDL Cholesterol 95 (H) 04/29/2019 03:38 PM    LDL, calculated 82.2 04/29/2019 03:38 PM    VLDL, calculated 12.8 04/29/2019 03:38 PM    Triglyceride 64 04/29/2019 03:38 PM    CHOL/HDL Ratio 2.0 04/29/2019 03:38 PM      BNP No results for input(s): BNPP in the last 72 hours. Liver Enzymes No results for input(s): TP, ALB, TBIL, AP in the last 72 hours. No lab exists for component: SGOT, GPT, DBIL   Thyroid Studies Lab Results   Component Value Date/Time    TSH 1.37 08/17/2020 01:13 PM            Significant Imaging:  Cta Chest W Or W Wo Cont    Result Date: 10/2/2020  EXAM: CTA chest CLINICAL INDICATION/HISTORY: hemoptysis   > Additional: Chronic cough. Shortness of breath. COMPARISON: None.    > Reference Exam: CT chest without contrast 03/28/2016 TECHNIQUE: Axial CT imaging from the thoracic inlet through the diaphragm with intravenous contrast. Coronal and sagittal MIP reformats were generated. One or more dose reduction techniques were used on this CT: automated exposure control, adjustment of the mAs and/or kVp according to patient size, and iterative reconstruction techniques. The specific techniques used on this CT exam have been documented in the patient's electronic medical record. Digital Imaging and Communications in Medicine (DICOM) format image data are available to nonaffiliated external healthcare facilities or entities on a secure, media free, reciprocally searchable basis with patient authorization for at least a 12-month period after this study. _______________ FINDINGS: EXAM QUALITY: Excellent. PULMONARY ARTERIES: No evidence of pulmonary embolism. MEDIASTINUM: Normal heart size. No pericardial effusion. Aorta is mildly atherosclerotic without dissection. LUNGS/AIRWAY: Bronchial wall thickening with areas of bronchiectasis more prevalent within the right middle lobe and inferior lingula. Adjacent patchy areas of airspace and groundglass opacity with scattered areas of subpleural nodularity and tree-in-bud opacification. PLEURA: No significant pleural effusion. LYMPH NODES: No enlarged nodes. UPPER ABDOMEN: Unremarkable. OTHER: No acute or aggressive osseous abnormalities identified. _______________     IMPRESSION: 1. No evidence of pulmonary embolism. 2. Bronchial wall thickening with areas of bronchiectasis, more prevalent within the right middle lobe and inferior lingula with adjacent patchy areas of airspace and groundglass opacities, likely sequela of infectious/inflammatory process with potential associated bronchitis. Preliminary report provided by on-call radiology resident. *   ** *   **    Xr Chest Port    Result Date: 10/2/2020  EXAM:  PORTABLE CHEST INDICATION:  Chest pain and shortness of breath. TECHNIQUE:  Portable, AP view. COMPARISON:  05/21/2016 ____________________ FINDINGS:  SUPPORT DEVICES: None. Interval removal of left arm PICC. HEART AND MEDIASTINUM: Cardiomediastinal silhouette appears within normal limits. Normal caliber thoracic aorta. LUNGS AND PLEURAL SPACES: Lungs are well expanded. Peribronchial cuffing noted within the central airways. No focal consolidation, pulmonary edema, pneumothorax or significant pleural effusion. BONY THORAX AND SOFT TISSUES: No acute osseous abnormality. ____________________     IMPRESSION:  Peribronchial cuffing may represent sequela of bronchitis. No focal consolidation. See accompanying CT report. Discharge Medications:     Current Discharge Medication List      START taking these medications    Details   benzonatate (Tessalon Perles) 100 mg capsule Take 1 Cap by mouth three (3) times daily as needed for Cough for up to 10 days. Qty: 30 Cap, Refills: 0      doxycycline (MONODOX) 100 mg capsule Take 1 Cap by mouth two (2) times a day for 5 days. Qty: 10 Cap, Refills: 0         CONTINUE these medications which have CHANGED    Details   albuterol (PROVENTIL HFA, VENTOLIN HFA, PROAIR HFA) 90 mcg/actuation inhaler Take 1 Puff by inhalation every four (4) hours as needed (reactive airway symptoms including cough). Qty: 1 Inhaler, Refills: 0    Associated Diagnoses: Mild persistent reactive airway disease without complication; Cough         CONTINUE these medications which have NOT CHANGED    Details   gabapentin (NEURONTIN) 100 mg capsule TAKE 2 CAPSULES BY MOUTH THREE TIMES DAILY  Qty: 540 Cap, Refills: 0    Associated Diagnoses: Paraspinal abscess (HCC); RLS (restless legs syndrome)      rOPINIRole (REQUIP) 0.5 mg tablet TAKE 1 TABLET BY MOUTH EVERY NIGHT  Qty: 90 Tab, Refills: 1    Associated Diagnoses: RLS (restless legs syndrome)      ferrous gluconate 324 mg (38 mg iron) tablet Take 1 Tab by mouth daily.  TAKE 1 TABLET BY MOUTH EVERY MONDAY, WEDNESDAY, AND SUNDAY  Qty: 90 Tab, Refills: 1    Associated Diagnoses: Microcytic anemia      loratadine (CLARITIN) 10 mg tablet Take 1 Tab by mouth daily. Qty: 90 Tab, Refills: 1    Associated Diagnoses: Non-seasonal allergic rhinitis due to other allergic trigger      montelukast (SINGULAIR) 10 mg tablet TAKE 1 TABLET BY MOUTH DAILY  Qty: 90 Tab, Refills: 1    Comments: **Patient requests 90 days supply**  Associated Diagnoses: Mild persistent reactive airway disease without complication      multivitamin (ONE A DAY) tablet Take 1 Tab by mouth daily. calcium-cholecalciferol, d3, (CALCIUM 600 + D) 600-125 mg-unit tab Take 2 Tabs by mouth. cholecalciferol (VITAMIN D3) (5000 Units/125 mcg) tab tablet Take  by mouth daily. b complex vitamins tablet Take 1 Tab by mouth daily. latanoprost (XALATAN) 0.005 % ophthalmic solution Administer 2 Drops to both eyes nightly. Activity: Activity as tolerated    Diet: Resume previous diet    Wound Care: None needed    Follow-up:   Please follow up with your PCP within 7 days to discuss your recent hospitalization. Patient to arrange.          Total time spent including time spent on final examination and discharge discussion, discharge documentation and records reviewed and medication reconciliation: > 30 minutes    Joli Runner, DO  Internal Medicine, Hospitalist  Pager: [phone]

## 2020-10-04 NOTE — PROGRESS NOTES
Discharge/Transition Planning    Problem: Discharge Planning  Goal: *Discharge to safe environment  Outcome: Resolved/Met   Home    2nd medicare letter signed and copy given. Sister will . Denied need for [place]. Has appointment. In a couple weeks with PCP    Care Management Interventions  PCP Verified by CM: Yes  Palliative Care Criteria Met (RRAT>21 & CHF Dx)?: No  Transition of Care Consult (CM Consult):  Other  Discharge Durable Medical Equipment: No  Physical Therapy Consult: Yes  Occupational Therapy Consult: Yes  Speech Therapy Consult: No  Current Support Network: Relative's Home  Confirm Follow Up Transport: Family  The Patient and/or Patient Representative was Provided with a Choice of Provider and Agrees with the Discharge Plan?: No  Freedom of Choice List was Provided with Basic Dialogue that Supports the Patient's Individualized Plan of Care/Goals, Treatment Preferences and Shares the Quality Data Associated with the Providers?: No  Discharge Location  Discharge Placement: Home

## 2020-10-04 NOTE — PROGRESS NOTES
Physical Exam  Skin:           Bedside shift change report was given to me, Judith Robison RN  ( ICU night shift nurse), by Aleksandr Wade RN ( ICU day shift nurse). Report included the following information:  SBAR, kardex, consultations, hemodynamic monitoring, intake/output, MAR, lab results, VS trends, cardiac rhythm noted NSR. Skin, neuro, respiratory status, order verification, and IV  verification has been dually noted and completed at the bedside with:  Aleksandr Wade RN                                         2700 ICU Nurse's Note:    2000: Pt is awake, alert x 4. Pt independently moves all extremities with mild, generalized weakness and guarding. Pt requires one assist with toileting ADLs, and is adept at self repositioning. She tolerated scheduled meds as indicated. She is speaking on the telephone with family and denies c/o. Bed is locked and in lowest position, side rails up x 3, call bell and bedside table are within reach. Interventions are ongoing, will continue to monitor. Neurological: as noted in assessment  Cardiovascular:  NSR on the monitor. Pulmonary: breath sounds clear, diminished, intermittent,dry non-productve cough noted. Saturations are well maintained on room air 95%  GI/: Abdomen is semi soft, non-distended with active bowel sounds noted throughout. Last BM noted prior to admission on 10/01/2020. Purewick external catheter is in place with clear yellow colored urine output. IVF/Critical gtts: IV maintenance fluid, IVPB abx  Skin: as noted above, ESTEPHANIA score: 3, ZAHRA score: 17    2200: Pt was assisted with repositioning, toileting, and a partial bedbath an linen change. She tolerated her regularly scheduled HS meds and was medicated at this time with prn Phenergan po for c/o mild nausea. She has not had dry heaves or actively vomited, and denies c/o abd pain or chest pain. Interventions are ongoing, will continue to monitor.    0000: VSS, pt remains afebrile and is resting quietly in bed after toileting assist. Pt noted to have had a small formed BM on the BSC.  0200: Pt requested prn Tessalon Perle for ongoing intermittent cough, which was given at 0145. No further complaint or reportable changes, pt is resting quietly in bed at this time,   0400: VSS, pt remains afebrile. Scheduled AM labs drawn without incident. Bed is locked and in lowest position, side rails up x 3, call bell and bedside table are within reach. Interventions are ongoing, will continue to monitor. 0600: Pt is resting quietly at this time. She is being prepared to be transferred to [address] [place] [address].   0745: Dr. Leonidas Malagon is in with pt at this time, discussing and planning for DC to home.   0930: Bedside change of shift report given to: Annalise Pelaez RN

## 2020-10-04 NOTE — PROGRESS NOTES
Problem: Falls - Risk of  Goal: *Absence of Falls  Description: Document Kang Finch Fall Risk and appropriate interventions in the flowsheet. Outcome: Progressing Towards Goal  Note: Fall Risk Interventions: bed is locked and in lowest position, side rails up x 2, call bell and bedside table are within reach  Mobility Interventions: Strengthening exercises (ROM-active/passive), Patient to call before getting OOB         Medication Interventions: Evaluate medications/consider consulting pharmacy, Teach patient to arise slowly, Patient to call before getting OOB    Elimination Interventions: Call light in reach, Toileting schedule/hourly rounds, Bed/chair exit alarm              Problem: Patient Education: Go to Patient Education Activity  Goal: Patient/Family Education  Outcome: Progressing Towards Goal     Problem: Pressure Injury - Risk of  Goal: *Prevention of pressure injury  Description: Document Vidal Scale and appropriate interventions in the flowsheet. Outcome: Progressing Towards Goal  Note: Pressure Injury Interventions:   Adequate hydration, frequent turning and repositioning, managing incontinence    Moisture Interventions: Internal/External urinary devices, Maintain skin hydration (lotion/cream), Minimize layers, Moisture barrier, Offer toileting Q_hr, Check for incontinence Q2 hours and as needed, Apply protective barrier, creams and emollients    Activity Interventions: Pressure redistribution bed/mattress(bed type)    Mobility Interventions: Turn and reposition approx.  every two hours(pillow and wedges), Pressure redistribution bed/mattress (bed type), PT/OT evaluation    Nutrition Interventions: Document food/fluid/supplement intake, Discuss nutritional consult with provider, Offer support with meals,snacks and hydration                     Problem: Patient Education: Go to Patient Education Activity  Goal: Patient/Family Education  Outcome: Progressing Towards Goal     Problem: Patient Education: Go to Patient Education Activity  Goal: Patient/Family Education  Outcome: Progressing Towards Goal     Problem: Breathing Pattern - Ineffective  Goal: *Absence of hypoxia  Outcome: Progressing Towards Goal  Goal: *Use of effective breathing techniques  Outcome: Progressing Towards Goal

## 2020-10-04 NOTE — PROGRESS NOTES
0900  Assumed care of pt from [facility], [address] [place] [address]. Orders for discharge to home today. Pt awake, alert, oriented x4, calm and cooperative, on r/a with O2 saturations WDL and negative COVID test.  IVF discontinued.

## 2020-10-05 ENCOUNTER — PATIENT OUTREACH (OUTPATIENT)
Dept: CASE MANAGEMENT | Age: 75
End: 2020-10-05

## 2020-10-05 NOTE — PROGRESS NOTES
Patient was admitted to [address] [place] [address] on 10/1/20 and discharged on 10/4/20 for acute bronchitis. Patient was contacted within 1 business days of discharge. Contacted patient for Transitions of Care Coordination  follow up. Home number rang busjacob X 3. Contacted emergency contact. No answer. Left message introducing self, role and reason for call. Requested return call. Contact information provided. Will attempt to contact at a later time.

## 2020-10-05 NOTE — ADT AUTH CERT NOTES
Requested clinicals 10/5/20 by Lindsay Parsons, EMMA  
 
   
Review Status  Review Entered In Primary  10/5/2020 15:51   
   
Criteria Review   
 
pulmonary notes, and progress notes for 10/2,10/3)   
  
10/2/20 
  
IM notes:  
Assessment/Plan  
  
       
Active Hospital Problems  
  Diagnosis Date Noted  Acute bronchitis 10/02/2020  Bronchiectasis ([address] 10/02/2020  Hemoptysis 10/01/2020  Tachycardia 10/01/2020  
  
- Afebrile, normal WBCs 
- Cont IV rocephin/azithro - Cont Kristan Stands - Duoneb PRN 
- Pulm consulted for possible bronch - CoVID-19 pending 
- Cont acceptable home medications for chronic conditions  
- DVT protocol 
  
I have personally reviewed all pertinent labs and films that have officially resulted over the last 24 hours. I have personally checked for all pending labs that are awaiting final results. 
  
Subjective  
  
Pt s/e @ bedside No major events overnight Still having hemoptysis Denies CP or SOB 
  
Objective  
  
Visit Vitals BP (!) 103/51 Pulse 60 Temp 98.3 °F (36.8 °C) Resp 18 Ht 5' (1.524 m) Wt 56.7 kg (125 lb) SpO2 97% Breastfeeding Unknown BMI 24.41 kg/m²  
  
  
Physical Exam: 
General Appearance: NAD, conversant w/ audible cough Lungs: CTA with normal respiratory effort CV: RRR, no m/r/g Abdomen: soft, non-tender, normal bowel sounds Extremities: no cyanosis, no peripheral edema Neuro: No focal deficits, motor/sensory intact 
  
     
Lab Results Component Value Date/Time  
   10/02/2020 04:14 AM  
  K 4.2 10/02/2020 04:14 AM  
   10/02/2020 04:14 AM  
  CO2 27 10/02/2020 04:14 AM  
  AGAP 3 10/02/2020 04:14 AM  
  GLU 88 10/02/2020 04:14 AM  
  BUN 23 (H) 10/02/2020 04:14 AM  
  CREA 0.98 10/02/2020 04:14 AM  
  GFRAA >60 10/02/2020 04:14 AM  
  GFRNA 55 (L) 10/02/2020 04:14 AM  
  
CBC:  
         
Lab Results Component Value Date/Time  
  WBC 3.7 (L) 10/02/2020 04:14 AM  
  HGB 11.7 (L) 10/02/2020 04:14 AM  
   HCT 35.7 10/02/2020 04:14 AM  
   10/02/2020 04:14 AM  
  
10/3/20 IM notes:  
Assessment/Plan  
  
       
Active Hospital Problems  
  Diagnosis Date Noted  Acute bronchitis 10/02/2020  Bronchiectasis ([address] 10/02/2020  Hemoptysis 10/01/2020  Tachycardia 10/01/2020  
  
- Afebrile, normal WBCs 
- Cont IV rocephin/azithro - Cont Roberto Shack - Duoneb PRN 
- Holding off on bronch given improvement 
- Cont tessalon and add mucinex - CoVID-19 pending 
- Cont acceptable home medications for chronic conditions  
- DVT protocol 
  
I have personally reviewed all pertinent labs and films that have officially resulted over the last 24 hours. I have personally checked for all pending labs that are awaiting final results. 
  
Subjective  
  
Pt s/e @ bedside No major events overnight No longer having hemoptysis Cough still present, but improving Denies CP or SOB 
  
Objective  
  
Visit Vitals BP (!) 124/56 Pulse 74 Temp 97.6 °F (36.4 °C) Resp 16 Ht 5' (1.524 m) Wt 57.8 kg (127 lb 6.4 oz) SpO2 92% Breastfeeding Unknown BMI 24.88 kg/m²  
  
  
Physical Exam: 
General Appearance: NAD, conversant w/ audible cough Lungs: CTA with normal respiratory effort CV: RRR, no m/r/g Abdomen: soft, non-tender, normal bowel sounds Extremities: no cyanosis, no peripheral edema Neuro: No focal deficits, motor/sensory intact 
  
Lab/Data Reviewed: BMP:  
         
Lab Results Component Value Date/Time  
   10/03/2020 04:00 AM  
  K 3.8 10/03/2020 04:00 AM  
   10/03/2020 04:00 AM  
  CO2 26 10/03/2020 04:00 AM  
  AGAP 5 10/03/2020 04:00 AM  
  GLU 75 10/03/2020 04:00 AM  
  BUN 17 10/03/2020 04:00 AM  
  CREA 1.04 10/03/2020 04:00 AM  
  GFRAA >60 10/03/2020 04:00 AM  
  GFRNA 51 (L) 10/03/2020 04:00 AM  
  
CBC:  
         
Lab Results Component Value Date/Time  
  WBC 2.5 (L) 10/03/2020 04:00 AM  
  HGB 11.3 (L) 10/03/2020 04:00 AM  
  HCT 34.3 (L) 10/03/2020 04:00 AM  
    10/03/2020 04:00 AM  
  
Internal Medicine Discharge Summary 
  
  
  
Patient: Ramona Patterson 
  
Date of Birth:  1945 
  
Age:  75 y. o.  
  
  
   
           
Admit Date: 10/1/2020 
  
Discharge Date: 10/4/2020 
  
LOS:  LOS: 2 days  
  
Discharge To: Home 
  
Consults: Pulmonary/Critical Care 
  
Admission Diagnoses: Acute bronchitis [J20.9]   Discharge Diagnoses:   
              
                   
Problem List as of 10/4/2020 Date Reviewed: 8/13/2020  
        Codes Class Noted - Resolved  
  * (Principal) Acute bronchitis ICD-10-CM: J20.9 ICD-9-CM: 466.0   10/2/2020 - Present  
     
  Bronchiectasis ([address] ICD-10-CM: J47.9 ICD-9-CM: 494.0   10/2/2020 - Present  
     
  Hemoptysis ICD-10-CM: R04.2 ICD-9-CM: 786.30   10/1/2020 - Present  
     
  Tachycardia ICD-10-CM: R00.0 ICD-9-CM: 494. 0   10/1/2020 - Present  
     
  Leukopenia ICD-10-CM: W88.643 ICD-9-CM: 288.50   8/13/2020 - Present  
     
  Iron deficiency anemia ICD-10-CM: D50.9 ICD-9-CM: 280. 9   8/13/2020 - Present  
     
  Heme positive stool ICD-10-CM: R19.5 ICD-9-CM: 792.1   1/22/2020 - Present  
     
  Voiding dysfunction ICD-10-CM: N39.8 ICD-9-CM: 599.9   12/26/2019 - Present  
     
  LBBB (left bundle branch block) ICD-10-CM: I44.7 ICD-9-CM: 426.3   10/19/2018 - Present  
  Overview Signed 10/19/2018  2:01 PM by Antonio FIELD  
    Seen by Dr. Guillaume Sultana.  
   
  
     
  Pain at rest ICD-10-CM: Buckld Furlong ICD-9-CM: 780.96   10/19/2018 - Present  
     
  Diarrhea ICD-10-CM: R19.7 ICD-9-CM: 787.91   10/19/2018 - Present  
     
  Uterovaginal prolapse ICD-10-CM: N81.4 ICD-9-CM: 353. 4   7/3/2018 - Present  
     
  Screening for colon cancer ICD-10-CM: Z12.11 ICD-9-CM: V76.51   3/22/2018 - Present  
     
  Functional urinary incontinence ICD-10-CM: R39.81 ICD-9-CM: 788.91   2/27/2018 - Present  
     
  Bladder stones ICD-10-CM: N21.0 ICD-9-CM: 594.1   Unknown - Present  
     
  Uterus prolapse ICD-10-CM: N81.4 ICD-9-CM: 932. 1   Unknown - Present  
  Overview Signed 2/17/2017 12:08 PM by Terrance Yuen, ARNOL  
    grade 5 
   
  
     
  RLS (restless legs syndrome) ICD-10-CM: G25.81 ICD-9-CM: 333.94   4/4/2016 - Present  
     
  Back pain ICD-10-CM: M54.9 ICD-9-CM: 580. 5   3/31/2016 - Present  
     
  Visual changes ICD-10-CM: H53.9 ICD-9-CM: 368. 9   3/30/2016 - Present  
     
  Procidentia of uterus ICD-10-CM: N81.3 ICD-9-CM: 415. 3   3/16/2016 - Present  
     
  Anemia ICD-10-CM: D64.9 ICD-9-CM: 309. 9   3/11/2016 - Present  
     
  RESOLVED: Deep venous thrombosis (HCC) ICD-10-CM: I82.409 ICD-9-CM: 453.40   10/19/2018 - 4/29/2019  
     
  RESOLVED: Decubitus ulcer of heel, unstageable ([facility] ICD-10-CM: L89.600 ICD-9-CM: 707.07, 707.25   10/31/2017 - 10/31/2017  
     
  RESOLVED: DVT of leg (deep venous thrombosis) (HCC) ICD-10-CM: I82.409 ICD-9-CM: 453.40   Unknown - 6/14/2017  
     
  RESOLVED: UTI (urinary tract infection) ICD-10-CM: N39.0 ICD-9-CM: 599.0   7/20/2016 - 6/14/2017  
     
  RESOLVED: DVT of lower extremity, bilateral (HCC) ICD-10-CM: I82.403 ICD-9-CM: 453.40   3/31/2016 - 6/14/2017  
     
  RESOLVED: Urinary retention ICD-10-CM: R33.9 ICD-9-CM: 788.20   3/31/2016 - 2/27/2018  
     
  RESOLVED: Vertebral osteomyelitis (HCC) ICD-10-CM: M46.20 ICD-9-CM: 730.28   3/30/2016 - 6/14/2017  
     
  RESOLVED: Decubitus ulcer of buttock, stage 2 (HCC) ICD-10-CM: V30.194 ICD-9-CM: 707.05, 707.22   3/26/2016 - 6/14/2017  
     
  RESOLVED: Protein calorie malnutrition ([address] ICD-10-CM: E46 
ICD-9-CM: 263.9   3/24/2016 - 6/14/2017  
     
  RESOLVED: Vaginal candida ICD-10-CM: B37.3 ICD-9-CM: 112.1   3/16/2016 - 6/16/2016  
     
  RESOLVED: Vaginal candida ICD-10-CM: B37.3 ICD-9-CM: 112.1   3/16/2016 - 6/14/2017  
     
  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  
     
  RESOLVED: UTI (urinary tract infection) ICD-10-CM: N39.0 ICD-9-CM: 599.0   3/11/2016 - 6/16/2016  
     
  RESOLVED: GATO (acute kidney injury) ([facility] ICD-10-CM: N17.9 ICD-9-CM: 086. 9   3/11/2016 - 6/14/2017  
     
  RESOLVED: Paraplegia (HCC) ICD-10-CM: H97.02 ICD-9-CM: 344. 1   3/11/2016 - 10/11/2016  
     
  RESOLVED: CAP (community acquired pneumonia) ICD-10-CM: J18.9 ICD-9-CM: 817   3/11/2016 - 6/16/2016  
     
  RESOLVED: Weight loss ICD-10-CM: R63.4 ICD-9-CM: 783.21   3/11/2016 - 6/14/2017  
     
  RESOLVED: Hyponatremia ICD-10-CM: E87.1 ICD-9-CM: 276.1   3/11/2016 - 6/14/2017  
     
   
  
  
Discharge Condition:  Improved 
  
Procedures: None 
  
   
  
HPI: Ramona Patterson is a 66 y. o. year old female who presents with  1 day of coughing up blood.  Has had some cough in recent weeks but hemoptysis just started today.  She states dark red with some small clots.  She denies clotting/bleeding hx, no hx of lung cancer or copd.  She denies taking nsaids, alcohol consumption, no hx of GI bleed.  She continues to cough up this blood in ED, Hb is ok.  Patient does state the blood seems to occur more when she tries to eat/drink something 
  
Hospital Course: 
  
Hemoptysis likely 2/2 acute bronchitis vs atypical bronchitis - CTA was negative for PE, only bronchial wall thickening with area of bronchiectasis. Treated with IV azithromycin/rocephin, brovana/pulmicort and duonebs. She was aloso given mucinex and cough medicine suppressants. She improved over next 48 hours. She was no longer having hemoptysis and no complaints of SOB. Her O2 demand was normal on room air. Pulm was consulted and agreed with plan. Initially plans for bronchoscopy, but this was cancelled. She was doing great on the day of discharge without need for further workup. She was transitioned to oral doxycycline to complete treatment. 
  
The rest of the patient's chronic conditions were managed appropriately during their admission.  They were medically stable at the time of discharge. 
  
 Visit Vitals BP (!) 126/47 Pulse 65 Temp 97.9 °F (36.6 °C) Resp 19 Ht 5' (1.524 m) Wt 57.8 kg (127 lb 6.4 oz) SpO2 99% Breastfeeding Unknown BMI 24.88 kg/m²  
  
  
Physical Exam at Discharge: 
General Appearance: NAD, conversant HENT: normocephalic/atraumatic, moist mucus membranes Lungs: CTA with normal respiratory effort CV: RRR, no m/r/g Abdomen: soft, non-tender, normal bowel sounds Extremities: no cyanosis, no peripheral edema Neuro: moves all extremities, no focal deficits Psych: appropriate affect, alert and oriented to person, place and time 
  
Labs Prior to Discharge: 
Labs: Results:  
     
Chemistry          
Recent Labs  
  10/04/20 
0410 10/03/20 
0400 10/02/20 
9143 GLU 81 75 88  138 137  
K 4.0 3.8 4.2 * 107 107 CO2 25 26 27 BUN 14 17 23* CREA 0.91 1.04 0.98  
CA 7.6* 8.0* 8.7 AGAP 4 5 3 BUCR 15 16 23*  
   
CBC w/Diff            
Recent Labs  
  10/04/20 
0410 10/03/20 
0400 10/02/20 
0414 10/01/20 
2041 WBC 2.4* 2.5* 3.7* 3.8*  
RBC 3.79* 3.58* 3.75* 4.01* HGB 11.9* 11.3* 11.7* 12.6 HCT 36.2 34.3* 35.7 37.7 * 147 158 164 GRANS  --   --   --  71  
LYMPH  --   --   --  16* EOS  --   --   --  1  
   
Cardiac Enzymes No results for input(s): CPK, CKND1, SHANITA in the last 72 hours. 
  
No lab exists for component: Lawerence Purple Coagulation      
Recent Labs  
  10/01/20 
2041 PTP 13.2 INR 1.0 APTT 37.1*  
   
Lipid Panel          
Lab Results Component Value Date/Time  
  Cholesterol, total 190 04/29/2019 03:38 PM  
  HDL Cholesterol 95 (H) 04/29/2019 03:38 PM  
  LDL, calculated 82.2 04/29/2019 03:38 PM  
  VLDL, calculated 12.8 04/29/2019 03:38 PM  
  Triglyceride 64 04/29/2019 03:38 PM  
  CHOL/HDL Ratio 2.0 04/29/2019 03:38 PM  
   
BNP No results for input(s): BNPP in the last 72 hours.   
Liver Enzymes No results for input(s): TP, ALB, TBIL, AP in the last 72 hours. 
  
No lab exists for component: SGOT, GPT, DBIL  
 Thyroid Studies          
Lab Results Component Value Date/Time  
  TSH 1.37 08/17/2020 01:13 PM  
    
  
  
Significant Imaging: 
Cta Chest W Or W Wo Cont 
  
Result Date: 10/2/2020 EXAM: CTA chest CLINICAL INDICATION/HISTORY: hemoptysis   > Additional: Chronic cough. Shortness of breath. COMPARISON: None.   > Reference Exam: CT chest without contrast 03/28/2016 TECHNIQUE: Axial CT imaging from the thoracic inlet through the diaphragm with intravenous contrast. Coronal and sagittal MIP reformats were generated. One or more dose reduction techniques were used on this CT: automated exposure control, adjustment of the mAs and/or kVp according to patient size, and iterative reconstruction techniques.  The specific techniques used on this CT exam have been documented in the patient's electronic medical record.  Digital Imaging and Communications in Medicine (DICOM) format image data are available to nonaffiliated external healthcare facilities or entities on a secure, media free, reciprocally searchable basis with patient authorization for at least a 12-month period after this study. _______________ FINDINGS: EXAM QUALITY: Excellent. PULMONARY ARTERIES: No evidence of pulmonary embolism. MEDIASTINUM: Normal heart size. No pericardial effusion. Leotis [place]quois is mildly atherosclerotic without dissection. LUNGS/AIRWAY: Bronchial wall thickening with areas of bronchiectasis more prevalent within the right middle lobe and inferior lingula. Adjacent patchy areas of airspace and groundglass opacity with scattered areas of subpleural nodularity and tree-in-bud opacification. PLEURA: No significant pleural effusion. LYMPH NODES: No enlarged nodes. UPPER ABDOMEN: Unremarkable. OTHER: No acute or aggressive osseous abnormalities identified. _______________  
  
IMPRESSION: 1.  No evidence of pulmonary embolism.  2. Bronchial wall thickening with areas of bronchiectasis, more prevalent within the right middle lobe and inferior lingula with adjacent patchy areas of airspace and groundglass opacities, likely sequela of infectious/inflammatory process with potential associated bronchitis. Preliminary report provided by on-call radiology resident. *   ** *   ** 
  
Xr Chest Port 
  
Result Date: 10/2/2020 EXAM:  PORTABLE CHEST INDICATION:  Chest pain and shortness of breath. TECHNIQUE:  Portable, AP view. COMPARISON:  05/21/2016 ____________________ FINDINGS:  SUPPORT DEVICES: None. Interval removal of left arm PICC. HEART AND MEDIASTINUM: Cardiomediastinal silhouette appears within normal limits.  Normal caliber thoracic aorta. LUNGS AND PLEURAL SPACES: Lungs are well expanded. Peribronchial cuffing noted within the central airways.  No focal consolidation, pulmonary edema, pneumothorax or significant pleural effusion.  BONY THORAX AND SOFT TISSUES: No acute osseous abnormality. ____________________  
  
IMPRESSION:  Peribronchial cuffing may represent sequela of bronchitis. No focal consolidation. See accompanying CT report.  
  
  
   
  
Discharge Medications:    
       
Current Discharge Medication List  
   
       
START taking these medications  
  Details  
benzonatate (Tessalon Perles) 100 mg capsule Take 1 Cap by mouth three (3) times daily as needed for Cough for up to 10 days. Qty: 30 Cap, Refills: 0  
   
doxycycline (MONODOX) 100 mg capsule Take 1 Cap by mouth two (2) times a day for 5 days. Qty: 10 Cap, Refills: 0  
   
   
       
CONTINUE these medications which have CHANGED  
  Details  
albuterol (PROVENTIL HFA, VENTOLIN HFA, PROAIR HFA) 90 mcg/actuation inhaler Take 1 Puff by inhalation every four (4) hours as needed (reactive airway symptoms including cough). Qty: 1 Inhaler, Refills: 0  
  Associated Diagnoses: Mild persistent reactive airway disease without complication; Cough    
   
       
CONTINUE these medications which have NOT CHANGED  
  Details gabapentin (NEURONTIN) 100 mg capsule TAKE 2 CAPSULES BY MOUTH THREE TIMES DAILY Qty: 540 Cap, Refills: 0  
  Associated Diagnoses: Paraspinal abscess ([address] RLS (restless legs syndrome)  
   
rOPINIRole (REQUIP) 0.5 mg tablet TAKE 1 TABLET BY MOUTH EVERY NIGHT Qty: 90 Tab, Refills: 1  
  Associated Diagnoses: RLS (restless legs syndrome)  
   
ferrous gluconate 324 mg (38 mg iron) tablet Take 1 Tab by mouth daily. TAKE 1 TABLET BY MOUTH EVERY MONDAY, WEDNESDAY, AND SUNDAY Qty: 90 Tab, Refills: 1  
  Associated Diagnoses: Microcytic anemia  
   
loratadine (CLARITIN) 10 mg tablet Take 1 Tab by mouth daily. Qty: 90 Tab, Refills: 1  
  Associated Diagnoses: Non-seasonal allergic rhinitis due to other allergic trigger  
   
montelukast (SINGULAIR) 10 mg tablet TAKE 1 TABLET BY MOUTH DAILY Qty: 90 Tab, Refills: 1  
  Comments: **Patient requests 90 days supply** Associated Diagnoses: Mild persistent reactive airway disease without complication  
   
multivitamin (ONE A DAY) tablet Take 1 Tab by mouth daily.  
   
calcium-cholecalciferol, d3, (CALCIUM 600 + D) 600-125 mg-unit tab Take 2 Tabs by mouth.  
   
cholecalciferol (VITAMIN D3) (5000 Units/125 mcg) tab tablet Take  by mouth daily.  
   
b complex vitamins tablet Take 1 Tab by mouth daily.  
   
latanoprost (XALATAN) 0.005 % ophthalmic solution Administer 2 Drops to both eyes nightly.  
   
   
  
  
Activity: Activity as tolerated 
  
Diet: Resume previous diet 
  
Wound Care: None needed 
  
Follow-up:  
Please follow up with your PCP within 7 days to discuss your recent hospitalization. Patient to arrange. 
  
   
  
Total time spent including time spent on final examination and discharge discussion, discharge documentation and records reviewed and medication reconciliation: > 30 minutes 
  
Crys Lui DO Internal Medicine, Hospitalist 
Pager: [phone] [facility]

## 2020-10-06 ENCOUNTER — PATIENT OUTREACH (OUTPATIENT)
Dept: CASE MANAGEMENT | Age: 75
End: 2020-10-06

## 2020-10-06 NOTE — PROGRESS NOTES
Patient was admitted to [facility] on 10/1/20 and discharged on 10/4/20 for acute bronchitis. Patient was contacted within 2 business days of discharge. Top Discharge Challenges to be reviewed by the provider   Additional needs identified to be addressed with provider no  none  Discussed COVID-19 related testing which was available at this time. Test results were negative. Patient informed of results, if available? yes   Method of communication with provider : chart routing       Advance Care Planning:   Does patient have an Advance Directive:  yes; reviewed and current     Inpatient Readmission Risk score: 23%  Was this a readmission? no   Patient stated reason for the admission: N/A  Patients top risk factors for readmission: N/A  Interventions to address risk factors: N/A    Care Transition Nurse (CTN) contacted the patient by telephone to perform post hospital discharge assessment. Verified name and  with patient as identifiers. Provided introduction to self, and explanation of the CTN role. Patient voiced she has mold underneath her home that she has been dealing with for some time. CTN reviewed discharge instructions, medical action plan and red flags with patient who verbalized understanding. Patient given an opportunity to ask questions and does not have any further questions or concerns at this time. The patient agrees to contact the PCP office for questions related to their healthcare. Medication reconciliation was performed with patient, who verbalizes understanding of administration of home medications. Advised obtaining a 90-day supply of all daily and as-needed medications. Referral to Pharm D needed: no     Home Health/Outpatient orders at discharge: none     Durable Medical Equipment ordered at discharge: N/A     Covid Risk Education  Patient has following risk factors of: age.  Education provided regarding infection prevention, and signs and symptoms of COVID-19 and when to seek medical attention with patient who verbalized understanding. Discussed exposure protocols and quarantine From CDC: Are you at higher risk for severe illness?  and given an opportunity for questions and concerns. The patient agrees to contact the COVID-19 hotline [phone] or PCP office for questions related to COVID-19. For more information on steps you can take to protect yourself, see CDC's How to Protect Yourself     Patient/family/caregiver given information for [employer] and agrees to enroll no      Discussed follow-up appointments. If no appointment was previously scheduled, appointment scheduling offered: patient voiced she will contact PCP for follow up  [facility] follow up appointment(s):   Future Appointments   Date Time Provider Earnest Mosher   12/7/2020  1:15 PM Bret [address] [place] [address]   12/14/2020  1:00 PM Ana Lilia Hoyt MD [phone] BS [facility]   7/26/2021 10:00 AM [facility]   7/26/2021 10:30 AM Desirae Torres MD [facility] OpenPM     Non[facility] follow up appointment(s): N/A  Plan for follow-up call in 7-10 days based on severity of symptoms and risk factors. CTN provided contact information for future needs. Goals Addressed                 This Visit's Progress     Attends follow-up appointments as directed. Goal: Patient will attend all appointments scheduled within the next 30 days. Ensure provider appt is scheduled within 7 days post-discharge; 10/5: Will assist with scheduling GRACIE appt. 10/6: Patient voiced she will call to schedule    Confirm patient attended post-discharge provider appt   Complete post-visit call to confirm attendance and update care needs           Prevent complications post hospitalization. Goal: No admissions post 30 days from discharge of 10/4/20.       Review/educate common or potential \"red flags\" of condition worsening; 10/6: Reviewed/educated s/s to monitor and report  Evaluate adherence to medications and priority barriers to resolve; 10/6: No barriers noted at this time      Instruct on adherence to medications as ordered and assess for therapeutic response and side-effects; 10/6: Patient taking meds as prescribed    Communicate visits and goals between multiple providers and services    Assess for health risk behaviors and educate patient/caregivers on reducing risk      Discuss and provide resources for ACP; 10/5: Reviewed and current

## 2020-10-09 ENCOUNTER — TELEPHONE (OUTPATIENT)
Dept: FAMILY MEDICINE CLINIC | Age: 75
End: 2020-10-09

## 2020-10-09 ENCOUNTER — PATIENT OUTREACH (OUTPATIENT)
Dept: CASE MANAGEMENT | Age: 75
End: 2020-10-09

## 2020-10-09 ENCOUNTER — PATIENT MESSAGE (OUTPATIENT)
Dept: FAMILY MEDICINE CLINIC | Age: 75
End: 2020-10-09

## 2020-10-09 RX ORDER — BENZONATATE 100 MG/1
100 CAPSULE ORAL
Qty: 30 CAP | Refills: 0 | Status: SHIPPED | OUTPATIENT
Start: 2020-10-09 | End: 2020-10-21 | Stop reason: SDUPTHER

## 2020-10-09 NOTE — TELEPHONE ENCOUNTER
Patient requested refill for [facility]. Pharmacy on file confirmed; [facility] on [address].

## 2020-10-09 NOTE — PROGRESS NOTES
Spoke with patient today. Patient c/o scratchy throat r/t to coughing and clearing of throat. Writer suggested Chloraseptic. Patient voiced she is also using her albuterol inhaler every 4 hours. Patient also requested refill on Tessalon Perles. Pharmacy confirmed. Patient also voiced she has mold under her home which I  suspect is exacerbating her condition. Asked  if patient had friend or family where she may stay temporarily until she could handle the mold issue. Patient voiced she does not have that option and it would cost around $10,000 to address mold. Patient verbalized she has contacted the [employer] on the past and they did help but does not think they would assist again. Patient provided verbal permission for referral to [facility] . Patient verbalized she has scheduled a virtual follow up appt at PCP office but she would like to have a face to face visit. Patient reported she is having some loose stools; denied diarrhea. Patient takes a probiotic daily.

## 2020-10-09 NOTE — TELEPHONE ENCOUNTER
Pt. Stated her friend takes benzonatate (Tessalon Perles) 100 mg capsule 200mg and she wants to know if she can take 200mg instead of 100mg. Pt. Stated she cannot stop coughing. Please advise.

## 2020-10-12 ENCOUNTER — PATIENT OUTREACH (OUTPATIENT)
Dept: CASE MANAGEMENT | Age: 75
End: 2020-10-12

## 2020-10-12 NOTE — PROGRESS NOTES
Social Work Note  10/12/2020      Date of referral: 10/9/2020  Referral received from: Sherine Torres  Reason for referral: Assist the patient with community resources to address mold issues in the home. Previous SW Referral:  no   If yes, brief summary and outcome:  n/a    Support System: Family and friends    Community Providers: unknown    Transportation: unknown    Medication:unknown    Financial Concern: unknown    Advance Care Plan:  reviewed and current     Other: n;a    Identified Needs:      The patient indicated that she has mold in the crawl space of the home. Social Work Plan:     Locate community resources to assist with addressing the mold issues in the patient's home. MSW received referral to assist the patient with community resources to address the mold issues in the home. MSW contacted the patient and introduced self, role and reason for call. The patient verified her name and  as identifiers. The patient  is alert and oriented X4 and was very interactive in conversation. She reported that she has mold in the crawl space of the home and shared that she's had that problem for many years. She mentioned that the wood in the crawl space was treated some years ago, she said she believed it might have been ten years ago. The patient informed MSW that she has been experiencing constant cough and was recently diagnosed with acute bronchitis. She reported that whenever she turns on the [facility] or heater in the home, she is able to smell the mold. MSW provided the patient with contact  information to the [address] [place] [address] to request for the [facility] and also provided with information to obtain grants to assist with obtaining funds for removal of mold and repair at [email or web address] org/v9/[email or web address]      The patient expressed her gratitude for the information and mentioned that she will contact the dept and access the website to complete the application form. MSW will follow to assist the patient as needed.

## 2020-10-12 NOTE — TELEPHONE ENCOUNTER
Called  patient to let her know that she can start taking 2 capsules at a time. No other concerns at this time closing encounter

## 2020-10-13 ENCOUNTER — PATIENT OUTREACH (OUTPATIENT)
Dept: CASE MANAGEMENT | Age: 75
End: 2020-10-13

## 2020-10-13 NOTE — PROGRESS NOTES
Transitions of Care Coordination  Follow-Up    Date/Time:  10/13/2020 2:11 PM     EMR reviewed. Patient was given okay to take Tessalon Perles 200 mg. PCP visit changed from virtual to  face to face visit. CTN (Care Transitions Nurse) contacted patient for Transitions of Care Coordination  follow up. Spoke to patient. Introduced self/role and reason for call. Patient reported:   Cough is no worse but still chronic; but not as often  Using inhaler for cough  Completed doxycycline  Having 2-3 loose stools but not watery diarrhea  Received MOM meals from [employer]   Taking Tessalon Perles 100 mg b/c she read 200 mg is not recommended for chronic cough or those with excessive mucus     Specialist appointments since last outreach? no  If so, specialist and date: N/a    Medications:   New medications since last outreach: no  Does patient need refills on any medications: no  Medication changes since last outreach (dose adjustments or discontinued meds): no    Barriers to care?  living situation; pt is to complete form for [employer] offered by the [employer]    Discussed exposure protocols and quarantine from [address] you at higher risk for severe illness 2019 and given an opportunity for questions and concerns. From CDC: Are you at higher risk for severe illness?  Wash your hands often.  Avoid close contact (6 feet, which is about two arm lengths) with people who are sick.  Put distance between yourself and other people if COVID-19 is spreading in your community.  Clean and disinfect frequently touched surfaces.  Avoid all cruise travel and non-essential air travel.  Call your healthcare professional if you have concerns about COVID-19 and your underlying condition or if you are sick.     Patient reminded that there are physicians on call 24 hours a day / 7 days a week (M-F 5pm to 8am and from Friday 5pm until Monday 8a for the weekend) should the patient have questions or concerns. Future Appointments   Date Time Provider Earnest Vidhi   10/15/2020  3:30 PM Lei Zavala MD [facility] BS AMB   12/7/2020  1:15 PM [address] INFUSION PHLEBOTOMIST PREET Navarrete [address]   12/14/2020  1:00 PM Edy Day MD [id number] BS AMB   7/26/2021 10:00 AM [facility]   7/26/2021 10:30 AM Tiarra Jackson MD [facility]        Other upcoming appointments:  N/A    Goals      Attends follow-up appointments as directed. Goal: Patient will attend all appointments scheduled within the next 30 days. Ensure provider appt is scheduled within 7 days post-discharge; 10/5: Will assist with scheduling GRACIE appt. 10/6: Patient voiced she will call to schedule. 10/13: Virtual appt changed to face to face visit; patient aware. Confirm patient attended post-discharge provider appt   Complete post-visit call to confirm attendance and update care needs           Prevent complications post hospitalization. Goal: No admissions post 30 days from discharge of 10/4/20. Review/educate common or potential \"red flags\" of condition worsening; 10/6: Reviewed/educated s/s to monitor and report  Evaluate adherence to medications and priority barriers to resolve; 10/6: No barriers noted at this time. 10/13:Completed doxycycline. Instruct on adherence to medications as ordered and assess for therapeutic response and side-effects; 10/6: Patient taking meds as prescribed.  10/13: Experiencing loose stools since completion of doxycycline; denied watery diarrhea    Discuss and provide resources for ACP; 10/5: Reviewed and current

## 2020-10-15 ENCOUNTER — OFFICE VISIT (OUTPATIENT)
Dept: FAMILY MEDICINE CLINIC | Age: 75
End: 2020-10-15
Payer: MEDICARE

## 2020-10-15 VITALS
HEART RATE: 76 BPM | DIASTOLIC BLOOD PRESSURE: 62 MMHG | OXYGEN SATURATION: 98 % | SYSTOLIC BLOOD PRESSURE: 116 MMHG | TEMPERATURE: 98.2 F | HEIGHT: 60 IN | BODY MASS INDEX: 24.15 KG/M2 | RESPIRATION RATE: 16 BRPM | WEIGHT: 123 LBS

## 2020-10-15 DIAGNOSIS — R05.9 COUGH: ICD-10-CM

## 2020-10-15 DIAGNOSIS — J45.30 MILD PERSISTENT REACTIVE AIRWAY DISEASE WITHOUT COMPLICATION: ICD-10-CM

## 2020-10-15 DIAGNOSIS — J47.9 BRONCHIECTASIS WITHOUT COMPLICATION (HCC): Primary | ICD-10-CM

## 2020-10-15 PROBLEM — R04.2 HEMOPTYSIS: Status: RESOLVED | Noted: 2020-10-01 | Resolved: 2020-10-15

## 2020-10-15 PROBLEM — J20.9 ACUTE BRONCHITIS: Status: RESOLVED | Noted: 2020-10-02 | Resolved: 2020-10-15

## 2020-10-15 PROCEDURE — 1101F PT FALLS ASSESS-DOCD LE1/YR: CPT | Performed by: INTERNAL MEDICINE

## 2020-10-15 PROCEDURE — G8536 NO DOC ELDER MAL SCRN: HCPCS | Performed by: INTERNAL MEDICINE

## 2020-10-15 PROCEDURE — G8420 CALC BMI NORM PARAMETERS: HCPCS | Performed by: INTERNAL MEDICINE

## 2020-10-15 PROCEDURE — 99214 OFFICE O/P EST MOD 30 MIN: CPT | Performed by: INTERNAL MEDICINE

## 2020-10-15 PROCEDURE — 1090F PRES/ABSN URINE INCON ASSESS: CPT | Performed by: INTERNAL MEDICINE

## 2020-10-15 PROCEDURE — 1111F DSCHRG MED/CURRENT MED MERGE: CPT | Performed by: INTERNAL MEDICINE

## 2020-10-15 PROCEDURE — G8427 DOCREV CUR MEDS BY ELIG CLIN: HCPCS | Performed by: INTERNAL MEDICINE

## 2020-10-15 PROCEDURE — G8432 DEP SCR NOT DOC, RNG: HCPCS | Performed by: INTERNAL MEDICINE

## 2020-10-15 PROCEDURE — G8399 PT W/DXA RESULTS DOCUMENT: HCPCS | Performed by: INTERNAL MEDICINE

## 2020-10-15 RX ORDER — ALBUTEROL SULFATE 90 UG/1
2 AEROSOL, METERED RESPIRATORY (INHALATION)
Qty: 1 INHALER | Refills: 0 | Status: SHIPPED | OUTPATIENT
Start: 2020-10-15 | End: 2021-02-05 | Stop reason: SDUPTHER

## 2020-10-15 NOTE — PROGRESS NOTES
Terre Cogan presents today for hospital follow up   - Is someone accompanying this pt? Yes  - Is the patient using any durable medical equipment during office visit? renata    Coordination of Care:  1. Have you been to the ER, urgent care clinic since your last visit? Hospitalized since your last visit? Oct 1 to  0ct 4th     2. Have you seen or consulted any other health care providers outside of the [address] [place] [address] since your last visit? Include any pap smears or colon screening.  No

## 2020-10-15 NOTE — PROGRESS NOTES
History of Present Illness  Robson Choi is a 66 y.o. female who presents today for management of    Chief Complaint   Patient presents with   [facility] Follow Up       Patient was admitted at [facility] on 10/1-10/4/2020 for hemoptysis and cough. CTA of the chest was negative for PE, it showed some areas of bronchiectasis. She was treated with antibiotics and inhaled steroids. Patient reports of chronic cough for 7 months. She has minimal, thin and clear mucus. Denies shortness of breath. Patient completed antibiotic treatment 5 days ago. Problem List  Patient Active Problem List    Diagnosis Date Noted    Bronchiectasis ([address] 10/02/2020    Tachycardia 10/01/2020    Leukopenia 08/13/2020    Iron deficiency anemia 08/13/2020    Heme positive stool 01/22/2020    Voiding dysfunction 12/26/2019    LBBB (left bundle branch block) 10/19/2018    Pain at rest 10/19/2018    Diarrhea 10/19/2018    Uterovaginal prolapse 07/03/2018    Screening for colon cancer 03/22/2018    Functional urinary incontinence 02/27/2018    Bladder stones     Uterus prolapse     RLS (restless legs syndrome) 04/04/2016    Back pain 03/31/2016    Visual changes 03/30/2016    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  benzonatate (Tessalon Perles) 100 mg capsule Take 1 Cap by mouth three (3) times daily as needed for Cough for up to 10 days.  Lactobac no.41/Bifidobact no.7 (PROBIOTIC-10 PO) Take 1 Tab by mouth daily.     gabapentin (NEURONTIN) 100 mg capsule TAKE 2 CAPSULES BY MOUTH THREE TIMES DAILY (Patient taking differently: Take 200 mg by mouth two (2) times a day.)    rOPINIRole (REQUIP) 0.5 mg tablet TAKE 1 TABLET BY MOUTH EVERY NIGHT    ferrous gluconate 324 mg (38 mg iron) tablet Take 1 Tab by mouth daily. TAKE 1 TABLET BY MOUTH EVERY MONDAY, WEDNESDAY, AND SUNDAY (Patient taking differently: Take 324 mg by mouth daily. TAKE 1 TABLET BY MOUTH EVERY MONDAY, WEDNESDAY, AND FRIDAY)    loratadine (CLARITIN) 10 mg tablet Take 1 Tab by mouth daily.  montelukast (SINGULAIR) 10 mg tablet TAKE 1 TABLET BY MOUTH DAILY    multivitamin (ONE A DAY) tablet Take 1 Tab by mouth daily.  calcium-cholecalciferol, d3, (CALCIUM 600 + D) 600-125 mg-unit tab Take 2 Tabs by mouth.  cholecalciferol (VITAMIN D3) (5000 Units/125 mcg) tab tablet Take  by mouth daily.  b complex vitamins tablet Take 1 Tab by mouth daily.  latanoprost (XALATAN) 0.005 % ophthalmic solution Administer 1 Drop to both eyes nightly. No current facility-administered medications for this visit. Allergies/Drug Reactions  Allergies   Allergen Reactions    Milk Containing Products Other (comments)     Mucous    Bactrim [Sulfamethoprim] Nausea Only and Other (comments)     Headache, Joint pain, loss of appetite     Mold Other (comments)    Pollen Extracts Other (comments)    Sulfamethoxazole-Trimethoprim Other (comments)        Review of Systems  Review of Systems   Constitutional: Negative for chills, fever, malaise/fatigue and weight loss. Respiratory: Positive for cough and sputum production. Negative for shortness of breath and wheezing. Cardiovascular: Negative for chest pain, palpitations and leg swelling. Gastrointestinal: Negative for abdominal pain, blood in stool, constipation, diarrhea, heartburn, melena, nausea and vomiting. Neurological: Negative for dizziness and headaches.         Physical Exam  Vital signs:   Vitals:    10/15/20 1318   BP: 116/62   Pulse: 76   Resp: 16   Temp: 98.2 °F (36.8 °C)   TempSrc: Temporal   SpO2: 98%   Weight: 123 lb (55.8 kg)   Height: 5' (1.524 m)       General: alert, oriented, not in distress  Head: scalp normal, atraumatic  Eyes: pupils are equal and reactive, full and intact EOM's  Chest/Lungs: clear breath sounds, no wheezing or crackles   Heart: normal rate, regular rhythm, no murmur  Extremities: no focal deformities, no edema  Skin: no active skin lesions    Laboratory/Tests:  CTA CHEST 10/1/2020  1. No evidence of pulmonary embolism.     2. Bronchial wall thickening with areas of bronchiectasis, more prevalent within  the right middle lobe and inferior lingula with adjacent patchy areas of  airspace and groundglass opacities, likely sequela of infectious/inflammatory  process with potential associated bronchitis. Assessment/Plan:      ICD-10-CM ICD-9-CM    1. Bronchiectasis without complication (HCC)  V86.8 494.0 REFERRAL TO PULMONARY DISEASE   2. Mild persistent reactive airway disease without complication  L40.50 932.56 albuterol (PROVENTIL HFA, VENTOLIN HFA, PROAIR HFA) 90 mcg/actuation inhaler   3. Cough  R05 786.2 albuterol (PROVENTIL HFA, VENTOLIN HFA, PROAIR HFA) 90 mcg/actuation inhaler     Educated about bronchiectasis  Completed antibiotic treatment  PRN albuterol  Mucolytics  PRN benzonatate    Follow-up and Dispositions    · Return if symptoms worsen or fail to improve. Total visit time was 25 minutes of which more than 50% was devoted to counseling and coordination of care. I have discussed the diagnosis with the patient and the intended plan as seen in the above orders. The patient has received an after-visit summary and questions were answered concerning future plans. I have discussed medication side effects and warnings with the patient as well. I have reviewed the plan of care with the patient, accepted their input and they are in agreement with the treatment goals.        Genia Aguirre MD  October 15, 2020

## 2020-10-20 ENCOUNTER — PATIENT MESSAGE (OUTPATIENT)
Dept: FAMILY MEDICINE CLINIC | Age: 75
End: 2020-10-20

## 2020-10-20 ENCOUNTER — PATIENT OUTREACH (OUTPATIENT)
Dept: CASE MANAGEMENT | Age: 75
End: 2020-10-20

## 2020-10-20 DIAGNOSIS — J47.9 BRONCHIECTASIS WITHOUT COMPLICATION (HCC): Primary | ICD-10-CM

## 2020-10-20 NOTE — PROGRESS NOTES
Social Work Note  10/20/2020      Date of referral: 10/9/2020  Referral received from: Sherine Torres  Reason for referral: Assist the patient with community resources to address mold issues in the home.       Previous SW Referral:  no   If yes, brief summary and outcome:  n/a     Support System: Family and friends     Community Providers: unknown     Transportation: unknown     Medication:unknown     Financial Concern: unknown     Advance Care Plan:  reviewed and current      Other: n;a     Identified Needs:      · The patient indicated that she has mold in the crawl space of the home.      Social Work Plan:     · Locate community resources to assist with addressing the mold issues in the patient's home.         MSW received referral to assist the patient with community resources to address the mold issues in the home.       MSW contacted the patient and introduced self, role and reason for call. The patient verified her name and  as identifiers. The patient informed that she was able to print out the applications to be completed and they require a fee. MSW suggested and encouraged the patient to call the local numbers provided for the [employer]. MSW continues to research other community resources for assistance with mold issues.      MSW will continue to follow to assist.

## 2020-10-21 RX ORDER — BENZONATATE 100 MG/1
100 CAPSULE ORAL
Qty: 30 CAP | Refills: 0 | Status: SHIPPED | OUTPATIENT
Start: 2020-10-21 | End: 2020-10-31

## 2020-10-26 ENCOUNTER — PATIENT OUTREACH (OUTPATIENT)
Dept: CASE MANAGEMENT | Age: 75
End: 2020-10-26

## 2020-10-26 NOTE — PROGRESS NOTES
Social Work Note  10/26/2020      Date of referral: 10/9/2020  Referral received from: LATHA Davies  Reason for referral: Assist the patient with community resources to address mold issues in the home.       Previous SW Referral:  no   If yes, brief summary and outcome:  n/a     Support System: Family and friends     Community Providers: unknown     Transportation: unknown     Medication:unknown     Financial Concern: unknown     Advance Care Plan:  reviewed and current      Other: n;a     Identified Needs:      · The patient indicated that she has mold in the crawl space of the home.      Social Work Plan:     · Locate community resources to assist with addressing the mold issues in the patient's home.         MSW received referral to assist the patient with community resources to address the mold issues in the home.       MSW contacted the patient and introduced self, role and reason for call. The patient verified her name and  as identifiers. The patient reported that she contacted the local numbers to the [employer] and was given another number to call. She mentioned that she has left messages and have not received a call back as yet. MSW requested to assist the with attempts to contact the representative. The patient informed that she did not have the telephone number close to her as she was currently laying in bed. MSW informed the patient that she will call back again tomorrow to obtain the information. MSW will follow to assist as needed.

## 2020-10-27 ENCOUNTER — PATIENT OUTREACH (OUTPATIENT)
Dept: CASE MANAGEMENT | Age: 75
End: 2020-10-27

## 2020-10-27 NOTE — PROGRESS NOTES
Social Work Note  10/27/2020      Date of referral: 10/9/2020  Referral received from: LATHA Rahman  Reason for referral: Assist the patient with community resources to address mold issues in the home.       Previous SW Referral:  no   If yes, brief summary and outcome:  n/a     Support System: Family and friends     Community Providers: unknown     Transportation: unknown     Medication:unknown     Financial Concern: unknown     Advance Care Plan:  reviewed and current      Other: n;a     Identified Needs:      · The patient indicated that she has mold in the crawl space of the home.      Social Work Plan:     · Locate community resources to assist with addressing the mold issues in the patient's home.         MSW received referral to assist the patient with community resources to address the mold issues in the home.       MSW contacted the patient and introduced self, role and reason for call. The patient verified her name and  as identifiers.     The patient is alert and oriented and very engaged in conversation. She provided MSW with contact number to [employer]. Patient stated that she is feeling well today. MSW attempted to contact the [employer] and left message requesting a return call. MSW also contacted the [employer] ([employer]). Left message requesting a return call.      MSW will follow to assist.

## 2020-11-04 ENCOUNTER — PATIENT OUTREACH (OUTPATIENT)
Dept: CASE MANAGEMENT | Age: 75
End: 2020-11-04

## 2020-11-04 NOTE — PROGRESS NOTES
Social Work Note  2020        Date of referral: 10/9/2020  Referral received from: LATHA Gallagher  Reason for referral: Assist the patient with community resources to address mold issues in the home.       Previous SW Referral:  no   If yes, brief summary and outcome:  n/a     Support System: Family and friends     Community Providers: unknown     Transportation: unknown     Medication:unknown     Financial Concern: unknown     Advance Care Plan:  reviewed and current      Other: n;a     Identified Needs:      · The patient indicated that she has mold in the crawl space of the home.      Social Work Plan:     · Locate community resources to assist with addressing the mold issues in the patient's home.         MSW received referral to assist the patient with community resources to address the mold issues in the home.       MSW contacted the patient and introduced self, role and reason for call. The patient verified her name and  as identifiers.      The patient is alert and oriented and very engaged in conversation. The patient reported that she has not received a call back from [employer]. MSW informed the patient that call has been placed to the [employer] ([phone]) and  no return calls have not been received as well. Patient shared that she was resting and was happy to hear from MSW. MSW will continue attempts to reach Ms Gaby Massey. MSW will follow to assist the patient.

## 2020-11-05 ENCOUNTER — PATIENT OUTREACH (OUTPATIENT)
Dept: CASE MANAGEMENT | Age: 75
End: 2020-11-05

## 2020-11-05 NOTE — PROGRESS NOTES
Patient has graduated from the Transitions of Care Coordination  program on 11/5/20. Patient's symptoms are stable at this time. Patient/family has the ability to self-manage. Care management goals have been completed at this time. No further care transitions nurse follow up scheduled. Goals Addressed                 This Visit's Progress     Attends follow-up appointments as directed. On track     Goal: Patient will attend all appointments scheduled within the next 30 days. Ensure provider appt is scheduled within 7 days post-discharge; 10/5: Will assist with scheduling GRACIE appt. 10/6: Patient voiced she will call to schedule. 10/13: Virtual appt changed to face to face visit; patient aware. Confirm patient attended post-discharge provider appt; Attended PCP appt on 10/15. Complete post-visit call to confirm attendance and update care needs; Done           Prevent complications post hospitalization. On track     Goal: No admissions post 30 days from discharge of 10/4/20. Review/educate common or potential \"red flags\" of condition worsening; 10/6: Reviewed/educated s/s to monitor and report  Evaluate adherence to medications and priority barriers to resolve; 10/6: No barriers noted at this time. 10/13:Completed doxycycline. Instruct on adherence to medications as ordered and assess for therapeutic response and side-effects; 10/6: Patient taking meds as prescribed. 10/13: Experiencing loose stools since completion of doxycycline; denied watery diarrhea    Discuss and provide resources for ACP; 10/5: Reviewed and current                  Patient has care transitions nurse contact information for any further questions, concerns, or needs.   Patient's upcoming visits:    Future Appointments   Date Time Provider Earnest Mosher   12/7/2020  1:15 PM Bret Navarrete [address] [place] [address]   12/14/2020  1:00 PM Eliseo Robles MD [id number] BS AMB   7/26/2021 10:00 AM [facility] OpenPM   7/26/2021 10:30 AM Miles Jefferson MD [facility] OpenPM      Chart forwarded to JEREMÍAS Avery for continued assistance with community resources.

## 2020-11-10 ENCOUNTER — PATIENT OUTREACH (OUTPATIENT)
Dept: CASE MANAGEMENT | Age: 75
End: 2020-11-10

## 2020-11-10 NOTE — PROGRESS NOTES
Social Work Note  11/10/2020    Date of referral: 10/9/2020  Referral received from: LATHA Virk  Reason for referral: Assist the patient with community resources to address mold issues in the home.       Previous SW Referral:  no   If yes, brief summary and outcome:  n/a     Support System: Family and friends     Community Providers: unknown     Transportation: unknown     Medication:unknown     Financial Concern: unknown     Advance Care Plan:  reviewed and current      Other: n;a     Identified Needs:      · The patient indicated that she has mold in the crawl space of the home.      Social Work Plan:     · Locate community resources to assist with addressing the mold issues in the patient's home.         MSW received referral to assist the patient with community resources to address the mold issues in the home.      MSW attempted to reach the patient by telephone. Left HIPPA compliant message for the patient. MSW will re-attempt to reach the patient at a later time.

## 2020-11-12 ENCOUNTER — PATIENT OUTREACH (OUTPATIENT)
Dept: CASE MANAGEMENT | Age: 75
End: 2020-11-12

## 2020-11-12 NOTE — PROGRESS NOTES
Social Work Note  2020      Date of referral: 10/9/2020  Referral received from: LATHA Hughes  Reason for referral: Assist the patient with community resources to address mold issues in the home.       Previous SW Referral:  no   If yes, brief summary and outcome:  n/a     Support System: Family and friends     Community Providers: unknown     Transportation: unknown     Medication:unknown     Financial Concern: unknown     Advance Care Plan:  reviewed and current      Other: n;a     Identified Needs:      · The patient indicated that she has mold in the crawl space of the home.      Social Work Plan:     · Locate community resources to assist with addressing the mold issues in the patient's home.         MSW received referral to assist the patient with community resources to address the mold issues in the home.       MSW contacted the patient and introduced self, role and reason for call. The patient verified her name and  as identifiers. Patient shared that she recognizes MSW' s voice. The patient is alert and oriented and pleasantly engaged in conversation. MSW informed the patient that research has been done to find assistance to address the mold issue in her home and continuous attempts made to the director of Rural [employer]. MSW provided the patient with contact number to the [facility]. gov director. MSW will continue to research for other options and will follow up with the patient as needed.

## 2020-11-13 ENCOUNTER — HOSPITAL ENCOUNTER (OUTPATIENT)
Dept: LAB | Age: 75
Discharge: HOME | End: 2020-11-13
Payer: MEDICARE

## 2020-11-13 PROCEDURE — 87070 CULTURE OTHR SPECIMN AEROBIC: CPT

## 2020-11-13 PROCEDURE — 87116 MYCOBACTERIA CULTURE: CPT

## 2020-11-13 PROCEDURE — 87077 CULTURE AEROBIC IDENTIFY: CPT

## 2020-11-13 PROCEDURE — 87186 SC STD MICRODIL/AGAR DIL: CPT

## 2020-11-17 LAB
BACTERIA SPEC CULT: ABNORMAL
GRAM STN SPEC: ABNORMAL
SERVICE CMNT-IMP: ABNORMAL

## 2020-11-18 ENCOUNTER — PATIENT MESSAGE (OUTPATIENT)
Dept: FAMILY MEDICINE CLINIC | Age: 75
End: 2020-11-18

## 2020-11-18 DIAGNOSIS — J47.9 BRONCHIECTASIS WITHOUT COMPLICATION (HCC): Primary | ICD-10-CM

## 2020-11-18 RX ORDER — BENZONATATE 100 MG/1
100 CAPSULE ORAL
Qty: 30 CAP | Refills: 3 | Status: SHIPPED | OUTPATIENT
Start: 2020-11-18 | End: 2021-03-05

## 2020-11-20 ENCOUNTER — CLINICAL SUPPORT (OUTPATIENT)
Dept: FAMILY MEDICINE CLINIC | Age: 75
End: 2020-11-20

## 2020-11-20 DIAGNOSIS — Z23 ENCOUNTER FOR IMMUNIZATION: Primary | ICD-10-CM

## 2020-11-20 LAB
BACTERIA ISLT: ABNORMAL
OTHER ANTIBIOTIC SUSC ISLT: ABNORMAL
OTHER ANTIBIOTIC SUSC ISLT: ABNORMAL
SOURCE, RSRC70: ABNORMAL
SPECIMEN SOURCE: ABNORMAL

## 2020-11-20 NOTE — PROGRESS NOTES
After obtaining consent, and per orders of Dr. Yasmin Waters, injection of Pneumococcal Pneumovax 23 given by Mimi Cates LPN. Patient instructed to remain in clinic for 20 minutes afterwards, and to report any adverse reaction to me immediately. Pt declined to wait, pt tolerated injection well.

## 2020-11-24 ENCOUNTER — PATIENT OUTREACH (OUTPATIENT)
Dept: CASE MANAGEMENT | Age: 75
End: 2020-11-24

## 2020-12-01 ENCOUNTER — PATIENT OUTREACH (OUTPATIENT)
Dept: CASE MANAGEMENT | Age: 75
End: 2020-12-01

## 2020-12-01 NOTE — PROGRESS NOTES
Social Work Note  12/1/2020    Date of referral: 10/9/2020  Referral received from: [facility]  Reason for referral: Assist the patient with community resources to address mold issues in the home.       Previous SW Referral:  no   If yes, brief summary and outcome:  n/a     Support System: Family and friends     Community Providers: unknown     Transportation: unknown     Medication:unknown     Financial Concern: unknown     Advance Care Plan:  reviewed and current      Other: n/a     Identified Needs:      · The patient indicated that she has mold in the crawl space of the home.      Social Work Plan:     · Locate community resources to assist with addressing the mold issues in the patient's home.         MSW received referral to assist the patient with community resources to address the mold issues in the home.       MSW attempted to reach the patient by telephone.    Left message requesting a return call.      MSW will re-attempt to reach the patient at a later time.

## 2020-12-02 ENCOUNTER — PATIENT OUTREACH (OUTPATIENT)
Dept: CASE MANAGEMENT | Age: 75
End: 2020-12-02

## 2020-12-02 NOTE — PROGRESS NOTES
Social Work Note  2020    Date of referral: 10/9/2020  Referral received from: LATHA Virk  Reason for referral: Assist the patient with community resources to address mold issues in the home.       Previous SW Referral:  no   If yes, brief summary and outcome:  n/a     Support System: Family and friends     Community Providers: unknown     Transportation: unknown     Medication:unknown     Financial Concern: unknown     Advance Care Plan:  reviewed and current      Other: n/a     Identified Needs:      · The patient indicated that she has mold in the crawl space of the home.      Social Work Plan:     · Locate community resources to assist with addressing the mold issues in the patient's home.         MSW received referral to assist the patient with community resources to address the mold issues in the home.      MSW received call from patient who verified her name and  as identifiers. The patient was very pleasant and engaged in conversation. The patient indicated that she is doing well today. MSW informed the patient of resources such [employer] and [facility]. The patient stated that she does not have the financial means even with the companies discount program. The patient informed that she will need financial assistance to pay for the services. MSW informed the patient that she will continue to research other options in the community. MSW will continue to follow to assist the patient as needed.

## 2020-12-11 ENCOUNTER — TELEPHONE (OUTPATIENT)
Age: 75
End: 2020-12-11

## 2020-12-11 ENCOUNTER — HOSPITAL ENCOUNTER (OUTPATIENT)
Dept: INFUSION THERAPY | Age: 75
Discharge: HOME | End: 2020-12-11
Payer: MEDICARE

## 2020-12-11 VITALS
SYSTOLIC BLOOD PRESSURE: 138 MMHG | OXYGEN SATURATION: 99 % | DIASTOLIC BLOOD PRESSURE: 70 MMHG | TEMPERATURE: 97.5 F | HEART RATE: 75 BPM

## 2020-12-11 LAB
BASOPHILS # BLD: 0 K/UL (ref 0–0.1)
BASOPHILS NFR BLD: 0 % (ref 0–2)
DIFFERENTIAL METHOD BLD: ABNORMAL
EOSINOPHIL # BLD: 0.1 K/UL (ref 0–0.4)
EOSINOPHIL NFR BLD: 5 % (ref 0–5)
ERYTHROCYTE [DISTWIDTH] IN BLOOD BY AUTOMATED COUNT: 14.5 % (ref 11.6–14.5)
HCT VFR BLD AUTO: 37.3 % (ref 35–45)
HGB BLD-MCNC: 11.5 G/DL (ref 12–16)
LYMPHOCYTES # BLD: 0.8 K/UL (ref 0.9–3.6)
LYMPHOCYTES NFR BLD: 32 % (ref 21–52)
MCH RBC QN AUTO: 30.2 PG (ref 24–34)
MCHC RBC AUTO-ENTMCNC: 30.8 G/DL (ref 31–37)
MCV RBC AUTO: 97.9 FL (ref 74–97)
MONOCYTES # BLD: 0.4 K/UL (ref 0.05–1.2)
MONOCYTES NFR BLD: 16 % (ref 3–10)
NEUTS SEG # BLD: 1.2 K/UL (ref 1.8–8)
NEUTS SEG NFR BLD: 47 % (ref 40–73)
PLATELET # BLD AUTO: 172 K/UL (ref 135–420)
PMV BLD AUTO: 13.2 FL (ref 9.2–11.8)
RBC # BLD AUTO: 3.81 M/UL (ref 4.2–5.3)
WBC # BLD AUTO: 2.6 K/UL (ref 4.6–13.2)

## 2020-12-11 PROCEDURE — 85025 COMPLETE CBC W/AUTO DIFF WBC: CPT

## 2020-12-11 PROCEDURE — 36415 COLL VENOUS BLD VENIPUNCTURE: CPT

## 2020-12-11 NOTE — PROGRESS NOTES
[facility] Progress Note    Date: 2020    Name: Emym Console    MRN: [id number]         : 1945    Peripheral Lab Draw      Ms. Patterson to [facility], ambulatory at 1310 accompanied by self. Pt was assessed and education was provided. Ms. Jamaal Pereira vitals were reviewed and patient was observed for 5 minutes prior to treatment. Visit Vitals  /70 (BP 1 Location: Left arm, BP Patient Position: Sitting)   Pulse 75   Temp 97.5 °F (36.4 °C)   SpO2 99%       Blood obtained peripherally from left arm x 1 attempt with butterfly needle and sent to lab for Cbc w/diff per written orders. No bleeding or hematoma noted at site. Gauze and coban applied. Ms. Branden Marroquin tolerated the phlebotomy, and had no complaints. Patient armband removed and shredded. Ms. Branden Marroquin was discharged from [place] [address] in stable condition at 1320.      Torrie Willis Phlebotomist PCT  2020  2:24 PM

## 2020-12-14 ENCOUNTER — VIRTUAL VISIT (OUTPATIENT)
Age: 75
End: 2020-12-14
Payer: MEDICARE

## 2020-12-14 DIAGNOSIS — D50.9 IRON DEFICIENCY ANEMIA, UNSPECIFIED IRON DEFICIENCY ANEMIA TYPE: ICD-10-CM

## 2020-12-14 DIAGNOSIS — D72.819 LEUKOPENIA, UNSPECIFIED TYPE: Primary | ICD-10-CM

## 2020-12-14 PROCEDURE — 99442 PR PHYS/QHP TELEPHONE EVALUATION 11-20 MIN: CPT | Performed by: INTERNAL MEDICINE

## 2020-12-14 NOTE — PROGRESS NOTES
Emmy Pantoja is a 66 y.o. female, evaluated via audio-only technology on 12/14/2020 . Assessment & Plan:   1. Leukopenia, unspecified type  --12/11/2020: CBC showed WBC was 2.6K/uL, ANC 1.2 and ALC 0.8.   --Patient denies any repeated infections.   --08/17/2020 HIV nonreactive and ARMANDO negative    2. Iron deficiency anemia, unspecified iron deficiency anemia type  --Most recent CBC showed: H&H 11.5/37.3, MCV 94.5, platelet 223,325. Ferritin 379. Normal Iron profile. --BUN and creatinine were normal  --Follow up in 4 months with repeat labs or sooner if indicated      Subjective:   Ms Emmy Pantoja is a 66 y.o. female who was evaluated via audio-only technology for Benign Leukopenia. She states she has been doing well since her last office visit. However she states she is being followed by a pulmonologist for a history of bronchiectasis. She reports she completed her antibiotics for that. Today she denies fevers, recurrent infections, and rash. She denies fatigue, shortness of breath, and chest pains. She denies pain or discomfort. She does not have any concerns or complaints to report at this time. Prior to Admission medications    Medication Sig Start Date End Date Taking? Authorizing Provider   benzonatate (TESSALON) 100 mg capsule Take 1 Cap by mouth three (3) times daily as needed for Cough. 11/18/20   Dnea Bell MD   albuterol (PROVENTIL HFA, VENTOLIN HFA, PROAIR HFA) 90 mcg/actuation inhaler Take 2 Puffs by inhalation every four (4) hours as needed for Cough. 10/15/20   Dena Bell MD   Lactobac no.41/Bifidobact no.7 (PROBIOTIC-10 PO) Take 1 Tab by mouth daily. Provider, Historical   gabapentin (NEURONTIN) 100 mg capsule TAKE 2 CAPSULES BY MOUTH THREE TIMES DAILY  Patient taking differently: Take 200 mg by mouth two (2) times a day.  8/10/20   Dena Bell MD   rOPINIRole (REQUIP) 0.5 mg tablet TAKE 1 TABLET BY MOUTH EVERY NIGHT 7/27/20   Ever Acevedo MD   ferrous gluconate 324 mg (38 mg iron) tablet Take 1 Tab by mouth daily. TAKE 1 TABLET BY MOUTH EVERY MONDAY, WEDNESDAY, AND SUNDAY  Patient taking differently: Take 324 mg by mouth daily. TAKE 1 TABLET BY MOUTH EVERY MONDAY, WEDNESDAY, AND FRIDAY 7/27/20   Gudelia Roldan MD   loratadine (CLARITIN) 10 mg tablet Take 1 Tab by mouth daily. 7/27/20   Gudelia Roldan MD   montelukast (SINGULAIR) 10 mg tablet TAKE 1 TABLET BY MOUTH DAILY 7/27/20   Gudelia Roldan MD   multivitamin (ONE A DAY) tablet Take 1 Tab by mouth daily. Provider, Historical   calcium-cholecalciferol, d3, (CALCIUM 600 + D) 600-125 mg-unit tab Take 2 Tabs by mouth. Provider, Historical   cholecalciferol (VITAMIN D3) (5000 Units/125 mcg) tab tablet Take  by mouth daily. Provider, Historical   b complex vitamins tablet Take 1 Tab by mouth daily. Provider, Historical   latanoprost (XALATAN) 0.005 % ophthalmic solution Administer 1 Drop to both eyes nightly. Provider, Historical       Review of Systems   All other systems reviewed and are negative. Patient-Reported Vitals 7/29/2020   Patient-Reported Weight 125   Patient-Reported Height 5'0\"   Patient-Reported Pulse 64   Patient-Reported Temperature 98   Patient-Reported SpO2 96   Patient-Reported Systolic  860   Patient-Reported Diastolic 66      LABS:  Lab Results   Component Value Date/Time    WBC 2.6 (L) 12/11/2020 01:20 PM    HGB 11.5 (L) 12/11/2020 01:20 PM    HCT 37.3 12/11/2020 01:20 PM    PLATELET 525 27/23/7973 01:20 PM    MCV 97.9 (H) 12/11/2020 01:20 PM       Tess Colindres, who was evaluated through a patient-initiated, synchronous (real-time) audio only encounter, and/or her healthcare decision maker, is aware that it is a billable service, with coverage as determined by her insurance carrier. She provided verbal consent to proceed: Yes. She has not had a related appointment within my department in the past 7 days or scheduled within the next 24 hours.       Total Time: minutes: 11-20 minutes   Follow up in 4 months or sooner if indicated      Orders Placed This Encounter    CBC WITH AUTOMATED DIFF     Standing Status:   Future     Standing Expiration Date:   62/32/4749    METABOLIC PANEL, COMPREHENSIVE     Standing Status:   Future     Standing Expiration Date:   12/15/2021    IRON PROFILE     Standing Status:   Future     Standing Expiration Date:   12/15/2021    FERRITIN     Standing Status:   Future     Standing Expiration Date:   12/15/2021         Jaime English MD  12/14/2020

## 2020-12-15 ENCOUNTER — PATIENT OUTREACH (OUTPATIENT)
Dept: CASE MANAGEMENT | Age: 75
End: 2020-12-15

## 2020-12-15 NOTE — PROGRESS NOTES
Social Work Note  12/15/2020    Date of referral: 10/9/2020  Referral received from: LATHA Ramirez  Reason for referral: Assist the patient with community resources to address mold issues in the home.       Previous SW Referral:  no   If yes, brief summary and outcome:  n/a     Support System: Family and friends     Community Providers: unknown     Transportation: unknown     Medication:unknown     Financial Concern: unknown     Advance Care Plan:  reviewed and current      Other: n/a     Identified Needs:      · The patient indicated that she has mold in the crawl space of the home.      Social Work Plan:     · Locate community resources to assist with addressing the mold issues in the patient's home. MSW received referral to assist the patient with community resources to address the mold issues in the home.       MSW received call from patient who verified her name and  as identifiers. The patient was very pleasant and engaged in conversation. MSW continues to research to assist the patient with community resources to address the moisture issues in the crawl space of her home. The patient informed MSW that she does not have the financial means to cover the cost for any necessary work needed. MSABDOUL is researching programs in the community to assist the patient.       MSW will continue to assist the patient as needed.

## 2020-12-23 ENCOUNTER — PATIENT OUTREACH (OUTPATIENT)
Dept: CASE MANAGEMENT | Age: 75
End: 2020-12-23

## 2020-12-23 NOTE — PROGRESS NOTES
Social Work Note  12/23/2020    Date of referral: 10/9/2020  Referral received from: LATHA Arnold  Reason for referral: Assist the patient with community resources to address mold issues in the home.       Previous SW Referral:  no   If yes, brief summary and outcome:  n/a     Support System: Family and friends     Community Providers: unknown     Transportation: unknown     Medication:unknown     Financial Concern: unknown     Advance Care Plan:  reviewed and current      Other: n/a     Identified Needs:      · The patient indicated that she has mold in the crawl space of the home.      Social Work Plan:     · Locate community resources to assist with addressing the mold issues in the patient's home.         MSW received referral to assist the patient with community resources to address the mold issues in the home.      MSW has been researching resources in the community to assist the patient with addressing moisture issues in crawl space of her home. MSW have left messages requesting return call. MSW contacted the patient to update her on progress of finding resources to address issue. The patient verbalized understanding and shared that she is also doing her own research and is experiencing the same. The patient is alert and oriented and very engaged in conversation. MSW will continue to assist the patient as needed.

## 2020-12-27 LAB
ACID FAST STN SPEC: NEGATIVE
MYCOBACTERIUM SPEC QL CULT: NEGATIVE
SPECIMEN PREPARATION: NORMAL
SPECIMEN SOURCE: NORMAL

## 2021-01-05 ENCOUNTER — PATIENT OUTREACH (OUTPATIENT)
Dept: CASE MANAGEMENT | Age: 76
End: 2021-01-05

## 2021-01-05 NOTE — PROGRESS NOTES
Social Work Note  1/5/2021      Date of referral: 10/9/2020  Referral received from: LATHA Cordero  Reason for referral: Assist the patient with community resources to address mold issues in the home.       Previous SW Referral:  no   If yes, brief summary and outcome:  n/a     Support System: Family and friends     Community Providers: unknown     Transportation: unknown     Medication:unknown     Financial Concern: unknown     Advance Care Plan:  reviewed and current      Other: n/a     Identified Needs:      · The patient indicated that she has mold in the crawl space of the home.      Social Work Plan:     · Locate community resources to assist with addressing the mold issues in the patient's home.         MSW received referral to assist the patient with community resources to address the mold issues in the home.       JEREMÍAS has been researching resources in the community to assist the patient with addressing moisture issues in crawl space of her home. JEREMÍAS contnues to [employer] messages for representative of [place] requesting return call. MSABDOUL contacted the patient who is alert and oriented and an active participant in conversation. JEREMÍAS provided the patient with contact information to personnel of the [address] [place] [address] to obtain gera to assist with addressing the moisture issues in her crawl space. The patient was very appreciative for the information. JEREMÍAS will continue to assist the patient as needed.

## 2021-01-18 ENCOUNTER — PATIENT OUTREACH (OUTPATIENT)
Dept: CASE MANAGEMENT | Age: 76
End: 2021-01-18

## 2021-01-18 NOTE — PROGRESS NOTES
Social Work Note  1/18/2021      Date of referral: 10/9/2020  Referral received from: LATHA Song  Reason for referral: Assist the patient with community resources to address mold issues in the home.       Previous  Referral:  no   If yes, brief summary and outcome:  n/a     Support System: Family and friends     Community Providers: unknown     Transportation: unknown     Medication:unknown     Financial Concern: unknown     Advance Care Plan:  reviewed and current      Other: n/a     Identified Needs:      · The patient indicated that she has mold in the crawl space of the home.      Social Work Plan:     · Locate community resources to assist with addressing the mold issues in the patient's home.         MSW received referral to assist the patient with community resources to address the mold issues in the home.       MSW has been researching resources in the community to assist the patient with addressing moisture issues in crawl space of her home. MSW contnues to leavet messages for representative of [place]'s [employer] requesting return call.      JEREMÍAS contacted the patient who is alert and oriented and an active participant in conversation.      JEREMÍAS provided the patient with contact information to personnel of the [address] [place] [address] to obtain gera to assist with addressing the moisture issues in her crawl space. The patient informed MSW that she called the numbers provided to her and was able to speak with the agencies representative and was informed that she qualifies for their program financially but [place], they will not be able to extend assistance. She mentioned that she is waiting to hear back from one of the representative who called her back but she was having a coughing episode and could not speak to him. She has asked him to call back and is willing to reach out to the representative when the office opens up tomorrow.      JEREMÍAS will follow to assist as needed.      MSW will continue to follow to assist the patient

## 2021-01-20 ENCOUNTER — PATIENT MESSAGE (OUTPATIENT)
Dept: FAMILY MEDICINE CLINIC | Age: 76
End: 2021-01-20

## 2021-01-20 DIAGNOSIS — G25.81 RLS (RESTLESS LEGS SYNDROME): ICD-10-CM

## 2021-01-20 RX ORDER — ROPINIROLE 0.5 MG/1
TABLET, FILM COATED ORAL
Qty: 90 TAB | Refills: 1 | Status: SHIPPED | OUTPATIENT
Start: 2021-01-20 | End: 2021-06-02 | Stop reason: SDUPTHER

## 2021-01-20 NOTE — TELEPHONE ENCOUNTER
Requested Prescriptions     Pending Prescriptions Disp Refills    rOPINIRole (REQUIP) 0.5 mg tablet 90 Tab 1     Sig: TAKE 1 TABLET BY MOUTH EVERY NIGHT

## 2021-01-20 NOTE — TELEPHONE ENCOUNTER
From: Ramona Patterson  To: Tiffany Bell MD  Sent: 1/20/2021 12:56 PM EST  Subject: Prescription Question    DrCarmelina \"B\",    I trust that you had a happy and healthy holiday season!    Please, if you will, renew my prescription for Ropinirole 0.5MG Tablets -   Take 1 tablet by mouth every night. Qty. 90. If possible, please write the prescription  for several refills.  My Pharmacy is [facility]  [phone].    Regarding the smear and the culture on my sputum test for the brochiectasis ordered by Dr. JULIEN Chan  in November, both came back negative. Thank God!    My next appointment with you is March 12th.    Stay Safe and have a GREAT day!    Ramona Patterson

## 2021-01-27 ENCOUNTER — PATIENT OUTREACH (OUTPATIENT)
Dept: CASE MANAGEMENT | Age: 76
End: 2021-01-27

## 2021-01-27 NOTE — PROGRESS NOTES
Social Work Note  2021    Date of referral: 10/9/2020  Referral received from: LATHA Campos  Reason for referral: Assist the patient with community resources to address mold issues in the home.       Previous SW Referral:  no   If yes, brief summary and outcome:  n/a     Support System: Family and friends     Community Providers: unknown     Transportation: unknown     Medication:unknown     Financial Concern: unknown     Advance Care Plan:  reviewed and current      Other: n/a     Identified Needs:      · The patient indicated that she has mold in the crawl space of the home.      Social Work Plan:     · Locate community resources to assist with addressing the mold issues in the patient's home.         MSW received referral to assist the patient with community resources to address the mold issues in the home.       MSW has been researching resources in the community to assist the patient with addressing moisture issues in crawl space of her home. MSW continues to leave messages for representative of [place]'s [employer] requesting return call.     MSW contacted the patient who verified her name and  as identifiers. MSW introduced self, role and reason for call. The patient stated that she contacted the Acting Director at the Dept of [facility]. The patient was provided with contact numbers to local agencies but she has not been able to reach anyone. MSW obtained information and will assist the patient with attempts to contact the agencies. MSW will follow to assist the patient as needed.

## 2021-02-03 ENCOUNTER — PATIENT OUTREACH (OUTPATIENT)
Dept: CASE MANAGEMENT | Age: 76
End: 2021-02-03

## 2021-02-03 NOTE — PROGRESS NOTES
Social Work Note  2/3/2021      Date of referral: 10/9/2020  Referral received from: LATHA West Pulse  Reason for referral: Assist the patient with community resources to address mold issues in the home.       Previous  Referral:  no   If yes, brief summary and outcome:  n/a     Support System: Family and friends     Community Providers: unknown     Transportation: unknown     Medication:unknown     Financial Concern: unknown     Advance Care Plan:  reviewed and current      Other: n/a     Identified Needs:      · The patient indicated that she has mold in the crawl space of the home.      Social Work Plan:     · Locate community resources to assist with addressing the mold issues in the patient's home.         MSW received referral to assist the patient with community resources to address the moisture issues in the crawl space of her home.       MSW has been researching resources in the community to assist the patient with addressing moisture issues in crawl space of her home. MSW continues to leave messages for representative of [employer] [place] requesting return call.     MSW contacted the patient who is alert and oriented. She verified her name and  as identifiers. MSW introduced self, role and reason for call. The patient reported that she is resting due to mucous build up. The patient shared that she have attempted to contact agencies provided by the [place] but has not been successful with getting through to someone for assistance. MSW also made attempts and was unable to speak with anyone. Telephone numbers provided by the [place] were for the state [address] which provided instructions to communicate with the department of [facility] state. MSW continues to research local agencies to assist the patient.  The patient is aware of the difficulties in trying to find agencies available to assist at no cost.     JEREMÍAS will continue to follow to assist the patient as needed.

## 2021-02-26 ENCOUNTER — PATIENT OUTREACH (OUTPATIENT)
Dept: CASE MANAGEMENT | Age: 76
End: 2021-02-26

## 2021-02-26 NOTE — PROGRESS NOTES
Social Work Note  2021      Date of referral: 10/9/2020  Referral received from: LATHA Guo  Reason for referral: Assist the patient with community resources to address mold issues in the home.       Previous SW Referral:  no   If yes, brief summary and outcome:  n/a     Support System: Family and friends     Community Providers: unknown     Transportation: unknown     Medication:unknown     Financial Concern: unknown     Advance Care Plan:  reviewed and current      Other: n/a     Identified Needs:      · The patient indicated that she has mold in the crawl space of the home.      Social Work Plan:     · Locate community resources to assist with addressing the mold issues in the patient's home.         MSW received referral to assist the patient with community resources to address the moisture issues in the crawl space of her home. MSW contacted Mrs Sunita Cid who is alert and oriented x4. She verified her name and  as identifiers. MSW introduced self, role and reason for call.     The patient reported that she received a device which allows her to blow into to break up mucous in her lungs. She mentioned that it seems to be working. The patient informed MSW that she has received a call back from any of the resources provided to her for gera assistance to address moisture issues. MSW shared with patient that she had communicated with local companies that handle moisture problems but the companies informed her that they were not aware or familiar with any programs to provide assistance. MSW informed the patient that the companies would offer payment plan options. The patient verbalized understanding and shared that she would prefer the gera to assist with paying for the repair. The patient remains hopeful that she will find the assistance that she is in need of. Patient inquired on finding a  to add her sister on the deed of the home.  She mentioned that she initially had her sister on the deed but was asked to remove her due to her sister's age at the time that she applied for reverse mortgage loan 15 years ago. The patient shared that she have reached out to  services and a couple of law practices for assistance/guidance but was told that they did not do initial free consultation. Patient is requesting for assistance to find a law firm to assist and educate her on what is needed to add her sister to the deed.    MSW will assist the patient to find available resources to address the patient's needs.

## 2021-03-05 ENCOUNTER — APPOINTMENT (OUTPATIENT)
Dept: CT IMAGING | Age: 76
DRG: 194 | End: 2021-03-05
Attending: STUDENT IN AN ORGANIZED HEALTH CARE EDUCATION/TRAINING PROGRAM
Payer: MEDICARE

## 2021-03-05 ENCOUNTER — HOSPITAL ENCOUNTER (INPATIENT)
Age: 76
LOS: 1 days | Discharge: HOME | DRG: 194 | End: 2021-03-07
Attending: STUDENT IN AN ORGANIZED HEALTH CARE EDUCATION/TRAINING PROGRAM | Admitting: INTERNAL MEDICINE
Payer: MEDICARE

## 2021-03-05 DIAGNOSIS — R04.2 HEMOPTYSIS: Primary | ICD-10-CM

## 2021-03-05 PROBLEM — R93.89 ABNORMAL CHEST CT: Status: ACTIVE | Noted: 2021-03-05

## 2021-03-05 PROBLEM — B37.9 YEAST INFECTION: Status: ACTIVE | Noted: 2021-03-05

## 2021-03-05 LAB
ANION GAP SERPL CALC-SCNC: 7 MMOL/L (ref 3–18)
APTT PPP: 37.9 SEC (ref 23–36.4)
BASOPHILS # BLD: 0 K/UL (ref 0–0.1)
BASOPHILS NFR BLD: 0 % (ref 0–2)
BUN SERPL-MCNC: 29 MG/DL (ref 7–18)
BUN/CREAT SERPL: 27 (ref 12–20)
CALCIUM SERPL-MCNC: 10.3 MG/DL (ref 8.5–10.1)
CHLORIDE SERPL-SCNC: 102 MMOL/L (ref 100–111)
CO2 SERPL-SCNC: 28 MMOL/L (ref 21–32)
COVID-19 RAPID TEST, COVR: NOT DETECTED
CREAT SERPL-MCNC: 1.09 MG/DL (ref 0.6–1.3)
DIFFERENTIAL METHOD BLD: ABNORMAL
EOSINOPHIL # BLD: 0.1 K/UL (ref 0–0.4)
EOSINOPHIL NFR BLD: 1 % (ref 0–5)
ERYTHROCYTE [DISTWIDTH] IN BLOOD BY AUTOMATED COUNT: 13.7 % (ref 11.6–14.5)
GLUCOSE SERPL-MCNC: 100 MG/DL (ref 74–99)
HCT VFR BLD AUTO: 37.3 % (ref 35–45)
HGB BLD-MCNC: 11.9 G/DL (ref 12–16)
INR PPP: 1.1 (ref 0.8–1.2)
LYMPHOCYTES # BLD: 0.7 K/UL (ref 0.9–3.6)
LYMPHOCYTES NFR BLD: 11 % (ref 21–52)
MCH RBC QN AUTO: 31.3 PG (ref 24–34)
MCHC RBC AUTO-ENTMCNC: 31.9 G/DL (ref 31–37)
MCV RBC AUTO: 98.2 FL (ref 74–97)
MONOCYTES # BLD: 0.6 K/UL (ref 0.05–1.2)
MONOCYTES NFR BLD: 9 % (ref 3–10)
NEUTS SEG # BLD: 5 K/UL (ref 1.8–8)
NEUTS SEG NFR BLD: 79 % (ref 40–73)
PLATELET # BLD AUTO: 216 K/UL (ref 135–420)
PMV BLD AUTO: 13.6 FL (ref 9.2–11.8)
POTASSIUM SERPL-SCNC: 4.1 MMOL/L (ref 3.5–5.5)
PROTHROMBIN TIME: 14.1 SEC (ref 11.5–15.2)
RBC # BLD AUTO: 3.8 M/UL (ref 4.2–5.3)
SARS-COV-2, COV2: NORMAL
SODIUM SERPL-SCNC: 137 MMOL/L (ref 136–145)
SOURCE, COVRS: NORMAL
WBC # BLD AUTO: 6.2 K/UL (ref 4.6–13.2)

## 2021-03-05 PROCEDURE — 85730 THROMBOPLASTIN TIME PARTIAL: CPT

## 2021-03-05 PROCEDURE — 85025 COMPLETE CBC W/AUTO DIFF WBC: CPT

## 2021-03-05 PROCEDURE — 87635 SARS-COV-2 COVID-19 AMP PRB: CPT

## 2021-03-05 PROCEDURE — 85610 PROTHROMBIN TIME: CPT

## 2021-03-05 PROCEDURE — 80048 BASIC METABOLIC PNL TOTAL CA: CPT

## 2021-03-05 PROCEDURE — 71275 CT ANGIOGRAPHY CHEST: CPT

## 2021-03-05 PROCEDURE — 74011000250 HC RX REV CODE- 250: Performed by: STUDENT IN AN ORGANIZED HEALTH CARE EDUCATION/TRAINING PROGRAM

## 2021-03-05 PROCEDURE — 99285 EMERGENCY DEPT VISIT HI MDM: CPT

## 2021-03-05 PROCEDURE — 74011000258 HC RX REV CODE- 258: Performed by: STUDENT IN AN ORGANIZED HEALTH CARE EDUCATION/TRAINING PROGRAM

## 2021-03-05 PROCEDURE — 74011000258 HC RX REV CODE- 258: Performed by: EMERGENCY MEDICINE

## 2021-03-05 PROCEDURE — 74011250636 HC RX REV CODE- 250/636: Performed by: EMERGENCY MEDICINE

## 2021-03-05 PROCEDURE — 96374 THER/PROPH/DIAG INJ IV PUSH: CPT

## 2021-03-05 PROCEDURE — 99218 HC RM OBSERVATION: CPT

## 2021-03-05 PROCEDURE — 74011000636 HC RX REV CODE- 636: Performed by: STUDENT IN AN ORGANIZED HEALTH CARE EDUCATION/TRAINING PROGRAM

## 2021-03-05 PROCEDURE — 96365 THER/PROPH/DIAG IV INF INIT: CPT

## 2021-03-05 RX ORDER — SODIUM CHLORIDE 9 MG/ML
100 INJECTION, SOLUTION INTRAVENOUS
Status: COMPLETED | OUTPATIENT
Start: 2021-03-05 | End: 2021-03-05

## 2021-03-05 RX ORDER — IPRATROPIUM BROMIDE AND ALBUTEROL SULFATE 2.5; .5 MG/3ML; MG/3ML
3 SOLUTION RESPIRATORY (INHALATION)
Status: COMPLETED | OUTPATIENT
Start: 2021-03-05 | End: 2021-03-05

## 2021-03-05 RX ORDER — GUAIFENESIN 600 MG/1
600 TABLET, EXTENDED RELEASE ORAL EVERY 12 HOURS
Status: DISCONTINUED | OUTPATIENT
Start: 2021-03-05 | End: 2021-03-07

## 2021-03-05 RX ORDER — FLUCONAZOLE 100 MG/1
150 TABLET ORAL ONCE
Status: COMPLETED | OUTPATIENT
Start: 2021-03-06 | End: 2021-03-06

## 2021-03-05 RX ADMIN — IPRATROPIUM BROMIDE AND ALBUTEROL SULFATE 3 ML: .5; 3 SOLUTION RESPIRATORY (INHALATION) at 15:59

## 2021-03-05 RX ADMIN — SODIUM CHLORIDE 100 ML: 900 INJECTION, SOLUTION INTRAVENOUS at 15:53

## 2021-03-05 RX ADMIN — IOPAMIDOL 80 ML: 755 INJECTION, SOLUTION INTRAVENOUS at 15:53

## 2021-03-05 RX ADMIN — CEFTRIAXONE 1 G: 1 INJECTION, POWDER, FOR SOLUTION INTRAMUSCULAR; INTRAVENOUS at 17:38

## 2021-03-05 RX ADMIN — AZITHROMYCIN MONOHYDRATE 500 MG: 500 INJECTION, POWDER, LYOPHILIZED, FOR SOLUTION INTRAVENOUS at 17:54

## 2021-03-05 NOTE — ED TRIAGE NOTES
Admitted back in October 2020 for coughing up blood. Dr. Tammy Villa was called this morning because started to cough up blood again.    Sister can stay with patient until after doctor sees her please

## 2021-03-05 NOTE — ED PROVIDER NOTES
57-year-old female with past medical history significant for pneumonia, bronchitis and bronchiectasis presents to the ED with complaint of hemoptysis. She states that she was hospitalized for this back in October of last year and she is now experiencing the same symptoms as she did then. She feels shortness of breath that is worse with exertion and improved with rest that has been present for the past 7 days but the hemoptysis did not start until this morning. She denies any chest pain, fever, chills or any other complaints. She spoke to her pulmonologist, Dr. Lois Welsh, who told her to come to the emergency room today. She took her regularly prescribed medications prior to arrival.  She is a former smoker but denies drinking or recreational drug use. No other complaints. Past Medical History:   Diagnosis Date    Anemia 03/2016    in hospital - [facility]    Asthma Sept. 2018    Asthma Dr. Juliette Ivey Bladder stones     Colon polyps 1-22-20    From colonoscopy     DVT (deep venous thrombosis) ([address]     S/P IVC filter 2016. In setting of paraplagia from Spinal abcess    Environmental allergies     Female genital prolapse     Glaucoma 2017    FaAcotucqjXbzzrquesvqhvTtAnuyw[id number]    Hypertension October 2018    Dr. Laura Silva diagnosis    Hypocontractile bladder     Ill-defined condition     Leak in valve. Followed by Dr. 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 patient ambulatory Summer 2018    SVT (supraventricular tachycardia) (HCC)     Umbilical hernia     Urine retention     Uterus prolapse     grade 5.   Surgically corrected 7/2018    UTI (urinary tract infection) 4250-2352    Many UTIs-Had IndwellingCatheter for 2 plus yrs.  Vaginal candida 2016    Voiding dysfunction        Past Surgical History:   Procedure Laterality Date    COLONOSCOPY N/A 2020    COLONOSCOPY performed by Yolanda Sequeira MD at [address] [facility]      x 4 after having miscarriages.  HX GYN  2018    CYSTOURETHROSCOPY; POSTERIOR COLPORRHAPHY, CYSTOCELE REPAIR; COLPOPEXY VAGINAL INTRA-PERITONEAL APPROACH;    HX TONSILLECTOMY      HX VITRECTOMY Left 2017    VASCULAR SURGERY PROCEDURE UNLIST  2016    IVC filter.           Family History:   Problem Relation Age of Onset    Cancer Mother         Stomach Cancer-     Heart Disease Mother         [email or web address]    Cancer Father         Stomach Cancer-     Asthma Father        Social History     Socioeconomic History    Marital status:      Spouse name: Not on file    Number of children: Not on file    Years of education: Not on file    Highest education level: Not on file   Occupational History    Not on file   Social Needs    Financial resource strain: Not on file    Food insecurity     Worry: Not on file     Inability: Not on file    Transportation needs     Medical: Not on file     Non-medical: Not on file   Tobacco Use    Smoking status: Former Smoker     Packs/day: 1.00     Years: 8.00     Pack years: 8.00     Types: Cigarettes     Quit date: 4/15/1970     Years since quittin.9    Smokeless tobacco: Never Used    Tobacco comment: no comments   Substance and Sexual Activity    Alcohol use: No     Alcohol/week: 0.0 standard drinks     Frequency: Never    Drug use: Never    Sexual activity: Not Currently   Lifestyle    Physical activity     Days per week: Not on file     Minutes per session: Not on file    Stress: Not on file   Relationships    Social connections     Talks on phone: Not on file     Gets together: Not on file     Attends Mandaeism service: Not on file     Active member of club or organization: Not on file     Attends meetings of clubs or organizations: Not on file     Relationship status: Not on file    Intimate partner violence     Fear of current or ex partner: Not on file     Emotionally abused: Not on file     Physically abused: Not on file     Forced sexual activity: Not on file   Other Topics Concern    Not on file   Social History Narrative    Not on file         ALLERGIES: Milk containing products, Bactrim [sulfamethoprim], Mold, Pollen extracts, and Sulfamethoxazole-trimethoprim    Review of Systems   Constitutional: Negative for activity change and appetite change. HENT: Negative for drooling and facial swelling. Eyes: Negative for pain and discharge. Respiratory: Positive for shortness of breath. Negative for apnea and choking. Hemoptysis   Cardiovascular: Negative for chest pain, palpitations and leg swelling. Gastrointestinal: Negative for blood in stool and rectal pain. Endocrine: Negative for polydipsia and polyphagia. Genitourinary: Negative for genital sores and hematuria. Musculoskeletal: Negative for gait problem and neck stiffness. Skin: Negative for color change and rash. Allergic/Immunologic: Negative for environmental allergies. Neurological: Negative for tremors. Hematological: Negative for adenopathy. Psychiatric/Behavioral: Negative for agitation and behavioral problems. Vitals:    03/05/21 1234   BP: (!) 185/94   Pulse: 90   Resp: 18   Temp: 97.6 °F (36.4 °C)   SpO2: 100%            Physical Exam  Vitals signs and nursing note reviewed. Constitutional:       Appearance: Normal appearance. HENT:      Head: Normocephalic and atraumatic. Eyes:      Extraocular Movements: Extraocular movements intact. Pupils: Pupils are equal, round, and reactive to light. Neck:      Musculoskeletal: Normal range of motion.    Cardiovascular:      Rate and Rhythm: Normal rate and regular rhythm. Heart sounds: Normal heart sounds. No murmur. No friction rub. Pulmonary:      Effort: Pulmonary effort is normal. No respiratory distress. Breath sounds: No stridor. Comments: Decreased breath sounds bilaterally and mild wheezing at the lung apices  Abdominal:      Palpations: Abdomen is soft. Musculoskeletal: Normal range of motion. Skin:     General: Skin is warm and dry. Capillary Refill: Capillary refill takes less than 2 seconds. Neurological:      General: No focal deficit present. Mental Status: She is alert and oriented to person, place, and time. Psychiatric:         Mood and Affect: Mood normal.          MDM  Number of Diagnoses or Management Options  Diagnosis management comments: 64-year-old female with history of bronchiectasis and prior admission for hemoptysis presents to the ED with recurrent hemoptysis. Will obtain full full laboratory work-up and provide symptomatic treatment with a nebulizer treatment. Patient refuses steroids because she claims they make her bronchiectasis worse. Will obtain CTA of the chest to evaluate for any acute pathology. Will reach out to Dr. Prashant Saleem. Will reassess. Laboratory work-up shows a chronic anemia that is not significantly changed from baseline. Coags within normal limits. Remainder of laboratory work-up unremarkable. Patient signed out to Dr. Sangeetha Emmanuel at 1600 pending completion of CTA chest, consultation with pulmonologist Dr. Prashant Saleem and reassessment.     Jimmy Luo,            Procedures

## 2021-03-06 PROBLEM — Z86.79 HISTORY OF SUPRAVENTRICULAR TACHYCARDIA: Status: ACTIVE | Noted: 2021-03-06

## 2021-03-06 PROBLEM — J45.909 ASTHMA: Status: ACTIVE | Noted: 2021-03-06

## 2021-03-06 PROBLEM — I51.89 GRADE I DIASTOLIC DYSFUNCTION: Status: ACTIVE | Noted: 2021-03-06

## 2021-03-06 PROBLEM — I35.1 AORTIC REGURGITATION: Status: ACTIVE | Noted: 2021-03-06

## 2021-03-06 LAB
ALBUMIN SERPL-MCNC: 3.6 G/DL (ref 3.4–5)
ALBUMIN/GLOB SERPL: 0.9 {RATIO} (ref 0.8–1.7)
ALP SERPL-CCNC: 88 U/L (ref 45–117)
ALT SERPL-CCNC: 24 U/L (ref 13–56)
ANION GAP SERPL CALC-SCNC: 7 MMOL/L (ref 3–18)
AST SERPL-CCNC: 23 U/L (ref 10–38)
ATRIAL RATE: 75 BPM
BASOPHILS # BLD: 0 K/UL (ref 0–0.1)
BASOPHILS NFR BLD: 0 % (ref 0–2)
BILIRUB SERPL-MCNC: 0.4 MG/DL (ref 0.2–1)
BUN SERPL-MCNC: 23 MG/DL (ref 7–18)
BUN/CREAT SERPL: 20 (ref 12–20)
CALCIUM SERPL-MCNC: 9.3 MG/DL (ref 8.5–10.1)
CALCULATED P AXIS, ECG09: 76 DEGREES
CALCULATED R AXIS, ECG10: 48 DEGREES
CALCULATED T AXIS, ECG11: 125 DEGREES
CHLORIDE SERPL-SCNC: 104 MMOL/L (ref 100–111)
CO2 SERPL-SCNC: 28 MMOL/L (ref 21–32)
CREAT SERPL-MCNC: 1.13 MG/DL (ref 0.6–1.3)
DIAGNOSIS, 93000: NORMAL
DIFFERENTIAL METHOD BLD: ABNORMAL
EOSINOPHIL # BLD: 0.1 K/UL (ref 0–0.4)
EOSINOPHIL NFR BLD: 1 % (ref 0–5)
ERYTHROCYTE [DISTWIDTH] IN BLOOD BY AUTOMATED COUNT: 13.8 % (ref 11.6–14.5)
GLOBULIN SER CALC-MCNC: 4.2 G/DL (ref 2–4)
GLUCOSE SERPL-MCNC: 71 MG/DL (ref 74–99)
HCT VFR BLD AUTO: 35.8 % (ref 35–45)
HGB BLD-MCNC: 11.2 G/DL (ref 12–16)
LYMPHOCYTES # BLD: 0.6 K/UL (ref 0.9–3.6)
LYMPHOCYTES NFR BLD: 12 % (ref 21–52)
MCH RBC QN AUTO: 30.7 PG (ref 24–34)
MCHC RBC AUTO-ENTMCNC: 31.3 G/DL (ref 31–37)
MCV RBC AUTO: 98.1 FL (ref 74–97)
MONOCYTES # BLD: 0.5 K/UL (ref 0.05–1.2)
MONOCYTES NFR BLD: 10 % (ref 3–10)
NEUTS SEG # BLD: 4.1 K/UL (ref 1.8–8)
NEUTS SEG NFR BLD: 77 % (ref 40–73)
P-R INTERVAL, ECG05: 126 MS
PLATELET # BLD AUTO: 193 K/UL (ref 135–420)
PMV BLD AUTO: 12.9 FL (ref 9.2–11.8)
POTASSIUM SERPL-SCNC: 4.8 MMOL/L (ref 3.5–5.5)
PROT SERPL-MCNC: 7.8 G/DL (ref 6.4–8.2)
Q-T INTERVAL, ECG07: 420 MS
QRS DURATION, ECG06: 122 MS
QTC CALCULATION (BEZET), ECG08: 469 MS
RBC # BLD AUTO: 3.65 M/UL (ref 4.2–5.3)
SODIUM SERPL-SCNC: 139 MMOL/L (ref 136–145)
VENTRICULAR RATE, ECG03: 75 BPM
WBC # BLD AUTO: 5.2 K/UL (ref 4.6–13.2)

## 2021-03-06 PROCEDURE — 80053 COMPREHEN METABOLIC PANEL: CPT

## 2021-03-06 PROCEDURE — 2709999900 HC NON-CHARGEABLE SUPPLY

## 2021-03-06 PROCEDURE — 93005 ELECTROCARDIOGRAM TRACING: CPT

## 2021-03-06 PROCEDURE — 94640 AIRWAY INHALATION TREATMENT: CPT

## 2021-03-06 PROCEDURE — 92610 EVALUATE SWALLOWING FUNCTION: CPT

## 2021-03-06 PROCEDURE — 87040 BLOOD CULTURE FOR BACTERIA: CPT

## 2021-03-06 PROCEDURE — 96376 TX/PRO/DX INJ SAME DRUG ADON: CPT

## 2021-03-06 PROCEDURE — 74011000258 HC RX REV CODE- 258: Performed by: INTERNAL MEDICINE

## 2021-03-06 PROCEDURE — 74011250636 HC RX REV CODE- 250/636: Performed by: HOSPITALIST

## 2021-03-06 PROCEDURE — 85025 COMPLETE CBC W/AUTO DIFF WBC: CPT

## 2021-03-06 PROCEDURE — 74011250636 HC RX REV CODE- 250/636: Performed by: INTERNAL MEDICINE

## 2021-03-06 PROCEDURE — 77010033678 HC OXYGEN DAILY

## 2021-03-06 PROCEDURE — 74011000250 HC RX REV CODE- 250: Performed by: INTERNAL MEDICINE

## 2021-03-06 PROCEDURE — 99218 HC RM OBSERVATION: CPT

## 2021-03-06 PROCEDURE — 74011250637 HC RX REV CODE- 250/637: Performed by: INTERNAL MEDICINE

## 2021-03-06 PROCEDURE — 94761 N-INVAS EAR/PLS OXIMETRY MLT: CPT

## 2021-03-06 RX ORDER — CODEINE PHOSPHATE AND GUAIFENESIN 10; 100 MG/5ML; MG/5ML
10 SOLUTION ORAL
Status: DISCONTINUED | OUTPATIENT
Start: 2021-03-06 | End: 2021-03-07

## 2021-03-06 RX ORDER — ACETAMINOPHEN 325 MG/1
650 TABLET ORAL
Status: DISCONTINUED | OUTPATIENT
Start: 2021-03-06 | End: 2021-03-07 | Stop reason: HOSPADM

## 2021-03-06 RX ORDER — GABAPENTIN 100 MG/1
200 CAPSULE ORAL 2 TIMES DAILY
Status: DISCONTINUED | OUTPATIENT
Start: 2021-03-06 | End: 2021-03-07 | Stop reason: HOSPADM

## 2021-03-06 RX ORDER — ROPINIROLE 0.25 MG/1
0.5 TABLET, FILM COATED ORAL
Status: DISCONTINUED | OUTPATIENT
Start: 2021-03-06 | End: 2021-03-07 | Stop reason: HOSPADM

## 2021-03-06 RX ORDER — ACETAMINOPHEN 650 MG/1
650 SUPPOSITORY RECTAL
Status: DISCONTINUED | OUTPATIENT
Start: 2021-03-06 | End: 2021-03-07 | Stop reason: HOSPADM

## 2021-03-06 RX ORDER — SODIUM CHLORIDE 0.9 % (FLUSH) 0.9 %
5-40 SYRINGE (ML) INJECTION AS NEEDED
Status: DISCONTINUED | OUTPATIENT
Start: 2021-03-06 | End: 2021-03-07 | Stop reason: HOSPADM

## 2021-03-06 RX ORDER — PANTOPRAZOLE SODIUM 40 MG/10ML
40 INJECTION, POWDER, LYOPHILIZED, FOR SOLUTION INTRAVENOUS DAILY PRN
Status: DISCONTINUED | OUTPATIENT
Start: 2021-03-06 | End: 2021-03-07 | Stop reason: HOSPADM

## 2021-03-06 RX ORDER — UREA 10 %
1 LOTION (ML) TOPICAL DAILY
Status: DISCONTINUED | OUTPATIENT
Start: 2021-03-06 | End: 2021-03-07 | Stop reason: HOSPADM

## 2021-03-06 RX ORDER — FERROUS GLUCONATE 324(37.5)
1 TABLET ORAL DAILY
Status: DISCONTINUED | OUTPATIENT
Start: 2021-03-06 | End: 2021-03-07 | Stop reason: HOSPADM

## 2021-03-06 RX ORDER — BISMUTH SUBSALICYLATE 262 MG
1 TABLET,CHEWABLE ORAL DAILY
Status: DISCONTINUED | OUTPATIENT
Start: 2021-03-06 | End: 2021-03-07 | Stop reason: HOSPADM

## 2021-03-06 RX ORDER — DOCUSATE SODIUM 100 MG/1
100 CAPSULE, LIQUID FILLED ORAL
Status: DISCONTINUED | OUTPATIENT
Start: 2021-03-06 | End: 2021-03-07 | Stop reason: HOSPADM

## 2021-03-06 RX ORDER — LANOLIN ALCOHOL/MO/W.PET/CERES
12 CREAM (GRAM) TOPICAL
Status: DISCONTINUED | OUTPATIENT
Start: 2021-03-06 | End: 2021-03-07 | Stop reason: HOSPADM

## 2021-03-06 RX ORDER — IPRATROPIUM BROMIDE AND ALBUTEROL SULFATE 2.5; .5 MG/3ML; MG/3ML
3 SOLUTION RESPIRATORY (INHALATION)
Status: DISCONTINUED | OUTPATIENT
Start: 2021-03-06 | End: 2021-03-07 | Stop reason: HOSPADM

## 2021-03-06 RX ORDER — MONTELUKAST SODIUM 10 MG/1
10 TABLET ORAL
Status: DISCONTINUED | OUTPATIENT
Start: 2021-03-06 | End: 2021-03-07 | Stop reason: HOSPADM

## 2021-03-06 RX ORDER — ONDANSETRON 2 MG/ML
4 INJECTION INTRAMUSCULAR; INTRAVENOUS
Status: DISCONTINUED | OUTPATIENT
Start: 2021-03-06 | End: 2021-03-07 | Stop reason: HOSPADM

## 2021-03-06 RX ORDER — SODIUM CHLORIDE 0.9 % (FLUSH) 0.9 %
5-40 SYRINGE (ML) INJECTION EVERY 8 HOURS
Status: DISCONTINUED | OUTPATIENT
Start: 2021-03-06 | End: 2021-03-07 | Stop reason: HOSPADM

## 2021-03-06 RX ORDER — LORATADINE 10 MG/1
10 TABLET ORAL DAILY
Status: DISCONTINUED | OUTPATIENT
Start: 2021-03-06 | End: 2021-03-07 | Stop reason: HOSPADM

## 2021-03-06 RX ORDER — LATANOPROST 50 UG/ML
1 SOLUTION/ DROPS OPHTHALMIC
Status: DISCONTINUED | OUTPATIENT
Start: 2021-03-06 | End: 2021-03-07 | Stop reason: HOSPADM

## 2021-03-06 RX ADMIN — IPRATROPIUM BROMIDE AND ALBUTEROL SULFATE 3 ML: .5; 3 SOLUTION RESPIRATORY (INHALATION) at 11:07

## 2021-03-06 RX ADMIN — MONTELUKAST 10 MG: 10 TABLET, FILM COATED ORAL at 03:18

## 2021-03-06 RX ADMIN — MONTELUKAST 10 MG: 10 TABLET, FILM COATED ORAL at 21:43

## 2021-03-06 RX ADMIN — IPRATROPIUM BROMIDE AND ALBUTEROL SULFATE 3 ML: .5; 3 SOLUTION RESPIRATORY (INHALATION) at 03:18

## 2021-03-06 RX ADMIN — FLUCONAZOLE 150 MG: 100 TABLET ORAL at 09:44

## 2021-03-06 RX ADMIN — CEFTRIAXONE 1 G: 1 INJECTION, POWDER, FOR SOLUTION INTRAMUSCULAR; INTRAVENOUS at 18:30

## 2021-03-06 RX ADMIN — ROPINIROLE HYDROCHLORIDE 0.5 MG: 0.25 TABLET, FILM COATED ORAL at 03:18

## 2021-03-06 RX ADMIN — ROPINIROLE HYDROCHLORIDE 0.5 MG: 0.25 TABLET, FILM COATED ORAL at 21:43

## 2021-03-06 RX ADMIN — Medication 10 ML: at 18:29

## 2021-03-06 RX ADMIN — AZITHROMYCIN MONOHYDRATE 500 MG: 500 INJECTION, POWDER, LYOPHILIZED, FOR SOLUTION INTRAVENOUS at 16:59

## 2021-03-06 RX ADMIN — LACTOBACILLUS TAB 1 TABLET: TAB at 09:44

## 2021-03-06 RX ADMIN — FERROUS GLUCONATE TAB 324 MG (37.5 MG ELEMENTAL IRON) 1 TABLET: 324 (37.5 FE) TAB at 09:45

## 2021-03-06 RX ADMIN — Medication 10 ML: at 21:44

## 2021-03-06 RX ADMIN — Medication 10 ML: at 06:00

## 2021-03-06 RX ADMIN — GABAPENTIN 200 MG: 100 CAPSULE ORAL at 09:44

## 2021-03-06 RX ADMIN — GUAIFENESIN 600 MG: 600 TABLET, EXTENDED RELEASE ORAL at 00:36

## 2021-03-06 RX ADMIN — GUAIFENESIN 600 MG: 600 TABLET, EXTENDED RELEASE ORAL at 09:44

## 2021-03-06 RX ADMIN — GUAIFENESIN 600 MG: 600 TABLET, EXTENDED RELEASE ORAL at 21:44

## 2021-03-06 RX ADMIN — MULTIVITAMIN TABLET 1 TABLET: TABLET at 09:45

## 2021-03-06 RX ADMIN — LORATADINE 10 MG: 10 TABLET ORAL at 09:44

## 2021-03-06 RX ADMIN — Medication 12 MG: at 03:54

## 2021-03-06 RX ADMIN — GABAPENTIN 200 MG: 100 CAPSULE ORAL at 18:29

## 2021-03-06 NOTE — PROGRESS NOTES
Problem: Discharge Planning  Goal: *Discharge to safe environment  Outcome: Resolved/Met     Home    Reason for Admission:   CAP / Bronchietasis / Hemoptysis    RUR Score:   13               PCP:First and Last name:   Tiffanie Stark MD          Name of Practice:    Are you a current patient: Yes/No: Yes   Approximate date of last visit:  10/15/2020   Can you participate in a virtual visit if needed:     Do you (patient/family) have any concerns for transition/discharge? Not @ this time                Plan for utilizing home health:  Refused     Current Advanced Directive/Advance Care Plan:  Full Code, scanned in record      Healthcare Decision Maker:   Click here to complete [address] including selection of the Healthcare Decision Maker Relationship (ie \"Primary\")            Primary Decision MakerAriel Dawson Sister - [phone]    Secondary Decision Maker: Dusty Harry - [phone]    Transition of Care Plan: Home with her sister & out-pt follow up when medically stable. Chart reviewed. Met with pt., verified all demographics. States has [employer] MCR ins. NOK/MPOA: Sandra Flor, sister, with whom she lives with & will pick her up @ discharge. Has walker & cane. Has NO home O2. States able to do her own ADL's. Will cont to follow for any needs. Codi RileyRN,ext [phone]. Patient has designated her sister to participate in his/her discharge plan and to receive any needed information. Name: Sandra Flor  Address:  Phone number:  [place] [place] [phone]   [phone]    Patient and/or next of kin has been given and has signed the [facility] Outpatient Observation  Notification letter and all questions answered. Copy of this notice given to patient and copy placed on chart. Patient and/or next of kin has been given the Outpatient Observation Information and Notification letter and all questions answered.      Care Management Interventions  PCP Verified by CM: Yes(Dr. Kaya Marquez)  Last Visit to PCP: 10/15/20  Palliative Care Criteria Met (RRAT>21 & CHF Dx)?: No  Mode of Transport at Discharge:  Other (see comment)(family)  Transition of Care Consult (CM Consult): Discharge Planning  Discharge Durable Medical Equipment: No  Physical Therapy Consult: No  Occupational Therapy Consult: No  Speech Therapy Consult: No  Current Support Network: Relative's Home(lives with her sister)  Confirm Follow Up Transport: Family  Discharge Location  Discharge Placement: Home

## 2021-03-06 NOTE — PROGRESS NOTES
Problem: Falls - Risk of  Goal: *Absence of Falls  Description: Document Lisa Jara Fall Risk and appropriate interventions in the flowsheet.   Outcome: Progressing Towards Goal  Note: Fall Risk Interventions:  Mobility Interventions: Patient to call before getting OOB         Medication Interventions: Teach patient to arise slowly                   Problem: Patient Education: Go to Patient Education Activity  Goal: Patient/Family Education  Outcome: Progressing Towards Goal     Problem: Breathing Pattern - Ineffective  Goal: *Absence of hypoxia  Outcome: Progressing Towards Goal  Goal: *Use of effective breathing techniques  Outcome: Progressing Towards Goal     Problem: Patient Education: Go to Patient Education Activity  Goal: Patient/Family Education  Outcome: Progressing Towards Goal

## 2021-03-06 NOTE — PROGRESS NOTES
Medicine Progress Note    Patient: Jhonathan Kline   Age:  66 y.o.  DOA: 3/5/2021   Admit Dx / CC: Hemoptysis [R04.2]  Abnormal chest CT [R93.89]  LOS:  LOS: 0 days     Assessment/Plan   Principal Problem:    CAP (community acquired pneumonia) (3/11/2016)    Active Problems:    RLS (restless legs syndrome) (4/4/2016)      LBBB (left bundle branch block) (10/19/2018)      Overview: Seen by Dr. Elizabeth Head. Iron deficiency anemia (8/13/2020)      Bronchiectasis ([facility] (10/2/2020)      Hemoptysis (3/5/2021)      Yeast infection (3/5/2021)      Overview: Genital      Asthma (3/6/2021)      Grade I diastolic dysfunction (2/5/9361)      Overview: Grade I Diastolic Dysfunction by TTE on 6/25/2018      Aortic regurgitation (3/6/2021)      Overview: Mild to Moderate AR by TTE on 6/25/2018      History of supraventricular tachycardia (3/6/2021)      History of Clostridium difficile colitis (7/6/2016)      Overview: (7/06/2016)        Additional Plan notes     Brochietasis, asthma, possible CAP   - also need to get speech to r/o aspiration- ordered   - rocephin azithro   - prn breathing treatments, nebs/inhalers    Hemoptysis   - none this am, will give mucinex with codeine at night if not already ordered    Walk test today, home 02 order placed. Possible dc tomorrow at latest Monday. If no dc tomorrow then change to inpatient status. DISPO         Anticipated Date of Discharge: 1-2 days  Anticipated Disposition (home, SNF) : home    Subjective:   Patient seen and examined.    No complaints today other than continued dry cough    Objective:     Visit Vitals  /66 (BP 1 Location: Right arm, BP Patient Position: At rest)   Pulse 73   Temp 98.2 °F (36.8 °C)   Resp 20   Ht 5' (1.524 m)   Wt 54.4 kg (120 lb)   SpO2 98%   BMI 23.44 kg/m²       Physical Exam:  General appearance: alert, cooperative, no distress, appears stated age  Head: Normocephalic, without obvious abnormality, atraumatic  Neck: supple, trachea midline  Lungs: decreased basilar breath sounds  Heart: regular rate and rhythm, S1, S2 normal, no murmur, click, rub or gallop  Abdomen: soft, non-tender. Bowel sounds normal. No masses,  no organomegaly  Extremities: extremities normal, atraumatic, no cyanosis or edema  Skin: Skin color, texture, turgor normal. No rashes or lesions  Neurologic: Grossly normal    Intake and Output:  Current Shift:  No intake/output data recorded.   Last three shifts:  03/04 1901 - 03/06 0700  In: 120 [P.O.:120]  Out: 250 [Urine:250]    Lab/Data Reviewed:  CMP:   Lab Results   Component Value Date/Time     03/06/2021 03:30 AM    K 4.8 03/06/2021 03:30 AM     03/06/2021 03:30 AM    CO2 28 03/06/2021 03:30 AM    AGAP 7 03/06/2021 03:30 AM    GLU 71 (L) 03/06/2021 03:30 AM    BUN 23 (H) 03/06/2021 03:30 AM    CREA 1.13 03/06/2021 03:30 AM    GFRAA 57 (L) 03/06/2021 03:30 AM    GFRNA 47 (L) 03/06/2021 03:30 AM    CA 9.3 03/06/2021 03:30 AM    ALB 3.6 03/06/2021 03:30 AM    TP 7.8 03/06/2021 03:30 AM    GLOB 4.2 (H) 03/06/2021 03:30 AM    AGRAT 0.9 03/06/2021 03:30 AM    ALT 24 03/06/2021 03:30 AM     CBC:   Lab Results   Component Value Date/Time    WBC 5.2 03/06/2021 03:30 AM    HGB 11.2 (L) 03/06/2021 03:30 AM    HCT 35.8 03/06/2021 03:30 AM     03/06/2021 03:30 AM     All Cardiac Markers in the last 24 hours: No results found for: CPK, CK, CKMMB, CKMB, RCK3, CKMBT, CKNDX, CKND1, SHANITA, TROPT, TROIQ, COREY, TROPT, TNIPOC, BNP, BNPP    Medications Reviewed:  Current Facility-Administered Medications   Medication Dose Route Frequency    azithromycin (ZITHROMAX) 500 mg in 0.9% sodium chloride 250 mL (VIAL-MATE)  500 mg IntraVENous Q24H    ferrous gluconate 324 mg (37.5 mg iron) tablet 1 Tab  1 Tab Oral DAILY    gabapentin (NEURONTIN) capsule 200 mg  200 mg Oral BID    Lactobacillus Acidoph & Bulgar (FLORANEX) tablet 1 Tab  1 Tab Oral DAILY    latanoprost (XALATAN) 0.005 % ophthalmic solution 1 Drop  1 Drop Both Eyes QHS    loratadine (CLARITIN) tablet 10 mg  10 mg Oral DAILY    montelukast (SINGULAIR) tablet 10 mg  10 mg Oral QHS    multivitamin (ONE A DAY) tablet 1 Tab  1 Tab Oral DAILY    rOPINIRole (REQUIP) tablet 0.5 mg  0.5 mg Oral QHS    sodium chloride (NS) flush 5-40 mL  5-40 mL IntraVENous Q8H    sodium chloride (NS) flush 5-40 mL  5-40 mL IntraVENous PRN    acetaminophen (TYLENOL) tablet 650 mg  650 mg Oral Q6H PRN    Or    acetaminophen (TYLENOL) suppository 650 mg  650 mg Rectal Q6H PRN    ondansetron (ZOFRAN) injection 4 mg  4 mg IntraVENous Q4H PRN    albuterol-ipratropium (DUO-NEB) 2.5 MG-0.5 MG/3 ML  3 mL Nebulization Q4H PRN    docusate sodium (COLACE) capsule 100 mg  100 mg Oral BID PRN    melatonin tablet 12 mg  12 mg Oral QHS PRN    pantoprazole (PROTONIX) injection 40 mg  40 mg IntraVENous DAILY PRN    cefTRIAXone (ROCEPHIN) 1 g in 0.9% sodium chloride (MBP/ADV) 50 mL MBP  1 g IntraVENous Q24H    tiotropium bromide (SPIRIVA RESPIMAT) 2.5 mcg /actuation  2 Puff Inhalation DAILY    guaiFENesin ER (MUCINEX) tablet 600 mg  600 mg Oral Q12H    fluconazole (DIFLUCAN) tablet 150 mg  150 mg Oral ONCE       Gissel Honeycutt MD    March 6, 2021

## 2021-03-06 NOTE — PROGRESS NOTES
conducted an initial consultation and Spiritual Assessment for Ana Sanchez, who is a 66 y.o.,female. Patient's Primary Language is: [place]. According to the patient's EMR Uatsdin Affiliation is: [place]. The reason the Patient came to the hospital is:   Patient Active Problem List    Diagnosis Date Noted    Bronchiectasis ([address] 10/02/2020    Tachycardia 10/01/2020    Leukopenia 08/13/2020    Iron deficiency anemia 08/13/2020    Heme positive stool 01/22/2020    Voiding dysfunction 12/26/2019    LBBB (left bundle branch block) 10/19/2018    Pain at rest 10/19/2018    Diarrhea 10/19/2018    Uterovaginal prolapse 07/03/2018    Screening for colon cancer 03/22/2018    Functional urinary incontinence 02/27/2018    Bladder stones     Uterus prolapse     RLS (restless legs syndrome) 04/04/2016    Back pain 03/31/2016    Visual changes 03/30/2016    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 Explored issues of kunal, belief, spirituality and Islam/ritual needs while hospitalized. Listened actively as she shared concerning the importance of her kunal in facing current and past trials. The following outcomes where achieved:  Patient shared limited information about both their medical narrative and spiritual journey/beliefs.  confirmed Patient's Uatsdin Affiliation. (Christianity)  Patient processed feeling about current hospitalization. Patient expressed gratitude for 's visit. Assessment:  Patient does not have any Islam/cultural needs that will affect patient's preferences in health care. There are no spiritual or Islam issues which require intervention at this time. Plan:  Chaplains will continue to follow and will provide pastoral care on an as needed/requested basis.    recommends bedside caregivers page  on duty if patient shows signs of acute spiritual or emotional distress.     [address] [place]   [phone]

## 2021-03-06 NOTE — PROGRESS NOTES
Speech Pathology:     5161: Orders received. RN asked SLP to come back tomorrow as pt is \"getting ready to go walking. \" Pt also requested evaluation tomorrow. Will follow up next business date or as medical condition permits. [id number]: RN allowed SLP to compete exam. Please see evaluation.      Thank you for this referral.    Justine Ragsdale M.S. CCC-SLP/L  Speech-Language Pathologist

## 2021-03-06 NOTE — PROGRESS NOTES
Problem: Falls - Risk of  Goal: *Absence of Falls  Description: Document Fab Ann Fall Risk and appropriate interventions in the flowsheet.   Outcome: Progressing Towards Goal  Note: Fall Risk Interventions:  Mobility Interventions: Patient to call before getting OOB         Medication Interventions: Evaluate medications/consider consulting pharmacy                   Problem: Patient Education: Go to Patient Education Activity  Goal: Patient/Family Education  Outcome: Progressing Towards Goal     Problem: Breathing Pattern - Ineffective  Goal: *Absence of hypoxia  Outcome: Progressing Towards Goal  Goal: *Use of effective breathing techniques  Outcome: Progressing Towards Goal     Problem: Patient Education: Go to Patient Education Activity  Goal: Patient/Family Education  Outcome: Progressing Towards Goal

## 2021-03-06 NOTE — PROGRESS NOTES
TRANSFER - IN REPORT:    Verbal report received from Sera CARTER (name) on Sheron Heller  being received from ED(unit) for routine progression of care      Report consisted of patients Situation, Background, Assessment and   Recommendations(SBAR). Information from the following report(s) SBAR, Kardex and MAR was reviewed with the receiving nurse. Opportunity for questions and clarification was provided. Assessment completed upon patients arrival to unit and care assumed. 3790  Patient came on the unit via stretcher. Was able to ambulate to the bed inside the room. Is alert and oriented x 4. Skin is intact. 2 IV sites noted. Patient noted to have productive cough, stated that is her baseline from when she got sick in October 2020. SCD attached but had a hard time looking for a hose. Supervisor found one.     0200  Telemetry attached, initial rhythm Afib with BBB.     0345  Telemetry called reporting that patient O2 is in the high 80's. Placed O2 with humidifier. 0500  Patient has purewick on, she urinates when she coughs. 0630  EKG done, NSR with BBB.     0700  1930    Bedside, Verbal, and Written shift change report given to [address] [place] [address] (oncoming nurse) by RN (offgoing nurse). Report included the following information SBAR, Kardex, and MAR.     2000  Patient is in bed, alert and oriented. Oxygen on/ Room air, IV CDI, dressing CDI, no sign of distress. SCD on/ contraindicated/ refused. Patient repositioned. Bed low, call bell within reach. 2100  Patient is in bed, resting/ sleeping. No sign of distress. Bed low, call bell within reach. 2200  Patient is in bed, resting/ sleeping. Patient repositioned. No sign of distress. Bed low, call bell within reach. 2300  Patient is in bed, resting/ sleeping. No sign of distress. Bed low, call bell within reach. 0000  Patient is in bed, alert and oriented. Oxygen on/ Room air, IV CDI, dressing CDI, no sign of distress. Patient repositioned.  Bed low, call bell within reach. 0100  Patient is in bed, resting/ sleeping. No sign of distress. Bed low, call bell within reach. 0200  Patient is in bed, resting/ sleeping. Patient repositioned. No sign of distress. Bed low, call bell within reach. 0300  Patient is in bed, resting/ sleeping. No sign of distress. Bed low, call bell within reach. 0400  Patient is in bed, alert and oriented. Oxygen on/ Room air, IV CDI, dressing CDI, no sign of distress. Patient repositioned. Bed low, call bell within reach. 0500  Patient offered assistance with hygiene. Refused/ Changed gown, linen, underpad. 0600 Trash emptied, given water. Patient repositioned. No sign of distress. 0700  Patient is in bed resting/ sleeping. Patient has no complaint. 0730  Bedside, Verbal, and Written shift change report given to [address] [place] [address] (oncoming nurse) by Cortez Mckinnon (offgoing nurse). Report included the following information SBAR, Kardex, and MAR.

## 2021-03-06 NOTE — PROGRESS NOTES
Problem: Dysphagia (Adult)  Goal: *Acute Goals and Plan of Care (Insert Text)  Description: Patient will:  1. Tolerate PO trials with 0 s/s overt distress in 4/5 trials - met  2. Utilize compensatory swallow strategies/maneuvers (decrease bite/sip, size/rate, alt. liq/sol) with min cues in 4/5 trials - met    Rec:     Reg solid with thin liquids  Aspiration precautions  HOB >45 during po intake, remain >30 for 30-45 minutes after po   Small bites/sips; alternate liquid/solid with slow feeding rate   Oral care TID  Meds per pt preference  May benefit from GI consult per MD/PCP? Outcome: Resolved/Met     SPEECH LANGUAGE PATHOLOGY BEDSIDE SWALLOW EVALUATION AND DISCHARGE    Patient: Lola Essex (89 y.o. female)  Date: 3/6/2021  Primary Diagnosis: Hemoptysis [R04.2]  Abnormal chest CT [R93.89]        Precautions: aspiration     PLOF: As per H&P    ASSESSMENT :  Based on the objective data described below, the patient presents with oropharyngeal swallow fxn essentially WFL. Strength, ROM, and coordination of the orofacial musculature were all found to be [facility]. Pt tolerated reg solid, puree, and thin liquids +/- straw consecutive swallows without any overt s/sx of aspiration. Mastication was appropriate with timely a-p transit. Positive oral clearance observed across all trials. Pt safe for initiation of reg solid diet with thin liquids, aspiration precautions, oral care TID, and meds as tolerated. Pt does complain of coughing intermittently post PO at times, but no coughing/choking during meals; she may benefit from a GI consult per MD discretion? No further skilled SLP services are indicated at this time. Please re-consult if needed. PLAN :  Recommendations and Planned Interventions:  No formal ST needs ID'd for dysphagia. Eval only. Discharge Recommendations: None     SUBJECTIVE:   Patient stated Capsul pills are like air. They go right down. Regular pills you tilt your head back.     OBJECTIVE:     Past Medical History:   Diagnosis Date    Abnormality of gait and mobility     Ambulatory with use of Wheeled Walker Outside the home (as of 3/05/2021)    Aortic regurgitation 06/25/2018    Mild to Moderate by TTE on 6/25/2018    Arthritis     in [facility]    Asthma Sept. 2018    Asthma Dr. Odliia Deshpande; Questionably Cough-Variant Asthma    Blindness of left eye     2/2 Blood Clot in Left Eye    Colon polyps 1-22-20    From colonoscopy     Compression fracture of L5 vertebra (HCC)     DVT (deep venous thrombosis) ([facility])     Provoked (Immobility, Spinal Infection) RLE DVT S/P IVC Filter (2016)    Environmental allergies     Female genital prolapse     Former smoker     1-1.5 PPD x6 years; Quit 1970    Glaucoma 2017    EeArhracvhVnklbzkjaxlcsPlObenoykdMldKchckrrfKzpwxKfchq9TDLK    Grade I diastolic dysfunction 56/11/3624    by TTE on 6/25/2018    History of Clostridium difficile colitis 07/06/2016    History of transfusion 03/2016    Transfused x4 units (ostensibly of) PRBCs over March of 2016    Hypertension October 2018    Dr. 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 patient ambulatory Summer 2018    SVT (supraventricular tachycardia) (HCC)     Umbilical hernia 03/61/6745    Supraumbilical Ventral Hernia    Urine retention     Voiding Dysfunction with Hypocontractile Bladder    Uterus prolapse     grade 5.   Surgically corrected 7/2018    Vaginal candida 03/16/2016     Past Surgical History:   Procedure Laterality Date    COLONOSCOPY N/A 1/22/2020 COLONOSCOPY performed by Lissy Barrientos MD at [address]      x 4 after having miscarriages. HX GYN  07/2018    CYSTOURETHROSCOPY; POSTERIOR COLPORRHAPHY, CYSTOCELE REPAIR; COLPOPEXY VAGINAL INTRA-PERITONEAL APPROACH;    HX TONSILLECTOMY      HX VITRECTOMY Left 2017    x2 (4/20/2017, 7/20/2017)    VASCULAR SURGERY PROCEDURE UNLIST  march 2016    IVC filter. Prior Level of Function/Home Situation: see below  Home Situation  Home Environment: Private residence  One/Two Story Residence: One story  Living Alone: No  Support Systems: Family member(s)  Patient Expects to be Discharged to[de-identified] Private residence  Current DME Used/Available at Home: [address] prior to admission: reg solid with thin  Current Diet:  reg solid with thin    Cognitive and Communication Status:  Neurologic State: Alert  Orientation Level: Oriented X4  Cognition: Follows commands  Oral Assessment:  Oral Assessment  Labial: No impairment  Dentition: Natural  Oral Hygiene: adequate  Lingual: No impairment  Velum: No impairment  Mandible: No impairment  P.O. Trials:  Patient Position: [facility] at [facility]  Vocal quality prior to P.O.: No impairment  Consistency Presented: Thin liquid;Puree; Solid  How Presented: Self-fed/presented;Cup/sip;Spoon;Straw;Successive swallows  Bolus Acceptance: No impairment  Bolus Formation/Control: No impairment  Propulsion: No impairment  Oral Residue: None  Initiation of Swallow: No impairment  Laryngeal Elevation: Functional  Aspiration Signs/Symptoms: None  Pharyngeal Phase Characteristics: No impairment, issues, or problems   Effective Modifications: None  Cues for Modifications: None     Oral Phase Severity: No impairment  Pharyngeal Phase Severity : No impairment    PAIN:  Start of Eval: 0  End of Eval: 0     After evaluation:   []            Patient left in no apparent distress sitting up in chair  [x]            Patient left in no apparent distress in bed  [x]            Call bell left within reach  [x]            Nursing notified  []            Family present  []            Caregiver present  []            Bed alarm activated    COMMUNICATION/EDUCATION:   [x]            Aspiration precautions; swallow safety; compensatory techniques. [x]            Patient/family have participated as able in goal setting and plan of care. []            Patient/family agree to work toward stated goals and plan of care. []            Patient understands intent and goals of therapy; neutral about participation. []            Patient unable to participate in goal setting/plan of care; educ ongoing with interdisciplinary staff  [x]         Posted safety precautions in patient's room.     Thank you for this referral.    Jefferson Phillips M.S. CCC-SLP/L  Speech-Language Pathologist

## 2021-03-06 NOTE — PROGRESS NOTES
Problem: Breathing Pattern - Ineffective  Goal: *Absence of hypoxia  Outcome: Progressing Towards Goal   Respiratory Therapy Assessment Care Plan    Patient:  Nakia Haney 66 y.o. female 3/6/2021 11:08 AM    Hemoptysis [R04.2]  Abnormal chest CT [R93.89]      Chest X-RAY:   Results from Hospital Encounter encounter on 10/01/20   XR CHEST PORT    Impression IMPRESSION:  Peribronchial cuffing may represent sequela of bronchitis. No focal  consolidation. See accompanying CT report. Results from [place] encounter on 12/18/18   XR SACRUM AND COCCYX    Impression IMPRESSION:    Minimal degenerative changes of the SI joints. XR SPINE LUMB 2 OR 3 V    Impression IMPRESSION:    Chronic vertebral plana of L5. Mild focal inferior endplate L5 irregularity,  probably chronic. Degenerated L4-5 and L5-S1 discs. L4-5 and L5-S1 facet arthropathy. Vital Signs:   Visit Vitals  BP (!) 120/49   Pulse 67   Temp 97.9 °F (36.6 °C)   Resp 20   Ht 5' (1.524 m)   Wt 54.4 kg (120 lb)   SpO2 99%   BMI 23.44 kg/m²         Indications for treatment: HIS ASTHMA      Plan of care: Nebs Q4h PRN and Spiriva.         Goal: NO SOB

## 2021-03-06 NOTE — H&P
History and Physical    Patient: Apolinar Ramsey MRN: [id number]  SSN: [id number]    YOB: 1945  Age: 66 y.o. Sex: female      Subjective:      Apolinar Ramsey is a 66 y.o. female who presents to [facility] ER with complaint of Hemoptysis and was referred to [facility] ER by her Pulmonologist.  Patient states that she had coughed this AM and had some Blood-streaked sputum followed by two other coughs in which there was jagjit blood. Patient reportedly has this issue in 10/2020 and was admitted at that time. Patient also states that she has been have some dyspnea on exertion when walking blocks with her [place] outsider her home more so than usually in the last week. Patient reports chronic copious sputum production, which has remained clear recently and which she attributes to her diagnosis of bronchiectasis. Patient reports that her coughing has worsened with use of an Acapella machine to mechanically vibrate and loosen up her mucus. Patient does admit to some sore throat from constant coughing, some low right rib pain (which she attribute to coughing), and insomnia recently, as well as the inability to lay flat while sleeping due to gradual dislodgement and irritation from mucus congestion (not orthopnea). Patient also reports a current Yeast Infection with dysuria. Patient reports palpitations with use of Albuterol inhaler. Patient reports a 7-lb weight loss recently (time period unknown). Notably, Patient reports that she is coughing consistently after drinking water and also quickly after starting to suck on lozenges. Patient denies fevers, chills, nausea, vomiting, diarrhea, abdominal pain, pain with inspiration, wheezing, and sick contacts. Patient's Primary Pulmonologist is Dr. Julian Gibbons. In [facility] ER, Patient is noted to have Tachycardia and marginally low Blood Pressure. SARS-CoV-2 (Novel Coronavirus Covid-19) Rapid results return negative.   Patient was placed on 2 L/min O2 via NC due to an SpO2 of 90% while sleeping. Patient received IV Ceftriaxone 1 g, IV Azithromycin 500 mg, Duoneb x1, and IV fluids while in [facility] ER. Patient is placed on Observation on Telemetry Unit (Non-Covid-19 Cohort) for management of CAP with Hemoptysis (CURB-65 score 3) with Yeast Infection and questionable Aspiration. Past Medical History:   Diagnosis Date    Abnormality of gait and mobility     Ambulatory with use of Wheeled Walker Outside the home (as of 3/05/2021)    Aortic regurgitation 06/25/2018    Mild to Moderate by TTE on 6/25/2018    Arthritis     in [facility]    Asthma Sept. 2018    Asthma Dr. Jose Miguel Hanks; Questionably Cough-Variant Asthma    Blindness of left eye     2/2 Blood Clot in Left Eye    Colon polyps 1-22-20    From colonoscopy     Compression fracture of L5 vertebra ([address]     DVT (deep venous thrombosis) (HCC)     Provoked (Immobility, Spinal Infection) RLE DVT S/P IVC Filter (2016)    Environmental allergies     Female genital prolapse     Former smoker     1-1.5 PPD x6 years; Quit 1970    Glaucoma 2017    JvAvvwrvewComtlrkzwawsdMlJvdvlqouIdbHnhfwkmfEnqijCtfel2PTTE    Grade I diastolic dysfunction 03/23/3806    by TTE on 6/25/2018    History of Clostridium difficile colitis 07/06/2016    History of transfusion 03/2016    Transfused x4 units (ostensibly of) PRBCs over March of 2016    Hypertension October 2018    Dr. Taisha Rodriguez diagnosis    Iron deficiency anemia 03/2016    Kidney stone 07/2019    KidneySXiang kidney-NoActionTaken; Bladder Stones, also    LBBB (left bundle branch block)     Osteoporosis 10/07/2016    Most recently by DEXA Scan on 12/18/2018    Psoas muscle abscess ([address] 03/12/2016    History of Right Psoas Muscle Abscess S/P Drainage    Recurrent UTI [id number]    likely 2/2 Indwelling-Catheter x2 years    Small right kidney     Spinal abscess ([address] 03/2016    Caused paraplegia.  patient ambulatory Summer 2018   [address] [place] [address] [facility]supraventricular tachycardia) [facility]     Umbilical hernia     Supraumbilical Ventral Hernia    Urine retention     Voiding Dysfunction with Hypocontractile Bladder    Uterus prolapse     grade 5. Surgically corrected 2018    Vaginal candida 2016     Past Surgical History:   Procedure Laterality Date    COLONOSCOPY N/A 2020    COLONOSCOPY performed by Daryl Jean MD at [address] [facility]      x 4 after having miscarriages.  HX GYN  2018    CYSTOURETHROSCOPY; POSTERIOR COLPORRHAPHY, CYSTOCELE REPAIR; COLPOPEXY VAGINAL INTRA-PERITONEAL APPROACH;    HX TONSILLECTOMY      HX VITRECTOMY Left 2017    x2 (2017, 2017)    VASCULAR SURGERY PROCEDURE UNLIST  2016    IVC filter. Family History   Problem Relation Age of Onset    Cancer Mother         Stomach Cancer-     Heart Disease Mother         [email or web address]    Cancer Father         Stomach Cancer-     Asthma Father      Social History     Tobacco Use    Smoking status: Former Smoker     Packs/day: 1.00     Years: 8.00     Pack years: 8.00     Types: Cigarettes     Quit date: 4/15/1970     Years since quittin.9    Smokeless tobacco: Never Used    Tobacco comment: 1-1.5 PPD x7-8 years; Quit    Substance Use Topics    Alcohol use: No     Alcohol/week: 0.0 standard drinks     Frequency: Never      Patient lives at home with her Sister, who is Patient's Primary Care Taker. Patient is up ad petar at home and Ambulatory outside home with use of a Wheeled Walker. Patient is a Former Smoker who Smoked 1-1.5 PPD x6 years; Quit . Patient denies Vaping, significant ETOH consumption, and Illicit Drug Use. Patient has a Prudence Otter at home. Prior to Admission medications    Medication Sig Start Date End Date Taking?  Authorizing Provider   tiotropium bromide (Spiriva Respimat) 2.5 mcg/actuation inhaler Take 2 Puffs by inhalation daily. Yes Provider, Historical   loratadine (CLARITIN) 10 mg tablet Take 1 Tab by mouth daily. 2/5/21  Yes Jacques Castro MD   montelukast (SINGULAIR) 10 mg tablet TAKE 1 TABLET BY MOUTH DAILY 2/5/21  Yes Jacques Castro MD   albuterol (PROVENTIL HFA, VENTOLIN HFA, PROAIR HFA) 90 mcg/actuation inhaler Take 2 Puffs by inhalation every four (4) hours as needed for Cough. 2/5/21  Yes Jacques Castro MD   rOPINIRole (REQUIP) 0.5 mg tablet TAKE 1 TABLET BY MOUTH EVERY NIGHT 1/20/21  Yes Jacques Castro MD   Lactobac no.41/Bifidobact no.7 (PROBIOTIC-10 PO) Take 1 Tab by mouth daily. Yes Provider, Historical   gabapentin (NEURONTIN) 100 mg capsule TAKE 2 CAPSULES BY MOUTH THREE TIMES DAILY  Patient taking differently: Take 200 mg by mouth two (2) times a day. 8/10/20  Yes Jacques Castro MD   multivitamin (ONE A DAY) tablet Take 1 Tab by mouth daily. Yes Provider, Historical   calcium-cholecalciferol, d3, (CALCIUM 600 + D) 600-125 mg-unit tab Take 2 Tabs by mouth. Yes Provider, Historical   cholecalciferol (VITAMIN D3) (5000 Units/125 mcg) tab tablet Take  by mouth daily. Yes Provider, Historical   b complex vitamins tablet Take 1 Tab by mouth daily. Yes Provider, Historical   latanoprost (XALATAN) 0.005 % ophthalmic solution Administer 1 Drop to both eyes nightly. Yes Provider, Historical   ferrous gluconate 324 mg (38 mg iron) tablet Take 1 Tab by mouth daily. TAKE 1 TABLET BY MOUTH EVERY MONDAY, WEDNESDAY, AND SUNDAY  Patient taking differently: Take 324 mg by mouth daily.  TAKE 1 TABLET BY MOUTH EVERY MONDAY, WEDNESDAY, AND FRIDAY 7/27/20   Judith Zelaya MD        Allergies   Allergen Reactions    Milk Containing Products Other (comments)     Mucous    Bactrim [Sulfamethoprim] Nausea Only and Other (comments)     Headache, Joint pain, loss of appetite     Mold Other (comments)    Pollen Extracts Other (comments)    Sulfamethoxazole-Trimethoprim Other (comments) Review of Systems:  (+) Weight Change (7-lb weight loss over unknown period of time)  (+) Insomnia  (+) Vision Loss (Chronic; Left Eye)  (+) Congestion  (+) Nasal Discharge  (+) Hoarseness  (+) Sore Throat  (+) Cough  (+) Increased Sputum Production (Copious amounts of clear sputum, which Patient attributes to Bronchiectasis)  (+) Hemoptysis  (+) Dyspnea on Exertion (When ambulating Blocks using her Wheeled Walker outside)  (+) Chest Pain (Right Lateral Rib pain, which Patient attributes to excessive coughing)  (+) Dysuria  (~) Palpitation (With Use of Albuterol)  (-) Fevers  (-) Chills  (-) Fatigue  (-) Generalized Weakness  (-) Pain with Inspiration  (-) Wheezing  (-) Shortness of Breath  (-) Orthopnea  (-) BLE Edema  (-) Abdominal Pain  (-) Nausea  (-) Vomiting  (-) Diarrhea  All other systems have been reviewed and are negative      Objective:     Vitals:    03/05/21 2255 03/05/21 2300 03/05/21 2325 03/05/21 2330   BP:  (!) 112/58 (!) 102/54 (!) 103/53   Pulse: 74 75 76 75   Resp: 19 15 19 19   Temp:       SpO2: 98% 99% 93% 92%   Weight:       Height:            Physical Exam:  General:  Elderly Adult female lying in bed in no acute distress  HEENT:  Atraumatic, normocephalic; Pupils equally round with (+) Decreased Left-sided reactivity to light; (+) Left Pseudophakia; Extraocular muscles intact; Moist Oropharynx with (+) Minimal erythema of Posterior Oropharynx without edema or exudates; (+) NC in place and running at 2 L/min O2; Procedure Mask in place  Neck:  No Bruits; No Lymphadenopathy  Chest:  No pectus carinatum;  No pectus excavatum  Cardiovascular:  Regular rate and rhythm without rubs, gallops, or murmurs appreciated  Respiratory:  (+) Right-sided End-Expiratory Squeak; (+) Slight Crackles over Right Upper and Mid lung-fields; (+) Slight Crackles over Left Lung Base; no rhonchi; normal effort of breathing (+) on 2 L/min O2 via NC  Abdominal:  Soft, non-tense, non-tender abdomen; BS present without guarding, rebound, or masses  :  Deferred  Extremities:  Pulses 2+ x4 without edema, clubbing, or cyanosis  Musculoskeletal:  Strength 5/5 and symmetrical in BUE and BLE  Integument:  No rash on face, forearms, or legs  Neurological:  A&O x4/4; (+) Severely reduced Acuity of Left Eye; No gross deficits of Eye Movement, Jaw Opening, Facial Expression, Hearing, Phonation, or Head Movement; No gross deficits of Tongue Movement or Slurring of Speech  Psychiatric:  Affect is appropriate; Language is present and fluent; Behavior is appropriate      Laboratory Studies:  CMP:   Lab Results   Component Value Date/Time     03/05/2021 01:11 PM    K 4.1 03/05/2021 01:11 PM     03/05/2021 01:11 PM    CO2 28 03/05/2021 01:11 PM    AGAP 7 03/05/2021 01:11 PM     (H) 03/05/2021 01:11 PM    BUN 29 (H) 03/05/2021 01:11 PM    CREA 1.09 03/05/2021 01:11 PM    GFRAA 59 (L) 03/05/2021 01:11 PM    GFRNA 49 (L) 03/05/2021 01:11 PM    CA 10.3 (H) 03/05/2021 01:11 PM     CBC:   Lab Results   Component Value Date/Time    WBC 6.2 03/05/2021 01:11 PM    HGB 11.9 (L) 03/05/2021 01:11 PM    HCT 37.3 03/05/2021 01:11 PM     03/05/2021 01:11 PM     COAGS:   Lab Results   Component Value Date/Time    APTT 37.9 (H) 03/05/2021 01:11 PM    PTP 14.1 03/05/2021 01:11 PM    INR 1.1 03/05/2021 01:11 PM        Images Reviewed:  Cta Chest W Or W Wo Cont    Result Date: 3/5/2021  EXAM: CTA Chest INDICATION: Shortness of breath. Possible PE. COMPARISON: No prior study. TECHNIQUE: Axial CT imaging from the thoracic inlet through the diaphragm with intravenous contrast utilizing CTA study for pulmonary artery evaluation. Coronal and sagittal MIP reformations were generated at a separate workstation. One or more dose reduction techniques were used on this CT: automated exposure control, adjustment of the mAs and/or kVp according to patient size, and iterative reconstruction techniques.   The specific techniques used on this CT exam have been documented in the patient's electronic medical record. Digital Imaging and Communications in Medicine (DICOM) format image data are available to nonaffiliated external healthcare facilities or entities on a secure, media free, reciprocally searchable basis with patient authorization for at least a 12-month period after this study. _______________ FINDINGS: EXAM QUALITY: Overall exam quality is adequate. Pulmonary arterial enhancement is adequate. The breath hold is satisfactory. PULMONARY ARTERIES: No convincing evidence of pulmonary embolism. MEDIASTINUM: Normal heart size. No evidence of right heart strain. Normal caliber aorta with vascular calcifications No pericardial effusion. LYMPH NODES: No enlarged mediastinal or hilar nodes by size criteria. AIRWAY: Unremarkable. LUNGS: Patchy groundglass and centrilobular nodules in the lungs. Background of mosaic attenuation consistent with air trapping. Areas of subsegmental linear atelectasis/scarring in the medial middle lobe and lingula. PLEURA: No pleural effusion or pneumothorax. UPPER ABDOMEN: Visualized upper abdomen is unremarkable. . OTHER: No acute or aggressive osseous abnormalities identified. _______________     No evidence of pulmonary embolism. Patchy groundglass and centrilobular nodules scattered throughout the lungs. May represent atypical infection, edema or hemorrhage. Background of air trapping suggestive of small airways disease. I have personally reviewed the EKG and have found, per my read, LBBB with isolated ST-segment elevation ~1 mm without J-Point elevation in lead V1.       Assessment:     Hospital Problems  Date Reviewed: 3/5/2021          Codes Class Noted POA    * (Principal) CAP (community acquired pneumonia) ICD-10-CM: J18.9  ICD-9-CM: 898  3/11/2016 Yes        Hemoptysis ICD-10-CM: R04.2  ICD-9-CM: 786.30  3/5/2021 Yes        Yeast infection ICD-10-CM: B37.9  ICD-9-CM: 112.9  3/5/2021 Yes    Overview Signed 3/5/2021 11:52 PM by Reji Ragland DO     Genital             Asthma ICD-10-CM: S37.749  ICD-9-CM: 493.90  3/6/2021 Yes        Grade I diastolic dysfunction VFM-80-VT: I51.9  ICD-9-CM: 429.9  3/6/2021 Yes    Overview Signed 3/6/2021 12:11 AM by Reji Ragland DO     Grade I Diastolic Dysfunction by TTE on 6/25/2018             Aortic regurgitation ICD-10-CM: I35.1  ICD-9-CM: 424.1  3/6/2021 Yes    Overview Signed 3/6/2021 12:11 AM by Reji Ragland DO     Mild to Moderate AR by TTE on 6/25/2018             History of supraventricular tachycardia ICD-10-CM: Z86.79  ICD-9-CM: V12.59  3/6/2021 Yes        Bronchiectasis ([address] ICD-10-CM: J47.9  ICD-9-CM: 494.0  10/2/2020 Yes        Iron deficiency anemia ICD-10-CM: D50.9  ICD-9-CM: 280.9  8/13/2020 Yes        LBBB (left bundle branch block) ICD-10-CM: I44.7  ICD-9-CM: 426.3  10/19/2018 Yes    Overview Signed 10/19/2018  2:01 PM by Emile Medeiros     Seen by Dr. Annmarie Jefferson. History of Clostridium difficile colitis ICD-10-CM: Z86.19  ICD-9-CM: V12.79  7/6/2016 Yes    Overview Signed 3/6/2021 12:12 AM by Reji Ragland DO     (7/06/2016)             RLS (restless legs syndrome) ICD-10-CM: L97.32  ICD-9-CM: 333.94  4/4/2016 Yes              Plan:     Telemetry, Pulse Oximetry, supportive/therapeutic oxygen as necessary, IV Ceftriaxone, IV Azithromycin, PRN Duonebs, Guaifenesin, Blood Cultures, Sputum Culture, EKG, and Urinalysis. Plan for Fluconazole 150 mg PO in AM.  Re-assess in AM to determine if Patient requires additional IV antibiotics or if Patient is able to be discharged home on oral antibiotics. Ordered Speech Therapy Evaluation for possible Aspiration or Early Aspiration Phenomena and Patient may require an Out-Patient Barium Swallow Study if discharged over the weekend. Patient may require Out-Patient Bronchoscopy (possibly Transfer if Hemoptysis returns in more severe capacity).   Consider Pulmnological consultation if Hemoptysis returns. Continue home medications for Asthma, Restless Leg Syndrome, Seasonal Allergies, Iron Deficiency Anemia, Glaucoma, and Vitamin Supplementation. DVT mechanoprophylaxis due to hemoptysis.     Signed By: Danny Garcia,      March 6, 2021

## 2021-03-06 NOTE — PROGRESS NOTES
Patient stated she uses lower dose at home and its unavailable in pharmacy. Will call if PRN neb is needed. Dr Adolfo Barrios Notified.

## 2021-03-06 NOTE — ED NOTES
TRANSFER - OUT REPORT:    Verbal report given to Abbey Jones (name) on Seun Dunlap  being transferred to [facility](unit) for routine progression of care       Report consisted of patients Situation, Background, Assessment and   Recommendations(SBAR). Information from the following report(s) SBAR, ED Summary and MAR was reviewed with the receiving nurse. Lines:   Peripheral IV 03/05/21 Anterior;Distal;Right Forearm (Active)       Peripheral IV 03/05/21 Left Antecubital (Active)   Site Assessment Clean, dry, & intact 03/05/21 1517   Phlebitis Assessment 0 03/05/21 1517   Infiltration Assessment 0 03/05/21 1517   Dressing Status Clean, dry, & intact 03/05/21 1517        Opportunity for questions and clarification was provided.       Patient transported with:   Monitor  Registered Nurse

## 2021-03-06 NOTE — PROGRESS NOTES
0730 am Bedside shift report received from [facility]  1107am- Pt is having a coughing fit. Duoneb given to pt as per RT.  1300pm- Pt is resting. 1530pm- Walk Test, RA at Rest-O2 Sat's -92. O2 Sat's checked while ambulating on RA-91. Pt tolerate walking with no problems. 1600pm Speech therapy at the bedside. Pt passed the test,  1900pm Bedside shift change report given to [facility], RN by Debra Ramirez RN. Report included the following information SBAR, Kardex, MAR, Recent Results, Med Rec Status, Cardiac Rhythm A-fib and Alarm Parameters .

## 2021-03-07 VITALS
DIASTOLIC BLOOD PRESSURE: 55 MMHG | HEIGHT: 60 IN | SYSTOLIC BLOOD PRESSURE: 115 MMHG | WEIGHT: 120 LBS | TEMPERATURE: 97.8 F | BODY MASS INDEX: 23.56 KG/M2 | RESPIRATION RATE: 20 BRPM | HEART RATE: 77 BPM | OXYGEN SATURATION: 94 %

## 2021-03-07 LAB
APPEARANCE UR: CLEAR
BACTERIA URNS QL MICRO: ABNORMAL /HPF
BILIRUB UR QL: NEGATIVE
COLOR UR: YELLOW
EPITH CASTS URNS QL MICRO: ABNORMAL /LPF (ref 0–5)
GLUCOSE UR STRIP.AUTO-MCNC: NEGATIVE MG/DL
HGB UR QL STRIP: NEGATIVE
KETONES UR QL STRIP.AUTO: NEGATIVE MG/DL
LEUKOCYTE ESTERASE UR QL STRIP.AUTO: ABNORMAL
NITRITE UR QL STRIP.AUTO: NEGATIVE
PH UR STRIP: 6.5 [PH] (ref 5–8)
PROT UR STRIP-MCNC: NEGATIVE MG/DL
RBC #/AREA URNS HPF: ABNORMAL /HPF (ref 0–5)
SP GR UR REFRACTOMETRY: 1.01 (ref 1–1.03)
UROBILINOGEN UR QL STRIP.AUTO: 0.2 EU/DL (ref 0.2–1)
WBC URNS QL MICRO: ABNORMAL /HPF (ref 0–4)

## 2021-03-07 PROCEDURE — 81001 URINALYSIS AUTO W/SCOPE: CPT

## 2021-03-07 PROCEDURE — 74011000250 HC RX REV CODE- 250: Performed by: INTERNAL MEDICINE

## 2021-03-07 PROCEDURE — 65660000000 HC RM CCU STEPDOWN

## 2021-03-07 PROCEDURE — 2709999900 HC NON-CHARGEABLE SUPPLY

## 2021-03-07 PROCEDURE — 94760 N-INVAS EAR/PLS OXIMETRY 1: CPT

## 2021-03-07 PROCEDURE — 74011250637 HC RX REV CODE- 250/637: Performed by: INTERNAL MEDICINE

## 2021-03-07 PROCEDURE — 87070 CULTURE OTHR SPECIMN AEROBIC: CPT

## 2021-03-07 PROCEDURE — 94640 AIRWAY INHALATION TREATMENT: CPT

## 2021-03-07 PROCEDURE — 99218 HC RM OBSERVATION: CPT

## 2021-03-07 RX ORDER — AZITHROMYCIN 250 MG/1
250 TABLET, FILM COATED ORAL DAILY
Qty: 3 TAB | Refills: 0 | Status: SHIPPED | OUTPATIENT
Start: 2021-03-07 | End: 2021-04-15

## 2021-03-07 RX ORDER — NYSTATIN 100000 [USP'U]/G
POWDER TOPICAL 2 TIMES DAILY
Qty: 60 G | Refills: 1 | Status: SHIPPED | OUTPATIENT
Start: 2021-03-07 | End: 2021-04-15

## 2021-03-07 RX ORDER — GUAIFENESIN 100 MG/5ML
200 SOLUTION ORAL
Status: DISCONTINUED | OUTPATIENT
Start: 2021-03-07 | End: 2021-03-07 | Stop reason: HOSPADM

## 2021-03-07 RX ORDER — GUAIFENESIN 100 MG/5ML
200 SOLUTION ORAL
Qty: 118 ML | Refills: 1 | Status: SHIPPED | OUTPATIENT
Start: 2021-03-07 | End: 2021-03-22 | Stop reason: SDUPTHER

## 2021-03-07 RX ORDER — AMOXICILLIN AND CLAVULANATE POTASSIUM 875; 125 MG/1; MG/1
1 TABLET, FILM COATED ORAL 2 TIMES DAILY
Qty: 10 TAB | Refills: 0 | Status: SHIPPED | OUTPATIENT
Start: 2021-03-07 | End: 2021-03-12

## 2021-03-07 RX ORDER — NYSTATIN 100000 [USP'U]/G
POWDER TOPICAL 2 TIMES DAILY
Status: DISCONTINUED | OUTPATIENT
Start: 2021-03-07 | End: 2021-03-07 | Stop reason: HOSPADM

## 2021-03-07 RX ADMIN — NYSTATIN: 100000 POWDER TOPICAL at 12:44

## 2021-03-07 RX ADMIN — MULTIVITAMIN TABLET 1 TABLET: TABLET at 09:33

## 2021-03-07 RX ADMIN — IPRATROPIUM BROMIDE AND ALBUTEROL SULFATE 3 ML: .5; 3 SOLUTION RESPIRATORY (INHALATION) at 11:35

## 2021-03-07 RX ADMIN — GABAPENTIN 200 MG: 100 CAPSULE ORAL at 09:33

## 2021-03-07 NOTE — DISCHARGE SUMMARY
Discharge Summary       PATIENT ID: Kelvin Lechuga  MRN: [id number]   YOB: 1945    DATE OF ADMISSION: 3/5/2021 12:35 PM    DATE OF DISCHARGE: 03/07/21    PRIMARY CARE PROVIDER: Barbara Gould MD     ATTENDING PHYSICIAN: Karl Garduno MD  DISCHARGING PROVIDER: Karl Garduno MD        CONSULTATIONS: IP CONSULT TO PULMONOLOGY    PROCEDURES/SURGERIES: * No surgery found *    ADMITTING [facility] COURSE:   Kelvin Lechuga is a 66 y.o. female who presents to [facility] ER with complaint of Hemoptysis and was referred to [facility] ER by her Pulmonologist.  Patient states that she had coughed this AM and had some Blood-streaked sputum followed by two other coughs in which there was jagjit blood. Patient reportedly has this issue in 10/2020 and was admitted at that time. Patient also states that she has been have some dyspnea on exertion when walking blocks with her Lowanda Ogles outsider her home more so than usually in the last week. Patient reports chronic copious sputum production, which has remained clear recently and which she attributes to her diagnosis of bronchiectasis. Patient reports that her coughing has worsened with use of an Acapella machine to mechanically vibrate and loosen up her mucus. Patient does admit to some sore throat from constant coughing, some low right rib pain (which she attribute to coughing), and insomnia recently, as well as the inability to lay flat while sleeping due to gradual dislodgement and irritation from mucus congestion (not orthopnea). Patient also reports a current Yeast Infection with dysuria. Patient reports palpitations with use of Albuterol inhaler. Patient reports a 7-lb weight loss recently (time period unknown). Notably, Patient reports that she is coughing consistently after drinking water and also quickly after starting to suck on lozenges.   Patient denies fevers, chills, nausea, vomiting, diarrhea, abdominal pain, pain with inspiration, wheezing, and sick contacts. Patient's Primary Pulmonologist is Dr. Jessica Escalante.     In [facility] ER, Patient is noted to have Tachycardia and marginally low Blood Pressure. SARS-CoV-2 (Novel Coronavirus Covid-19) Rapid results return negative. Patient was placed on 2 L/min O2 via NC due to an SpO2 of 90% while sleeping. Patient received IV Ceftriaxone 1 g, IV Azithromycin 500 mg, Duoneb x1, and IV fluids while in [facility] ER.     Patient is placed on Observation on Telemetry Unit (Non-Covid-19 Cohort) for management of CAP with Hemoptysis (CURB-65 score 3) with Yeast Infection and questionable Aspiration. DISCHARGE DIAGNOSES / PLAN:      Assessment/Plan   Principal Problem:    CAP (community acquired pneumonia) (3/11/2016)     Active Problems:    RLS (restless legs syndrome) (4/4/2016)       LBBB (left bundle branch block) (10/19/2018)      Overview: Seen by Dr. Ciara Castillo.        Iron deficiency anemia (8/13/2020)       Bronchiectasis ([address] (10/2/2020)       Hemoptysis (3/5/2021)       Yeast infection (3/5/2021)      Overview: Genital       Asthma (3/6/2021)       Grade I diastolic dysfunction (4/5/1229)      Overview: Grade I Diastolic Dysfunction by TTE on 6/25/2018       Aortic regurgitation (3/6/2021)      Overview: Mild to Moderate AR by TTE on 6/25/2018       History of supraventricular tachycardia (3/6/2021)       History of Clostridium difficile colitis (7/6/2016)      Overview: (7/06/2016)           Additional Plan notes      Brochietasis, asthma, possible CAP              - also need to get speech to r/o aspiration- ordered              - rocephin azithro              - prn breathing treatments, nebs/inhalers     Hemoptysis              - none this am, will give mucinex with codeine at night if not already ordered     Walk test today, home 02 order placed. Possible dc tomorrow at latest Monday.   If no dc tomorrow then change to inpatient status.     DISPO          Day of dc, 35 mins, no further hemopsysis today or yesterdsay, voice remains weak and hoarse but this is baseline per pt, asks for nystatin for groin, robitussin. Will give short course abx for possible bacterial bronchitis that may have led to the hemoptsysis in first place. Follow closely with your pulmonologist.       FOLLOW UP APPOINTMENTS:    Follow-up Information     Follow up With Specialties Details Why Contact Info    Jacob Blue MD Internal Medicine   [address]  [address] [facility]  [phone]               DISCHARGE MEDICATIONS:  Current Discharge Medication List      START taking these medications    Details   guaiFENesin (ROBITUSSIN) 100 mg/5 mL liquid Take 10 mL by mouth every four (4) hours as needed for Cough. Qty: 118 mL, Refills: 1      nystatin (MYCOSTATIN) powder Apply  to affected area two (2) times a day. Qty: 60 g, Refills: 1      amoxicillin-clavulanate (Augmentin) 875-125 mg per tablet Take 1 Tab by mouth two (2) times a day for 5 days. Qty: 10 Tab, Refills: 0      azithromycin (ZITHROMAX) 250 mg tablet Take 1 Tab by mouth daily. Qty: 3 Tab, Refills: 0         CONTINUE these medications which have NOT CHANGED    Details   tiotropium bromide (Spiriva Respimat) 2.5 mcg/actuation inhaler Take 2 Puffs by inhalation daily. loratadine (CLARITIN) 10 mg tablet Take 1 Tab by mouth daily. Qty: 90 Tab, Refills: 1    Associated Diagnoses: Non-seasonal allergic rhinitis due to other allergic trigger      montelukast (SINGULAIR) 10 mg tablet TAKE 1 TABLET BY MOUTH DAILY  Qty: 90 Tab, Refills: 1    Comments: **Patient requests 90 days supply**  Associated Diagnoses: Mild persistent reactive airway disease without complication      albuterol (PROVENTIL HFA, VENTOLIN HFA, PROAIR HFA) 90 mcg/actuation inhaler Take 2 Puffs by inhalation every four (4) hours as needed for Cough.   Qty: 1 Inhaler, Refills: 5    Associated Diagnoses: Mild persistent reactive airway disease without complication; Cough      rOPINIRole (REQUIP) 0.5 mg tablet TAKE 1 TABLET BY MOUTH EVERY NIGHT  Qty: 90 Tab, Refills: 1    Associated Diagnoses: RLS (restless legs syndrome)      Lactobac no.41/Bifidobact no.7 (PROBIOTIC-10 PO) Take 1 Tab by mouth daily. gabapentin (NEURONTIN) 100 mg capsule TAKE 2 CAPSULES BY MOUTH THREE TIMES DAILY  Qty: 540 Cap, Refills: 0    Associated Diagnoses: Paraspinal abscess (HCC); RLS (restless legs syndrome)      multivitamin (ONE A DAY) tablet Take 1 Tab by mouth daily. calcium-cholecalciferol, d3, (CALCIUM 600 + D) 600-125 mg-unit tab Take 2 Tabs by mouth. cholecalciferol (VITAMIN D3) (5000 Units/125 mcg) tab tablet Take  by mouth daily. b complex vitamins tablet Take 1 Tab by mouth daily. latanoprost (XALATAN) 0.005 % ophthalmic solution Administer 1 Drop to both eyes nightly. ferrous gluconate 324 mg (38 mg iron) tablet Take 1 Tab by mouth daily. TAKE 1 TABLET BY MOUTH EVERY MONDAY, WEDNESDAY, AND SUNDAY  Qty: 90 Tab, Refills: 1    Associated Diagnoses: Microcytic anemia               NOTIFY YOUR PHYSICIAN FOR ANY OF THE FOLLOWING:   Fever over 101 degrees for 24 hours. Chest pain, shortness of breath, fever, chills, nausea, vomiting, diarrhea, change in mentation, falling, weakness, bleeding. Severe pain or pain not relieved by medications. Or, any other signs or symptoms that you may have questions about. DISPOSITION:    Home With:   OT  PT  HH  RN       Long term SNF/Inpatient Rehab    Independent/assisted living    Hospice    Other:       PATIENT CONDITION AT DISCHARGE:     Functional status    Poor    x Deconditioned     Independent      Cognition    x Lucid     Forgetful     Dementia      Catheters/lines (plus indication)    Sam     PICC     PEG    x None      Code status   x  Full code     DNR      PHYSICAL EXAMINATION AT DISCHARGE: soft voice, hoarse  General:          Alert, cooperative, no distress, appears stated age.      HEENT:           Atraumatic, anicteric sclerae, pink conjunctivae                          No oral ulcers, mucosa moist, throat clear, dentition fair  Neck:               Supple, symmetrical  Lungs:             Clear to auscultation bilaterally. No Wheezing or Rhonchi. No rales. Chest wall:      No tenderness  No Accessory muscle use. Heart:              Regular  rhythm,  No  murmur   No edema  Abdomen:        Soft, non-tender. Not distended. Bowel sounds normal  Extremities:     No cyanosis. No clubbing,                            Skin turgor normal, Capillary refill normal  Skin:                Not pale. Not Jaundiced  No rashes   Psych:             Not anxious or agitated.   Neurologic:      Alert, moves all extremities, answers questions appropriately and responds to commands       CHRONIC MEDICAL DIAGNOSES:  Problem List as of 3/7/2021 Date Reviewed: 3/5/2021          Codes Class Noted - Resolved    Asthma ICD-10-CM: J45.909  ICD-9-CM: 493.90  3/6/2021 - Present        Grade I diastolic dysfunction ISM-41-WD: I51.9  ICD-9-CM: 429.9  3/6/2021 - Present    Overview Signed 3/6/2021 12:11 AM by Mikayla Waller DO     Grade I Diastolic Dysfunction by TTE on 6/25/2018             Aortic regurgitation ICD-10-CM: I35.1  ICD-9-CM: 424.1  3/6/2021 - Present    Overview Signed 3/6/2021 12:11 AM by Mikayla Waller DO     Mild to Moderate AR by TTE on 6/25/2018             History of supraventricular tachycardia ICD-10-CM: Z86.79  ICD-9-CM: V12.59  3/6/2021 - Present        Abnormal chest CT ICD-10-CM: R93.89  ICD-9-CM: 793.2  3/5/2021 - Present        Hemoptysis ICD-10-CM: R04.2  ICD-9-CM: 786.30  3/5/2021 - Present        Yeast infection ICD-10-CM: B37.9  ICD-9-CM: 112.9  3/5/2021 - Present    Overview Signed 3/5/2021 11:52 PM by Mikayla Waller,      Genital             Bronchiectasis ([facility] [address] ICD-10-CM: J47.9  ICD-9-CM: 494.0  10/2/2020 - Present        Tachycardia ICD-10-CM: R00.0  ICD-9-CM: 785.0  10/1/2020 - Present        Leukopenia ICD-10-CM: D72.819  ICD-9-CM: 288.50  8/13/2020 - Present        Iron deficiency anemia ICD-10-CM: D50.9  ICD-9-CM: 280.9  8/13/2020 - Present        Heme positive stool ICD-10-CM: R19.5  ICD-9-CM: 792.1  1/22/2020 - Present        Voiding dysfunction ICD-10-CM: N39.8  ICD-9-CM: 599.9  12/26/2019 - Present        LBBB (left bundle branch block) ICD-10-CM: I44.7  ICD-9-CM: 426.3  10/19/2018 - Present    Overview Signed 10/19/2018  2:01 PM by Jaz Victoria     Seen by Dr. Mychal Ch.               Pain at rest ICD-10-CM: R52  ICD-9-CM: 780.96  10/19/2018 - Present        Diarrhea ICD-10-CM: R19.7  ICD-9-CM: 787.91  10/19/2018 - Present        Uterovaginal prolapse ICD-10-CM: N81.4  ICD-9-CM: 618.4  7/3/2018 - Present        Screening for colon cancer ICD-10-CM: Z12.11  ICD-9-CM: V76.51  3/22/2018 - Present        Functional urinary incontinence ICD-10-CM: R39.81  ICD-9-CM: 788.91  2/27/2018 - Present        Bladder stones ICD-10-CM: N21.0  ICD-9-CM: 594.1  Unknown - Present        Uterus prolapse ICD-10-CM: N81.4  ICD-9-CM: 618.1  Unknown - Present    Overview Signed 2/17/2017 12:08 PM by Dontae Gallardo, ARNOL     grade 5             History of Clostridium difficile colitis ICD-10-CM: Z86.19  ICD-9-CM: V12.79  7/6/2016 - Present    Overview Signed 3/6/2021 12:12 AM by Marquise Kelley DO     (7/06/2016)             RLS (restless legs syndrome) ICD-10-CM: G25.81  ICD-9-CM: 333.94  4/4/2016 - Present        Back pain ICD-10-CM: M54.9  ICD-9-CM: 724.5  3/31/2016 - Present        Visual changes ICD-10-CM: H53.9  ICD-9-CM: 368.9  3/30/2016 - Present        Procidentia of uterus ICD-10-CM: N81.3  ICD-9-CM: 618.3  3/16/2016 - Present        * (Principal) CAP (community acquired pneumonia) ICD-10-CM: J18.9  ICD-9-CM: 091  3/11/2016 - Present        Anemia ICD-10-CM: D64.9  ICD-9-CM: 285.9  3/11/2016 - Present        RESOLVED: Acute bronchitis ICD-10-CM: J20.9  ICD-9-CM: 466.0  10/2/2020 - 10/15/2020        RESOLVED: Hemoptysis ICD-10-CM: R04.2  ICD-9-CM: 786.30  10/1/2020 - 10/15/2020        RESOLVED: Deep venous thrombosis ([facility] ICD-10-CM: I82.409  ICD-9-CM: 453.40  10/19/2018 - 4/29/2019        RESOLVED: Decubitus ulcer of heel, unstageable ([facility] ICD-10-CM: L89.600  ICD-9-CM: 707.07, 707.25  10/31/2017 - 10/31/2017        RESOLVED: DVT of leg (deep venous thrombosis) (HCC) ICD-10-CM: I82.409  ICD-9-CM: 453.40  Unknown - 6/14/2017        RESOLVED: UTI (urinary tract infection) ICD-10-CM: N39.0  ICD-9-CM: 599.0  7/20/2016 - 6/14/2017        RESOLVED: DVT of lower extremity, bilateral ([facility] ICD-10-CM: I82.403  ICD-9-CM: 453.40  3/31/2016 - 6/14/2017        RESOLVED: Urinary retention ICD-10-CM: R33.9  ICD-9-CM: 788.20  3/31/2016 - 2/27/2018        RESOLVED: Vertebral osteomyelitis ([facility] ICD-10-CM: M46.20  ICD-9-CM: 730.28  3/30/2016 - 6/14/2017        RESOLVED: Decubitus ulcer of buttock, stage 2 ([facility] ICD-10-CM: L89.302  ICD-9-CM: 707.05, 707.22  3/26/2016 - 6/14/2017        RESOLVED: Protein calorie malnutrition ([facility] ICD-10-CM: E46  ICD-9-CM: 263.9  3/24/2016 - 6/14/2017        RESOLVED: Vaginal candida ICD-10-CM: B37.3  ICD-9-CM: 112.1  3/16/2016 - 6/16/2016        RESOLVED: Vaginal candida ICD-10-CM: B37.3  ICD-9-CM: 112.1  3/16/2016 - 6/14/2017        RESOLVED: Paraspinal abscess ([facility] ICD-10-CM: M46.20  ICD-9-CM: 730.08  3/11/2016 - 6/14/2017        RESOLVED: UTI (urinary tract infection) ICD-10-CM: N39.0  ICD-9-CM: 599.0  3/11/2016 - 6/16/2016        RESOLVED: GATO (acute kidney injury) ([facility] ICD-10-CM: N17.9  ICD-9-CM: 584.9  3/11/2016 - 6/14/2017        RESOLVED: Paraplegia ([facility] ICD-10-CM: G82.20  ICD-9-CM: 344.1  3/11/2016 - 10/11/2016        RESOLVED: Weight loss ICD-10-CM: R63.4  ICD-9-CM: 783.21  3/11/2016 - 6/14/2017        RESOLVED: Hyponatremia ICD-10-CM: E87.1  ICD-9-CM: 276.1  3/11/2016 - 6/14/2017              Greater than 35 minutes were spent with the patient on counseling and coordination of care    Signed:   Nuzhat Hazel MD  3/7/2021  10:20 AM

## 2021-03-07 NOTE — DISCHARGE INSTRUCTIONS
Learning About Asthma Triggers  What are triggers? When you have asthma, certain things can make your symptoms worse. These are called triggers. They include:  · Cigarette smoke or air pollution. · Things you are allergic to, such as:  ¨ Pollen, mold, or dust mites. ¨ Pet hair, skin, or saliva. · Illnesses, like colds, flu, or pneumonia. · Exercise. · Dry, cold air. How do triggers affect asthma? Triggers can make it harder for your lungs to work as they should and can lead to sudden difficulty breathing and other symptoms. When you are around a trigger, an asthma attack is more likely. If your symptoms are severe, you may need emergency treatment or have to go to the hospital for treatment. If you know what your triggers are and can avoid them, you may be able to prevent asthma attacks, reduce how often you have them, and make them less severe. What can you do to avoid triggers? The first thing is to know your triggers. When you are having symptoms, note the things around you that might be causing them. Then look for patterns in what may be triggering your symptoms. Record your triggers on a piece of paper or in an asthma diary. When you have your list of possible triggers, work with your doctor to find ways to avoid them. You also can check how well your lungs are working by measuring your peak expiratory flow (PEF) throughout the day. Your PEF may drop when you are near things that trigger symptoms. Here are some ways to avoid a few common triggers. · Do not smoke or allow others to smoke around you. If you need help quitting, talk to your doctor about stop-smoking programs and medicines. These can increase your chances of quitting for good. · If there is a lot of pollution, pollen, or dust outside, stay at home and keep your windows closed. Use an air conditioner or air filter in your home. Check your local weather report or newspaper for air quality and pollen reports.   · Get a flu shot every year. Talk to your doctor about getting a pneumococcal shot. Wash your hands often to prevent infections. · Avoid exercising outdoors in cold weather. If you are outdoors in cold weather, wear a scarf around your face and breathe through your nose. How can you manage an asthma attack? · If you have an asthma action plan, follow the plan. In general:  ¨ Use your quick-relief inhaler as directed by your doctor. If your symptoms do not get better after you use your medicine, have someone take you to the emergency room. Call an ambulance if needed. ¨ If your doctor has given you other inhaled medicines or steroid pills, take them as directed. Where can you learn more? Go to KnCMiner.be  Enter M564 in the search box to learn more about \"Learning About Asthma Triggers. \"   © 0033-8277 Healthwise, Incorporated. Care instructions adapted under license by [employer] (which disclaims liability or warranty for this information). This care instruction is for use with your licensed healthcare professional. If you have questions about a medical condition or this instruction, always ask your healthcare professional. [place] [address] any warranty or liability for your use of this information. Content Version: 67.1.633476; Last Revised: February 23, 2012                 Iron Deficiency Anemia: Care Instructions  Your Care Instructions     Anemia means that you don't have enough red blood cells. Red blood cells carry oxygen around your body. When you have anemia, it can make you pale, weak, and tired. Many things can cause anemia. The most common cause is loss of blood. This can happen if you have heavy menstrual periods. It can also happen if you have bleeding in your stomach or bowel. You can also get anemia if you don't have enough iron in your diet or if it's hard for your body to absorb iron. In some cases, pregnancy causes anemia.  That's because a pregnant woman needs more iron.  Your doctor may do more tests to find the cause of your anemia. If a disease or other health problem is causing it, your doctor will treat that problem. It's important to follow up with your doctor to make sure that your iron level returns to normal.  Follow-up care is a key part of your treatment and safety. Be sure to make and go to all appointments, and call your doctor if you are having problems. It's also a good idea to know your test results and keep a list of the medicines you take. How can you care for yourself at home? · If your doctor recommended iron pills, take them as directed. ? Try to take the pills on an empty stomach. You can do this about 1 hour before or 2 hours after meals. But you may need to take iron with food to avoid an upset stomach. ? Do not take antacids or drink milk or anything with caffeine within 2 hours of when you take your iron. They can keep your body from absorbing the iron well. ? Vitamin C helps your body absorb iron. You may want to take iron pills with a glass of orange juice or some other food high in vitamin C.  ? Iron pills may cause stomach problems. These include heartburn, nausea, diarrhea, constipation, and cramps. It can help to drink plenty of fluids and include fruits, vegetables, and fiber in your diet. ? It's normal for iron pills to make your stool a greenish or grayish black. But internal bleeding can also cause dark stool. So it's important to tell your doctor about any color changes. ? Call your doctor if you think you are having a problem with your iron pills. Even after you start to feel better, it will take several months for your body to build up its supply of iron. ? If you miss a pill, don't take a double dose. ? Keep iron pills out of the reach of small children. Too much iron can be very dangerous. · Eat foods with a lot of iron. These include red meat, shellfish, poultry, and eggs.  They also include beans, raisins, whole-grain bread, and leafy green vegetables. · Steam your vegetables. This is the best way to prepare them if you want to get as much iron as possible. · Be safe with medicines. Do not take nonsteroidal anti-inflammatory pain relievers unless your doctor tells you to. These include aspirin, naproxen (Aleve), and ibuprofen (Advil, Motrin). · Liquid iron can stain your teeth. But you can mix it with water or juice and drink it with a straw. Then it won't get on your teeth. When should you call for help? Call 911 anytime you think you may need emergency care. For example, call if:    · You passed out (lost consciousness). Call your doctor now or seek immediate medical care if:    · You are short of breath.     · You are dizzy or light-headed, or you feel like you may faint.     · You have new or worse bleeding. Watch closely for changes in your health, and be sure to contact your doctor if:    · You feel weaker or more tired than usual.     · You do not get better as expected. Where can you learn more? Go to http://www.gray.com/  Enter Z825 in the search box to learn more about \"Iron Deficiency Anemia: Care Instructions. \"  Current as of: November 8, 2019               Content Version: 12.6  © 1396-1792 Healthwise, Incorporated. Care instructions adapted under license by [employer] (which disclaims liability or warranty for this information). If you have questions about a medical condition or this instruction, always ask your healthcare professional. [place] [address] any warranty or liability for your use of this information. Asthma Attack: Care Instructions  Your Care Instructions     During an asthma attack, the airways swell and narrow. This makes it hard to breathe. Severe asthma attacks can be life-threatening, but you can help prevent them by keeping your asthma under control and treating symptoms before they get bad.  Symptoms include being short of breath, having chest tightness, coughing, and wheezing. Noting and treating these symptoms can also help you avoid future trips to the emergency room. The doctor has checked you carefully, but problems can develop later. If you notice any problems or new symptoms, get medical treatment right away. Follow-up care is a key part of your treatment and safety. Be sure to make and go to all appointments, and call your doctor if you are having problems. It's also a good idea to know your test results and keep a list of the medicines you take. How can you care for yourself at home? · Follow your asthma action plan to prevent and treat attacks. If you don't have an asthma action plan, work with your doctor to create one. · Take your asthma medicines exactly as prescribed. Talk to your doctor right away if you have any questions about how to take them. ? Use your quick-relief medicine when you have symptoms of an attack. Quick-relief medicine is usually an albuterol inhaler. Some people need to use quick-relief medicine before they exercise. ? Take your controller medicine every day, not just when you have symptoms. Controller medicine is usually an inhaled corticosteroid. The goal is to prevent problems before they occur. Don't use your controller medicine to treat an attack that has already started. It doesn't work fast enough to help. ? If your doctor prescribed corticosteroid pills to use during an attack, take them exactly as prescribed. It may take hours for the pills to work, but they may make the episode shorter and help you breathe better. ? Keep your quick-relief medicine with you at all times. · Talk to your doctor before using other medicines. Some medicines, such as aspirin, can cause asthma attacks in some people. · If you have a peak flow meter, use it to check how well you are breathing. This can help you predict when an asthma attack is going to occur.  Then you can take medicine to prevent the asthma attack or make it less severe. · Do not smoke or allow others to smoke around you. Avoid smoky places. Smoking makes asthma worse. If you need help quitting, talk to your doctor about stop-smoking programs and medicines. These can increase your chances of quitting for good. · Learn what triggers an asthma attack for you, and avoid the triggers when you can. Common triggers include colds, smoke, air pollution, dust, pollen, mold, pets, cockroaches, stress, and cold air. · Avoid colds and the flu. Get a pneumococcal vaccine shot. If you have had one before, ask your doctor if you need a second dose. Get a flu vaccine every fall. If you must be around people with colds or the flu, wash your hands often. When should you call for help? Call 911 anytime you think you may need emergency care. For example, call if:    · You have severe trouble breathing. Call your doctor now or seek immediate medical care if:    · Your symptoms do not get better after you have followed your asthma action plan.     · You have new or worse trouble breathing.     · Your coughing and wheezing get worse.     · You cough up dark brown or bloody mucus (sputum).     · You have a new or higher fever. Watch closely for changes in your health, and be sure to contact your doctor if:    · You need to use quick-relief medicine on more than 2 days a week (unless it is just for exercise).     · You cough more deeply or more often, especially if you notice more mucus or a change in the color of your mucus.     · You are not getting better as expected. Where can you learn more? Go to http://www.gray.com/  Enter V779 in the search box to learn more about \"Asthma Attack: Care Instructions. \"  Current as of: February 24, 2020               Content Version: 12.6  © 3700-5627 [employer]orporated.    Care instructions adapted under license by [employer] (which disclaims liability or warranty for this information). If you have questions about a medical condition or this instruction, always ask your healthcare professional. [address] any warranty or liability for your use of this information. Asthma in Adults: Care Instructions  Your Care Instructions     During an asthma attack, your airways swell and narrow as a reaction to certain things (triggers). This makes it hard to breathe. You may be able to prevent asthma attacks if you avoid the things that set off your asthma symptoms. Keeping your asthma under control and treating symptoms before they get bad can help you avoid severe attacks. If you can control your asthma, you may be able to do all of your normal daily activities. You may also avoid asthma attacks and trips to the hospital.  Follow-up care is a key part of your treatment and safety. Be sure to make and go to all appointments, and call your doctor if you are having problems. It's also a good idea to know your test results and keep a list of the medicines you take. How can you care for yourself at home? · Follow your asthma action plan so you can manage your symptoms at home. An asthma action plan will help you prevent and control airway reactions and will tell you what to do during an asthma attack. If you do not have an asthma action plan, work with your doctor to build one. · Take your asthma medicine exactly as prescribed. Medicine plays an important role in controlling asthma. Talk to your doctor right away if you have any questions about what to take and how to take it. ? Use your quick-relief medicine when you have symptoms of an attack. Quick-relief medicine often is an albuterol inhaler. Some people need to use quick-relief medicine before they exercise. ? Take your controller medicine every day, not just when you have symptoms. Controller medicine is usually an inhaled corticosteroid. The goal is to prevent problems before they occur.  Do not use your controller medicine to try to treat an attack that has already started. It does not work fast enough to help. ? If your doctor prescribed corticosteroid pills to use during an attack, take them as directed. They may take hours to work, but they may shorten the attack and help you breathe better. ? Keep your quick-relief medicine with you at all times. · Talk to your doctor before using other medicines. Some medicines, such as aspirin, can cause asthma attacks in some people. · Check yourself for asthma symptoms to know which step to follow in your action plan. Watch for things like being short of breath, having chest tightness, coughing, and wheezing. Also notice if symptoms wake you up at night or if you get tired quickly when you exercise. · If you have a peak flow meter, use it to check how well you are breathing. This can help you predict when an asthma attack is going to occur. Then you can take medicine to prevent the asthma attack or make it less severe. · See your doctor regularly. These visits will help you learn more about asthma and what you can do to control it. Your doctor will monitor your treatment to make sure the medicine is helping you. · Keep track of your asthma attacks and your treatment. After you have had an attack, write down what triggered it, what helped end it, and any concerns you have about your asthma action plan. Take your diary when you see your doctor. You can then review your asthma action plan and decide if it is working. · Do not smoke or allow others to smoke around you. Avoid smoky places. Smoking makes asthma worse. If you need help quitting, talk to your doctor about stop-smoking programs and medicines. These can increase your chances of quitting for good. · Learn what triggers an asthma attack for you, and avoid the triggers when you can. Common triggers include colds, smoke, air pollution, dust, pollen, mold, pets, cockroaches, stress, and cold air.   · Avoid colds and the flu. Get a pneumococcal vaccine shot. If you have had one before, ask your doctor whether you need a second dose. Get a flu vaccine every fall. If you must be around people with colds or the flu, wash your hands often. When should you call for help? Call 911 anytime you think you may need emergency care. For example, call if:    · You have severe trouble breathing. Call your doctor now or seek immediate medical care if:    · Your symptoms do not get better after you have followed your asthma action plan.     · You cough up yellow, dark brown, or bloody mucus (sputum). Watch closely for changes in your health, and be sure to contact your doctor if:    · Your coughing and wheezing get worse.     · You need to use quick-relief medicine on more than 2 days a week (unless it is just for exercise).     · You need help figuring out what is triggering your asthma attacks. Where can you learn more? Go to http://www.gray.com/  Enter P597 in the search box to learn more about \"Asthma in Adults: Care Instructions. \"  Current as of: February 24, 2020               Content Version: 12.6  © 8873-1603 [employer]. Care instructions adapted under license by [employer] (which disclaims liability or warranty for this information). If you have questions about a medical condition or this instruction, always ask your healthcare professional. [address] any warranty or liability for your use of this information. Bronchiectasis: Care Instructions  Your Care Instructions  Bronchiectasis (say \"pwqgu-xrm-RCC-tuh-marvin\") is a lung problem in which the breathing tubes are stretched and become larger. The buildup of mucus causes the stretching and can lead to swelling and infections. You may find it harder to breathe and cough up mucus out of your lungs. Some people are born with it.  Other people get it because of another health problem, usually cystic fibrosis or a lung infection such as pneumonia or tuberculosis. Symptoms are often different for everyone. But a cough that brings up mucus, or sputum, is common. The condition is usually treated with antibiotics, medicines to relax the airways (bronchodilators), and medicines to make it easier to cough up mucus (expectorants). Follow-up care is a key part of your treatment and safety. Be sure to make and go to all appointments, and call your doctor if you are having problems. It's also a good idea to know your test results and keep a list of the medicines you take. How can you care for yourself at home? · Take your antibiotics as directed. Do not stop taking them just because you feel better. You need to take the full course of antibiotics. · Take your medicines exactly as prescribed. Call your doctor if you think you are having a problem with your medicine. · If you have cystic fibrosis, follow your treatment plan. · If you or your child has bronchiectasis, follow directions from your doctor or respiratory therapist for moving your or your child's body into different positions to help drain fluid. This is called postural drainage, and it helps to ease breathing and prevent infections. · You also may do chest percussion on your child. This is strong clapping of the chest with a cupped hand to vibrate the airways in the lungs. The vibration helps your child cough up mucus. You may see a respiratory therapist to learn how to do chest percussion. · Use an airway clearance device, such as a flutter valve, as directed to help remove mucus from the lungs. · Avoid lung infections. ? Get shots to protect against the flu and pneumococcal disease. ? Wash your hands frequently. ? Avoid close contact with people who have colds or the flu. ? Do not smoke or allow others to smoke around you. If you need help quitting, talk to your doctor about stop-smoking programs and medicines.  These can increase your chances of quitting for good. ? Stay inside, if you can, on days when the pollution level is high. When should you call for help? Call 911 anytime you think you may need emergency care. For example, call if:    · You have severe trouble breathing.     · You cough up more than 1 cup of blood. Call your doctor now or seek immediate medical care if:    · You have chest pain.     · You have shortness of breath.     · You have a fever.     · Your mucus (sputum) is bloody or changes color. Watch closely for changes in your health, and be sure to contact your doctor if:    · You are coughing up more sputum than before.     · Your symptoms get worse or do not get better with treatment. Where can you learn more? Go to http://www.gray.com/  Enter C667 in the search box to learn more about \"Bronchiectasis: Care Instructions. \"  Current as of: February 24, 2020               Content Version: 12.6  © 2006-2020 [employer]. Care instructions adapted under license by [employer] (which disclaims liability or warranty for this information). If you have questions about a medical condition or this instruction, always ask your healthcare professional. [place] [address] any warranty or liability for your use of this information. Patient Education        Pneumonia: Care Instructions  Your Care Instructions     Pneumonia is an infection of the lungs. Most cases are caused by infections from bacteria or viruses. Pneumonia may be mild or very severe. If it is caused by bacteria, you will be treated with antibiotics. It may take a few weeks to a few months to recover fully from pneumonia, depending on how sick you were and whether your overall health is good. Follow-up care is a key part of your treatment and safety. Be sure to make and go to all appointments, and call your doctor if you are having problems.  It's also a good idea to know your test results and keep a list of the medicines you take. How can you care for yourself at home? · Take your antibiotics exactly as directed. Do not stop taking the medicine just because you are feeling better. You need to take the full course of antibiotics. · Take your medicines exactly as prescribed. Call your doctor if you think you are having a problem with your medicine. · Get plenty of rest and sleep. You may feel weak and tired for a while, but your energy level will improve with time. · To prevent dehydration, drink plenty of fluids, enough so that your urine is light yellow or clear like water. Choose water and other caffeine-free clear liquids until you feel better. If you have kidney, heart, or liver disease and have to limit fluids, talk with your doctor before you increase the amount of fluids you drink. · Take care of your cough so you can rest. A cough that brings up mucus from your lungs is common with pneumonia. It is one way your body gets rid of the infection. But if coughing keeps you from resting or causes severe fatigue and chest-wall pain, talk to your doctor. He or she may suggest that you take a medicine to reduce the cough. · Use a vaporizer or humidifier to add moisture to your bedroom. Follow the directions for cleaning the machine. · Do not smoke or allow others to smoke around you. Smoke will make your cough last longer. If you need help quitting, talk to your doctor about stop-smoking programs and medicines. These can increase your chances of quitting for good. · Take an over-the-counter pain medicine, such as acetaminophen (Tylenol), ibuprofen (Advil, Motrin), or naproxen (Aleve). Read and follow all instructions on the label. · Do not take two or more pain medicines at the same time unless the doctor told you to. Many pain medicines have acetaminophen, which is Tylenol. Too much acetaminophen (Tylenol) can be harmful.   · If you were given a spirometer to measure how well your lungs are working, use it as instructed. This can help your doctor tell how your recovery is going. · To prevent pneumonia in the future, talk to your doctor about getting a flu vaccine (once a year) and a pneumococcal vaccine (one time only for most people). When should you call for help? Call 911 anytime you think you may need emergency care. For example, call if:    · You have severe trouble breathing. Call your doctor now or seek immediate medical care if:    · You cough up dark brown or bloody mucus (sputum).     · You have new or worse trouble breathing.     · You are dizzy or lightheaded, or you feel like you may faint. Watch closely for changes in your health, and be sure to contact your doctor if:    · You have a new or higher fever.     · You are coughing more deeply or more often.     · You are not getting better after 2 days (48 hours).     · You do not get better as expected. Where can you learn more? Go to http://www.hess.com/  Enter D336 in the search box to learn more about \"Pneumonia: Care Instructions. \"  Current as of: February 24, 2020               Content Version: 12.6  © 5103-1364 [employer]. Care instructions adapted under license by [employer] (which disclaims liability or warranty for this information). If you have questions about a medical condition or this instruction, always ask your healthcare professional. [address] any warranty or liability for your use of this information. As part of the discharge instructions, medications already given today were discussed with the patient. The next dose due of all ordered meds was highlighted as part of the medication discharge instructions. Discussed with the patient the importance of taking medications as directed, as well as the side effects and adverse reactions to medications ordered.    Patient armband removed and shredded  MyChart Activation    Thank you for requesting access to [employer]. Please follow the instructions below to securely access and download your online medical record. [employer] allows you to send messages to your doctor, view your test results, renew your prescriptions, schedule appointments, and more. How Do I Sign Up? 1. In your internet browser, go to [email or web address]  2. Click on the First Time User? Click Here link in the Sign In box. You will be redirect to the New Member Sign Up page. 3. Enter your [employer] Access Code exactly as it appears below. You will not need to use this code after youve completed the sign-up process. If you do not sign up before the expiration date, you must request a new code. [employer] Access Code: Activation code not generated  Current [employer] Status: Active (This is the date your Transifex access code will )    4. Enter the last four digits of your Social Security Number (xxxx) and Date of Birth (mm/dd/yyyy) as indicated and click Submit. You will be taken to the next sign-up page. 5. Create a [employer]ex ID. This will be your [employer] login ID and cannot be changed, so think of one that is secure and easy to remember. 6. Create a [employer] password. You can change your password at any time. 7. Enter your Password Reset Question and Answer. This can be used at a later time if you forget your password. 8. Enter your e-mail address. You will receive e-mail notification when new information is available in [address] 9. Click Sign Up. You can now view and download portions of your medical record. 10. Click the Download Summary menu link to download a portable copy of your medical information. Additional Information    If you have questions, please visit the Frequently Asked Questions section of the [employer] website at [email or web address] Remember, [employer] is NOT to be used for urgent needs. For medical emergencies, dial 911.       DISCHARGE SUMMARY from Nurse    PATIENT INSTRUCTIONS:    After general anesthesia or intravenous sedation, for 24 hours or while taking prescription Narcotics:  · Limit your activities  · Do not drive and operate hazardous machinery  · Do not make important personal or business decisions  · Do  not drink alcoholic beverages  · If you have not urinated within 8 hours after discharge, please contact your surgeon on call. Report the following to your surgeon:  · Excessive pain, swelling, redness or odor of or around the surgical area  · Temperature over 100.5  · Nausea and vomiting lasting longer than 4 hours or if unable to take medications  · Any signs of decreased circulation or nerve impairment to extremity: change in color, persistent  numbness, tingling, coldness or increase pain  · Any questions    What to do at Home:  Recommended activity: Activity as tolerated,     If you experience any of the following symptoms chest pains, shortness of breath, temperature greater than 100.5, nausea, vomiting, diarrhea, severe pain, please follow up with PCP or call 911. *  Please give a list of your current medications to your Primary Care Provider. *  Please update this list whenever your medications are discontinued, doses are      changed, or new medications (including over-the-counter products) are added. *  Please carry medication information at all times in case of emergency situations. These are general instructions for a healthy lifestyle:    No smoking/ No tobacco products/ Avoid exposure to second hand smoke  Surgeon General's Warning:  Quitting smoking now greatly reduces serious risk to your health.     Obesity, smoking, and sedentary lifestyle greatly increases your risk for illness    A healthy diet, regular physical exercise & weight monitoring are important for maintaining a healthy lifestyle    You may be retaining fluid if you have a history of heart failure or if you experience any of the following symptoms:  Weight gain of 3 pounds or more overnight or 5 pounds in a week, increased swelling in our hands or feet or shortness of breath while lying flat in bed. Please call your doctor as soon as you notice any of these symptoms; do not wait until your next office visit. The discharge information has been reviewed with the patient. The patient verbalized understanding. Discharge medications reviewed with the patient and appropriate educational materials and side effects teaching were provided.   ___________________________________________________________________________________________________________________________________

## 2021-03-07 NOTE — PROGRESS NOTES
Problem: Falls - Risk of  Goal: *Absence of Falls  Description: Document Olegario Kar Fall Risk and appropriate interventions in the flowsheet.   Outcome: Resolved/Met     Problem: Patient Education: Go to Patient Education Activity  Goal: Patient/Family Education  Outcome: Resolved/Met     Problem: Breathing Pattern - Ineffective  Goal: *Absence of hypoxia  Outcome: Resolved/Met  Goal: *Use of effective breathing techniques  Outcome: Resolved/Met     Problem: Patient Education: Go to Patient Education Activity  Goal: Patient/Family Education  Outcome: Resolved/Met     Problem: Patient Education: Go to Patient Education Activity  Goal: Patient/Family Education  Outcome: Resolved/Met

## 2021-03-07 NOTE — ROUTINE PROCESS
Verbal shift change report given to [facility] RN (oncoming nurse) by Diane Duke RN (offgoing nurse). Report included the following information SBAR, Intake/Output, MAR, Recent Results and Cardiac Rhythm SR/SB with LBBB, ST depression in lead II. At administration of evening medications pt stated she felt drowsy, declined robitussin AC at this time. 0000 Sample for UA obtained. 0500 Pt requested cough syrup, but would like one without codeine. Order changed to robitussin, but is not available on the floor. Pt sleeping at reassessment. Bedside and Verbal shift change report given to Renetta Horne (oncoming nurse) by [facility] RN (offgoing nurse). Report included the following information SBAR, Intake/Output, MAR, Recent Results and Cardiac Rhythm SR with LBBB, ST depression in lead II.

## 2021-03-07 NOTE — PROGRESS NOTES
0730- Bedside and Verbal shift change report given to [facility] Tapia (oncoming nurse) by Silvia Ho RN (offgoing nurse). Report included the following information SBAR, Kardex, Intake/Output, MAR and Recent Results. 8740- AM meds given. Pt refused Claritin, Floranex and Iron tablets. Stated that she will take them when she gets home so it does not throw off her medication regimen. 1135- Reassessment. No changes from previous assessment. Pt will be discharged today. [address] [facility] pt discharge instructions. Answered questions and pt verbalized understanding. Nystatin powder applied. Tele monitor, pulse O2 removed, IV removed. Pt up getting dressed. Family member (sister) will be picking up pt.     1500- Pt taken downstairs via wheelchair to main entrance to be discharged.

## 2021-03-07 NOTE — PROGRESS NOTES
Spiriva not given. Patient refused. She stated that she uses a lower dose at home, which the pharmacy here does not carry.

## 2021-03-08 ENCOUNTER — PATIENT OUTREACH (OUTPATIENT)
Dept: CASE MANAGEMENT | Age: 76
End: 2021-03-08

## 2021-03-08 NOTE — PROGRESS NOTES
3/8/2021 10:04 AM     CTN attempted to contact patient for hospital follow up. Patient admitted to [facility] on 3/5/21 and discharged on 3/7/21 for CAP. Message left introducing myself, the purpose of the call and giving my contact information. Requested that patient call back at her earliest convenience.

## 2021-03-08 NOTE — PROGRESS NOTES
Social Work Note  3/8/2021    Date of referral: 10/9/2020  Referral received from: LATHA Espinoza  Reason for referral: Assist the patient with community resources to address mold issues in the home.       Previous SW Referral:  no   If yes, brief summary and outcome:  n/a     Support System: Family and friends     Community Providers: unknown     Transportation: unknown     Medication:unknown     Financial Concern: unknown     Advance Care Plan:  reviewed and current      Other: n/a     Identified Needs:      · The patient indicated that she has mold in the crawl space of the home.      Social Work Plan:     · Locate community resources to assist with addressing the mold issues in the patient's home.         MSW received referral to assist the patient with community resources to address the moisture issues in the crawl space of her home. During last conversation, the patient mentioned that she is seeking assistance to find a law firm to assist and educate her on what is needed to add her sister to the deed of her house. MSW shared with the patient that she will research  agencies that will provide free initial consultation. MSW continues to attempt to reach community resources to address the patient's crawl space moisture issues. MSW attempted to reach the patient. The phone is picked but connection is very poor with static. MSW will re-attempt to reach the patient at a later time.

## 2021-03-08 NOTE — PROGRESS NOTES
Patient called and stated that while she was in the hospital she lost her voice. Patient identified with 2 identifiers; name and date of birth. CTN identified self, position, and purpose of call. Patient stated that I could talk to her sister and that she will have her sister call me when she was available. 3/9/2021 received follow up call from patient stated that she felt better today and felt like her voice would hold out long enough to do assessment. Care Transitions Initial Follow Up Call    Call within 2 business days of discharge: Yes     Patient: Hetal Valderrama Patient : 1945 MRN: [id number]    Last Discharge [address]       Complaint Diagnosis Description Type Department Provider    3/5/21 Hemoptysis Hemoptysis ED to Hosp-Admission (Discharged) (ADMIT) [place] MD; Jaycee Stahl. .. Challenges to be reviewed by the provider   Additional needs identified to be addressed with provider no  none         Method of communication with provider : chart routing, staff message    Discussed COVID-19 related testing which was available at this time. Test results were negative. Patient informed of results, if available? yes     Advance Care Planning:   Does patient have an Advance Directive: yes, reviewed and current     Inpatient Readmission Risk score: Unplanned Readmit Risk Score: 12    Was this a readmission? no   Patient stated reason for the admission: coughing up blood    Patients top risk factors for readmission: medical condition asthma  Interventions to address risk factors: Obtained and reviewed discharge summary and/or continuity of care documents, Education of patient/family/caregiver/guardian to support self-management-  and Assessment and support for treatment adherence and medication management-reviewed medications, reason for use     Care Transition Nurse (CTN) contacted the patient by telephone to perform post hospital discharge assessment.  Verified name and  with patient as identifiers. Provided introduction to self, and explanation of the CTN role. CTN reviewed discharge instructions, medical action plan and red flags with patient who verbalized understanding. Were discharge instructions available to patient? yes. Reviewed appropriate site of care based on symptoms and resources available to patient including: PCP, Specialist, When to call 911 and CTN. Patient given an opportunity to ask questions and does not have any further questions or concerns at this time. The patient agrees to contact the PCP office for questions related to their healthcare. Medication reconciliation was performed with patient, who verbalizes understanding of administration of home medications. Advised obtaining a 90-day supply of all daily and as-needed medications. Referral to Pharm D needed: no     Home Health/Outpatient orders at discharge: none    Durable Medical Equipment ordered at discharge: None    Covid Risk Education    Education provided regarding infection prevention, and signs and symptoms of COVID-19 and when to seek medical attention with patient who verbalized understanding. Discussed exposure protocols and quarantine From CDC: Are you at higher risk for severe illness?  and given an opportunity for questions and concerns. The patient agrees to contact the COVID-19 hotline 722-295-6501 or PCP office for questions related to COVID-19. For more information on steps you can take to protect yourself, see CDC's How to Protect Yourself     Was patient discharged with a pulse oximeter? no Discussed and confirmed pulse oximeter discharge instructions and when to notify provider or seek emergency care. Patient/family/caregiver given information for [facility] and agrees to enroll no  Patient's preferred e-mail: declines  Patient's preferred phone number: declines    Discussed follow-up appointments.  If no appointment was previously scheduled, appointment scheduling offered: yes Is follow up appointment scheduled within 7 days of discharge? patient refused offer to get her an appoitment with PCP she stated she would [facility] this [facility]   Future Appointments   Date Time Provider Earnest Mosher   4/12/2021  1:00 PM Bret Navarrete [address] [place] [address]   4/15/2021 11:15 AM Frederic Bell MD [facility]   4/19/2021  1:00 PM Lucio Alvarez MD [id number] [facility]   7/26/2021 10:00 AM [facility] [address]   7/26/2021 10:30 AM MD Rajesh Borrego     [facility] follow up appointment(s): tbd    Plan for follow-up call in 5-7 days based on severity of symptoms and risk factors. Plan for next call: symptom management-ask about cough, self management-weekly follow up and follow up appointment-did patient get follow up with Pulmonology and PCP  CTN provided contact information for future needs. Goals Addressed                 This Visit's Progress     Prevent complications post hospitalization. 1. CTN will monitor X 4 weeks    2. Ensure provider appt is scheduled within 7 days post-discharge    3. Confirm patient attended post-discharge provider apt    4. Complete post-visit call to confirm attendance and update care needs      5. Review/educate common or potential \"red flags\" of condition worsening  · Shortness of breath does not cease with bronchodilator treatments inhaled one hour apart  · Wheezing does not cease with bronchodilator treatments inhaled one hour apart  · Severe weakness  · Fever of 101 degrees Fahrenheit or above  · Shaking chills  · Coughing up blood   6. Evaluate adherence to medications and priority barriers to resolve        7. Instruct on adherence to medications as ordered and assess for therapeutic response and side-effects         8. Discuss and evaluate ADL performance.   Provide recommendations on energy conservation, particularly related to transition home from an inpatient admission.

## 2021-03-11 ENCOUNTER — TELEPHONE (OUTPATIENT)
Dept: FAMILY MEDICINE CLINIC | Age: 76
End: 2021-03-11

## 2021-03-11 ENCOUNTER — PATIENT OUTREACH (OUTPATIENT)
Dept: CASE MANAGEMENT | Age: 76
End: 2021-03-11

## 2021-03-11 NOTE — PROGRESS NOTES
Returned patient's call. Patient is requesting tussinex or some medication to stop her from coughing. She had been offered Tussinex at discharge but refused it because she does not like to take drugs and how they make her feel. Patient now has had a change of heart because she is not able to sleep due to excessive coughing. Patient's PCP is out of town and her pulmonologist will not write her a prescription with out seeing her and her appointment is Tuesday. CTN recommended she go to urgent care or try a virtual appointment with [facility]. Patient took the information and said she would try the virtual appointment.

## 2021-03-11 NOTE — TELEPHONE ENCOUNTER
Pt. Would like to speak with nurse [facility] TANIA VARGAS regarding getting a prescription for Robitussin with [facility]. Advised appt. Is needed. Dr. Lalita Byrne does not have any available appt.  Please assist.

## 2021-03-12 LAB
BACTERIA SPEC CULT: ABNORMAL
BACTERIA SPEC CULT: NORMAL
BACTERIA SPEC CULT: NORMAL
GRAM STN SPEC: ABNORMAL
SERVICE CMNT-IMP: ABNORMAL
SERVICE CMNT-IMP: NORMAL
SERVICE CMNT-IMP: NORMAL

## 2021-03-16 ENCOUNTER — PATIENT OUTREACH (OUTPATIENT)
Dept: CASE MANAGEMENT | Age: 76
End: 2021-03-16

## 2021-03-16 NOTE — PROGRESS NOTES
Social Work Note  3/16/2021      Date of referral: 10/9/2020  Referral received from: LATHA Campos  Reason for referral: Assist the patient with community resources to address mold issues in the home.       Previous SW Referral:  no   If yes, brief summary and outcome:  n/a     Support System: Family and friends     Community Providers: unknown     Transportation: unknown     Medication:unknown     Financial Concern: unknown     Advance Care Plan:  reviewed and current      Other: n/a     Identified Needs:      · The patient indicated that she has mold in the crawl space of the home.      Social Work Plan:     · Locate community resources to assist with addressing the mold issues in the patient's home.         MSW received referral to assist the patient with community resources to address the moisture issues in the crawl space of her home.     During last conversation, the patient mentioned that she is seeking assistance to find a law firm to assist and educate her on what is needed to add her sister to the deed of her house. MSW shared with the patient that she will research  agencies that will provide free initial consultation. MSW continues to attempt to reach community resources to address the patient's crawl space moisture issues.      MSW attempted to reach the patient. MSW left message on voicemail.      MSW will re-attempt to reach the patient at a later time.

## 2021-03-17 ENCOUNTER — NURSE TRIAGE (OUTPATIENT)
Dept: OTHER | Facility: CLINIC | Age: 76
End: 2021-03-17

## 2021-03-17 NOTE — TELEPHONE ENCOUNTER
Reason for Disposition   [1] Caller requesting NON-URGENT health information AND [2] PCP's office is the best resource    Answer Assessment - Initial Assessment Questions  1. REASON FOR CALL or QUESTION: \"What is your reason for calling today? \" or \"How can I best help you? \" or \"What question do you have that I can help answer? \"      Caller hospitalized for pneumonia and wants to know if safe to receive vaccine. Per CDC as a precaution people with moderate to severe acute illness should delay vaccine until illness has improved.   Advised caller to discuss with pulmonologist.    Protocols used: INFORMATION ONLY CALL - NO TRIAGE-ADULT-

## 2021-03-22 ENCOUNTER — PATIENT MESSAGE (OUTPATIENT)
Dept: FAMILY MEDICINE CLINIC | Age: 76
End: 2021-03-22

## 2021-03-22 DIAGNOSIS — R05.9 COUGH: Primary | ICD-10-CM

## 2021-03-22 RX ORDER — GUAIFENESIN 100 MG/5ML
200 SOLUTION ORAL
Qty: 118 ML | Refills: 1 | Status: SHIPPED | OUTPATIENT
Start: 2021-03-22 | End: 2021-04-15 | Stop reason: SDUPTHER

## 2021-03-24 ENCOUNTER — PATIENT OUTREACH (OUTPATIENT)
Dept: CASE MANAGEMENT | Age: 76
End: 2021-03-24

## 2021-03-29 ENCOUNTER — HOSPITAL ENCOUNTER (OUTPATIENT)
Dept: LAB | Age: 76
Discharge: HOME | End: 2021-03-29
Payer: MEDICARE

## 2021-03-29 PROCEDURE — 87116 MYCOBACTERIA CULTURE: CPT

## 2021-03-30 ENCOUNTER — TRANSCRIBE ORDER (OUTPATIENT)
Dept: REGISTRATION | Age: 76
End: 2021-03-30

## 2021-03-30 ENCOUNTER — HOSPITAL ENCOUNTER (OUTPATIENT)
Dept: LAB | Age: 76
Discharge: HOME | End: 2021-03-30
Payer: MEDICARE

## 2021-03-30 ENCOUNTER — TRANSCRIBE ORDER (OUTPATIENT)
Dept: SCHEDULING | Age: 76
End: 2021-03-30

## 2021-03-30 DIAGNOSIS — R06.01 ORTHOPNEA: Primary | ICD-10-CM

## 2021-03-30 PROCEDURE — 87116 MYCOBACTERIA CULTURE: CPT

## 2021-04-02 ENCOUNTER — PATIENT OUTREACH (OUTPATIENT)
Dept: CASE MANAGEMENT | Age: 76
End: 2021-04-02

## 2021-04-02 NOTE — PROGRESS NOTES
Social Work Note  2021    Date of referral: 10/9/2020  Referral received from: LATHA Navarrete  Reason for referral: Assist the patient with community resources to address mold issues in the home.       Previous SW Referral:  no   If yes, brief summary and outcome:  n/a     Support System: Family and friends     Community Providers: unknown     Transportation: unknown     Medication:unknown     Financial Concern: unknown     Advance Care Plan:  reviewed and current      Other: n/a     Identified Needs:      · The patient indicated that she has mold in the crawl space of the home.      Social Work Plan:     · Locate community resources to assist with addressing the mold issues in the patient's home.         MSW received referral to assist the patient with community resources to address the moisture issues in the crawl space of her home.     MSW contacted Mrs Tere Fuller who is alert and oriented x4. She verified her name and  as identifiers. MSW introduced self, role and reason for call.   Ms. Patterson reported that she received email sent by MSW regarding  services. She mentioned that she have not been able to contact any of the offices at this time. MSW informed the patient that continuous attempts have been made to reach staff at the [facility]. No return calls have been received. The patient verbalized understanding. MSW will continue to assist the patient as needed.

## 2021-04-12 ENCOUNTER — HOSPITAL ENCOUNTER (OUTPATIENT)
Dept: INFUSION THERAPY | Age: 76
Discharge: HOME | End: 2021-04-12
Payer: MEDICARE

## 2021-04-12 DIAGNOSIS — D50.9 IRON DEFICIENCY ANEMIA, UNSPECIFIED IRON DEFICIENCY ANEMIA TYPE: ICD-10-CM

## 2021-04-12 DIAGNOSIS — D72.819 LEUKOPENIA, UNSPECIFIED TYPE: ICD-10-CM

## 2021-04-12 LAB
ALBUMIN SERPL-MCNC: 3.6 G/DL (ref 3.4–5)
ALBUMIN/GLOB SERPL: 0.7 {RATIO} (ref 0.8–1.7)
ALP SERPL-CCNC: 85 U/L (ref 45–117)
ALT SERPL-CCNC: 20 U/L (ref 13–56)
ANION GAP SERPL CALC-SCNC: 4 MMOL/L (ref 3–18)
AST SERPL-CCNC: 17 U/L (ref 10–38)
BASO+EOS+MONOS # BLD AUTO: 0.8 K/UL (ref 0–2.3)
BASO+EOS+MONOS NFR BLD AUTO: 16 % (ref 0.1–17)
BILIRUB SERPL-MCNC: 0.3 MG/DL (ref 0.2–1)
BUN SERPL-MCNC: 24 MG/DL (ref 7–18)
BUN/CREAT SERPL: 20 (ref 12–20)
CALCIUM SERPL-MCNC: 10.3 MG/DL (ref 8.5–10.1)
CHLORIDE SERPL-SCNC: 104 MMOL/L (ref 100–111)
CO2 SERPL-SCNC: 30 MMOL/L (ref 21–32)
CREAT SERPL-MCNC: 1.2 MG/DL (ref 0.6–1.3)
DIFFERENTIAL METHOD BLD: ABNORMAL
ERYTHROCYTE [DISTWIDTH] IN BLOOD BY AUTOMATED COUNT: 13.1 % (ref 11.5–14.5)
FERRITIN SERPL-MCNC: 493 NG/ML (ref 8–388)
GLOBULIN SER CALC-MCNC: 4.9 G/DL (ref 2–4)
GLUCOSE SERPL-MCNC: 88 MG/DL (ref 74–99)
HCT VFR BLD AUTO: 35.4 % (ref 36–48)
HGB BLD-MCNC: 11.2 G/DL (ref 12–16)
IRON SATN MFR SERPL: 19 % (ref 20–50)
IRON SERPL-MCNC: 55 UG/DL (ref 50–175)
LYMPHOCYTES # BLD: 0.7 K/UL (ref 1.1–5.9)
LYMPHOCYTES NFR BLD: 14 % (ref 14–44)
MCH RBC QN AUTO: 30.5 PG (ref 25–35)
MCHC RBC AUTO-ENTMCNC: 31.6 G/DL (ref 31–37)
MCV RBC AUTO: 96.5 FL (ref 78–102)
NEUTS SEG # BLD: 3.4 K/UL (ref 1.8–9.5)
NEUTS SEG NFR BLD: 71 % (ref 40–70)
PLATELET # BLD AUTO: 155 K/UL (ref 135–420)
POTASSIUM SERPL-SCNC: 3.5 MMOL/L (ref 3.5–5.5)
PROT SERPL-MCNC: 8.5 G/DL (ref 6.4–8.2)
RBC # BLD AUTO: 3.67 M/UL (ref 4.1–5.1)
SODIUM SERPL-SCNC: 138 MMOL/L (ref 136–145)
TIBC SERPL-MCNC: 292 UG/DL (ref 250–450)
WBC # BLD AUTO: 4.9 K/UL (ref 4.5–13)

## 2021-04-12 PROCEDURE — 83540 ASSAY OF IRON: CPT

## 2021-04-12 PROCEDURE — 36415 COLL VENOUS BLD VENIPUNCTURE: CPT

## 2021-04-12 PROCEDURE — 80053 COMPREHEN METABOLIC PANEL: CPT

## 2021-04-12 PROCEDURE — 85049 AUTOMATED PLATELET COUNT: CPT

## 2021-04-12 PROCEDURE — 82728 ASSAY OF FERRITIN: CPT

## 2021-04-12 PROCEDURE — 85025 COMPLETE CBC W/AUTO DIFF WBC: CPT

## 2021-04-12 NOTE — PROGRESS NOTES
[facility] Progress Note    Date: 2021    Name: Alcon Gamboa    MRN: [id number]         : 1945    Peripheral Lab Draw    Recent Results (from the past 12 hour(s))   CBC WITH 3 PART DIFF    Collection Time: 21  1:10 PM   Result Value Ref Range    WBC 4.9 4.5 - 13.0 K/uL    RBC 3.67 (L) 4.10 - 5.10 M/uL    HGB 11.2 (L) 12.0 - 16.0 g/dL    HCT 35.4 (L) 36 - 48 %    MCV 96.5 78 - 102 FL    MCH 30.5 25.0 - 35.0 PG    MCHC 31.6 31 - 37 g/dL    RDW 13.1 11.5 - 14.5 %    NEUTROPHILS 71 (H) 40 - 70 %    MIXED CELLS 16 0.1 - 17 %    LYMPHOCYTES 14 14 - 44 %    ABS. NEUTROPHILS 3.4 1.8 - 9.5 K/UL    ABS. MIXED CELLS 0.8 0.0 - 2.3 K/uL    ABS. LYMPHOCYTES 0.7 (L) 1.1 - 5.9 K/UL    DF AUTOMATED         Ms. Patterson to [facility], ambulatory at 1300 accompanied by self. Pt was assessed and education was provided. Blood obtained peripherally from left arm x 1 attempt with butterfly needle and sent to lab for Cbc w/diff, Cmp, Iron Profile, Ferritin per written orders. No bleeding or hematoma noted at site. Gauze and coban applied. Ms. Carlotta Clemons tolerated the phlebotomy, and had no complaints. Patient armband removed and shredded. Ms. Carlotta Clemons was discharged from [place] [address] in stable condition at 1310.      Sole Phillips Phlebotomist PCT  2021  2:27 PM

## 2021-04-15 ENCOUNTER — OFFICE VISIT (OUTPATIENT)
Dept: FAMILY MEDICINE CLINIC | Age: 76
End: 2021-04-15
Payer: MEDICARE

## 2021-04-15 VITALS
SYSTOLIC BLOOD PRESSURE: 133 MMHG | DIASTOLIC BLOOD PRESSURE: 68 MMHG | BODY MASS INDEX: 23.56 KG/M2 | OXYGEN SATURATION: 96 % | WEIGHT: 120 LBS | TEMPERATURE: 97.7 F | RESPIRATION RATE: 18 BRPM | HEIGHT: 60 IN | HEART RATE: 76 BPM

## 2021-04-15 DIAGNOSIS — R05.9 COUGH: ICD-10-CM

## 2021-04-15 DIAGNOSIS — M54.16 LUMBAR RADICULOPATHY: ICD-10-CM

## 2021-04-15 DIAGNOSIS — N28.9 RENAL INSUFFICIENCY: Primary | ICD-10-CM

## 2021-04-15 DIAGNOSIS — G25.81 RLS (RESTLESS LEGS SYNDROME): ICD-10-CM

## 2021-04-15 DIAGNOSIS — J47.1 BRONCHIECTASIS WITH ACUTE EXACERBATION (HCC): ICD-10-CM

## 2021-04-15 PROBLEM — R19.5 HEME POSITIVE STOOL: Status: RESOLVED | Noted: 2020-01-22 | Resolved: 2021-04-15

## 2021-04-15 PROBLEM — R00.0 TACHYCARDIA: Status: RESOLVED | Noted: 2020-10-01 | Resolved: 2021-04-15

## 2021-04-15 PROBLEM — N39.8 VOIDING DYSFUNCTION: Status: RESOLVED | Noted: 2019-12-26 | Resolved: 2021-04-15

## 2021-04-15 PROBLEM — N81.4 UTEROVAGINAL PROLAPSE: Status: RESOLVED | Noted: 2018-07-03 | Resolved: 2021-04-15

## 2021-04-15 PROBLEM — N18.32 STAGE 3B CHRONIC KIDNEY DISEASE (HCC): Status: ACTIVE | Noted: 2021-04-15

## 2021-04-15 PROBLEM — Z12.11 SCREENING FOR COLON CANCER: Status: RESOLVED | Noted: 2018-03-22 | Resolved: 2021-04-15

## 2021-04-15 PROBLEM — R19.7 DIARRHEA: Status: RESOLVED | Noted: 2018-10-19 | Resolved: 2021-04-15

## 2021-04-15 PROBLEM — D72.819 LEUKOPENIA: Status: RESOLVED | Noted: 2020-08-13 | Resolved: 2021-04-15

## 2021-04-15 PROBLEM — R52 PAIN AT REST: Status: RESOLVED | Noted: 2018-10-19 | Resolved: 2021-04-15

## 2021-04-15 PROBLEM — R04.2 HEMOPTYSIS: Status: RESOLVED | Noted: 2021-03-05 | Resolved: 2021-04-15

## 2021-04-15 PROBLEM — B37.9 YEAST INFECTION: Status: RESOLVED | Noted: 2021-03-05 | Resolved: 2021-04-15

## 2021-04-15 PROCEDURE — G8420 CALC BMI NORM PARAMETERS: HCPCS | Performed by: INTERNAL MEDICINE

## 2021-04-15 PROCEDURE — G8427 DOCREV CUR MEDS BY ELIG CLIN: HCPCS | Performed by: INTERNAL MEDICINE

## 2021-04-15 PROCEDURE — G8432 DEP SCR NOT DOC, RNG: HCPCS | Performed by: INTERNAL MEDICINE

## 2021-04-15 PROCEDURE — 99214 OFFICE O/P EST MOD 30 MIN: CPT | Performed by: INTERNAL MEDICINE

## 2021-04-15 PROCEDURE — 1090F PRES/ABSN URINE INCON ASSESS: CPT | Performed by: INTERNAL MEDICINE

## 2021-04-15 PROCEDURE — G8399 PT W/DXA RESULTS DOCUMENT: HCPCS | Performed by: INTERNAL MEDICINE

## 2021-04-15 PROCEDURE — 1101F PT FALLS ASSESS-DOCD LE1/YR: CPT | Performed by: INTERNAL MEDICINE

## 2021-04-15 PROCEDURE — G8536 NO DOC ELDER MAL SCRN: HCPCS | Performed by: INTERNAL MEDICINE

## 2021-04-15 RX ORDER — TIOTROPIUM BROMIDE INHALATION SPRAY 1.56 UG/1
SPRAY, METERED RESPIRATORY (INHALATION)
COMMUNITY
Start: 2021-01-25 | End: 2022-10-11

## 2021-04-15 RX ORDER — LEVOFLOXACIN 500 MG/1
500 TABLET, FILM COATED ORAL DAILY
Qty: 7 TAB | Refills: 0 | Status: SHIPPED | OUTPATIENT
Start: 2021-04-15 | End: 2021-04-22

## 2021-04-15 RX ORDER — GUAIFENESIN 100 MG/5ML
200 SOLUTION ORAL
Qty: 473 ML | Refills: 2 | Status: SHIPPED | OUTPATIENT
Start: 2021-04-15 | End: 2022-03-03 | Stop reason: SDUPTHER

## 2021-04-15 RX ORDER — IPRATROPIUM BROMIDE 42 UG/1
SPRAY, METERED NASAL
COMMUNITY
Start: 2021-03-25 | End: 2021-09-16 | Stop reason: ALTCHOICE

## 2021-04-15 NOTE — PROGRESS NOTES
History of Present Illness  Anahi Davenport is a 66 y.o. female who presents today for management of    Chief Complaint   Patient presents with   [facility] Care       Patient is here to establish care. Previous PCP: SUNDAR Keller    The patient has history of asthma and bronchiectasis. She is followed by Dr. Whit Unger. Oxygen: She currently is not on home oxygen therapy. Symptoms: cough: severity: severe: sputum : brown: moderate. Patient does not smoke cigarettes. Patient has history of spinal abscess (L4-S1) in 2016. Patient was paraplegic for a while but is now ambulatory. She has chronic intermittent low back pain and left leg pain. She is down to gabapentin 100mg QHS. She ambulates with a cane at home, walker when outside. Patient lives with her sister.     Past Medical History  Past Medical History:   Diagnosis Date    Abnormality of gait and mobility     Ambulatory with use of Wheeled Walker Outside the home (as of 3/05/2021)    Aortic regurgitation 06/25/2018    Mild to Moderate by TTE on 6/25/2018    [facility]     in [facility]    Asthma Sept. 2018    Asthma Dr. Coates Arm; Questionably Cough-Variant Asthma    Blindness of left eye     2/2 Blood Clot in Left Eye    CAP (community acquired pneumonia) 3/11/2016    Colon polyps 1-22-20    From colonoscopy     Compression fracture of L5 vertebra [facility]     DVT (deep venous thrombosis) (HCC)     Provoked (Immobility, Spinal Infection) RLE DVT S/P IVC Filter (2016)    Environmental allergies     Female genital prolapse     Former smoker     1-1.5 PPD x6 years; Quit 1970    Glaucoma 2017    [id number]    Grade I diastolic dysfunction 48/74/2779    by TTE on 6/25/2018    Hemoptysis 3/5/2021    History of Clostridium difficile colitis 07/06/2016    History of transfusion 03/2016    Transfused x4 units (ostensibly of) PRBCs over March of 2016    Hypertension October 2018    Dr. Chapin Hurtado diagnosis    Iron deficiency anemia 03/2016    Kidney [facility] 07/2019    [facility]; Bladder Stones, also    LBBB (left bundle branch block)     Osteoporosis 10/07/2016    Most recently by DEXA Scan on 12/18/2018    Psoas muscle abscess ([address] 03/12/2016    History of Right Psoas Muscle Abscess S/P Drainage    Recurrent UTI [id number]    likely 2/2 Indwelling-Catheter x2 years    Small right kidney     Spinal abscess ([address] 03/2016    Caused paraplegia. patient ambulatory Summer 2018    SVT (supraventricular tachycardia) ([address]     Umbilical hernia 15/51/1273    Supraumbilical Ventral Hernia    Urine retention     Voiding Dysfunction with Hypocontractile Bladder    Uterus prolapse     grade 5. Surgically corrected 7/2018    Vaginal candida 03/16/2016        Surgical History  Past Surgical History:   Procedure Laterality Date    COLONOSCOPY N/A 1/22/2020    COLONOSCOPY performed by Elias Lewis MD at [id number] [facility]      x 4 after having miscarriages.  HX GYN  07/2018    CYSTOURETHROSCOPY; POSTERIOR COLPORRHAPHY, CYSTOCELE REPAIR; COLPOPEXY VAGINAL INTRA-PERITONEAL APPROACH;    HX TONSILLECTOMY      HX VITRECTOMY Left 2017    x2 (4/20/2017, 7/20/2017)    VASCULAR SURGERY PROCEDURE UNLIST  march 2016    IVC filter. Current Medications  Current Outpatient Medications   Medication Sig    levoFLOXacin (LEVAQUIN) 500 mg tablet Take 1 Tab by mouth daily for 7 days.  guaiFENesin (ROBITUSSIN) 100 mg/5 mL liquid Take 10 mL by mouth four (4) times daily as needed for Cough.  loratadine (CLARITIN) 10 mg tablet Take 1 Tab by mouth daily.  montelukast (SINGULAIR) 10 mg tablet TAKE 1 TABLET BY MOUTH DAILY    albuterol (PROVENTIL HFA, VENTOLIN HFA, PROAIR HFA) 90 mcg/actuation inhaler Take 2 Puffs by inhalation every four (4) hours as needed for Cough.     rOPINIRole (REQUIP) 0.5 mg tablet TAKE 1 TABLET BY MOUTH EVERY NIGHT    Lactobac no.41/Bifidobact no.7 (PROBIOTIC-10 PO) Take 1 Tab by mouth daily.  gabapentin (NEURONTIN) 100 mg capsule TAKE 2 CAPSULES BY MOUTH THREE TIMES DAILY (Patient taking differently: Take 100 mg by mouth nightly.)    ferrous gluconate 324 mg (38 mg iron) tablet Take 1 Tab by mouth daily. TAKE 1 TABLET BY MOUTH EVERY MONDAY, WEDNESDAY, AND  (Patient taking differently: Take 324 mg by mouth daily. TAKE 1 TABLET BY MOUTH EVERY MONDAY, WEDNESDAY, AND FRIDAY)    multivitamin (ONE A DAY) tablet Take 1 Tab by mouth daily.  calcium-cholecalciferol, d3, (CALCIUM 600 + D) 600-125 mg-unit tab Take 2 Tabs by mouth.  cholecalciferol (VITAMIN D3) (5000 Units/125 mcg) tab tablet Take  by mouth daily.  b complex vitamins tablet Take 1 Tab by mouth daily.  latanoprost (XALATAN) 0.005 % ophthalmic solution Administer 1 Drop to both eyes nightly.  Spiriva Respimat 1.25 mcg/actuation inhaler INHALE 2 PUFFS BY MOUTH DAILY    ipratropium (ATROVENT) 42 mcg (0.06 %) nasal spray USE 2 SPRAYS IN EACH NOSTRIL THREE TIMES DAILY     No current facility-administered medications for this visit.         Allergies/Drug Reactions  Allergies   Allergen Reactions    Other Food Cough    Milk Containing Products Other (comments)     Mucous    Bactrim [Sulfamethoprim] Nausea Only and Other (comments)     Headache, Joint pain, loss of appetite     Mold Other (comments)    Pollen Extracts Other (comments)    Sulfamethoxazole-Trimethoprim Other (comments)        Family History  Family History   Problem Relation Age of Onset    Cancer Mother         Stomach Cancer-     Heart Disease Mother         [email or web address]    Cancer Father         Stomach Cancer-     Asthma Father         Social History  Social History     Tobacco Use    Smoking status: Former Smoker     Packs/day: 1.00     Years: 8.00     Pack years: 8.00     Types: Cigarettes     Quit date: 4/15/1970     Years since quittin.0    Smokeless tobacco: Never Used    Tobacco comment: 1-1.5 PPD x7-8 years; Quit    Substance Use Topics    Alcohol use: No     Alcohol/week: 0.0 standard drinks     Frequency: Never    Drug use: Never        Health Maintenance   Topic Date Due    Hepatitis C Screening  Never done    COVID-19 Vaccine (1) Never done    DTaP/Tdap/Td series (1 - Tdap) Never done    Shingrix Vaccine Age 50> (1 of 2) Never done    Medicare Yearly Exam  2021    Flu Vaccine (Season Ended) 2021    Bone Densitometry (Dexa) Screening  Completed    Pneumococcal 65+ years  Completed     Immunization History   Administered Date(s) Administered    Influenza Vaccine 10/13/1994, 1995, 1997, 1998    Pneumococcal Conjugate (PCV-13) 10/11/2016    Pneumococcal Polysaccharide (PPSV-23) 2020    Pneumococcal Vaccine (Unspecified Type) 2014       Review of Systems  Review of Systems   Constitutional: Negative. Respiratory: Positive for cough and sputum production. Negative for hemoptysis, shortness of breath and wheezing. Cardiovascular: Negative. Gastrointestinal: Negative. Musculoskeletal: Negative. Neurological: Negative. Psychiatric/Behavioral: Negative.          Physical Exam  Vital signs:   Vitals:    04/15/21 1122   BP: 133/68   Pulse: 76   Resp: 18   Temp: 97.7 °F (36.5 °C)   TempSrc: Temporal   SpO2: 96%   Weight: 120 lb (54.4 kg)   Height: 5' (1.524 m)       General: alert, oriented, not in distress  Head: scalp normal, atraumatic  Eyes: pupils are equal and reactive, full and intact EOM's  Ears: patent ear canal, intact tympanic membrane  Nose: normal turbinates, no congestion or discharge  Lips/Mouth: moist lips and buccal mucosa, non-enlarged tonsils, pink throat  Neck: supple, no JVD, no lymphadenopathy, non-palpable thyroid  Chest/Lungs: clear breath sounds, no wheezing or crackles  Heart: normal rate, regular rhythm, no murmur  Extremities: no focal deformities, no edema  Skin: no active skin lesions    Laboratory/Tests:  Lab Results   Component Value Date/Time    WBC 4.9 04/12/2021 01:10 PM    HGB 11.2 (L) 04/12/2021 01:10 PM    HCT 35.4 (L) 04/12/2021 01:10 PM    PLATELET 539 49/07/3881 01:10 PM    MCV 96.5 04/12/2021 01:10 PM     Lab Results   Component Value Date/Time    Cholesterol, total 190 04/29/2019 03:38 PM    HDL Cholesterol 95 (H) 04/29/2019 03:38 PM    LDL, calculated 82.2 04/29/2019 03:38 PM    Triglyceride 64 04/29/2019 03:38 PM    CHOL/HDL Ratio 2.0 04/29/2019 03:38 PM     Lab Results   Component Value Date/Time    TSH 1.37 08/17/2020 01:13 PM      Lab Results   Component Value Date/Time    Sodium 138 04/12/2021 01:10 PM    Potassium 3.5 04/12/2021 01:10 PM    Chloride 104 04/12/2021 01:10 PM    CO2 30 04/12/2021 01:10 PM    Anion gap 4 04/12/2021 01:10 PM    Glucose 88 04/12/2021 01:10 PM    BUN 24 (H) 04/12/2021 01:10 PM    Creatinine 1.20 04/12/2021 01:10 PM    BUN/Creatinine ratio 20 04/12/2021 01:10 PM    GFR est AA 53 (L) 04/12/2021 01:10 PM    GFR est non-AA 44 (L) 04/12/2021 01:10 PM    Calcium 10.3 (H) 04/12/2021 01:10 PM    Bilirubin, total 0.3 04/12/2021 01:10 PM    ALT (SGPT) 20 04/12/2021 01:10 PM    Alk. phosphatase 85 04/12/2021 01:10 PM    Protein, total 8.5 (H) 04/12/2021 01:10 PM    Albumin 3.6 04/12/2021 01:10 PM    Globulin 4.9 (H) 04/12/2021 01:10 PM    A-G Ratio 0.7 (L) 04/12/2021 01:10 PM         Assessment/Plan:      1. Renal Insufficiency vs Stage 3b chronic kidney disease (HCC)  - stable    2. [facility]  - pulmo follow-up  - levoFLOXacin (LEVAQUIN) 500 mg tablet; Take 1 Tab by mouth daily for 7 days. Dispense: 7 Tab; Refill: 0  - guaiFENesin (ROBITUSSIN) 100 mg/5 mL liquid; Take 10 mL by mouth four (4) times daily as needed for Cough. Dispense: 473 mL; Refill: 2    3. Lumbar radiculopathy  - history of spinal abscess in 2016  - continue gabapentin    4. RLS (restless legs syndrome)  - continue ropinirole    Follow-up and Dispositions    · Return in about 6 months (around 10/15/2021) for ROV, in-person. I have discussed the diagnosis with the patient and the intended plan as seen in the above orders. The patient has received an after-visit summary and questions were answered concerning future plans. I have discussed medication side effects and warnings with the patient as well. I have reviewed the plan of care with the patient, accepted their input and they are in agreement with the treatment goals.        Kriss Torres MD  April 15, 2021

## 2021-04-19 ENCOUNTER — VIRTUAL VISIT (OUTPATIENT)
Age: 76
End: 2021-04-19
Payer: MEDICARE

## 2021-04-19 DIAGNOSIS — D50.9 IRON DEFICIENCY ANEMIA, UNSPECIFIED IRON DEFICIENCY ANEMIA TYPE: ICD-10-CM

## 2021-04-19 DIAGNOSIS — D72.819 LEUKOPENIA, UNSPECIFIED TYPE: Primary | ICD-10-CM

## 2021-04-19 PROCEDURE — 99442 PR PHYS/QHP TELEPHONE EVALUATION 11-20 MIN: CPT | Performed by: INTERNAL MEDICINE

## 2021-04-19 NOTE — PROGRESS NOTES
Kris Gutierrez is a 66 y.o. female, evaluated via audio-only technology on 4/19/2021. PCP: Jerry Aragon MD    Assessment & Plan:   1. Leukopenia, unspecified type  --12/11/2020: CBC showed WBC was 4.9K/uL, ANC 3.4 and ALC 0.7.   --Patient denies any repeated infections.   --08/17/2020 HIV nonreactive and ARMANDO negative    2. Iron deficiency anemia, unspecified iron deficiency anemia type  --Most recent CBC showed: H&H 11.2/35.4, MCV 96.5, platelet 315,100. Ferritin 493. Iron 55 and TSAT 19%. --BUN 24 and creatinine was normal    --Follow up in 4 months with repeat labs or sooner if indicated      Subjective:   Ms Kris Gutierrez is a 66 y.o. female who was evaluated via audio-only technology for Benign Leukopenia and Iron Deficiency Anemia. She states she is being followed by a pulmonologist for bronchiectasis. She reports she was hospitalized for 2 days last month for this reason. She had a follow up appointment with her Pulmonologist on March 26, 2021 and was given nebulizer treatment in the office. She also had a follow up wit her PCP last week and was given Levaquin and Robitussin. Today she denies fevers and rash. She denies fatigue, shortness of breath, and chest pains. She denies pain or discomfort. She does not have any concerns or complaints to report at this time. Prior to Admission medications    Medication Sig Start Date End Date Taking? Authorizing Provider   Spiriva Respimat 1.25 mcg/actuation inhaler INHALE 2 PUFFS BY MOUTH DAILY 1/25/21   Provider, Historical   ipratropium (ATROVENT) 42 mcg (0.06 %) nasal spray USE 2 SPRAYS IN EACH NOSTRIL THREE TIMES DAILY 3/25/21   Provider, Historical   levoFLOXacin (LEVAQUIN) 500 mg tablet Take 1 Tab by mouth daily for 7 days. 4/15/21 4/22/21  Sara Bell MD   guaiFENesin (ROBITUSSIN) 100 mg/5 mL liquid Take 10 mL by mouth four (4) times daily as needed for Cough.  4/15/21   Sara Bell MD   loratadine (CLARITIN) 10 mg tablet Take 1 Tab by mouth daily. 2/5/21   Gilbert Bell MD   montelukast (SINGULAIR) 10 mg tablet TAKE 1 TABLET BY MOUTH DAILY 2/5/21   Gilbert Bell MD   albuterol (PROVENTIL HFA, VENTOLIN HFA, PROAIR HFA) 90 mcg/actuation inhaler Take 2 Puffs by inhalation every four (4) hours as needed for Cough. 2/5/21   Gilbert Bell MD   rOPINIRole (REQUIP) 0.5 mg tablet TAKE 1 TABLET BY MOUTH EVERY NIGHT 1/20/21   Gilbert Bell MD   Lactobac no.41/Bifidobact no.7 (PROBIOTIC-10 PO) Take 1 Tab by mouth daily. Provider, Historical   gabapentin (NEURONTIN) 100 mg capsule TAKE 2 CAPSULES BY MOUTH THREE TIMES DAILY  Patient taking differently: Take 100 mg by mouth nightly. 8/10/20   Gilbert Bell MD   ferrous gluconate 324 mg (38 mg iron) tablet Take 1 Tab by mouth daily. TAKE 1 TABLET BY MOUTH EVERY MONDAY, WEDNESDAY, AND SUNDAY  Patient taking differently: Take 324 mg by mouth daily. TAKE 1 TABLET BY MOUTH EVERY MONDAY, WEDNESDAY, AND FRIDAY 7/27/20   Annalise Haley MD   multivitamin (ONE A DAY) tablet Take 1 Tab by mouth daily. Provider, Historical   calcium-cholecalciferol, d3, (CALCIUM 600 + D) 600-125 mg-unit tab Take 2 Tabs by mouth. Provider, Historical   cholecalciferol (VITAMIN D3) (5000 Units/125 mcg) tab tablet Take  by mouth daily. Provider, Historical   b complex vitamins tablet Take 1 Tab by mouth daily. Provider, Historical   latanoprost (XALATAN) 0.005 % ophthalmic solution Administer 1 Drop to both eyes nightly. Provider, Historical       Review of Systems   All other systems reviewed and are negative.       Patient-Reported Vitals 12/14/2020   Patient-Reported Weight 123lb   Patient-Reported Height -   Patient-Reported Pulse -   Patient-Reported Temperature -   Patient-Reported SpO2 -   Patient-Reported Systolic  -   Patient-Reported Diastolic -      LABS:  Lab Results   Component Value Date/Time    WBC 4.9 04/12/2021 01:10 PM    HGB 11.2 (L) 04/12/2021 01:10 PM    HCT 35.4 (L) 04/12/2021 01:10 PM    PLATELET 116 60/07/1004 01:10 PM    MCV 96.5 04/12/2021 01:10 PM     Lab Results   Component Value Date/Time    Sodium 138 04/12/2021 01:10 PM    Potassium 3.5 04/12/2021 01:10 PM    Chloride 104 04/12/2021 01:10 PM    CO2 30 04/12/2021 01:10 PM    Anion gap 4 04/12/2021 01:10 PM    Glucose 88 04/12/2021 01:10 PM    BUN 24 (H) 04/12/2021 01:10 PM    Creatinine 1.20 04/12/2021 01:10 PM    BUN/Creatinine ratio 20 04/12/2021 01:10 PM    GFR est AA 53 (L) 04/12/2021 01:10 PM    GFR est non-AA 44 (L) 04/12/2021 01:10 PM    Calcium 10.3 (H) 04/12/2021 01:10 PM    Bilirubin, total 0.3 04/12/2021 01:10 PM    Alk. phosphatase 85 04/12/2021 01:10 PM    Protein, total 8.5 (H) 04/12/2021 01:10 PM    Albumin 3.6 04/12/2021 01:10 PM    Globulin 4.9 (H) 04/12/2021 01:10 PM    A-G Ratio 0.7 (L) 04/12/2021 01:10 PM    ALT (SGPT) 20 04/12/2021 01:10 PM    AST (SGOT) 17 04/12/2021 01:10 PM     Lab Results   Component Value Date/Time    Iron 55 04/12/2021 12:55 PM    TIBC 292 04/12/2021 12:55 PM    Iron % saturation 19 (L) 04/12/2021 12:55 PM    Ferritin 493 (H) 04/12/2021 01:10 PM         Phyllis Ferguson, who was evaluated through a patient-initiated, synchronous (real-time) audio only encounter, and/or her healthcare decision maker, is aware that it is a billable service, with coverage as determined by her insurance carrier. She provided verbal consent to proceed: Yes. She has not had a related appointment within my department in the past 7 days or scheduled within the next 24 hours.       Total Time: minutes: 11-20 minutes      Orders Placed This Encounter    CBC WITH AUTOMATED DIFF     Standing Status:   Future     Standing Expiration Date:   4/20/2022    IRON PROFILE     Standing Status:   Future     Standing Expiration Date:   5/69/3767    METABOLIC PANEL, COMPREHENSIVE     Standing Status:   Future     Standing Expiration Date:   4/20/2022    FERRITIN     Standing Status:   Future     Standing Expiration Date:   4/20/2022       Follow up in 4 months or sooner if indicated      Dany Harris MD  04/19/2120

## 2021-04-20 ENCOUNTER — PATIENT OUTREACH (OUTPATIENT)
Dept: CASE MANAGEMENT | Age: 76
End: 2021-04-20

## 2021-04-20 NOTE — PROGRESS NOTES
Social Work Note  4/20/2021    Date of referral: 10/9/2020  Referral received from: LATHA Hernandez  Reason for referral: Assist the patient with community resources to address mold issues in the home.       Previous SW Referral:  no   If yes, brief summary and outcome:  n/a     Support System: Family and friends     Community Providers: unknown     Transportation: unknown     Medication:unknown     Financial Concern: unknown     Advance Care Plan:  reviewed and current      Other: n/a     Identified Needs:      · The patient indicated that she has mold in the crawl space of the home.   · Need for  services or Pro-blossom services in order to add her sister to trust of the house.     Social Work Plan:     · Locate community resources to assist with addressing the mold issues in the patient's home. · Provide the patient with list of  and/or pro-blossom services in the community        MSW received referral to assist the patient with community resources to address the moisture issues in the crawl space of her home. The patient have since requested assistance of  or pro-blossom services in the community to add her sister to the trust of the house. MSW attempted to reach the patient by telephone. Left message on voicemail.      Will re-attempt to reach the patient at a later time.

## 2021-04-28 ENCOUNTER — PATIENT OUTREACH (OUTPATIENT)
Dept: CASE MANAGEMENT | Age: 76
End: 2021-04-28

## 2021-04-28 NOTE — PROGRESS NOTES
Social Work Note  2021      Date of referral: 10/9/2020  Referral received from: LATHA Fried  Reason for referral: Assist the patient with community resources to address mold issues in the home.       Previous SW Referral:  no   If yes, brief summary and outcome:  n/a     Support System: Family and friends     Community Providers: unknown     Transportation: unknown     Medication:unknown     Financial Concern: unknown     Advance Care Plan:  reviewed and current      Other: n/a     Identified Needs:      · The patient indicated that she has mold in the crawl space of the home.   · Need for  services or Pro-blossom services in order to add her sister to trust of the house. (Resolved)     Social Work Plan:     · Locate community resources to assist with addressing the mold issues in the patient's home. · Provide the patient with list of  and/or pro-blossom services in the community        MSW received referral to assist the patient with community resources to address the moisture issues in the crawl space of her home. The patient have since requested assistance of  or pro-blossom services in the community to add her sister to the trust of the house. MSW contacted the patient who is alert and oriented and was able to verify her name and  as identifiers. MSW introduced self, role and reason for call. The patient stated that she is not feeling so good due to ongoing symptoms of bronchiectasis. The patient was in good spirits and was an active participant in the conversation. The patient reported that she was able to reach out to  for information on adding her sister on the mortgage. The patient verbalized understanding regarding the information she received. MSW continues to research local resources to assist the patient with the moisture issues in the crawl space of her house.  MSW and the patient have attempted to reach out to local and federal resources and left messages requesting return calls. So far, no responses have been received.      MSW will continue to assist the patient as needed.

## 2021-06-02 DIAGNOSIS — G25.81 RLS (RESTLESS LEGS SYNDROME): ICD-10-CM

## 2021-06-02 RX ORDER — ROPINIROLE 0.5 MG/1
TABLET, FILM COATED ORAL
Qty: 90 TABLET | Refills: 1 | Status: SHIPPED | OUTPATIENT
Start: 2021-06-02 | End: 2021-10-21

## 2021-06-04 ENCOUNTER — PATIENT OUTREACH (OUTPATIENT)
Dept: CASE MANAGEMENT | Age: 76
End: 2021-06-04

## 2021-06-04 NOTE — PROGRESS NOTES
Social Work Note  6/4/2021    Social Work Note  4/28/2021        Date of referral: 10/9/2020  Referral received from: LATHA Cramer  Reason for referral: Assist the patient with community resources to address mold issues in the home.       Previous SW Referral:  no   If yes, brief summary and outcome:  n/a     Support System: Family and friends     Community Providers: unknown     Transportation: unknown     Medication:unknown     Financial Concern: unknown     Advance Care Plan:  reviewed and current      Other: n/a     Identified Needs:      · The patient indicated that she has mold in the crawl space of the home.   · Need for  services or Pro-blossom services in order to add her sister to trust of the house. (Resolved)     Social Work Plan:     · Locate community resources to assist with addressing the mold issues in the patient's home.   · Provide the patient with list of  and/or pro-blossom services in the community        MSW received referral to assist the patient with community resources to address the moisture issues in the crawl space of her home. The patient have since requested assistance of  or pro-blossom services in the community to add her sister to the trust of the house. The patient has been able to reach out to  and was provided with information needed.      MSW attempted to reach the patient by telephone. Left message with information to return call. Will attempt to reach the patient at a later time.

## 2021-06-15 ENCOUNTER — OFFICE VISIT (OUTPATIENT)
Dept: CARDIOLOGY CLINIC | Age: 76
End: 2021-06-15
Payer: MEDICARE

## 2021-06-15 VITALS
HEIGHT: 60 IN | HEART RATE: 72 BPM | OXYGEN SATURATION: 96 % | WEIGHT: 111 LBS | DIASTOLIC BLOOD PRESSURE: 65 MMHG | SYSTOLIC BLOOD PRESSURE: 143 MMHG | BODY MASS INDEX: 21.79 KG/M2

## 2021-06-15 DIAGNOSIS — I44.7 LBBB (LEFT BUNDLE BRANCH BLOCK): Primary | ICD-10-CM

## 2021-06-15 PROCEDURE — 1090F PRES/ABSN URINE INCON ASSESS: CPT | Performed by: INTERNAL MEDICINE

## 2021-06-15 PROCEDURE — G8432 DEP SCR NOT DOC, RNG: HCPCS | Performed by: INTERNAL MEDICINE

## 2021-06-15 PROCEDURE — G8536 NO DOC ELDER MAL SCRN: HCPCS | Performed by: INTERNAL MEDICINE

## 2021-06-15 PROCEDURE — 1101F PT FALLS ASSESS-DOCD LE1/YR: CPT | Performed by: INTERNAL MEDICINE

## 2021-06-15 PROCEDURE — 99213 OFFICE O/P EST LOW 20 MIN: CPT | Performed by: INTERNAL MEDICINE

## 2021-06-15 PROCEDURE — G8428 CUR MEDS NOT DOCUMENT: HCPCS | Performed by: INTERNAL MEDICINE

## 2021-06-15 PROCEDURE — G8420 CALC BMI NORM PARAMETERS: HCPCS | Performed by: INTERNAL MEDICINE

## 2021-06-15 PROCEDURE — G8399 PT W/DXA RESULTS DOCUMENT: HCPCS | Performed by: INTERNAL MEDICINE

## 2021-06-15 NOTE — PROGRESS NOTES
[facility]    Ms. Carla Singh is a 66 y.o. female with a history of left bundle branch block, uterine prolapse, other multiple medical problems. Ms. Carla Singh is here today for follow-up appointment. She recently had an echocardiogram which was ordered by pulmonary team.  Because of possible abnormality she was asked to come see me. She has some dyspnea on moderate exertion. She has bronchiectasis and she is being followed by pulmonary team.  She denies PND or any significant lower extremity swelling. She denies any chest pain or chest tightness.   She denies presyncope or syncope    Past Medical History:   Diagnosis Date    Abnormality of gait and mobility     Ambulatory with use of Wheeled Walker Outside the home (as of 3/05/2021)    Aortic regurgitation 06/25/2018    Mild to Moderate by TTE on 6/25/2018    Arthritis     [facility]    Asthma Sept. 2018    Asthma Dr. Sammie Conner; Questionably Cough-Variant Asthma    Blindness of left eye     2/2 Blood Clot in Left Eye    CAP (community acquired pneumonia) 3/11/2016    Colon polyps 1-22-20    From colonoscopy     Compression fracture of L5 vertebra ([address]     DVT (deep venous thrombosis) ([facility])     Provoked (Immobility, Spinal Infection) RLE DVT S/P IVC Filter (2016)    Environmental allergies     Female genital prolapse     Former smoker     1-1.5 PPD x6 years; Quit 1970    Glaucoma 2017    BgUkjvsilgIasmyjlfagvybNnMrismrkvKgtPtqhdyqsNyiywHrtqm1BVHU    Grade I diastolic dysfunction 98/12/6234    by TTE on 6/25/2018    Hemoptysis 3/5/2021    History of Clostridium difficile colitis 07/06/2016    History of transfusion 03/2016    Transfused x4 units (ostensibly of) PRBCs over March of 2016    Hypertension October 2018    Dr. 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 patient ambulatory Summer 2018    SVT (supraventricular tachycardia) ([address]     Umbilical hernia 97/55/0020    Supraumbilical Ventral Hernia    Urine retention     Voiding Dysfunction with Hypocontractile Bladder    Uterus prolapse     grade 5. Surgically corrected 7/2018    Vaginal candida 03/16/2016         Past Surgical History:   Procedure Laterality Date    COLONOSCOPY N/A 1/22/2020    COLONOSCOPY performed by Mei Ortez MD at [address] [facility]      x 4 after having miscarriages.  HX GYN  07/2018    CYSTOURETHROSCOPY; POSTERIOR COLPORRHAPHY, CYSTOCELE REPAIR; COLPOPEXY VAGINAL INTRA-PERITONEAL APPROACH;    HX TONSILLECTOMY      HX VITRECTOMY Left 2017    x2 (4/20/2017, 7/20/2017)    VASCULAR SURGERY PROCEDURE UNLIST  march 2016    IVC filter. Current Outpatient Medications   Medication Sig    rOPINIRole (REQUIP) 0.5 mg tablet TAKE 1 TABLET AT BEDTIME    Spiriva Respimat 1.25 mcg/actuation inhaler INHALE 2 PUFFS BY MOUTH DAILY    ipratropium (ATROVENT) 42 mcg (0.06 %) nasal spray USE 2 SPRAYS IN EACH NOSTRIL THREE TIMES DAILY    guaiFENesin (ROBITUSSIN) 100 mg/5 mL liquid Take 10 mL by mouth four (4) times daily as needed for Cough.  loratadine (CLARITIN) 10 mg tablet Take 1 Tab by mouth daily.  montelukast (SINGULAIR) 10 mg tablet TAKE 1 TABLET BY MOUTH DAILY    albuterol (PROVENTIL HFA, VENTOLIN HFA, PROAIR HFA) 90 mcg/actuation inhaler Take 2 Puffs by inhalation every four (4) hours as needed for Cough.     Lactobac no.41/Bifidobact no.7 (PROBIOTIC-10 PO) Take 1 Tab by mouth daily.  gabapentin (NEURONTIN) 100 mg capsule TAKE 2 CAPSULES BY MOUTH THREE TIMES DAILY (Patient taking differently: Take 100 mg by mouth nightly.)    ferrous gluconate 324 mg (38 mg iron) tablet Take 1 Tab by mouth daily. TAKE 1 TABLET BY MOUTH EVERY MONDAY, WEDNESDAY, AND  (Patient taking differently: Take 324 mg by mouth daily. TAKE 1 TABLET BY MOUTH EVERY MONDAY, WEDNESDAY, AND FRIDAY)    multivitamin (ONE A DAY) tablet Take 1 Tab by mouth daily.  calcium-cholecalciferol, d3, (CALCIUM 600 + D) 600-125 mg-unit tab Take 2 Tabs by mouth.  cholecalciferol (VITAMIN D3) (5000 Units/125 mcg) tab tablet Take  by mouth daily.  b complex vitamins tablet Take 1 Tab by mouth daily.  latanoprost (XALATAN) 0.005 % ophthalmic solution Administer 1 Drop to both eyes nightly. No current facility-administered medications for this visit.        Allergies and Sensitivities:  Allergies   Allergen Reactions    Other Food Cough    Milk Containing Products Other (comments)     Mucous    Bactrim [Sulfamethoprim] Nausea Only and Other (comments)     Headache, Joint pain, loss of appetite     Mold Other (comments)    Pollen Extracts Other (comments)    Sulfamethoxazole-Trimethoprim Other (comments)       Family History:  Family History   Problem Relation Age of Onset    Cancer Mother         Stomach Cancer-     Heart Disease Mother         [email or web address]    Cancer Father         Stomach Cancer-     Asthma Father        Social History:  Social History     Tobacco Use    Smoking status: Former Smoker     Packs/day: 1.00     Years: 8.00     Pack years: 8.00     Types: Cigarettes     Quit date: 4/15/1970     Years since quittin.2    Smokeless tobacco: Never Used    Tobacco comment: 1-1.5 PPD x7-8 years; Quit    Substance Use Topics    Alcohol use: No     Alcohol/week: 0.0 standard drinks    Drug use: Never     She  reports that she quit smoking about 51 years ago. Her smoking use included cigarettes. She has a 8.00 pack-year smoking history. She has never used smokeless tobacco.  She  reports no history of alcohol use. Review of Systems:  Cardiac symptoms as noted above in HPI. All others negative. Physical Exam:  BP Readings from Last 3 Encounters:   06/15/21 (!) 143/65   04/15/21 133/68   03/07/21 (!) 115/55         Pulse Readings from Last 3 Encounters:   06/15/21 72   04/15/21 76   03/07/21 77          Wt Readings from Last 3 Encounters:   06/15/21 50.3 kg (111 lb)   04/15/21 54.4 kg (120 lb)   03/05/21 54.4 kg (120 lb)       Constitutional: Oriented to person, place, and time. HENT: Head: Normocephalic and atraumatic. Neck: No JVD present. Carotid bruit is not appreciated. Cardiovascular: Regular rhythm. No murmur, gallop or rubs appreciated  Lung: Breath sounds normal. No respiratory distress. No ronchi or rales appreciated  Abdominal: No tenderness. No rebound and no guarding. Musculoskeletal: There is no lower extremity edema.  No cynosis      Review of Data  LABS:   Lab Results   Component Value Date/Time    Sodium 138 04/12/2021 01:10 PM    Potassium 3.5 04/12/2021 01:10 PM    Chloride 104 04/12/2021 01:10 PM    CO2 30 04/12/2021 01:10 PM    Glucose 88 04/12/2021 01:10 PM    BUN 24 (H) 04/12/2021 01:10 PM    Creatinine 1.20 04/12/2021 01:10 PM     Lipids Latest Ref Rng & Units 4/29/2019 2/27/2018   Chol, Total <200 MG/ 181   HDL 40 - 60 MG/DL 95(H) 65   LDL 0 - 100 MG/DL 82.2 101(H)   Trig <150 MG/DL 64 76   Chol/HDL Ratio 0 - 5.0   2.0 -   Some recent data might be hidden     Lab Results   Component Value Date/Time    ALT (SGPT) 20 04/12/2021 01:10 PM     No results found for: HBA1C, DVW2XTPP, VPQ0EHZR, JNT3VUEL    EKG  (2018) LBBB, sinus rhythm    ECHO (04/2021)  Normal left heart chamber sizes and left ventricular wall thickness.     * Left ventricular systolic function is normal with an ejection fraction of 65 % by Bragg's biplane.     * The left ventricular diastolic function is indeterminate.     * Left ventricular segmental wall motion is normal.     * Abnormal interventricular septal motion.     * Mildly dilated right heart chambers with preserved ventricular systolic function.     * Sclerotic aortic valve without stenosis.     * Mild aortic, mitral, and tricuspid regurgitation.     * Normal inferior vena cava with >50% collapse upon inspiration. Right atrial pressure, 3 mmHg.     * Estimated pulmonary arterial systolic pressure is 35 mmHg.     * No mass, shunts, or thrombi. IMPRESSION & PLAN:  Ms. Grant Gaytan is a 66 y.o. female with multiple medical problems. Left bundle branch block:    Chronic and asymptomatic   Echo with normal EF in 2018     Abnormal echocardiographic finding:  Patient recently had echocardiogram which was ordered by pulmonary team.  I reviewed echocardiogram finding and discussed the finding with patient in detail. There was concern with possible abnormal septal motion. I discussed that her septal motion abnormality was likely because of the bundle branch block. She was reassured. Currently she does not have any fluid overload on exam.  Currently she denies any symptoms to suggest angina or heart failure. Recommend to continue with clinical observation. Thank you for allowing me to participate in patient care. Please feel free to call me if you have any question or concern. Wil Middleton MD  Please note: This document has been produced using voice recognition software. Unrecognized errors in transcription may be present.

## 2021-06-15 NOTE — LETTER
6/15/2021    Patient: Anahi Davenport   YOB: 1945   Date of Visit: 6/15/2021     Bolivar Solitario MD  [address] [place] [address]  [place] [employer]    Dear Bolivar Solitario MD,      Thank you for referring Ms. Terrell Carpenter to [facility] for evaluation. My notes for this consultation are attached. If you have questions, please do not hesitate to call me. I look forward to following your patient along with you.       Sincerely,    John Cantu MD

## 2021-06-17 ENCOUNTER — PATIENT OUTREACH (OUTPATIENT)
Dept: CASE MANAGEMENT | Age: 76
End: 2021-06-17

## 2021-06-17 NOTE — PROGRESS NOTES
Social Work Note  6/17/2021  Date of referral: 10/9/2020  Referral received from: LATHA Santiago Olaf  Reason for referral: Assist the patient with community resources to address mold issues in the home.       Previous SW Referral:  no   If yes, brief summary and outcome:  n/a     Support System: Family and friends     Community Providers: unknown     Transportation: unknown     Medication:unknown     Financial Concern: unknown     Advance Care Plan:  reviewed and current      Other: n/a     Identified Needs:      · The patient indicated that she has mold in the crawl space of the home.   · Need for  services or Pro-blossom services in order to add her sister to trust of the house.  (Resolved)     Social Work Plan:     · Locate community resources to assist with addressing the mold issues in the patient's home.   · Provide the patient with list of  and/or pro-blossom services in the community        MSW received referral to assist the patient with community resources to address the moisture issues in the crawl space of her home. The patient have since requested assistance of  or pro-blossom services in the community to add her sister to the trust of the house. The patient has been able to reach out to  and was provided with information needed.      MSW contacted the patient who informed that she was on her way out of the house and could not speak. She requested for MSW to call back on another day. MSW will follow to assist as needed.

## 2021-07-12 ENCOUNTER — PATIENT OUTREACH (OUTPATIENT)
Dept: CASE MANAGEMENT | Age: 76
End: 2021-07-12

## 2021-07-12 NOTE — PROGRESS NOTES
Social Work Note  2021    Date of referral: 10/9/2020  Referral received from: LATHA Cole  Reason for referral: Assist the patient with community resources to address mold issues in the home.       Previous SW Referral:  no   If yes, brief summary and outcome:  n/a     Support System: Family and friends     Community Providers: unknown     Transportation: unknown     Medication:unknown     Financial Concern: unknown     Advance Care Plan:  reviewed and current      Other: n/a     Identified Needs:      · The patient indicated that she has mold in the crawl space of the home.   · Need for  services or Pro-blossom services in order to add her sister to trust of the house.  (Resolved)     Social Work Plan:     · Locate community resources to assist with addressing the mold issues in the patient's home.   · Provide the patient with list of  and/or pro-blossom services in the community        MSW received referral to assist the patient with community resources to address the moisture issues in the crawl space of her home. The patient had since requested assistance of  or Pro-blossom services in the community to add her sister to the trust of the house. MSW contacted the patient who verified her name and  as identifiers. The patient is alert and oriented and active participant in conversation. MSW introduced self, role and reason for call. The patient shared that she have spoken to  and was informed that the services will be out of pocket cost. She also reached out to  and was informed that her income surpassed the bracket to receive free services. The patient verbalized understanding and stated that she is no longer seeking the service.      MSW informed the patient about the [address] [place] [address] assistance program. The patient reported that she have used their assistance in the past and they only provide a \"once in a lifetime assistance\", therefore she cannot utilize them again. MSW continues to research community resources to assist the patient with addressing the moisture issues in the crawl space of her home.

## 2021-07-15 DIAGNOSIS — D50.9 MICROCYTIC ANEMIA: ICD-10-CM

## 2021-07-15 DIAGNOSIS — M46.20 PARASPINAL ABSCESS (HCC): ICD-10-CM

## 2021-07-15 DIAGNOSIS — G25.81 RLS (RESTLESS LEGS SYNDROME): ICD-10-CM

## 2021-07-15 RX ORDER — FERROUS GLUCONATE 324(38)MG
324 TABLET ORAL DAILY
Qty: 90 TABLET | Refills: 1 | Status: SHIPPED | OUTPATIENT
Start: 2021-07-15 | End: 2021-12-15 | Stop reason: SDUPTHER

## 2021-07-15 RX ORDER — GABAPENTIN 100 MG/1
CAPSULE ORAL
Qty: 540 CAPSULE | Refills: 0 | Status: SHIPPED | OUTPATIENT
Start: 2021-07-15 | End: 2022-10-06 | Stop reason: ALTCHOICE

## 2021-07-15 NOTE — TELEPHONE ENCOUNTER
Requested Prescriptions     Pending Prescriptions Disp Refills    gabapentin (NEURONTIN) 100 mg capsule 540 Capsule 0     Sig: TAKE 2 CAPSULES BY MOUTH THREE TIMES DAILY    ferrous gluconate 324 mg (38 mg iron) tablet 90 Tablet 1     Sig: Take 1 Tablet by mouth daily.  TAKE 1 TABLET BY MOUTH EVERY MONDAY, WEDNESDAY, AND SUNDAY

## 2021-08-10 ENCOUNTER — HOSPITAL ENCOUNTER (OUTPATIENT)
Dept: INFUSION THERAPY | Age: 76
Discharge: HOME | End: 2021-08-10
Payer: MEDICARE

## 2021-08-10 VITALS
SYSTOLIC BLOOD PRESSURE: 120 MMHG | TEMPERATURE: 96.9 F | HEART RATE: 73 BPM | OXYGEN SATURATION: 96 % | DIASTOLIC BLOOD PRESSURE: 59 MMHG

## 2021-08-10 DIAGNOSIS — D72.819 LEUKOPENIA, UNSPECIFIED TYPE: ICD-10-CM

## 2021-08-10 DIAGNOSIS — D50.9 IRON DEFICIENCY ANEMIA, UNSPECIFIED IRON DEFICIENCY ANEMIA TYPE: ICD-10-CM

## 2021-08-10 LAB
ALBUMIN SERPL-MCNC: 3.9 G/DL (ref 3.4–5)
ALBUMIN/GLOB SERPL: 0.9 {RATIO} (ref 0.8–1.7)
ALP SERPL-CCNC: 112 U/L (ref 45–117)
ALT SERPL-CCNC: 27 U/L (ref 13–56)
ANION GAP SERPL CALC-SCNC: 5 MMOL/L (ref 3–18)
AST SERPL-CCNC: 21 U/L (ref 10–38)
BASOPHILS # BLD: 0 K/UL (ref 0–0.1)
BASOPHILS NFR BLD: 0 % (ref 0–2)
BILIRUB SERPL-MCNC: 0.4 MG/DL (ref 0.2–1)
BUN SERPL-MCNC: 33 MG/DL (ref 7–18)
BUN/CREAT SERPL: 35 (ref 12–20)
CALCIUM SERPL-MCNC: 9.6 MG/DL (ref 8.5–10.1)
CHLORIDE SERPL-SCNC: 105 MMOL/L (ref 100–111)
CO2 SERPL-SCNC: 30 MMOL/L (ref 21–32)
CREAT SERPL-MCNC: 0.94 MG/DL (ref 0.6–1.3)
DIFFERENTIAL METHOD BLD: ABNORMAL
EOSINOPHIL # BLD: 0.1 K/UL (ref 0–0.4)
EOSINOPHIL NFR BLD: 4 % (ref 0–5)
ERYTHROCYTE [DISTWIDTH] IN BLOOD BY AUTOMATED COUNT: 13.9 % (ref 11.6–14.5)
FERRITIN SERPL-MCNC: 333 NG/ML (ref 8–388)
GLOBULIN SER CALC-MCNC: 4.3 G/DL (ref 2–4)
GLUCOSE SERPL-MCNC: 103 MG/DL (ref 74–99)
HCT VFR BLD AUTO: 35.1 % (ref 35–45)
HGB BLD-MCNC: 11.5 G/DL (ref 12–16)
IRON SATN MFR SERPL: 24 % (ref 20–50)
IRON SERPL-MCNC: 76 UG/DL (ref 50–175)
LYMPHOCYTES # BLD: 0.8 K/UL (ref 0.9–3.6)
LYMPHOCYTES NFR BLD: 29 % (ref 21–52)
MCH RBC QN AUTO: 29.9 PG (ref 24–34)
MCHC RBC AUTO-ENTMCNC: 32.8 G/DL (ref 31–37)
MCV RBC AUTO: 91.4 FL (ref 74–97)
MONOCYTES # BLD: 0.4 K/UL (ref 0.05–1.2)
MONOCYTES NFR BLD: 13 % (ref 3–10)
NEUTS SEG # BLD: 1.6 K/UL (ref 1.8–8)
NEUTS SEG NFR BLD: 54 % (ref 40–73)
PLATELET # BLD AUTO: 133 K/UL (ref 135–420)
PMV BLD AUTO: 13 FL (ref 9.2–11.8)
POTASSIUM SERPL-SCNC: 4 MMOL/L (ref 3.5–5.5)
PROT SERPL-MCNC: 8.2 G/DL (ref 6.4–8.2)
RBC # BLD AUTO: 3.84 M/UL (ref 4.2–5.3)
SODIUM SERPL-SCNC: 140 MMOL/L (ref 136–145)
TIBC SERPL-MCNC: 322 UG/DL (ref 250–450)
WBC # BLD AUTO: 2.9 K/UL (ref 4.6–13.2)

## 2021-08-10 PROCEDURE — 80053 COMPREHEN METABOLIC PANEL: CPT

## 2021-08-10 PROCEDURE — 83540 ASSAY OF IRON: CPT

## 2021-08-10 PROCEDURE — 36415 COLL VENOUS BLD VENIPUNCTURE: CPT

## 2021-08-10 PROCEDURE — 85025 COMPLETE CBC W/AUTO DIFF WBC: CPT

## 2021-08-10 PROCEDURE — 82728 ASSAY OF FERRITIN: CPT

## 2021-08-16 DIAGNOSIS — J45.30 MILD PERSISTENT REACTIVE AIRWAY DISEASE WITHOUT COMPLICATION: ICD-10-CM

## 2021-08-16 DIAGNOSIS — J30.89 NON-SEASONAL ALLERGIC RHINITIS DUE TO OTHER ALLERGIC TRIGGER: ICD-10-CM

## 2021-08-16 RX ORDER — LORATADINE 10 MG/1
10 TABLET ORAL DAILY
Qty: 90 TABLET | Refills: 1 | Status: SHIPPED | OUTPATIENT
Start: 2021-08-16 | End: 2021-12-15 | Stop reason: SDUPTHER

## 2021-08-16 RX ORDER — MONTELUKAST SODIUM 10 MG/1
TABLET ORAL
Qty: 90 TABLET | Refills: 1 | Status: SHIPPED | OUTPATIENT
Start: 2021-08-16 | End: 2022-03-03 | Stop reason: SDUPTHER

## 2021-08-16 NOTE — TELEPHONE ENCOUNTER
Requested Prescriptions     Pending Prescriptions Disp Refills    loratadine (CLARITIN) 10 mg tablet 90 Tablet 1     Sig: Take 1 Tablet by mouth daily.     montelukast (SINGULAIR) 10 mg tablet 90 Tablet 1     Sig: TAKE 1 TABLET BY MOUTH DAILY

## 2021-08-17 ENCOUNTER — OFFICE VISIT (OUTPATIENT)
Dept: FAMILY MEDICINE CLINIC | Age: 76
End: 2021-08-17
Payer: MEDICARE

## 2021-08-17 VITALS
DIASTOLIC BLOOD PRESSURE: 60 MMHG | HEIGHT: 60 IN | OXYGEN SATURATION: 96 % | SYSTOLIC BLOOD PRESSURE: 136 MMHG | TEMPERATURE: 98 F | RESPIRATION RATE: 16 BRPM | WEIGHT: 117 LBS | HEART RATE: 68 BPM | BODY MASS INDEX: 22.97 KG/M2

## 2021-08-17 DIAGNOSIS — L89.322 PRESSURE INJURY OF LEFT BUTTOCK, STAGE 2 (HCC): ICD-10-CM

## 2021-08-17 DIAGNOSIS — Z00.00 MEDICARE ANNUAL WELLNESS VISIT, SUBSEQUENT: Primary | ICD-10-CM

## 2021-08-17 DIAGNOSIS — I87.2 VENOUS INSUFFICIENCY OF BOTH LOWER EXTREMITIES: ICD-10-CM

## 2021-08-17 DIAGNOSIS — B37.2 CANDIDAL INTERTRIGO: ICD-10-CM

## 2021-08-17 PROCEDURE — G8427 DOCREV CUR MEDS BY ELIG CLIN: HCPCS | Performed by: INTERNAL MEDICINE

## 2021-08-17 PROCEDURE — G8536 NO DOC ELDER MAL SCRN: HCPCS | Performed by: INTERNAL MEDICINE

## 2021-08-17 PROCEDURE — G8510 SCR DEP NEG, NO PLAN REQD: HCPCS | Performed by: INTERNAL MEDICINE

## 2021-08-17 PROCEDURE — 1090F PRES/ABSN URINE INCON ASSESS: CPT | Performed by: INTERNAL MEDICINE

## 2021-08-17 PROCEDURE — 99214 OFFICE O/P EST MOD 30 MIN: CPT | Performed by: INTERNAL MEDICINE

## 2021-08-17 PROCEDURE — G0439 PPPS, SUBSEQ VISIT: HCPCS | Performed by: INTERNAL MEDICINE

## 2021-08-17 PROCEDURE — G8399 PT W/DXA RESULTS DOCUMENT: HCPCS | Performed by: INTERNAL MEDICINE

## 2021-08-17 PROCEDURE — 1101F PT FALLS ASSESS-DOCD LE1/YR: CPT | Performed by: INTERNAL MEDICINE

## 2021-08-17 PROCEDURE — G8420 CALC BMI NORM PARAMETERS: HCPCS | Performed by: INTERNAL MEDICINE

## 2021-08-17 RX ORDER — NYSTATIN 100000 [USP'U]/G
POWDER TOPICAL
Qty: 60 G | Refills: 4 | Status: SHIPPED | OUTPATIENT
Start: 2021-08-17 | End: 2021-09-14 | Stop reason: SDUPTHER

## 2021-08-17 RX ORDER — KETOCONAZOLE 20 MG/G
CREAM TOPICAL DAILY
Qty: 60 G | Refills: 0 | Status: SHIPPED | OUTPATIENT
Start: 2021-08-17 | End: 2021-10-11 | Stop reason: SDUPTHER

## 2021-08-17 NOTE — PROGRESS NOTES
History of Present Illness  Vick Gosselin is a 64 y.o. female who presents today for management of    Chief Complaint   Patient presents with    Rash       Patient complains of a pink rash on her lower abdominal area. It is associated with itching. She has been applying OTC powder with no benefit. Patient complains of buttock pain. She reports that she sits all day, even when sleeping. Patient reports of redness on both legs. Denies pain. Problem List  Patient Active Problem List    Diagnosis Date Noted    Renal insufficiency 04/15/2021    Asthma 03/06/2021    Grade I diastolic dysfunction 42/11/8804    Aortic regurgitation 03/06/2021    History of supraventricular tachycardia 03/06/2021    Bronchiectasis ([address] 10/02/2020    Iron deficiency anemia 08/13/2020    LBBB (left bundle branch block) 10/19/2018    Functional urinary incontinence 02/27/2018    History of Clostridium difficile colitis 07/06/2016    RLS (restless legs syndrome) 04/04/2016    Back pain 03/31/2016       Current Medications  Current Outpatient Medications   Medication Sig    ketoconazole (NIZORAL) 2 % topical cream Apply  to affected area daily.  nystatin (MYCOSTATIN) powder Apply to rash 4 times a day    loratadine (CLARITIN) 10 mg tablet Take 1 Tablet by mouth daily.  montelukast (SINGULAIR) 10 mg tablet TAKE 1 TABLET BY MOUTH DAILY    gabapentin (NEURONTIN) 100 mg capsule TAKE 2 CAPSULES BY MOUTH THREE TIMES DAILY    ferrous gluconate 324 mg (38 mg iron) tablet Take 1 Tablet by mouth daily. TAKE 1 TABLET BY MOUTH EVERY MONDAY, WEDNESDAY, AND SUNDAY    rOPINIRole (REQUIP) 0.5 mg tablet TAKE 1 TABLET AT BEDTIME    Spiriva Respimat 1.25 mcg/actuation inhaler INHALE 2 PUFFS BY MOUTH DAILY    ipratropium (ATROVENT) 42 mcg (0.06 %) nasal spray USE 2 SPRAYS IN EACH NOSTRIL THREE TIMES DAILY    guaiFENesin (ROBITUSSIN) 100 mg/5 mL liquid Take 10 mL by mouth four (4) times daily as needed for Cough.     albuterol (PROVENTIL HFA, VENTOLIN HFA, PROAIR HFA) 90 mcg/actuation inhaler Take 2 Puffs by inhalation every four (4) hours as needed for Cough.  Lactobac no.41/Bifidobact no.7 (PROBIOTIC-10 PO) Take 1 Tab by mouth daily.  multivitamin (ONE A DAY) tablet Take 1 Tab by mouth daily.  calcium-cholecalciferol, d3, (CALCIUM 600 + D) 600-125 mg-unit tab Take 2 Tabs by mouth.  cholecalciferol (VITAMIN D3) (5000 Units/125 mcg) tab tablet Take  by mouth daily.  b complex vitamins tablet Take 1 Tab by mouth daily.  latanoprost (XALATAN) 0.005 % ophthalmic solution Administer 1 Drop to both eyes nightly. No current facility-administered medications for this visit. Allergies/Drug Reactions  Allergies   Allergen Reactions    Other Food Cough    Milk Containing Products Other (comments)     Mucous    Bactrim [Sulfamethoprim] Nausea Only and Other (comments)     Headache, Joint pain, loss of appetite     Lonhala Magnair Starter [Glycopyrrol-Nebulizer-Accessor] Other (comments)     Blisters on the legs    Mold Other (comments)    Pollen Extracts Other (comments)    Sulfamethoxazole-Trimethoprim Other (comments)        Review of Systems  Review of Systems   Constitutional: Negative. Respiratory: Positive for cough and sputum production. Cardiovascular: Negative. Gastrointestinal: Negative. Musculoskeletal: Positive for back pain. Skin: Positive for rash. Neurological: Negative. Psychiatric/Behavioral: Negative.          Physical Exam  Vital signs:   Vitals:    08/17/21 1230   BP: 136/60   Pulse: 68   Resp: 16   Temp: 98 °F (36.7 °C)   TempSrc: Temporal   SpO2: 96%   Weight: 117 lb (53.1 kg)   Height: 5' (1.524 m)       General: alert, oriented, not in distress  Head: scalp normal, atraumatic  Eyes: pupils are equal and reactive, full and intact EOM's  Neck: supple, no JVD, no lymphadenopathy, non-palpable thyroid  Chest/Lungs: clear breath sounds, no wheezing or crackles  Heart: normal rate, regular rhythm, no murmur  Abdomen: soft, non-distended, non-tender, normal bowel sounds, no organomegaly, no masses  Extremities: no focal deformities, pitting bipedal edema with mild erythema, no calf tenderness  Skin: pink patch on the lower abdominal fold, open pink ulcer on the left buttock    Laboratory/Tests:  Lab Results   Component Value Date/Time    WBC 2.9 (L) 08/10/2021 01:15 PM    HGB 11.5 (L) 08/10/2021 01:15 PM    HCT 35.1 08/10/2021 01:15 PM    PLATELET 759 (L) 68/92/4167 01:15 PM    MCV 91.4 08/10/2021 01:15 PM     Lab Results   Component Value Date/Time    Cholesterol, total 190 04/29/2019 03:38 PM    HDL Cholesterol 95 (H) 04/29/2019 03:38 PM    LDL, calculated 82.2 04/29/2019 03:38 PM    Triglyceride 64 04/29/2019 03:38 PM    CHOL/HDL Ratio 2.0 04/29/2019 03:38 PM     Lab Results   Component Value Date/Time    TSH 1.37 08/17/2020 01:13 PM      Lab Results   Component Value Date/Time    Sodium 140 08/10/2021 01:15 PM    Potassium 4.0 08/10/2021 01:15 PM    Chloride 105 08/10/2021 01:15 PM    CO2 30 08/10/2021 01:15 PM    Anion gap 5 08/10/2021 01:15 PM    Glucose 103 (H) 08/10/2021 01:15 PM    BUN 33 (H) 08/10/2021 01:15 PM    Creatinine 0.94 08/10/2021 01:15 PM    BUN/Creatinine ratio 35 (H) 08/10/2021 01:15 PM    GFR est AA >60 08/10/2021 01:15 PM    GFR est non-AA 58 (L) 08/10/2021 01:15 PM    Calcium 9.6 08/10/2021 01:15 PM         Assessment/Plan:    1. Candidal intertrigo  - lower abdominal fold  - ketoconazole (NIZORAL) 2 % topical cream; Apply  to affected area daily. Dispense: 60 g; Refill: 0  - nystatin (MYCOSTATIN) powder; Apply to rash 4 times a day  Dispense: 60 g; Refill: 4    2. Venous insufficiency of both lower extremities  - leg elevation, compression socks    3.  Pressure injury of left buttock, stage 2 (HCC)  - no evidence of infection  - advised to avoid prolonged sitting      Follow-up and Dispositions    · Return in about 6 months (around 2/17/2022), or as needed, for ROV.           I have discussed the diagnosis with the patient and the intended plan as seen in the above orders. The patient has received an after-visit summary and questions were answered concerning future plans. I have discussed medication side effects and warnings with the patient as well. I have reviewed the plan of care with the patient, accepted their input and they are in agreement with the treatment goals. Surinder Garcia MD  August 17, 2021       This is the Subsequent Medicare Annual Wellness Exam, performed 12 months or more after the Initial AWV or the last Subsequent AWV    I have reviewed the patient's medical history in detail and updated the computerized patient record. Assessment/Plan   Education and counseling provided:  Are appropriate based on today's review and evaluation    1. Medicare annual wellness visit, subsequent  2. Candidal intertrigo  -     ketoconazole (NIZORAL) 2 % topical cream; Apply  to affected area daily. , Normal, Disp-60 g, R-0  -     nystatin (MYCOSTATIN) powder; Apply to rash 4 times a day, Normal, Disp-60 g, R-4  3. Venous insufficiency of both lower extremities  4. Pressure injury of left buttock, stage 2 (HCC)       Depression Risk Factor Screening     3 most recent PHQ Screens 8/17/2021   Little interest or pleasure in doing things Not at all   Feeling down, depressed, irritable, or hopeless Not at all   Total Score PHQ 2 0       Alcohol Risk Screen    Do you average more than 1 drink per night or more than 7 drinks a week:  No    On any one occasion in the past three months have you have had more than 3 drinks containing alcohol:  No        Functional Ability and Level of Safety    Hearing: Hearing is good. Activities of Daily Living: The home contains: no safety equipment. Patient needs help with:  transportation      Ambulation: with difficulty, uses a walker     Fall Risk:  Fall Risk Assessment, last 12 mths 8/17/2021   Able to walk?  Yes Fall in past 12 months? 0   Do you feel unsteady? 1   Are you worried about falling 1   Is TUG test greater than 12 seconds?  0   Is the gait abnormal? 0      Abuse Screen:  Patient is not abused       Cognitive Screening    Has your family/caregiver stated any concerns about your memory: no     Cognitive Screening: Normal - Verbal Fluency Test    Health Maintenance Due     Health Maintenance Due   Topic Date Due    Hepatitis C Screening  Never done    COVID-19 Vaccine (1) Never done    DTaP/Tdap/Td series (1 - Tdap) Never done    Shingrix Vaccine Age 50> (1 of 2) Never done       Patient Care Team   Patient Care Team:  Africa Kearns MD as PCP - General (Internal Medicine)  Africa Kearns MD as PCP - [facility]  Chaya Torres MD as Physician (Urology)  Lucía Ventura MD as Physician (Obstetrics & Gynecology)  Jerry Osman, NP as Nurse Practitioner (Nurse Practitioner)  Etha Habermann, DPM as Physician (Podiatry)  Maira Juarez MD as Physician (Neurosurgery)  Mukesh Pena MD as Physician (Infectious Disease)  Juventino Shine MD as Physician (Vascular Surgery)  aRcheal Barry as   Molly Salcedo MD (Pulmonary Disease)  Shanna Melendez MD (Cardiology)    History     Patient Active Problem List   Diagnosis Code    Back pain M54.9    RLS (restless legs syndrome) G25.81    Functional urinary incontinence R39.81    LBBB (left bundle branch block) I44.7    Iron deficiency anemia D50.9    [address]chiect[address] ([facility] J47.9    Asthma J45.909    Grade I diastolic dysfunction M26.5    Aortic regurgitation I35.1    History of supraventricular tachycardia Z86.79    History of Clostridium difficile colitis Z86.19    Renal insufficiency N28.9     Past Medical History:   Diagnosis Date    Abnormality of gait and mobility     Ambulatory with use of Wheeled Walker Outside the home (as of 3/05/2021)    Aortic regurgitation 06/25/2018    Mild to Moderate by TTE on 6/25/2018    Arthritis     in [facility]    Asthma Sept. 2018    Asthma Dr. Raul Boo; Questionably Cough-Variant Asthma    Blindness of left eye     2/2 Blood Clot in Left Eye    CAP (community acquired pneumonia) 3/11/2016    Colon polyps 1-22-20    From colonoscopy     Compression fracture of L5 vertebra ([address]     DVT (deep venous thrombosis) (HCC)     Provoked (Immobility, Spinal Infection) RLE DVT S/P IVC Filter (2016)    Environmental allergies     Female genital prolapse     Former smoker     1-1.5 PPD x6 years; Quit 1970    Glaucoma 2017    UaYfpkoggvXnqzrezcfnfmv[id number]    Grade I diastolic dysfunction 44/35/4038    by TTE on 6/25/2018    Hemoptysis 3/5/2021    History of Clostridium difficile colitis 07/06/2016    History of transfusion 03/2016    Transfused x4 units (ostensibly of) PRBCs over March of 2016    Hypertension October 2018    Dr. 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 patient ambulatory Summer 2018    SVT (supraventricular tachycardia) ([address]     Umbilical hernia 77/34/0167    Supraumbilical Ventral Hernia    Urine retention     Voiding Dysfunction with Hypocontractile Bladder    Uterus prolapse     grade 5.   Surgically corrected 7/2018    Vaginal candida 03/16/2016      Past Surgical History:   Procedure Laterality Date    COLONOSCOPY N/A 1/22/2020    COLONOSCOPY performed by Parker Carter MD at [address] [place] [address] CURETTAGE      x 4 after having miscarriages.  HX GYN  07/2018    CYSTOURETHROSCOPY; POSTERIOR COLPORRHAPHY, CYSTOCELE REPAIR; COLPOPEXY VAGINAL INTRA-PERITONEAL APPROACH;    HX TONSILLECTOMY      HX VITRECTOMY Left 2017    x2 (4/20/2017, 7/20/2017)    VASCULAR SURGERY PROCEDURE UNLIST  march 2016    IVC filter. Current Outpatient Medications   Medication Sig Dispense Refill    ketoconazole (NIZORAL) 2 % topical cream Apply  to affected area daily. 60 g 0    nystatin (MYCOSTATIN) powder Apply to rash 4 times a day 60 g 4    loratadine (CLARITIN) 10 mg tablet Take 1 Tablet by mouth daily. 90 Tablet 1    montelukast (SINGULAIR) 10 mg tablet TAKE 1 TABLET BY MOUTH DAILY 90 Tablet 1    gabapentin (NEURONTIN) 100 mg capsule TAKE 2 CAPSULES BY MOUTH THREE TIMES DAILY 540 Capsule 0    ferrous gluconate 324 mg (38 mg iron) tablet Take 1 Tablet by mouth daily. TAKE 1 TABLET BY MOUTH EVERY MONDAY, WEDNESDAY, AND SUNDAY 90 Tablet 1    rOPINIRole (REQUIP) 0.5 mg tablet TAKE 1 TABLET AT BEDTIME 90 Tablet 1    Spiriva Respimat 1.25 mcg/actuation inhaler INHALE 2 PUFFS BY MOUTH DAILY      ipratropium (ATROVENT) 42 mcg (0.06 %) nasal spray USE 2 SPRAYS IN EACH NOSTRIL THREE TIMES DAILY      guaiFENesin (ROBITUSSIN) 100 mg/5 mL liquid Take 10 mL by mouth four (4) times daily as needed for Cough. 473 mL 2    albuterol (PROVENTIL HFA, VENTOLIN HFA, PROAIR HFA) 90 mcg/actuation inhaler Take 2 Puffs by inhalation every four (4) hours as needed for Cough. 1 Inhaler 5    Lactobac no.41/Bifidobact no.7 (PROBIOTIC-10 PO) Take 1 Tab by mouth daily.  multivitamin (ONE A DAY) tablet Take 1 Tab by mouth daily.  calcium-cholecalciferol, d3, (CALCIUM 600 + D) 600-125 mg-unit tab Take 2 Tabs by mouth.  cholecalciferol (VITAMIN D3) (5000 Units/125 mcg) tab tablet Take  by mouth daily.  b complex vitamins tablet Take 1 Tab by mouth daily.       latanoprost (XALATAN) 0.005 % ophthalmic solution Administer 1 Drop to both eyes nightly.        Allergies   Allergen Reactions    Other Food Cough    Milk Containing Products Other (comments)     Mucous    Bactrim [Sulfamethoprim] Nausea Only and Other (comments)     Headache, Joint pain, loss of appetite     Lonhala Magnair Starter [Glycopyrrol-Nebulizer-Accessor] Other (comments)     Blisters on the legs    Mold Other (comments)    Pollen Extracts Other (comments)    Sulfamethoxazole-Trimethoprim Other (comments)       Family History   Problem Relation Age of Onset    Cancer Mother         Stomach Cancer-     Heart Disease Mother         [email or web address]    Cancer Father         Stomach Cancer-     Asthma Father      Social History     Tobacco Use    Smoking status: Former Smoker     Packs/day: 1.00     Years: 8.00     Pack years: 8.00     Types: Cigarettes     Quit date: 4/15/1970     Years since quittin.3    Smokeless tobacco: Never Used    Tobacco comment: 1-1.5 PPD x7-8 years; Quit    Substance Use Topics    Alcohol use: No     Alcohol/week: 0.0 standard drinks         Clarissa Leung MD

## 2021-08-17 NOTE — PATIENT INSTRUCTIONS

## 2021-08-19 ENCOUNTER — VIRTUAL VISIT (OUTPATIENT)
Age: 76
End: 2021-08-19
Payer: MEDICARE

## 2021-08-19 DIAGNOSIS — D50.9 IRON DEFICIENCY ANEMIA, UNSPECIFIED IRON DEFICIENCY ANEMIA TYPE: Primary | ICD-10-CM

## 2021-08-19 DIAGNOSIS — D72.819 LEUKOPENIA, UNSPECIFIED TYPE: ICD-10-CM

## 2021-08-19 DIAGNOSIS — D72.810 LYMPHOPENIA: ICD-10-CM

## 2021-08-19 PROCEDURE — 99442 PR PHYS/QHP TELEPHONE EVALUATION 11-20 MIN: CPT | Performed by: INTERNAL MEDICINE

## 2021-08-19 NOTE — PROGRESS NOTES
Bossman Gonsalves is a 64 y.o. female, evaluated via audio-only technology on 8/19/2021. PCP: Rosalba Bishop MD    Assessment & Plan:   1. Leukopenia, unspecified type  Patient with chronic leukopenia with no repeated infections. Labs on 8/10/2021 showed WBC 2.9, H&H 11.5/35.1, platelet 599. ANC 1.6, ALC 0.8. Ferritin 333, transferrin saturation 24%. BUN 33, creatinine 0.94.  *Patient has no repeated infection as aforementioned. Continue follow-up. *Following with Hudson Lopez for bronchiactasis  *I discussed with patient regarding appointment sharp. She says she has been taking Crestor skeptical side effects. I explained to her about the benefits outweighs the risks especially for somebody like her with bronchiectasis. I told her that having the virus in her case would be deadly. . I also told her to take aspirin and/or  ibuprofen before and after the shots. She expressed understanding and will go get vaccinated she says. 2. Iron deficiency anemia, unspecified iron deficiency anemia type  Her iron stores are adequate and H&H stable. Continue follow-up. --Follow up in 4 months with repeat labs or sooner if indicated      Subjective:   Ms Bossman Gonsalves is a 64 y.o. female who was evaluated via audio-only technology for Benign Leukopenia and Iron Deficiency Anemia. Today she denies fevers and rash. She denies fatigue, shortness of breath, and chest pains. She denies pain or discomfort. She does not have any concerns or complaints to report at this time. Struggling with Bronchiectasis. Has cough at night. All other point review of system have been reviewed and were negative. ECOG performance status 0. Independent with ADLs and IADLs. Prior to Admission medications    Medication Sig Start Date End Date Taking? Authorizing Provider   loratadine (CLARITIN) 10 mg tablet Take 1 Tablet by mouth daily.  8/16/21   Irene Bell MD   montelukast (SINGULAIR) 10 mg tablet TAKE 1 TABLET BY MOUTH DAILY 8/16/21   Moses Bell MD   ketoconazole (NIZORAL) 2 % topical cream Apply  to affected area daily. 8/17/21   Moses Bell MD   nystatin (MYCOSTATIN) powder Apply to rash 4 times a day 8/17/21   Mike Griffith MD   gabapentin (NEURONTIN) 100 mg capsule TAKE 2 CAPSULES BY MOUTH THREE TIMES DAILY 7/15/21   Moses Bell MD   ferrous gluconate 324 mg (38 mg iron) tablet Take 1 Tablet by mouth daily. TAKE 1 TABLET BY MOUTH EVERY MONDAY, WEDNESDAY, AND KINDRA 7/15/21   Moses Bell MD   rOPINIRole (REQUIP) 0.5 mg tablet TAKE 1 TABLET AT BEDTIME 6/2/21   Moses Bell MD   Spiriva Respimat 1.25 mcg/actuation inhaler INHALE 2 PUFFS BY MOUTH DAILY 1/25/21   Provider, Historical   ipratropium (ATROVENT) 42 mcg (0.06 %) nasal spray USE 2 SPRAYS IN EACH NOSTRIL THREE TIMES DAILY 3/25/21   Provider, Historical   guaiFENesin (ROBITUSSIN) 100 mg/5 mL liquid Take 10 mL by mouth four (4) times daily as needed for Cough. 4/15/21   Moses Bell MD   albuterol (PROVENTIL HFA, VENTOLIN HFA, PROAIR HFA) 90 mcg/actuation inhaler Take 2 Puffs by inhalation every four (4) hours as needed for Cough. 2/5/21   Moses Bell MD   rOPINIRole (REQUIP) 0.5 mg tablet TAKE 1 TABLET BY MOUTH EVERY NIGHT 1/20/21   Moses Bell MD   Lactobac no.41/Bifidobact no.7 (PROBIOTIC-10 PO) Take 1 Tab by mouth daily. Provider, Historical   multivitamin (ONE A DAY) tablet Take 1 Tab by mouth daily. Provider, Historical   calcium-cholecalciferol, d3, (CALCIUM 600 + D) 600-125 mg-unit tab Take 2 Tabs by mouth. Provider, Historical   cholecalciferol (VITAMIN D3) (5000 Units/125 mcg) tab tablet Take  by mouth daily. Provider, Historical   b complex vitamins tablet Take 1 Tab by mouth daily. Provider, Historical   latanoprost (XALATAN) 0.005 % ophthalmic solution Administer 1 Drop to both eyes nightly.     Provider, Historical       Review of Systems   All other systems reviewed and are negative. Patient-Reported Vitals 4/19/2021   Patient-Reported Weight 120lbs   Patient-Reported Height -   Patient-Reported Pulse -   Patient-Reported Temperature -   Patient-Reported SpO2 -   Patient-Reported Systolic  772   Patient-Reported Diastolic 68      LABS:  Lab Results   Component Value Date/Time    WBC 2.9 (L) 08/10/2021 01:15 PM    HGB 11.5 (L) 08/10/2021 01:15 PM    HCT 35.1 08/10/2021 01:15 PM    PLATELET 481 (L) 38/97/3464 01:15 PM    MCV 91.4 08/10/2021 01:15 PM     Lab Results   Component Value Date/Time    Sodium 140 08/10/2021 01:15 PM    Potassium 4.0 08/10/2021 01:15 PM    Chloride 105 08/10/2021 01:15 PM    CO2 30 08/10/2021 01:15 PM    Anion gap 5 08/10/2021 01:15 PM    Glucose 103 (H) 08/10/2021 01:15 PM    BUN 33 (H) 08/10/2021 01:15 PM    Creatinine 0.94 08/10/2021 01:15 PM    BUN/Creatinine ratio 35 (H) 08/10/2021 01:15 PM    GFR est AA >60 08/10/2021 01:15 PM    GFR est non-AA 58 (L) 08/10/2021 01:15 PM    Calcium 9.6 08/10/2021 01:15 PM    Bilirubin, total 0.4 08/10/2021 01:15 PM    Alk. phosphatase 112 08/10/2021 01:15 PM    Protein, total 8.2 08/10/2021 01:15 PM    Albumin 3.9 08/10/2021 01:15 PM    Globulin 4.3 (H) 08/10/2021 01:15 PM    A-G Ratio 0.9 08/10/2021 01:15 PM    ALT (SGPT) 27 08/10/2021 01:15 PM    AST (SGOT) 21 08/10/2021 01:15 PM     Lab Results   Component Value Date/Time    Iron 76 08/10/2021 01:15 PM    TIBC 322 08/10/2021 01:15 PM    Iron % saturation 24 08/10/2021 01:15 PM    Ferritin 333 08/10/2021 01:15 PM         Vick Gosselin, who was evaluated through a patient-initiated, synchronous (real-time) audio only encounter, and/or her healthcare decision maker, is aware that it is a billable service, with coverage as determined by her insurance carrier. She provided verbal consent to proceed: Yes. She has not had a related appointment within my department in the past 7 days or scheduled within the next 24 hours.       Total Time: minutes: 11-20 minutes      No orders of the defined types were placed in this encounter.       Follow up in 4 months or sooner if indicated      Harry Loya MD  08/19/2120

## 2021-09-13 ENCOUNTER — PATIENT MESSAGE (OUTPATIENT)
Dept: FAMILY MEDICINE CLINIC | Age: 76
End: 2021-09-13

## 2021-09-13 DIAGNOSIS — B37.2 CANDIDAL INTERTRIGO: ICD-10-CM

## 2021-09-14 RX ORDER — NYSTATIN 100000 [USP'U]/G
POWDER TOPICAL
Qty: 180 G | Refills: 2 | Status: SHIPPED | OUTPATIENT
Start: 2021-09-14 | End: 2021-12-15 | Stop reason: SDUPTHER

## 2021-10-20 DIAGNOSIS — G25.81 RLS (RESTLESS LEGS SYNDROME): ICD-10-CM

## 2021-10-21 RX ORDER — ROPINIROLE 0.5 MG/1
TABLET, FILM COATED ORAL
Qty: 90 TABLET | Refills: 1 | Status: SHIPPED | OUTPATIENT
Start: 2021-10-21 | End: 2022-03-24 | Stop reason: SDUPTHER

## 2021-11-17 ENCOUNTER — PATIENT MESSAGE (OUTPATIENT)
Dept: FAMILY MEDICINE CLINIC | Age: 76
End: 2021-11-17

## 2021-12-02 ENCOUNTER — PATIENT OUTREACH (OUTPATIENT)
Dept: CASE MANAGEMENT | Age: 76
End: 2021-12-02

## 2021-12-02 NOTE — PROGRESS NOTES
Social Work Note  2021    Date of referral: 10/9/2020  Referral received from: LATHA Villa  Reason for referral: Assist the patient with community resources to address mold issues in the home.       Previous SW Referral:  no   If yes, brief summary and outcome:  n/a     Support System: Family and friends     Community Providers: unknown     Transportation: unknown     Medication:unknown     Financial Concern: unknown     Advance Care Plan:  reviewed and current      Other: n/a     Identified Needs:      · The patient indicated that she has mold in the crawl space of the home.   · Need for  services or Pro-blossom services in order to add her sister to trust of the house.  (Resolved)     Social Work Plan:     · Locate community resources to assist with addressing the mold issues in the patient's home.   · Provide the patient with list of  and/or pro-blossom services in the community (Completed)        MSW received referral to assist the patient with community resources to address the moisture issues in the crawl space of her home. The patient had since requested assistance of  or Pro-blossom services in the community to add her sister to the trust of the house.     MSW contacted the patient who verified her name and  as identifiers. The patient is alert and oriented and active participant in conversation. MSW introduced self, role and reason for call. The patient reported that she had been in and out of the ED recently due to her coughing up blood. She informed MSW that this has since been resolved. MSW continues to research solution to the moisture issues in her crawlspace. MSW have attempted to contact local representatives from the Department of [employer] and have left messages with call back information. The patient has also attempted to contact and left her call back information.    Calls have been made to local [employer] [employer] who shared that they did not have any special programs available to assist residents. The patient indicated that she is unable to pay out of pocket. MSW informed her of the [employer], wheatherization assistance program, however the patient had already utilized their assistance in the past and was not eligible for any additional assistance. The patient does not qualify for some programs available in the community due to her income. MSW will follow to assist the patient as needed.

## 2021-12-15 ENCOUNTER — PATIENT MESSAGE (OUTPATIENT)
Dept: FAMILY MEDICINE CLINIC | Age: 76
End: 2021-12-15

## 2021-12-15 DIAGNOSIS — B37.2 CANDIDAL INTERTRIGO: ICD-10-CM

## 2021-12-15 DIAGNOSIS — D50.9 MICROCYTIC ANEMIA: ICD-10-CM

## 2021-12-15 DIAGNOSIS — J30.89 NON-SEASONAL ALLERGIC RHINITIS DUE TO OTHER ALLERGIC TRIGGER: ICD-10-CM

## 2021-12-15 RX ORDER — KETOCONAZOLE 20 MG/G
CREAM TOPICAL DAILY
Qty: 60 G | Refills: 3 | Status: SHIPPED | OUTPATIENT
Start: 2021-12-15 | End: 2022-03-24

## 2021-12-15 RX ORDER — FERROUS GLUCONATE 324(38)MG
324 TABLET ORAL DAILY
Qty: 90 TABLET | Refills: 1 | Status: SHIPPED | OUTPATIENT
Start: 2021-12-15 | End: 2022-03-03 | Stop reason: SDUPTHER

## 2021-12-15 RX ORDER — LORATADINE 10 MG/1
10 TABLET ORAL DAILY
Qty: 90 TABLET | Refills: 1 | Status: SHIPPED | OUTPATIENT
Start: 2021-12-15 | End: 2022-06-15 | Stop reason: SDUPTHER

## 2021-12-15 RX ORDER — NYSTATIN 100000 [USP'U]/G
POWDER TOPICAL
Qty: 180 G | Refills: 2 | Status: SHIPPED | OUTPATIENT
Start: 2021-12-15 | End: 2022-03-03 | Stop reason: SDUPTHER

## 2021-12-15 NOTE — TELEPHONE ENCOUNTER
From: Kim Look  To: Maureen Mcneal MD  Sent: 12/15/2021 12:08 AM EST  Subject: Prescription Refills    Dear \"B\",  Hope you are well. I have a question. My leg is no longer swollen. Do I still need to wear compression socks? My legs feel good when I wear them. I have been wearing the compression socks for @ l6 hrs. daily. Then socks with copper for the remainder of the day. I believe copper is also supposed to help with circulation. Is it safe to be wearing socks that help with circulation   every day all day? Also, can you send refill prescriptions to [facility] [phone] for the following. I'd like them on file at the pharmacy, should I need them before I see your replacement. Ferrous Gluc 324mg. Tabs. Qty. 90. Loratadine 10mg. Tabs. Qty. 90 this one I need now. [place] Stop Top. Powder 100,000 30GM Qty.180. Ketoconazole 2% cream 60GM Qty. 61.  Again, I wish you the best at your new location. Have a Blessed [place] and Healthy New Year!

## 2021-12-20 ENCOUNTER — HOSPITAL ENCOUNTER (OUTPATIENT)
Dept: INFUSION THERAPY | Age: 76
Discharge: HOME | End: 2021-12-20
Payer: MEDICARE

## 2021-12-20 DIAGNOSIS — D72.819 LEUKOPENIA, UNSPECIFIED TYPE: ICD-10-CM

## 2021-12-20 DIAGNOSIS — D50.9 IRON DEFICIENCY ANEMIA, UNSPECIFIED IRON DEFICIENCY ANEMIA TYPE: ICD-10-CM

## 2021-12-20 LAB
ALBUMIN SERPL-MCNC: 3.8 G/DL (ref 3.4–5)
ALBUMIN/GLOB SERPL: 0.8 {RATIO} (ref 0.8–1.7)
ALP SERPL-CCNC: 101 U/L (ref 45–117)
ALT SERPL-CCNC: 23 U/L (ref 13–56)
ANION GAP SERPL CALC-SCNC: 4 MMOL/L (ref 3–18)
AST SERPL-CCNC: 20 U/L (ref 10–38)
BASO+EOS+MONOS # BLD AUTO: 0.5 K/UL (ref 0–2.3)
BASO+EOS+MONOS NFR BLD AUTO: 10 % (ref 0.1–17)
BILIRUB SERPL-MCNC: 0.3 MG/DL (ref 0.2–1)
BUN SERPL-MCNC: 26 MG/DL (ref 7–18)
BUN/CREAT SERPL: 22 (ref 12–20)
CALCIUM SERPL-MCNC: 9.7 MG/DL (ref 8.5–10.1)
CHLORIDE SERPL-SCNC: 102 MMOL/L (ref 100–111)
CO2 SERPL-SCNC: 31 MMOL/L (ref 21–32)
CREAT SERPL-MCNC: 1.19 MG/DL (ref 0.6–1.3)
DIFFERENTIAL METHOD BLD: ABNORMAL
ERYTHROCYTE [DISTWIDTH] IN BLOOD BY AUTOMATED COUNT: 13.3 % (ref 11.5–14.5)
FERRITIN SERPL-MCNC: 489 NG/ML (ref 8–388)
GLOBULIN SER CALC-MCNC: 4.5 G/DL (ref 2–4)
GLUCOSE SERPL-MCNC: 116 MG/DL (ref 74–99)
HCT VFR BLD AUTO: 36.7 % (ref 36–48)
HGB BLD-MCNC: 11.6 G/DL (ref 12–16)
IRON SATN MFR SERPL: 15 % (ref 20–50)
IRON SERPL-MCNC: 45 UG/DL (ref 50–175)
LYMPHOCYTES # BLD: 0.8 K/UL (ref 1.1–5.9)
LYMPHOCYTES NFR BLD: 16 % (ref 14–44)
MCH RBC QN AUTO: 31.1 PG (ref 25–35)
MCHC RBC AUTO-ENTMCNC: 31.6 G/DL (ref 31–37)
MCV RBC AUTO: 98.4 FL (ref 78–102)
NEUTS SEG # BLD: 3.9 K/UL (ref 1.8–9.5)
NEUTS SEG NFR BLD: 74 % (ref 40–70)
PLATELET # BLD AUTO: 176 K/UL (ref 140–440)
POTASSIUM SERPL-SCNC: 4 MMOL/L (ref 3.5–5.5)
PROT SERPL-MCNC: 8.3 G/DL (ref 6.4–8.2)
RBC # BLD AUTO: 3.73 M/UL (ref 4.1–5.1)
SODIUM SERPL-SCNC: 137 MMOL/L (ref 136–145)
TIBC SERPL-MCNC: 293 UG/DL (ref 250–450)
WBC # BLD AUTO: 5.2 K/UL (ref 4.5–13)

## 2021-12-20 PROCEDURE — 82728 ASSAY OF FERRITIN: CPT

## 2021-12-20 PROCEDURE — 80053 COMPREHEN METABOLIC PANEL: CPT

## 2021-12-20 PROCEDURE — 36415 COLL VENOUS BLD VENIPUNCTURE: CPT

## 2021-12-20 PROCEDURE — 85025 COMPLETE CBC W/AUTO DIFF WBC: CPT

## 2021-12-20 PROCEDURE — 83540 ASSAY OF IRON: CPT

## 2021-12-20 NOTE — PROGRESS NOTES
[facility] Progress Note    Date: 2021    Name: Sera Dorado    MRN: [id number]         : 1945    Peripheral Lab Draw    Recent Results (from the past 12 hour(s))   CBC WITH 3 PART DIFF    Collection Time: 21  1:08 PM   Result Value Ref Range    WBC 5.2 4.5 - 13.0 K/uL    RBC 3.73 (L) 4.10 - 5.10 M/uL    HGB 11.6 (L) 12.0 - 16.0 g/dL    HCT 36.7 36 - 48 %    MCV 98.4 78 - 102 FL    MCH 31.1 25.0 - 35.0 PG    MCHC 31.6 31 - 37 g/dL    RDW 13.3 11.5 - 14.5 %    PLATELET 795 023 - 734 K/uL    NEUTROPHILS 74 (H) 40 - 70 %    MIXED CELLS 10 0.1 - 17 %    LYMPHOCYTES 16 14 - 44 %    ABS. NEUTROPHILS 3.9 1.8 - 9.5 K/UL    ABS. MIXED CELLS 0.5 0.0 - 2.3 K/uL    ABS. LYMPHOCYTES 0.8 (L) 1.1 - 5.9 K/UL    DF AUTOMATED         Ms. Patterson to [place], ambulatory at 1300 accompanied by self. Pt was assessed and education was provided. Blood obtained peripherally from left arm x 1 attempt with butterfly needle and sent to lab for Cbc w/diff, Cmp, Iron Profile and Ferritin per written orders. No bleeding or hematoma noted at site. Gauze and coban applied. Ms. Hemant Gibbons tolerated the phlebotomy, and had no complaints. Patient armband removed and shredded. Ms. Hemant Gibbons was discharged from [place] [id number] in stable condition at 1310.      Britney Massey Phlebotomist PCT  2021  1:18 PM

## 2021-12-27 ENCOUNTER — VIRTUAL VISIT (OUTPATIENT)
Age: 76
End: 2021-12-27
Payer: MEDICARE

## 2021-12-27 DIAGNOSIS — D72.819 LEUKOPENIA, UNSPECIFIED TYPE: ICD-10-CM

## 2021-12-27 DIAGNOSIS — D50.9 IRON DEFICIENCY ANEMIA, UNSPECIFIED IRON DEFICIENCY ANEMIA TYPE: Primary | ICD-10-CM

## 2021-12-27 DIAGNOSIS — J47.9 BRONCHIECTASIS WITHOUT COMPLICATION (HCC): ICD-10-CM

## 2021-12-27 DIAGNOSIS — N28.9 RENAL INSUFFICIENCY: ICD-10-CM

## 2021-12-27 PROCEDURE — 99443 PR PHYS/QHP TELEPHONE EVALUATION 21-30 MIN: CPT | Performed by: INTERNAL MEDICINE

## 2021-12-27 NOTE — PROGRESS NOTES
Guillermina Sandoval is a 64 y.o. female, evaluated via audio-only technology on 12/27/2021 for Anemia (Leukopenia)  Patient  has been followed these issues have resolved. Patient's continues to have frequent pneumonia secondary to her bronchiectasis. She has little sleep coughs quite a bit sometimes gets a bit dehydrated. She was on iron oral supplementation every day then went to 3 times a week iron has gone down to 4-5. We should increase the iron to daily once again. We had a long discussion reviewing the laboratory tests patient asked excellent questions    Assessment & Plan:   I we will reveal the patient's results in 3 months again  12  Subjective:       Prior to Admission medications    Medication Sig Start Date End Date Taking? Authorizing Provider   loratadine (CLARITIN) 10 mg tablet Take 1 Tablet by mouth daily. 12/15/21   Mukund Bell MD   ferrous gluconate 324 mg (38 mg iron) tablet Take 1 Tablet by mouth daily. TAKE 1 TABLET BY MOUTH EVERY MONDAY, WEDNESDAY, AND KINDRA 12/15/21   Mukund Blel MD   nystatin (MYCOSTATIN) powder Apply to rash 4 times a day 12/15/21   Alesia Enriquez MD   ketoconazole (NIZORAL) 2 % topical cream Apply  to affected area daily. 12/15/21   Mukund Bell MD   rOPINIRole (REQUIP) 0.5 mg tablet TAKE 1 TABLET AT BEDTIME 10/21/21   Mukund Bell MD   montelukast (SINGULAIR) 10 mg tablet TAKE 1 TABLET BY MOUTH DAILY 8/16/21   Mukund Bell MD   gabapentin (NEURONTIN) 100 mg capsule TAKE 2 CAPSULES BY MOUTH THREE TIMES DAILY 7/15/21   Mukund Bell MD   Spiriva Respimat 1.25 mcg/actuation inhaler INHALE 2 PUFFS BY MOUTH DAILY 1/25/21   Provider, [place]   guaiFENesin (ROBITUSSIN) 100 mg/5 mL liquid Take 10 mL by mouth four (4) times daily as needed for Cough. 4/15/21   Mukund Bell MD   albuterol (PROVENTIL HFA, VENTOLIN HFA, PROAIR HFA) 90 mcg/actuation inhaler Take 2 Puffs by inhalation every four (4) hours as needed for Cough. 2/5/21   Kan Bell MD   Lactobac no.41/Bifidobact no.7 (PROBIOTIC-10 PO) Take 1 Tab by mouth daily. Provider, Historical   multivitamin (ONE A DAY) tablet Take 1 Tab by mouth daily. Provider, Historical   calcium-cholecalciferol, d3, (CALCIUM 600 + D) 600-125 mg-unit tab Take 2 Tabs by mouth. Provider, Historical   cholecalciferol (VITAMIN D3) (5000 Units/125 mcg) tab tablet Take  by mouth daily. Provider, Historical   b complex vitamins tablet Take 1 Tab by mouth daily. Provider, Historical   latanoprost (XALATAN) 0.005 % ophthalmic solution Administer 1 Drop to both eyes nightly. Provider, Historical     Patient Active Problem List   Diagnosis Code    Back pain M54.9    RLS (restless legs syndrome) G25.81    Functional urinary incontinence R39.81    LBBB (left bundle branch block) I44.7    Leukopenia D72.819    Iron deficiency anemia D50.9    Bronchiectasis (HCC) J47.9    Asthma J45.909    Grade I diastolic dysfunction L14.23    Aortic regurgitation I35.1    History of supraventricular tachycardia Z86.79    History of Clostridium difficile colitis Z86.19    Renal insufficiency N28.9    Lymphopenia D72.810     Current Outpatient Medications   Medication Sig Dispense Refill    loratadine (CLARITIN) 10 mg tablet Take 1 Tablet by mouth daily. 90 Tablet 1    ferrous gluconate 324 mg (38 mg iron) tablet Take 1 Tablet by mouth daily. TAKE 1 TABLET BY MOUTH EVERY MONDAY, WEDNESDAY, AND SUNDAY 90 Tablet 1    nystatin (MYCOSTATIN) powder Apply to rash 4 times a day 180 g 2    ketoconazole (NIZORAL) 2 % topical cream Apply  to affected area daily.  60 g 3    rOPINIRole (REQUIP) 0.5 mg tablet TAKE 1 TABLET AT BEDTIME 90 Tablet 1    montelukast (SINGULAIR) 10 mg tablet TAKE 1 TABLET BY MOUTH DAILY 90 Tablet 1    gabapentin (NEURONTIN) 100 mg capsule TAKE 2 CAPSULES BY MOUTH THREE TIMES DAILY 540 Capsule 0    Spiriva Respimat 1.25 mcg/actuation inhaler INHALE 2 PUFFS BY MOUTH DAILY      guaiFENesin (ROBITUSSIN) 100 mg/5 mL liquid Take 10 mL by mouth four (4) times daily as needed for Cough. 473 mL 2    albuterol (PROVENTIL HFA, VENTOLIN HFA, PROAIR HFA) 90 mcg/actuation inhaler Take 2 Puffs by inhalation every four (4) hours as needed for Cough. 1 Inhaler 5    Lactobac no.41/Bifidobact no.7 (PROBIOTIC-10 PO) Take 1 Tab by mouth daily.  multivitamin (ONE A DAY) tablet Take 1 Tab by mouth daily.  calcium-cholecalciferol, d3, (CALCIUM 600 + D) 600-125 mg-unit tab Take 2 Tabs by mouth.  cholecalciferol (VITAMIN D3) (5000 Units/125 mcg) tab tablet Take  by mouth daily.  b complex vitamins tablet Take 1 Tab by mouth daily.  latanoprost (XALATAN) 0.005 % ophthalmic solution Administer 1 Drop to both eyes nightly.        Allergies   Allergen Reactions    Other Food Cough    Milk Containing Products Other (comments)     Mucous    Bactrim [Sulfamethoprim] Nausea Only and Other (comments)     Headache, Joint pain, loss of appetite     Lonhala Magnair Starter [Glycopyrrol-Nebulizer-Accessor] Other (comments)     Blisters on the legs    Mold Other (comments)    Peanut Other (comments)    Pollen Extracts Other (comments)    Sulfamethoxazole-Trimethoprim Other (comments)     Past Medical History:   Diagnosis Date    Abnormality of gait and mobility     Ambulatory with use of Wheeled Walker Outside the home (as of 3/05/2021)    Aortic regurgitation 06/25/2018    Mild to Moderate by TTE on 6/25/2018    Arth[facility]     [facility]    Asthma Sept. 2018    Asthma Dr. Carolina Dyer; Questionably Cough-Variant Asthma    Blindness of left eye     2/2 Blood Clot in Left Eye    Bronchiectasis ([address]     CAP (community acquired pneumonia) 3/11/2016    Colon polyps 1-22-20    From colonoscopy     Compression fracture of L5 vertebra ([address]     DVT (deep venous thrombosis) (HCC)     Provoked (Immobility, Spinal Infection) RLE DVT S/P IVC Filter (2016)  Environmental allergies     Female genital prolapse     Former smoker     1-1.5 PPD x6 years; Quit 1970    Glaucoma 2017    OiFsddeyznYrtowjgljqjacYlYrzrxwouPuwKxrvtsbfLnixiYiehm4IUGX    Grade I diastolic dysfunction 04/11/0118    by TTE on 6/25/2018    Hemoptysis 3/5/2021    History of Clostridium difficile colitis 07/06/2016    History of transfusion 03/2016    Transfused x4 units (ostensibly of) PRBCs over March of 2016    Hypertension October 2018    Dr. Renetta Park diagnosis    Iron deficiency anemia 03/2016    Kidney stone 07/2019    -KidneyStoneMidLeft kidney-NoActionTaken; Bladder Stones, also    LBBB (left bundle branch block)     Osteoporosis 10/07/2016    Most recently by DEXA Scan on 12/18/2018    Psoas muscle abscess ([address] 03/12/2016    History of Right Psoas Muscle Abscess S/P Drainage    Recurrent UTI [id number]    likely 2/2 Indwelling-Catheter x2 years    Small right kidney     Spinal abscess ([address] 03/2016    Caused paraplegia. patient ambulatory Summer 2018    SVT (supraventricular tachycardia) ([address]     Umbilical hernia 26/64/4725    Supraumbilical Ventral Hernia    Urine retention     Voiding Dysfunction with Hypocontractile Bladder    Uterus prolapse     grade 5. Surgically corrected 7/2018    Vaginal candida 03/16/2016       ROS:  Frequent coughing    Patient-Reported Vitals 12/27/2021   Patient-Reported Weight 110   Patient-Reported Height -   Patient-Reported Pulse -   Patient-Reported Temperature -   Patient-Reported SpO2 -   Patient-Reported Systolic  -   Patient-Reported Diastolic -       Tiarra Hernandezroberto carlos, who was evaluated through a patient-initiated, synchronous (real-time) audio only encounter, and/or her healthcare decision maker, is aware that it is a billable service, with coverage as determined by her insurance carrier. She provided verbal consent to proceed: Yes.  She has not had a related appointment within my department in the past 7 days or scheduled within the next 24 hours. On this date 12/27/2021 I have spent 28 minutes reviewing previous notes, test results and face to face (virtual) with the patient discussing the diagnosis and importance of compliance with the treatment plan as well as documenting on the day of the visit.     Meli Mcintyre MD

## 2022-02-15 ENCOUNTER — PATIENT OUTREACH (OUTPATIENT)
Dept: CASE MANAGEMENT | Age: 77
End: 2022-02-15

## 2022-02-15 NOTE — PROGRESS NOTES
Social Work Note  2/15/2022    Date of referral: 10/9/2020  Referral received from: LATHA Kruger  Reason for referral: Assist the patient with community resources to address mold issues in the home.       Previous SW Referral:  no   If yes, brief summary and outcome:  n/a     Support System: Family and friends     Community Providers: unknown     Transportation: unknown     Medication:unknown     Financial Concern: unknown     Advance Care Plan:  reviewed and current      Other: n/a     Identified Needs:      · The patient indicated that she has mold in the crawl space of the home.   · Need for  services or Pro-bl[employer]om services in order to add her sister to trust of the house.  (Resolved)     Social Work Plan:     · Locate community resources to assist with addressing the mold issues in the patient's home.   · Provide the patient with list of  and/or pro-blossom services in the community (Completed)        MSW received referral to assist the patient with community resources to address the moisture issues in the crawl space of her home. The patient had since requested assistance of  or Pro-bl[employer]om services in the community to add her sister to the trust of the house.     MSW contacted the patient and introduced self role and reason for call. The patient is alert and oriented and active participant in conversation.  She informed MSW that she is just returning home from the hospital after being admitted for 4 days. She mentioned that she was admitted because she was coughing up blood. She shared that she is feeling so much better now. MSW have attempted to contact local representatives from the Department of [employer] and have left messages with call back information. The patient has also attempted to contact and left her call back information. Calls have been made to local [employer] who shared that they did not have any special programs available to assist residents.  The patient indicated that she is unable to pay out of pocket. MSW informed her of the [employer], wheatherization assistance program, however the patient had already utilized their assistance in the past and was not eligible for any additional assistance. The patient does not qualify for some programs available in the community due to her income. MSW has been unable to locate resources that can provide the patient with free assistance to address the moisture issue in the crawlspace of her house. The patient has been informed and verbalized understanding. She expressed her appreciation for the assistance provided. The patient had no other issues or concerns to report. There are no other needs identified at this time. No further follow up will be required. MSW will be available to assist with any future needs.

## 2022-02-16 ENCOUNTER — VIRTUAL VISIT (OUTPATIENT)
Dept: FAMILY MEDICINE CLINIC | Age: 77
End: 2022-02-16

## 2022-02-16 DIAGNOSIS — J47.9 BRONCHIECTASIS WITHOUT COMPLICATION (HCC): Primary | ICD-10-CM

## 2022-02-16 PROBLEM — K63.5 POLYP OF COLON: Status: ACTIVE | Noted: 2022-02-16

## 2022-02-16 PROBLEM — K64.8 INTERNAL HEMORRHOIDS: Status: ACTIVE | Noted: 2022-02-16

## 2022-02-16 PROBLEM — F32.A DEPRESSION: Status: ACTIVE | Noted: 2022-02-09

## 2022-02-16 RX ORDER — DOXYCYCLINE 100 MG/1
CAPSULE ORAL
COMMUNITY
Start: 2022-02-09 | End: 2022-03-03

## 2022-02-16 RX ORDER — AZITHROMYCIN 500 MG/1
TABLET, FILM COATED ORAL
COMMUNITY
Start: 2021-11-30 | End: 2022-03-03 | Stop reason: ALTCHOICE

## 2022-02-16 RX ORDER — MULTIVIT,IRON,MINERALS/LUTEIN
TABLET ORAL
COMMUNITY

## 2022-02-16 RX ORDER — PREDNISONE 20 MG/1
TABLET ORAL
COMMUNITY
Start: 2022-02-09 | End: 2022-03-03 | Stop reason: ALTCHOICE

## 2022-02-16 RX ORDER — BENZONATATE 100 MG/1
CAPSULE ORAL
COMMUNITY
Start: 2022-02-09 | End: 2022-03-03 | Stop reason: ALTCHOICE

## 2022-02-16 RX ORDER — PSEUDOEPHEDRINE HCL 30 MG
TABLET ORAL
COMMUNITY

## 2022-03-03 ENCOUNTER — OFFICE VISIT (OUTPATIENT)
Dept: FAMILY MEDICINE CLINIC | Age: 77
End: 2022-03-03
Payer: MEDICARE

## 2022-03-03 VITALS
WEIGHT: 101.2 LBS | HEIGHT: 60 IN | HEART RATE: 76 BPM | BODY MASS INDEX: 19.87 KG/M2 | RESPIRATION RATE: 16 BRPM | SYSTOLIC BLOOD PRESSURE: 119 MMHG | TEMPERATURE: 98.4 F | DIASTOLIC BLOOD PRESSURE: 64 MMHG | OXYGEN SATURATION: 94 %

## 2022-03-03 DIAGNOSIS — Z76.89 ENCOUNTER TO ESTABLISH CARE: Primary | ICD-10-CM

## 2022-03-03 DIAGNOSIS — J45.30 MILD PERSISTENT REACTIVE AIRWAY DISEASE WITHOUT COMPLICATION: ICD-10-CM

## 2022-03-03 DIAGNOSIS — B37.2 CANDIDAL INTERTRIGO: ICD-10-CM

## 2022-03-03 DIAGNOSIS — R63.4 WEIGHT LOSS: ICD-10-CM

## 2022-03-03 DIAGNOSIS — R63.4 DECREASED WEIGHT: ICD-10-CM

## 2022-03-03 DIAGNOSIS — D50.9 MICROCYTIC ANEMIA: ICD-10-CM

## 2022-03-03 DIAGNOSIS — Z91.018 FOOD ALLERGY: ICD-10-CM

## 2022-03-03 DIAGNOSIS — J47.1 BRONCHIECTASIS WITH ACUTE EXACERBATION (HCC): ICD-10-CM

## 2022-03-03 PROCEDURE — G8420 CALC BMI NORM PARAMETERS: HCPCS | Performed by: NURSE PRACTITIONER

## 2022-03-03 PROCEDURE — 1101F PT FALLS ASSESS-DOCD LE1/YR: CPT | Performed by: NURSE PRACTITIONER

## 2022-03-03 PROCEDURE — G8427 DOCREV CUR MEDS BY ELIG CLIN: HCPCS | Performed by: NURSE PRACTITIONER

## 2022-03-03 PROCEDURE — G9717 DOC PT DX DEP/BP F/U NT REQ: HCPCS | Performed by: NURSE PRACTITIONER

## 2022-03-03 PROCEDURE — 99215 OFFICE O/P EST HI 40 MIN: CPT | Performed by: NURSE PRACTITIONER

## 2022-03-03 PROCEDURE — 1090F PRES/ABSN URINE INCON ASSESS: CPT | Performed by: NURSE PRACTITIONER

## 2022-03-03 PROCEDURE — G8536 NO DOC ELDER MAL SCRN: HCPCS | Performed by: NURSE PRACTITIONER

## 2022-03-03 PROCEDURE — G8399 PT W/DXA RESULTS DOCUMENT: HCPCS | Performed by: NURSE PRACTITIONER

## 2022-03-03 RX ORDER — NYSTATIN 100000 [USP'U]/G
POWDER TOPICAL
Qty: 180 G | Refills: 2 | Status: SHIPPED | OUTPATIENT
Start: 2022-03-03 | End: 2022-03-24 | Stop reason: SDUPTHER

## 2022-03-03 RX ORDER — GABAPENTIN 100 MG/1
CAPSULE ORAL
Qty: 540 CAPSULE | Refills: 0 | Status: CANCELLED | OUTPATIENT
Start: 2022-03-03

## 2022-03-03 RX ORDER — LORATADINE 10 MG/1
TABLET ORAL
COMMUNITY
End: 2022-03-03 | Stop reason: ALTCHOICE

## 2022-03-03 RX ORDER — KETOCONAZOLE 20 MG/G
CREAM TOPICAL DAILY
Qty: 60 G | Refills: 3 | Status: CANCELLED | OUTPATIENT
Start: 2022-03-03

## 2022-03-03 RX ORDER — GUAIFENESIN 600 MG/1
600 TABLET, EXTENDED RELEASE ORAL 2 TIMES DAILY
COMMUNITY

## 2022-03-03 RX ORDER — GUAIFENESIN 100 MG/5ML
200 SOLUTION ORAL
Qty: 473 ML | Refills: 2 | Status: SHIPPED | OUTPATIENT
Start: 2022-03-03

## 2022-03-03 RX ORDER — FERROUS GLUCONATE 324(38)MG
324 TABLET ORAL DAILY
Qty: 90 TABLET | Refills: 1 | Status: SHIPPED | OUTPATIENT
Start: 2022-03-03 | End: 2022-10-06 | Stop reason: SDUPTHER

## 2022-03-03 RX ORDER — MONTELUKAST SODIUM 10 MG/1
TABLET ORAL
Qty: 90 TABLET | Refills: 1 | Status: SHIPPED | OUTPATIENT
Start: 2022-03-03 | End: 2022-08-30 | Stop reason: SDUPTHER

## 2022-03-03 NOTE — PROGRESS NOTES
OFFICE NOTE    Yrn Marks is a 64 y.o. female presenting today for office visit. 3/3/2022  12:50 PM    Chief Complaint   Patient presents with   [facility] Follow Up     HPI:   In office visit. Previous Dr. Willie Bejarano patient. Patient says she is well and she appears well. Hospital follow-up, admitted 2/5/2022 until 2/9/2022 related to Hemoptysis  MAIC (mycobacterium avium-intracellulare complex) ([facility])  DVT (deep venous thrombosis) ([facility]) and discharge diagnosis Bronchiectasis with acute exacerbation. Reviewed hospital notes. Patient has lost a lot of weight. She would like put on weight but she has a lot of allergies. Patient agreed to nutrition referral.    She has a bronchoscopy 2/8/2022 and had cultures taken. Patient sess Dr. Katina Duarte with pulmonary Monday. Patient reports appetite is good, no urinary issues, sleeps well and regular bowel movements. Patient denies fever, chills, chest pain, shortness of breath, abdomen pain or dark tarry stool. ROS:    · General: negative for - chills, fever, weight changes or malaise  · HEENT: no sore throat, nasal congestion, vision problems or ear problems  · Respiratory: + for cough, shortness of breath varies, no wheezing  · Cardiovascular: no chest pain, palpitations, or dyspnea on exertion  · Gastrointestinal: no abdominal pain, N/V, change in bowel habits, or black or bloody stools  · Musculoskeletal: no back pain, joint pain, joint stiffness, muscle pain or muscle weakness  · Neurological: no numbness, tingling, headache or dizziness  · Endo:  No polyuria or polydipsia. · : no hematuria, dysuria, frequency, hesitancy, or nocturia.     · Skin: No itching or rash, no open skin, no unusual lesions   · Psychological: negative for - anxiety, depression, sleep disturbances, suicidal or homicidal ideations     PHQ Screening   3 most recent PHQ Screens 3/3/2022   Little interest or pleasure in doing things Not at all   Feeling down, depressed, irritable, or hopeless Not at all   Total Score PHQ 2 0       History  Past Medical History:   Diagnosis Date    Abnormality of gait and mobility     Ambulatory with use of Wheeled Walker Outside the home (as of 3/05/2021)    Aortic regurgitation 06/25/2018    Mild to Moderate by TTE on 6/25/2018    Arthritis     in [facility]    Asthma Sept. 2018    Asthma Dr. Juanita Enrique; Questionably Cough-Variant Asthma    Blindness of left eye     2/2 Blood Clot in Left Eye    Bronchiectasis ([address]     CAP (community acquired pneumonia) 3/11/2016    Colon polyps 1-22-20    From colonoscopy     Compression fracture of L5 vertebra ([address]     DVT (deep venous thrombosis) (HCC)     Provoked (Immobility, Spinal Infection) RLE DVT S/P IVC Filter (2016)    Environmental allergies     Female genital prolapse     Former smoker     1-1.5 PPD x6 years; Quit 1970    Glaucoma 2017    YsUhttmrgtKoricldyqfnflViTypvw[id number]    Grade I diastolic dysfunction 69/31/6360    by TTE on 6/25/2018    Hemoptysis 3/5/2021    History of Clostridium difficile colitis 07/06/2016    History of transfusion 03/2016    Transfused x4 units (ostensibly of) PRBCs over March of 2016    Hypertension October 2018    Dr. Karen Burton diagnosis    Iron deficiency anemia 03/2016    Kidney stone 07/2019    KidneyStonMichel kidney-NoActionTaken; Bladder Stones, also    LBBB (left bundle branch block)     Osteoporosis 10/07/2016    Most recently by DEXA Scan on 12/18/2018    Psoas muscle abscess ([address] 03/12/2016    History of Right Psoas Muscle Abscess S/P Drainage    Recurrent UTI [id number]    likely 2/2 Indwelling-Catheter x2 years    Small right kidney     Spinal abscess ([address] 03/2016    Caused paraplegia.  patient ambulatory Summer 2018    SVT (supraventricular tachycardia) ([address]     Umbilical hernia 36/24/0652    Supraumbilical Ventral Hernia    Urine retention     Voiding Dysfunction with Hypocontractile Bladder    Uterus prolapse     grade 5. Surgically corrected 2018    Vaginal candida 2016       Past Surgical History:   Procedure Laterality Date    COLONOSCOPY N/A 2020    COLONOSCOPY performed by Sarita Carpio MD at [address] [facility]      x 4 after having miscarriages.  HX GYN  2018    CYSTOURETHROSCOPY; POSTERIOR COLPORRHAPHY, CYSTOCELE REPAIR; COLPOPEXY VAGINAL INTRA-PERITONEAL APPROACH;    HX TONSILLECTOMY      HX VITRECTOMY Left 2017    x2 (2017, 2017)    VASCULAR SURGERY PROCEDURE UNLIST  2016    IVC filter.         Social History     Socioeconomic History    Marital status:      Spouse name: Not on file    Number of children: Not on file    Years of education: Not on file    Highest education level: Not on file   Occupational History    Not on file   Tobacco Use    Smoking status: Former Smoker     Packs/day: 1.00     Years: 8.00     Pack years: 8.00     Types: Cigarettes     Quit date: 4/15/1970     Years since quittin.9    Smokeless tobacco: Never Used    Tobacco comment: 1-1.5 PPD x7-8 years; Quit    Substance and Sexual Activity    Alcohol use: No     Alcohol/week: 0.0 standard drinks    Drug use: Never    Sexual activity: Not Currently   Other Topics Concern     Service Not Asked    Blood Transfusions Not Asked    Caffeine Concern Not Asked    Occupational Exposure Not Asked    Hobby Hazards Not Asked    Sleep Concern Not Asked    Stress Concern Not Asked    Weight Concern Not Asked    Special Diet Not Asked    Back Care Not Asked    Exercise Not Asked    Bike Helmet Not Asked    [address] Not Asked    Self-Exams Not Asked   Social History Narrative    Not on file     Social Determinants of Health     Financial Resource Strain:     Difficulty of Paying Living Expenses: Not on file   Food Insecurity:     Worried About Running Out of Food in the Last Year: Not on file    [address] in the Last Year: Not on file   Transportation Needs:     Lack of Transportation (Medical): Not on file    Lack of Transportation (Non-Medical): Not on file   Physical Activity:     Days of Exercise per Week: Not on file    Minutes of Exercise per Session: Not on file   Stress:     Feeling of Stress : Not on file   Social Connections:     Frequency of Communication with Friends and Family: Not on file    Frequency of Social Gatherings with Friends and Family: Not on file    Attends Jewish Services: Not on file    Active Member of [address] [place] [address] or Organizations: Not on file    Attends Club or Organization Meetings: Not on file    Marital Status: Not on file   Intimate Partner Violence:     Fear of Current or Ex-Partner: Not on file    Emotionally Abused: Not on file    Physically Abused: Not on file    Sexually Abused: Not on file   Housing Stability:     Unable to Pay for Housing in the Last Year: Not on file    Number of [place] in the Last Year: Not on file    Unstable Housing in the Last Year: Not on file     Allergies   Allergen Reactions    Other Food Cough   [place] Other (comments)     Mucous    [place] Magnair Starter [Glycopyrrol-Nebulizer-Accessor] Other (comments)     Blisters on the legs    Mold Other (comments)    Peanut Other (comments)    Pollen Extracts Other (comments)    Sulfamethoprim Nausea Only, Other (comments) and Unknown (comments)     Headache, Joint pain, loss of appetite     Sulfamethoxazole-Trimethoprim Other (comments)     Current Outpatient Medications   Medication Sig Dispense Refill    vitamin B complex (B COMPLEX 1 PO) as directed.  guaiFENesin ER (Mucinex) 600 mg ER tablet Take 600 mg by mouth two (2) times a day.  ferrous gluconate 324 mg (38 mg iron) tablet Take 1 Tablet by mouth daily.  90 Tablet 1    guaiFENesin (ROBITUSSIN) 100 mg/5 mL liquid Take 10 mL by mouth four (4) times daily as needed for Cough. 473 mL 2    montelukast (SINGULAIR) 10 mg tablet TAKE 1 TABLET BY MOUTH DAILY 90 Tablet 1    nystatin (MYCOSTATIN) powder Apply to rash 4 times a day 180 g 2    multivit-min-iron-FA-lutein (Centrum Silver Women) 8 mg iron-400 mcg-300 mcg tab 1 tab(s)      calcium citrate 250 mg calcium tab tablet 1 tab(s)      loratadine (CLARITIN) 10 mg tablet Take 1 Tablet by mouth daily. 90 Tablet 1    ketoconazole (NIZORAL) 2 % topical cream Apply  to affected area daily. 60 g 3    rOPINIRole (REQUIP) 0.5 mg tablet TAKE 1 TABLET AT BEDTIME 90 Tablet 1    gabapentin (NEURONTIN) 100 mg capsule TAKE 2 CAPSULES BY MOUTH THREE TIMES DAILY (Patient taking differently: two (2) times daily (with meals). Take one tablet twice a day) 540 Capsule 0    Spiriva Respimat 1.25 mcg/actuation inhaler INHALE 2 PUFFS BY MOUTH DAILY      albuterol (PROVENTIL HFA, VENTOLIN HFA, PROAIR HFA) 90 mcg/actuation inhaler Take 2 Puffs by inhalation every four (4) hours as needed for Cough. 1 Inhaler 5    Lactobac no.41/Bifidobact no.7 (PROBIOTIC-10 PO) Take 1 Tab by mouth daily.  multivitamin (ONE A DAY) tablet Take 1 Tab by mouth daily.  calcium-cholecalciferol, d3, (CALCIUM 600 + D) 600-125 mg-unit tab Take 2 Tabs by mouth.  cholecalciferol (VITAMIN D3) (5000 Units/125 mcg) tab tablet Take  by mouth daily.  latanoprost (XALATAN) 0.005 % ophthalmic solution Administer 1 Drop to both eyes nightly.        Patient Care Team:  Patient Care Team:  Bryanna Gil NP as PCP - General (Nurse Practitioner)  Bryanna Gil NP as PCP - [facility]  Diomedes Manzano MD as Physician (Urology)  Tony Bueno MD as Physician (Obstetrics & Gynecology)  Elsi Hernandez NP as Nurse Practitioner (Nurse Practitioner)  Flaco Huerta DPM as Physician (Podiatry)  Sathya Layne MD as Physician (Neurosurgery)  Dolly Hunt MD as Physician (Infectious Disease)  Jennifer Bejarano MD as Physician (Vascular Surgery)  Emerald Jennings MD (Pulmonary Disease)  John Littlejohn MD (Cardiology)    Physical Exam  Patient appears well, she is pleasant, alert, oriented x 3, in no distress. Thin and frail. ENT normal.  Neck supple. No adenopathy or thyromegaly. LAVELL. Lungs are clear, good air entry, no wheezes, rhonchi or rales. Cardiovascular, S1 and S2 normal, no murmurs, regular rate and rhythm. No LAD. Chest wall negative for tenderness  Abdomen is soft without tenderness,  /Anorectal, deferred. Muscleskeletal, no swelling  Extremities show no edema  Neurological is normal without focal findings. Skin: no concerning lesions. Psych: normal affect. Mood good. Oriented x 3. Judgement and insight intact. Vitals:    03/03/22 1352   BP: 119/64   Pulse: 76   Resp: 16   Temp: 98.4 °F (36.9 °C)   SpO2: 94%   Weight: 101 lb 3.2 oz (45.9 kg)   Height: 5' (1.524 m)   PainSc:   0 - No pain     Assessment and Plan    Diagnoses and all orders for this visit:    Encounter to establish care    Microcytic anemia  -     ferrous gluconate 324 mg (38 mg iron) tablet; Take 1 Tablet by mouth daily. , Normal, Disp-90 Tablet, R-1    Bronchiectasis with acute exacerbation (HCC)  -     guaiFENesin (ROBITUSSIN) 100 mg/5 mL liquid; Take 10 mL by mouth four (4) times daily as needed for Cough., Normal, Disp-473 mL, R-2    Mild persistent reactive airway disease without complication  -     montelukast (SINGULAIR) 10 mg tablet; TAKE 1 TABLET BY MOUTH DAILY, Normal, Disp-90 Tablet, R-1**Patient requests 90 days supply**    Candidal intertrigo  -     nystatin (MYCOSTATIN) powder; Apply to rash 4 times a day, Normal, Disp-180 g, R-2    Decreased weight  -     REFERRAL TO NUTRITION    Weight loss  -     REFERRAL TO NUTRITION    Food allergy  -     REFERRAL TO ALLERGY    *Plan of care reviewed with patient. Patient in agreement with plan and expresses understanding.  All questions answered and patient encouraged to call or RTO if further questions or concerns. 50% of 45 minutes spent on counseling and managing her care. Follow-up and Dispositions    · Return in about 1 month (around 4/3/2022).        ONUR SmallwoodC

## 2022-03-03 NOTE — PATIENT INSTRUCTIONS
START taking these medications   Details   Doxycycline Hyclate 100 mg PO CAPS Take 1 Cap by Mouth Twice Daily for 6 days. Qty: 12 Cap, Refills: 0     guaiFENesin ER (MUCINEX ER) 600 mg PO Ta12 tablet Take 1 Tab by Mouth Every 12 hours for 5 days. Qty: 10 Tab, Refills: 0     predniSONE (DELTASONE) 20 mg PO TABS Take 3 Tabs by Mouth Once a Day for 6 days. Qty: 18 Tab, Refills: 0       CONTINUE these medications which have CHANGED   Details   benzonatate (TESSALON PERLES) 100 mg PO CAPS Take 1 capsule 3 times daily as needed for coughing, swallow capsules whole. Qty: 30 Cap, Refills: 0       CONTINUE these medications which have NOT CHANGED   Details   CALCIUM CARBONATE (CALCIUM 300 PO) Take 1,500 Units by Mouth Once a Day. cholecalciferol (VITAMIN D3) 1,000 unit PO TABS Take 5,000 Units by Mouth Once a Day. Takes with calcium suppliment     cyclobenzaprine (FLEXERIL) 10 mg PO TABS Take 5 mg by Mouth Every 8 Hours As Needed. EScitalopram (LEXAPRO) 10 mg PO TABS Take 10 mg by Mouth Every Night at Bedtime. ferrous sulfate (FEOSOL) 325 mg (65 mg Iron) PO TABS Take 325 mg by Mouth Once a Day. Pt takes every Sun Mon and Wed     LACTOBAC 40/BIFIDO 3/S. THERMOP (PROBIOTIC PO) Take by Mouth.     latanoprost (XALATAN) 0.005 % OP Drop Instill 2 Drops in both eyes Every Night at Bedtime. Multivitamin PO CAPS Take by Mouth. rOPINIRole (REQUIP) 0.25 mg PO TABS Take 0.25 mg by Mouth Every Night at Bedtime.        STOP taking these medications     docusate sodium (COLACE) 100 mg PO CAPS Comments:   Reason for Stopping:     gabapentin (NEURONTIN) 100 mg PO CAPS Comments:   Reason for Stopping:

## 2022-03-03 NOTE — PROGRESS NOTES
Radha Lee is a 64 y.o. female (: 1945) presenting to address:    Chief Complaint   Patient presents with   [facility] Follow Up       There were no vitals filed for this visit. Hearing/Vision:   No exam data present    Learning Assessment:     Learning Assessment 2021   PRIMARY LEARNER Patient   HIGHEST LEVEL OF EDUCATION - PRIMARY LEARNER  GRADUATED HIGH SCHOOL OR GED   PRIMARY LANGUAGE ENGLISH   LEARNER PREFERENCE PRIMARY VIDEOS   ANSWERED BY Patient   RELATIONSHIP SELF     Depression Screening:     3 most recent PHQ Screens 3/3/2022   Little interest or pleasure in doing things Not at all   Feeling down, depressed, irritable, or hopeless Not at all   Total Score PHQ 2 0     Fall Risk Assessment:     Fall Risk Assessment, last 12 mths 2021   Able to walk? Yes   Fall in past 12 months? 0   Do you feel unsteady? 1   Are you worried about falling 1   Is TUG test greater than 12 seconds? 0   Is the gait abnormal? 0     Abuse Screening:     Abuse Screening Questionnaire 3/3/2022   Do you ever feel afraid of your partner? N   Are you in a relationship with someone who physically or mentally threatens you? N   Is it safe for you to go home? Y     ADL Assessment:   No flowsheet data found. Coordination of Care Questionaire:   1. \"Have you been to the ER, urgent care clinic since your last visit? Hospitalized since your last visit? \" No    2. \"Have you seen or consulted any other health care providers outside of the [address] [place] [address] since your last visit? \" No     3. For patients aged 39-70: Has the patient had a colonoscopy? NA - based on age     If the patient is female:    4. For patients aged 41-77: Has the patient had a mammogram within the past 2 years? NA - based on age    11. For patients aged 21-65: Has the patient had a pap smear? NA - based on age    Advanced Directive:   1. Do you have an Advanced Directive? YES    2. Would you like information on Advanced Directives?  NO

## 2022-03-18 PROBLEM — Z86.79 HISTORY OF SUPRAVENTRICULAR TACHYCARDIA: Status: ACTIVE | Noted: 2021-03-06

## 2022-03-19 PROBLEM — F32.A DEPRESSION: Status: ACTIVE | Noted: 2022-02-09

## 2022-03-19 PROBLEM — J47.9 BRONCHIECTASIS (HCC): Status: ACTIVE | Noted: 2020-10-02

## 2022-03-19 PROBLEM — J45.909 ASTHMA: Status: ACTIVE | Noted: 2021-03-06

## 2022-03-19 PROBLEM — K64.8 INTERNAL HEMORRHOIDS: Status: ACTIVE | Noted: 2022-02-16

## 2022-03-19 PROBLEM — R39.81 FUNCTIONAL URINARY INCONTINENCE: Status: ACTIVE | Noted: 2018-02-27

## 2022-03-19 PROBLEM — K63.5 POLYP OF COLON: Status: ACTIVE | Noted: 2022-02-16

## 2022-03-19 PROBLEM — D72.819 LEUKOPENIA: Status: ACTIVE | Noted: 2020-08-13

## 2022-03-19 PROBLEM — D72.810 LYMPHOPENIA: Status: ACTIVE | Noted: 2021-08-19

## 2022-03-19 PROBLEM — N28.9 RENAL INSUFFICIENCY: Status: ACTIVE | Noted: 2021-04-15

## 2022-03-19 PROBLEM — I35.1 AORTIC REGURGITATION: Status: ACTIVE | Noted: 2021-03-06

## 2022-03-19 PROBLEM — I51.89 GRADE I DIASTOLIC DYSFUNCTION: Status: ACTIVE | Noted: 2021-03-06

## 2022-03-20 PROBLEM — D50.9 IRON DEFICIENCY ANEMIA: Status: ACTIVE | Noted: 2020-08-13

## 2022-03-20 PROBLEM — I44.7 LBBB (LEFT BUNDLE BRANCH BLOCK): Status: ACTIVE | Noted: 2018-10-19

## 2022-03-24 DIAGNOSIS — G25.81 RLS (RESTLESS LEGS SYNDROME): ICD-10-CM

## 2022-03-24 DIAGNOSIS — B37.2 CANDIDAL INTERTRIGO: ICD-10-CM

## 2022-03-24 RX ORDER — NYSTATIN 100000 [USP'U]/G
POWDER TOPICAL
Qty: 180 G | Refills: 2 | Status: SHIPPED | OUTPATIENT
Start: 2022-03-24 | End: 2022-10-06

## 2022-03-24 RX ORDER — ROPINIROLE 0.5 MG/1
0.5 TABLET, FILM COATED ORAL
Qty: 90 TABLET | Refills: 1 | Status: SHIPPED | OUTPATIENT
Start: 2022-03-24

## 2022-03-28 ENCOUNTER — HOSPITAL ENCOUNTER (OUTPATIENT)
Dept: INFUSION THERAPY | Age: 77
Discharge: HOME | End: 2022-03-28
Payer: MEDICARE

## 2022-03-28 DIAGNOSIS — D50.9 IRON DEFICIENCY ANEMIA, UNSPECIFIED IRON DEFICIENCY ANEMIA TYPE: ICD-10-CM

## 2022-03-28 DIAGNOSIS — N28.9 RENAL INSUFFICIENCY: ICD-10-CM

## 2022-03-28 DIAGNOSIS — J47.9 BRONCHIECTASIS WITHOUT COMPLICATION (HCC): ICD-10-CM

## 2022-03-28 DIAGNOSIS — D72.819 LEUKOPENIA, UNSPECIFIED TYPE: ICD-10-CM

## 2022-03-28 LAB
25(OH)D3 SERPL-MCNC: 81.8 NG/ML (ref 30–100)
ALBUMIN SERPL-MCNC: 3.7 G/DL (ref 3.4–5)
ALBUMIN/GLOB SERPL: 0.8 {RATIO} (ref 0.8–1.7)
ALP SERPL-CCNC: 93 U/L (ref 45–117)
ALT SERPL-CCNC: 23 U/L (ref 13–56)
ANION GAP SERPL CALC-SCNC: 7 MMOL/L (ref 3–18)
AST SERPL-CCNC: 20 U/L (ref 10–38)
BASO+EOS+MONOS # BLD AUTO: 0.5 K/UL (ref 0–2.3)
BASO+EOS+MONOS NFR BLD AUTO: 12 % (ref 0.1–17)
BILIRUB SERPL-MCNC: 0.4 MG/DL (ref 0.2–1)
BUN SERPL-MCNC: 30 MG/DL (ref 7–18)
BUN/CREAT SERPL: 29 (ref 12–20)
CALCIUM SERPL-MCNC: 10 MG/DL (ref 8.5–10.1)
CHLORIDE SERPL-SCNC: 102 MMOL/L (ref 100–111)
CO2 SERPL-SCNC: 30 MMOL/L (ref 21–32)
CREAT SERPL-MCNC: 1.05 MG/DL (ref 0.6–1.3)
DIFFERENTIAL METHOD BLD: ABNORMAL
ERYTHROCYTE [DISTWIDTH] IN BLOOD BY AUTOMATED COUNT: 12.9 % (ref 11.5–14.5)
FERRITIN SERPL-MCNC: 474 NG/ML (ref 8–388)
FOLATE SERPL-MCNC: >20 NG/ML (ref 3.1–17.5)
GLOBULIN SER CALC-MCNC: 4.8 G/DL (ref 2–4)
GLUCOSE SERPL-MCNC: 80 MG/DL (ref 74–99)
HCT VFR BLD AUTO: 38.9 % (ref 36–48)
HGB BLD-MCNC: 12.4 G/DL (ref 12–16)
IRON SATN MFR SERPL: 21 % (ref 20–50)
IRON SERPL-MCNC: 69 UG/DL (ref 50–175)
LYMPHOCYTES # BLD: 0.6 K/UL (ref 1.1–5.9)
LYMPHOCYTES NFR BLD: 16 % (ref 14–44)
MCH RBC QN AUTO: 31.6 PG (ref 25–35)
MCHC RBC AUTO-ENTMCNC: 31.9 G/DL (ref 31–37)
MCV RBC AUTO: 99 FL (ref 78–102)
NEUTS SEG # BLD: 3 K/UL (ref 1.8–9.5)
NEUTS SEG NFR BLD: 73 % (ref 40–70)
PLATELET # BLD AUTO: 190 K/UL (ref 140–440)
POTASSIUM SERPL-SCNC: 3.8 MMOL/L (ref 3.5–5.5)
PROT SERPL-MCNC: 8.5 G/DL (ref 6.4–8.2)
RBC # BLD AUTO: 3.93 M/UL (ref 4.1–5.1)
SODIUM SERPL-SCNC: 139 MMOL/L (ref 136–145)
TIBC SERPL-MCNC: 325 UG/DL (ref 250–450)
VIT B12 SERPL-MCNC: 1647 PG/ML (ref 211–911)
WBC # BLD AUTO: 4.1 K/UL (ref 4.5–13)

## 2022-03-28 PROCEDURE — 82306 VITAMIN D 25 HYDROXY: CPT

## 2022-03-28 PROCEDURE — 82728 ASSAY OF FERRITIN: CPT

## 2022-03-28 PROCEDURE — 83540 ASSAY OF IRON: CPT

## 2022-03-28 PROCEDURE — 82607 VITAMIN B-12: CPT

## 2022-03-28 PROCEDURE — 36415 COLL VENOUS BLD VENIPUNCTURE: CPT

## 2022-03-28 PROCEDURE — 80053 COMPREHEN METABOLIC PANEL: CPT

## 2022-03-28 PROCEDURE — 85025 COMPLETE CBC W/AUTO DIFF WBC: CPT

## 2022-03-28 NOTE — PROGRESS NOTES
[facility] Progress Note    Date: 2022    Name: Geena Rasmussen    MRN: [id number]         : 1945    Peripheral Lab Draw    Recent Results (from the past 12 hour(s))   CBC WITH 3 PART DIFF    Collection Time: 22  1:15 PM   Result Value Ref Range    WBC 4.1 (L) 4.5 - 13.0 K/uL    RBC 3.93 (L) 4.10 - 5.10 M/uL    HGB 12.4 12.0 - 16.0 g/dL    HCT 38.9 36 - 48 %    MCV 99.0 78 - 102 FL    MCH 31.6 25.0 - 35.0 PG    MCHC 31.9 31 - 37 g/dL    RDW 12.9 11.5 - 14.5 %    PLATELET 913 475 - 612 K/uL    NEUTROPHILS 73 (H) 40 - 70 %    Mixed cells 12 0.1 - 17 %    LYMPHOCYTES 16 14 - 44 %    ABS. NEUTROPHILS 3.0 1.8 - 9.5 K/UL    ABS. MIXED CELLS 0.5 0.0 - 2.3 K/uL    ABS. LYMPHOCYTES 0.6 (L) 1.1 - 5.9 K/UL    DF AUTOMATED         Ms. Patterson to [facility], ambulatory at 1300 accompanied by self. Pt was assessed and education was provided. Ms. Sri Zabala vitals were reviewed and patient was observed for 5 minutes prior to treatment. There were no vitals taken for this visit. Blood obtained peripherally from left arm x 1 attempt with butterfly needle and sent to lab per written orders. No bleeding or hematoma noted at site. Gauze and coban applied. Ms. Gio Clemons tolerated the phlebotomy, and had no complaints. Patient armband removed and shredded. Ms. Gio Clemons was discharged from [place] [address] in stable condition at 1315.      Faith Colindres Phlebotomist PCT  2022  1:28 PM

## 2022-04-01 ENCOUNTER — OFFICE VISIT (OUTPATIENT)
Dept: FAMILY MEDICINE CLINIC | Age: 77
End: 2022-04-01
Payer: MEDICARE

## 2022-04-01 VITALS
OXYGEN SATURATION: 94 % | RESPIRATION RATE: 16 BRPM | HEART RATE: 77 BPM | HEIGHT: 60 IN | WEIGHT: 102.6 LBS | TEMPERATURE: 98.1 F | BODY MASS INDEX: 20.14 KG/M2 | SYSTOLIC BLOOD PRESSURE: 115 MMHG | DIASTOLIC BLOOD PRESSURE: 60 MMHG

## 2022-04-01 DIAGNOSIS — J47.1 BRONCHIECTASIS WITH ACUTE EXACERBATION (HCC): Primary | ICD-10-CM

## 2022-04-01 PROCEDURE — 1090F PRES/ABSN URINE INCON ASSESS: CPT | Performed by: NURSE PRACTITIONER

## 2022-04-01 PROCEDURE — G8536 NO DOC ELDER MAL SCRN: HCPCS | Performed by: NURSE PRACTITIONER

## 2022-04-01 PROCEDURE — G9717 DOC PT DX DEP/BP F/U NT REQ: HCPCS | Performed by: NURSE PRACTITIONER

## 2022-04-01 PROCEDURE — 99213 OFFICE O/P EST LOW 20 MIN: CPT | Performed by: NURSE PRACTITIONER

## 2022-04-01 PROCEDURE — 1101F PT FALLS ASSESS-DOCD LE1/YR: CPT | Performed by: NURSE PRACTITIONER

## 2022-04-01 PROCEDURE — G8427 DOCREV CUR MEDS BY ELIG CLIN: HCPCS | Performed by: NURSE PRACTITIONER

## 2022-04-01 PROCEDURE — G8399 PT W/DXA RESULTS DOCUMENT: HCPCS | Performed by: NURSE PRACTITIONER

## 2022-04-01 PROCEDURE — G8420 CALC BMI NORM PARAMETERS: HCPCS | Performed by: NURSE PRACTITIONER

## 2022-04-01 RX ORDER — DOXYCYCLINE 100 MG/1
100 CAPSULE ORAL 2 TIMES DAILY
Qty: 20 CAPSULE | Refills: 0 | Status: SHIPPED | OUTPATIENT
Start: 2022-04-01 | End: 2022-04-11

## 2022-04-01 NOTE — PROGRESS NOTES
Morales Amaral is a 64 y.o. female (: 1945) presenting to address:    Chief Complaint   Patient presents with    URI     cough up brown mucus     Follow-up     c/o sob    Fatigue       Vitals:    22 1355   Resp: 16   Temp: 98.1 °F (36.7 °C)   PainSc:   0 - No pain       Hearing/Vision:   No exam data present    Learning Assessment:     Learning Assessment 2021   PRIMARY LEARNER Patient   HIGHEST LEVEL OF EDUCATION - PRIMARY LEARNER  GRADUATED HIGH SCHOOL OR GED   PRIMARY LANGUAGE ENGLISH   LEARNER PREFERENCE PRIMARY VIDEOS   ANSWERED BY Patient   RELATIONSHIP SELF     Depression Screening:     3 most recent PHQ Screens 2022   Little interest or pleasure in doing things Not at all   Feeling down, depressed, irritable, or hopeless Not at all   Total Score PHQ 2 0     Fall Risk Assessment:     Fall Risk Assessment, last 12 mths 2022   Able to walk? Yes   Fall in past 12 months? 0   Do you feel unsteady? 0   Are you worried about falling 0   Is TUG test greater than 12 seconds? -   Is the gait abnormal? -     Abuse Screening:     Abuse Screening Questionnaire 2022   Do you ever feel afraid of your partner? N   Are you in a relationship with someone who physically or mentally threatens you? N   Is it safe for you to go home? Y     ADL Assessment:   No flowsheet data found. Coordination of Care Questionaire:   1. \"Have you been to the ER, urgent care clinic since your last visit? Hospitalized since your last visit? \" No    2. \"Have you seen or consulted any other health care providers outside of the [address] [place] [address] since your last visit? \" No     3. For patients aged 39-70: Has the patient had a colonoscopy? NA - based on age     If the patient is female:    4. For patients aged 41-77: Has the patient had a mammogram within the past 2 years? NA - based on age    11. For patients aged 21-65: Has the patient had a pap smear? NA - based on age    Advanced Directive:   1.  Do you have an Advanced Directive? YES    2. Would you like information on Advanced Directives?  NO

## 2022-04-01 NOTE — PROGRESS NOTES
OFFICE NOTE    Elizabeth Blair is a 64 y.o. female presenting today for office visit. 4/1/2022  12:27 PM    Chief Complaint   Patient presents with    URI     cough up brown mucus     Follow-up     c/o sob    Fatigue     HPI:   In office follow-up    Patient says she sees Dr. Rosa Blum with pulmonary every 2 months for her bronchiectasis. She sees him 5/19/2022. She has had a cough with dark sputum and thinks she has infection. She denies fever or chills. She sees allergy 4/21/2022 for multiple food allergies. She is hoping to get to the bottom of these food allergies because she wants to put on weight. Patient reports appetite is so-so, no urinary issues, sleeps well and regular bowel movements. Patient denies fever, chills, chest pain, shortness of breath, abdomen pain or dark tarry stool. ROS:    · General: negative for - chills, fever, weight changes or malaise  · HEENT: no sore throat, nasal congestion, vision problems or ear problems  · Respiratory: no cough, shortness of breath, or wheezing  · Cardiovascular: no chest pain, palpitations, or dyspnea on exertion  · Gastrointestinal: no abdominal pain, N/V, change in bowel habits, or black or bloody stools  · Musculoskeletal: no back pain, joint pain, joint stiffness, muscle pain or muscle weakness  · Neurological: no numbness, tingling, headache or dizziness  · Endo:  No polyuria or polydipsia. · : no hematuria, dysuria, frequency, hesitancy, or nocturia.     · Skin: No itching or rash, no open skin, no unusual lesions   · Psychological: negative for - anxiety, depression, sleep disturbances, suicidal or homicidal ideations     PHQ Screening   3 most recent PHQ Screens 4/1/2022   Little interest or pleasure in doing things Not at all   Feeling down, depressed, irritable, or hopeless Not at all   Total Score PHQ 2 0       History  Past Medical History:   Diagnosis Date    Abnormality of gait and mobility     Ambulatory with use of Wheeled Walker Outside the home (as of 3/05/2021)    Aortic regurgitation 06/25/2018    Mild to Moderate by TTE on 6/25/2018    [facility]     [facility]    Asthma Sept. 2018    Asthma Dr. Lui Voss; Questionably Cough-Variant Asthma    Blindness of left eye     2/2 Blood Clot in Left Eye    Bronchiectasis ([address]     CAP (community acquired pneumonia) 3/11/2016    Colon polyps 1-22-20    From colonoscopy     Compression fracture of L5 vertebra ([address]     DVT (deep venous thrombosis) (HCC)     Provoked (Immobility, Spinal Infection) RLE DVT S/P IVC Filter (2016)    Environmental allergies     Female genital prolapse     Former smoker     1-1.5 PPD x6 years; Quit 1970    Glaucoma 2017    OdPgdsaqkbFzhpuaiaesnpqVeAivqcpbaSauZjqiriocRhebiCxkpv4AFWT    Grade I diastolic dysfunction 87/65/3765    by TTE on 6/25/2018    Hemoptysis 3/5/2021    History of Clostridium difficile colitis 07/06/2016    History of transfusion 03/2016    Transfused x4 units (ostensibly of) PRBCs over March of 2016    Hypertension October 2018    Dr. 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 patient ambulatory Summer 2018    SVT (supraventricular tachycardia) ([address]     Umbilical hernia 43/56/9440    Supraumbilical Ventral Hernia    Urine retention     Voiding Dysfunction with Hypocontractile Bladder    Uterus prolapse     grade 5.   Surgically corrected 7/2018    Vaginal candida 03/16/2016       Past Surgical History:   Procedure Laterality Date    COLONOSCOPY N/A 2020    COLONOSCOPY performed by Felisha Esteves MD at [address] [facility]      x 4 after having miscarriages.  HX GYN  2018    CYSTOURETHROSCOPY; POSTERIOR COLPORRHAPHY, CYSTOCELE REPAIR; COLPOPEXY VAGINAL INTRA-PERITONEAL APPROACH;    HX TONSILLECTOMY      HX VITRECTOMY Left 2017    x2 (2017, 2017)    VASCULAR SURGERY PROCEDURE UNLIST  2016    IVC filter. Social History     Socioeconomic History    Marital status:      Spouse name: Not on file    Number of children: Not on file    Years of education: Not on file    Highest education level: Not on file   Occupational History    Not on file   Tobacco Use    Smoking status: Former Smoker     Packs/day: 1.00     Years: 8.00     Pack years: 8.00     Types: Cigarettes     Quit date: 4/15/1970     Years since quittin.9    Smokeless tobacco: Never Used    Tobacco comment: 1-1.5 PPD x7-8 years; Quit 1970   Substance and Sexual Activity    Alcohol use: No     Alcohol/week: 0.0 standard drinks    Drug use: Never    Sexual activity: Not Currently   Other Topics Concern     Service Not Asked    Blood Transfusions Not Asked    Caffeine Concern Not Asked    Occupational Exposure Not Asked    Hobby Hazards Not Asked    Sleep Concern Not Asked    Stress Concern Not Asked    Weight Concern Not Asked    Special Diet Not Asked    Back Care Not Asked    Exercise Not Asked    Bike Helmet Not Asked    Seat Belt Not Asked    Self-Exams Not Asked   Social History Narrative    Not on file     Social Determinants of Health     Financial Resource Strain:     Difficulty of Paying Living Expenses: Not on file   Food Insecurity:     Worried About Running Out of Food in the Last Year: Not on file    Luz of Food in the Last Year: Not on file   Transportation Needs:     Lack of Transportation (Medical): Not on file    Lack of Transportation (Non-Medical):  Not on file Physical Activity:     Days of Exercise per Week: Not on file    Minutes of Exercise per Session: Not on file   Stress:     Feeling of Stress : Not on file   Social Connections:     Frequency of Communication with Friends and Family: Not on file    Frequency of Social Gatherings with Friends and Family: Not on file    Attends [employer] Services: Not on file    Active Member of [address] [place] [address] or Organizations: Not on file    Attends Club or Organization Meetings: Not on file    Marital Status: Not on file   Intimate Partner Violence:     Fear of Current or Ex-Partner: Not on file    Emotionally Abused: Not on file    Physically Abused: Not on file    Sexually Abused: Not on file   Housing Stability:     Unable to Pay for Housing in the Last Year: Not on file    Number of [place] in the Last Year: Not on file    Unstable Housing in the Last Year: Not on file       Allergies   Allergen Reactions    Other Food Cough   [place] Other (comments)     Mucous    Lonhala Magnair Starter [Glycopyrrol-Nebulizer-Accessor] Other (comments)     Blisters on the legs    Mold Other (comments)    Peanut Other (comments)    Pollen Extracts Other (comments)    Sulfamethoprim Nausea Only, Other (comments) and Unknown (comments)     Headache, Joint pain, loss of appetite     Sulfamethoxazole-Trimethoprim Other (comments)       Current Outpatient Medications   Medication Sig Dispense Refill    doxycycline (MONODOX) 100 mg capsule Take 1 Capsule by mouth two (2) times a day for 10 days. 20 Capsule 0    rOPINIRole (REQUIP) 0.5 mg tablet Take 1 Tablet by mouth nightly. 90 Tablet 1    nystatin (MYCOSTATIN) powder Apply to rash 4 times a day 180 g 2    vitamin B complex (B COMPLEX 1 PO) as directed.  guaiFENesin ER (Mucinex) 600 mg ER tablet Take 600 mg by mouth two (2) times a day.  ferrous gluconate 324 mg (38 mg iron) tablet Take 1 Tablet by mouth daily.  90 Tablet 1    guaiFENesin (ROBITUSSIN) 100 mg/5 mL liquid Take 10 mL by mouth four (4) times daily as needed for Cough. 473 mL 2    montelukast (SINGULAIR) 10 mg tablet TAKE 1 TABLET BY MOUTH DAILY 90 Tablet 1    multivit-min-iron-FA-lutein (Centrum Silver Women) 8 mg iron-400 mcg-300 mcg tab 1 tab(s)      calcium citrate 250 mg calcium tab tablet 1 tab(s)      loratadine (CLARITIN) 10 mg tablet Take 1 Tablet by mouth daily. 90 Tablet 1    Spiriva Respimat 1.25 mcg/actuation inhaler INHALE 2 PUFFS BY MOUTH DAILY      albuterol (PROVENTIL HFA, VENTOLIN HFA, PROAIR HFA) 90 mcg/actuation inhaler Take 2 Puffs by inhalation every four (4) hours as needed for Cough. 1 Inhaler 5    Lactobac no.41/Bifidobact no.7 (PROBIOTIC-10 PO) Take 1 Tab by mouth daily.  multivitamin (ONE A DAY) tablet Take 1 Tab by mouth daily.  calcium-cholecalciferol, d3, (CALCIUM 600 + D) 600-125 mg-unit tab Take 2 Tabs by mouth.  cholecalciferol (VITAMIN D3) (5000 Units/125 mcg) tab tablet Take  by mouth daily.  latanoprost (XALATAN) 0.005 % ophthalmic solution Administer 1 Drop to both eyes nightly.  gabapentin (NEURONTIN) 100 mg capsule TAKE 2 CAPSULES BY MOUTH THREE TIMES DAILY (Patient taking differently: two (2) times daily (with meals).  Take one tablet twice a day) 540 Capsule 0     Patient Care Team:  Patient Care Team:  Edwige Sofia NP as PCP - General (Nurse Practitioner)  Edwige Sofia NP [facility] P[facility]  Camille Foster MD as Physician (Urology)  David Henson MD as Physician (Obstetrics & Gynecology)  Claudio Carney NP as Nurse Practitioner (Nurse Practitioner)  Dani Bowers DPM as Physician (Podiatry)  Stephanie Mckeon MD as Physician (Neurosurgery)  Edmar Collins MD as Physician (Infectious Disease)  Lorrie Pedroza MD as Physician (Vascular Surgery)  Lida Rosenberg MD (Pulmonary Disease)  Kevon Banks MD (Cardiology)    Physical Exam  Patient appears well, she is pleasant, alert, oriented x 3, in no distress. ENT normal.  Neck supple. No adenopathy or thyromegaly. LAVELL. Lungs are clear, good air entry, no wheezes, rhonchi or rales. Cardiovascular, S1 and S2 normal, no murmurs, regular rate and rhythm. No LAD. Chest wall negative for tenderness  Abdomen is soft without tenderness, guarding  /Anorectal, deferred. Muscleskeletal, no swelling, no tenderness, no injury. Extremities show no edema, normal peripheral pulses. Neurological is normal without focal findings. Skin: no concerning lesions. Psych: normal affect. Mood good. Oriented x 3. Judgement and insight intact. Vitals:    04/01/22 1355   BP: 115/60   Pulse: 77   Resp: 16   Temp: 98.1 °F (36.7 °C)   SpO2: 94%   Weight: 102 lb 9.6 oz (46.5 kg)   Height: 5' (1.524 m)   PainSc:   0 - No pain     Assessment and Plan    Diagnoses and all orders for this visit:    Bronchiectasis with acute exacerbation (HCC)  -     doxycycline (MONODOX) 100 mg capsule; Take 1 Capsule by mouth two (2) times a day for 10 days. , Normal, Disp-20 Capsule, R-0    *Plan of care reviewed with patient. Patient in agreement with plan and expresses understanding. All questions answered and patient encouraged to call or RTO if further questions or concerns. 50% of 29 minutes spent on counseling and managing her care. Follow-up and Dispositions    · Return in about 2 weeks (around 4/15/2022).        EFRAIN James

## 2022-04-04 ENCOUNTER — VIRTUAL VISIT (OUTPATIENT)
Age: 77
End: 2022-04-04
Payer: MEDICARE

## 2022-04-04 DIAGNOSIS — E55.9 VITAMIN D DEFICIENCY: ICD-10-CM

## 2022-04-04 DIAGNOSIS — D50.9 IRON DEFICIENCY ANEMIA, UNSPECIFIED IRON DEFICIENCY ANEMIA TYPE: Primary | ICD-10-CM

## 2022-04-04 DIAGNOSIS — J47.0 BRONCHIECTASIS WITH ACUTE LOWER RESPIRATORY INFECTION (HCC): ICD-10-CM

## 2022-04-04 DIAGNOSIS — D72.810 LYMPHOPENIA: ICD-10-CM

## 2022-04-04 DIAGNOSIS — E53.8 VITAMIN B 12 DEFICIENCY: ICD-10-CM

## 2022-04-04 PROCEDURE — 99443 PR PHYS/QHP TELEPHONE EVALUATION 21-30 MIN: CPT | Performed by: INTERNAL MEDICINE

## 2022-04-04 NOTE — PROGRESS NOTES
Rossi Miller is a 64 y.o. female, evaluated via audio-only technology on 4/4/2022 for JEEVAN. Bronchiectasis    Assessment & Plan:   Iron is increased to 69 when patient is taking iron supplement daily. She is tolerating these well. Hemoglobin is 12 .4 and improvement from 11.6. We shall continue current daily iron and add quantitative immunoglobulins to labs on return    We had a long discussion reviewing the laboratory tests patient asked excellent questions    Patient is in agreement    12  Subjective:     Patient  has been followed these issues have resolved. Patient's continues to have frequent pneumonia secondary to her bronchiectasis. She has little sleep coughs quite a bit sometimes gets a bit dehydrated. Prior to Admission medications    Medication Sig Start Date End Date Taking? Authorizing Provider   doxycycline (MONODOX) 100 mg capsule Take 1 Capsule by mouth two (2) times a day for 10 days. 4/1/22 4/11/22  Deborah Méndez NP   rOPINIRole (REQUIP) 0.5 mg tablet Take 1 Tablet by mouth nightly. 3/24/22   Deborah Méndez NP   nystatin (MYCOSTATIN) powder Apply to rash 4 times a day 3/24/22   Troy Meenu FONSECA NP   vitamin B complex (B COMPLEX 1 PO) as directed. Provider, Historical   guaiFENesin ER (Mucinex) 600 mg ER tablet Take 600 mg by mouth two (2) times a day. Provider, Historical   ferrous gluconate 324 mg (38 mg iron) tablet Take 1 Tablet by mouth daily. 3/3/22   Deborah Méndez NP   guaiFENesin (ROBITUSSIN) 100 mg/5 mL liquid Take 10 mL by mouth four (4) times daily as needed for Cough. 3/3/22   Deborah Méndez NP   montelukast (SINGULAIR) 10 mg tablet TAKE 1 TABLET BY MOUTH DAILY 3/3/22   Deborah Méndez NP   multivit-min-iron-FA-lutein (Centrum Silver Women) 8 mg iron-400 mcg-300 mcg tab 1 tab(s)    Provider, Historical   calcium citrate 250 mg calcium tab tablet 1 tab(s)    Provider, Historical   loratadine (CLARITIN) 10 mg tablet Take 1 Tablet by mouth daily. 12/15/21   Medhat Bell MD   gabapentin (NEURONTIN) 100 mg capsule TAKE 2 CAPSULES BY MOUTH THREE TIMES DAILY  Patient taking differently: two (2) times daily (with meals). Take one tablet twice a day 7/15/21   Medhat Bell MD   Spiriva Respimat 1.25 mcg/actuation inhaler INHALE 2 PUFFS BY MOUTH DAILY 1/25/21   Provider, Historical   albuterol (PROVENTIL HFA, VENTOLIN HFA, PROAIR HFA) 90 mcg/actuation inhaler Take 2 Puffs by inhalation every four (4) hours as needed for Cough. 2/5/21   Medhat Bell MD   Lactobac no.41/Bifidobact no.7 (PROBIOTIC-10 PO) Take 1 Tab by mouth daily. Provider, Historical   multivitamin (ONE A DAY) tablet Take 1 Tab by mouth daily. Provider, Historical   calcium-cholecalciferol, d3, (CALCIUM 600 + D) 600-125 mg-unit tab Take 2 Tabs by mouth. Provider, Historical   cholecalciferol (VITAMIN D3) (5000 Units/125 mcg) tab tablet Take  by mouth daily. Provider, Historical   latanoprost (XALATAN) 0.005 % ophthalmic solution Administer 1 Drop to both eyes nightly. Provider, Historical     Patient Active Problem List   Diagnosis Code    Back pain M54.9    RLS (restless legs syndrome) G25.81    Functional urinary incontinence R39.81    LBBB (left bundle branch block) I44.7    Leukopenia D72.819    Iron deficiency anemia D50.9    Bronchiectasis (HCC) J47.9    Asthma J45.909    Grade I diastolic dysfunction U70.17    Aortic regurgitation I35.1    History of supraventricular tachycardia Z86.79    History of Clostridium difficile colitis Z86.19    Renal insufficiency N28.9    Lymphopenia D72.810    Polyp of colon K63.5    Internal hemorrhoids K64.8    Depression F32. A     Current Outpatient Medications   Medication Sig Dispense Refill    doxycycline (MONODOX) 100 mg capsule Take 1 Capsule by mouth two (2) times a day for 10 days. 20 Capsule 0    rOPINIRole (REQUIP) 0.5 mg tablet Take 1 Tablet by mouth nightly.  90 Tablet 1    nystatin (MYCOSTATIN) powder Apply to rash 4 times a day 180 g 2    vitamin B complex (B COMPLEX 1 PO) as directed.  guaiFENesin ER (Mucinex) 600 mg ER tablet Take 600 mg by mouth two (2) times a day.  ferrous gluconate 324 mg (38 mg iron) tablet Take 1 Tablet by mouth daily. 90 Tablet 1    guaiFENesin (ROBITUSSIN) 100 mg/5 mL liquid Take 10 mL by mouth four (4) times daily as needed for Cough. 473 mL 2    montelukast (SINGULAIR) 10 mg tablet TAKE 1 TABLET BY MOUTH DAILY 90 Tablet 1    multivit-min-iron-FA-lutein (Centrum Silver Women) 8 mg iron-400 mcg-300 mcg tab 1 tab(s)      calcium citrate 250 mg calcium tab tablet 1 tab(s)      loratadine (CLARITIN) 10 mg tablet Take 1 Tablet by mouth daily. 90 Tablet 1    gabapentin (NEURONTIN) 100 mg capsule TAKE 2 CAPSULES BY MOUTH THREE TIMES DAILY (Patient taking differently: two (2) times daily (with meals). Take one tablet twice a day) 540 Capsule 0    Spiriva Respimat 1.25 mcg/actuation inhaler INHALE 2 PUFFS BY MOUTH DAILY      albuterol (PROVENTIL HFA, VENTOLIN HFA, PROAIR HFA) 90 mcg/actuation inhaler Take 2 Puffs by inhalation every four (4) hours as needed for Cough. 1 Inhaler 5    Lactobac no.41/Bifidobact no.7 (PROBIOTIC-10 PO) Take 1 Tab by mouth daily.  multivitamin (ONE A DAY) tablet Take 1 Tab by mouth daily.  calcium-cholecalciferol, d3, (CALCIUM 600 + D) 600-125 mg-unit tab Take 2 Tabs by mouth.  cholecalciferol (VITAMIN D3) (5000 Units/125 mcg) tab tablet Take  by mouth daily.  latanoprost (XALATAN) 0.005 % ophthalmic solution Administer 1 Drop to both eyes nightly.        Allergies   Allergen Reactions    Other Food Cough    Milk Containing Products Other (comments)     Mucous    Lonhala Magnair Starter [Glycopyrrol-Nebulizer-Accessor] Other (comments)     Blisters on the legs    Mold Other (comments)    Peanut Other (comments)    Pollen Extracts Other (comments)    Sulfamethoprim Nausea Only, Other (comments) and Unknown (comments)     Headache, Joint pain, loss of appetite     Sulfamethoxazole-Trimethoprim Other (comments)     Past Medical History:   Diagnosis Date    Abnormality of gait and mobility     Ambulatory with use of Wheeled Walker Outside the home (as of 3/05/2021)    Aortic regurgitation 06/25/2018    Mild to Moderate by TTE on 6/25/2018    [facility]     [facility]    Asthma Sept. 2018    Asthma Dr. Latonia Garcia; Questionably Cough-Variant Asthma    Blindness of left eye     2/2 Blood Clot in Left Eye    Bronchiectasis ([address]     CAP (community acquired pneumonia) 3/11/2016    Colon polyps 1-22-20    From colonoscopy     Compression fracture of L5 vertebra ([address]     DVT (deep venous thrombosis) (HCC)     Provoked (Immobility, Spinal Infection) RLE DVT S/P IVC Filter (2016)    Environmental allergies     Female genital prolapse     Former smoker     1-1.5 PPD x6 years; Quit 1970    Glaucoma 2017    QuJhwrmdejKxpnbvvcwdumwEyIbqvlpqwUhjRyyqfaaeGncniCilva6OLFU    Grade I diastolic dysfunction 62/54/5374    by TTE on 6/25/2018    Hemoptysis 3/5/2021    History of Clostridium difficile colitis 07/06/2016    History of transfusion 03/2016    Transfused x4 units (ostensibly of) PRBCs over March of 2016    Hypertension October 2018    Dr. Alvarez Serum diagnosis    Iron deficiency anemia 03/2016    Kidney stone 07/2019    [facility] kidney-NoActionTaken; Bladder Stones, also    LBBB (left bundle branch block)     Osteoporosis 10/07/2016    Most recently by DEXA Scan on 12/18/2018    Psoas muscle abscess ([address] 03/12/2016    History of Right Psoas Muscle Abscess S/P Drainage    Recurrent UTI [id number]    likely 2/2 Indwelling-Catheter x2 years    Small right kidney     Spinal abscess ([address] 03/2016    Caused paraplegia.  patient ambulatory Summer 2018    SVT (supraventricular tachycardia) ([address]     Umbilical hernia 36/92/9419    Supraumbilical Ventral Hernia    Urine retention     Voiding Dysfunction with Hypocontractile Bladder    Uterus prolapse     grade 5. Surgically corrected 7/2018    Vaginal candida 03/16/2016           Patient-Reported Vitals 12/27/2021   Patient-Reported Weight 110   Patient-Reported Height -   Patient-Reported Pulse -   Patient-Reported Temperature -   Patient-Reported SpO2 -   Patient-Reported Systolic  -   Patient-Reported Diastolic -       Anatoliy Kim, who was evaluated through a patient-initiated, synchronous (real-time) audio only encounter, and/or her healthcare decision maker, is aware that it is a billable service, with coverage as determined by her insurance carrier. She provided verbal consent to proceed: Yes. She has not had a related appointment within my department in the past 7 days or scheduled within the next 24 hours. On this date 04/04/2022 I have spent 37 minutes reviewing previous notes, test results and voice to voice (virtual) with the patient discussing the diagnosis and importance of compliance with the treatment plan as well as documenting on the day of the visit.     Fernando Lopez MD

## 2022-04-08 ENCOUNTER — TELEPHONE (OUTPATIENT)
Dept: FAMILY MEDICINE CLINIC | Age: 77
End: 2022-04-08

## 2022-04-08 NOTE — TELEPHONE ENCOUNTER
----- Message from Chico Jin sent at 4/8/2022 10:28 AM EDT -----  Subject: Message to Provider    QUESTIONS  Information for Provider? Patient has 4/11 appt with Radha Emmanuel, but   has not finished her antibiotics yet. Should she keep appt even though she   hasn't finished them at time of appt. Next available appt isn't until may.     ---------------------------------------------------------------------------  --------------  CALL BACK INFO  What is the best way for the office to contact you? OK to leave message on   voicemail  Preferred Call Back Phone Number? [phone]  ---------------------------------------------------------------------------  --------------  SCRIPT ANSWERS  Relationship to Patient?  Self

## 2022-04-11 ENCOUNTER — OFFICE VISIT (OUTPATIENT)
Dept: FAMILY MEDICINE CLINIC | Age: 77
End: 2022-04-11
Payer: MEDICARE

## 2022-04-11 VITALS
OXYGEN SATURATION: 94 % | WEIGHT: 101.6 LBS | RESPIRATION RATE: 16 BRPM | SYSTOLIC BLOOD PRESSURE: 119 MMHG | TEMPERATURE: 97.6 F | DIASTOLIC BLOOD PRESSURE: 57 MMHG | HEIGHT: 60 IN | BODY MASS INDEX: 19.94 KG/M2 | HEART RATE: 76 BPM

## 2022-04-11 DIAGNOSIS — J47.1 BRONCHIECTASIS WITH ACUTE EXACERBATION (HCC): Primary | ICD-10-CM

## 2022-04-11 PROCEDURE — 99213 OFFICE O/P EST LOW 20 MIN: CPT | Performed by: NURSE PRACTITIONER

## 2022-04-11 PROCEDURE — G9717 DOC PT DX DEP/BP F/U NT REQ: HCPCS | Performed by: NURSE PRACTITIONER

## 2022-04-11 PROCEDURE — 1090F PRES/ABSN URINE INCON ASSESS: CPT | Performed by: NURSE PRACTITIONER

## 2022-04-11 PROCEDURE — G8420 CALC BMI NORM PARAMETERS: HCPCS | Performed by: NURSE PRACTITIONER

## 2022-04-11 PROCEDURE — G8399 PT W/DXA RESULTS DOCUMENT: HCPCS | Performed by: NURSE PRACTITIONER

## 2022-04-11 PROCEDURE — G8536 NO DOC ELDER MAL SCRN: HCPCS | Performed by: NURSE PRACTITIONER

## 2022-04-11 PROCEDURE — G8427 DOCREV CUR MEDS BY ELIG CLIN: HCPCS | Performed by: NURSE PRACTITIONER

## 2022-04-11 PROCEDURE — 1101F PT FALLS ASSESS-DOCD LE1/YR: CPT | Performed by: NURSE PRACTITIONER

## 2022-04-11 RX ORDER — AZITHROMYCIN 250 MG/1
TABLET, FILM COATED ORAL
Qty: 6 TABLET | Refills: 0 | Status: SHIPPED | OUTPATIENT
Start: 2022-04-11 | End: 2022-04-16

## 2022-04-11 NOTE — PROGRESS NOTES
OFFICE NOTE    Leann Chowdary is a 64 y.o. female presenting today for office visit. 4/11/2022  7:51 AM    Chief Complaint   Patient presents with    Follow-up    URI     pt still having sxs      HPI:   In office follow-up related to Bronchiectasis with acute exacerbation from 4/1/2022. She finishes her antibiotic tonight. She still has a cough. She often has a cough. She says Dr. Melanie Orellana gave her 2 antibiotics doxycycline and azithromycin the last time she had a flare of her bronchiectasis. I asked patient to follow-up with Dr. Melanie Orellana. She has seen Dr. Adarsh Nuno with hematology. She had blood work that looked good. She will see allergy soon. BP is low and patient is concerned. Patient denies being dizzy. Patient reports appetite is good, no urinary issues, sleeps well and regular bowel movements. Patient denies fever, chills, chest pain, shortness of breath, abdomen pain or dark tarry stool. ROS:    · General: negative for - chills, fever, weight changes or malaise  · HEENT: no sore throat, nasal congestion, vision problems or ear problems  · Respiratory: no cough, shortness of breath, or wheezing  · Cardiovascular: no chest pain, palpitations, or dyspnea on exertion  · Gastrointestinal: no abdominal pain, N/V, change in bowel habits, or black or bloody stools  · Musculoskeletal: no back pain, joint pain, joint stiffness, muscle pain or muscle weakness  · Neurological: no numbness, tingling, headache or dizziness  · Endo:  No polyuria or polydipsia. · : no hematuria, dysuria, frequency, hesitancy, or nocturia.     · Skin: No itching or rash, no open skin, no unusual lesions   · Psychological: negative for - anxiety, depression, sleep disturbances, suicidal or homicidal ideations     PHQ Screening   3 most recent PHQ Screens 4/11/2022   Little interest or pleasure in doing things Not at all   Feeling down, depressed, irritable, or hopeless Not at all   Total Score PHQ 2 0       History  Past Medical History:   Diagnosis Date    Abnormality of gait and mobility     Ambulatory with use of Wheeled Walker Outside the home (as of 3/05/2021)    Aortic regurgitation 06/25/2018    Mild to Moderate by TTE on 6/25/2018    Arthritis     in [facility]    Asthma Sept. 2018    Asthma Dr. Lane Brock; Questionably Cough-Variant Asthma    Blindness of left eye     2/2 Blood Clot in Left Eye    Bronchiectasis ([address]     CAP (community acquired pneumonia) 3/11/2016    Colon polyps 1-22-20    From colonoscopy     Compression fracture of L5 vertebra ([address]     DVT (deep venous thrombosis) (HCC)     Provoked (Immobility, Spinal Infection) RLE DVT S/P IVC Filter (2016)    Environmental allergies     Female genital prolapse     Former smoker     1-1.5 PPD x6 years; Quit 1970    Glaucoma 2017    [id number]    Grade I diastolic dysfunction 75/71/6976    by TTE on 6/25/2018    Hemoptysis 3/5/2021    History of Clostridium difficile colitis 07/06/2016    History of transfusion 03/2016    Transfused x4 units (ostensibly of) PRBCs over March of 2016    Hypertension October 2018    Dr. Amy Mayen diagnosis    Iron deficiency anemia 03/2016    Kidney stone 07/2019    KidneySXiang kidney-NoActionTaken; Bladder Stones, also    LBBB (left bundle branch block)     Osteoporosis 10/07/2016    Most recently by DEXA Scan on 12/18/2018    Psoas muscle abscess ([address] 03/12/2016    History of Right Psoas Muscle Abscess S/P Drainage    Recurrent UTI [id number]    likely 2/2 Indwelling-Catheter x2 years    Small right kidney     Spinal abscess ([address] 03/2016    Caused paraplegia. patient ambulatory Summer 2018    SVT (supraventricular tachycardia) ([address]     Umbilical hernia 03/86/2818    Supraumbilical Ventral Hernia    Urine retention     Voiding Dysfunction with Hypocontractile Bladder    Uterus prolapse     grade 5.   Surgically corrected 2018    Vaginal candida 2016       Past Surgical History:   Procedure Laterality Date    COLONOSCOPY N/A 2020    COLONOSCOPY performed by Elva Conley MD at [address]      x 4 after having miscarriages.  HX GYN  2018    CYSTOURETHROSCOPY; POSTERIOR COLPORRHAPHY, CYSTOCELE REPAIR; COLPOPEXY VAGINAL INTRA-PERITONEAL APPROACH;    HX TONSILLECTOMY      HX VITRECTOMY Left 2017    x2 (2017, 2017)    VASCULAR SURGERY PROCEDURE UNLIST  2016    IVC filter.         Social History     Socioeconomic History    Marital status:      Spouse name: Not on file    Number of children: Not on file    Years of education: Not on file    Highest education level: Not on file   Occupational History    Not on file   Tobacco Use    Smoking status: Former Smoker     Packs/day: 1.00     Years: 8.00     Pack years: 8.00     Types: Cigarettes     Quit date: 4/15/1970     Years since quittin.0    Smokeless tobacco: Never Used    Tobacco comment: 1-1.5 PPD x7-8 years; Quit    Substance and Sexual Activity    Alcohol use: No     Alcohol/week: 0.0 standard drinks    Drug use: Never    Sexual activity: Not Currently   Other Topics Concern     Service Not Asked    Blood Transfusions Not Asked    Caffeine Concern Not Asked    Occupational Exposure Not Asked    Hobby Hazards Not Asked    Sleep Concern Not Asked    Stress Concern Not Asked    Weight Concern Not Asked    Special Diet Not Asked    Back Care Not Asked    Exercise Not Asked    Bike Helmet Not Asked    Seat Belt Not Asked    Self-Exams Not Asked   Social History Narrative    Not on file     Social Determinants of Health     Financial Resource Strain:     Difficulty of Paying Living Expenses: Not on file   Food Insecurity:     Worried About Running Out of Food in the Last Year: Not on file    Luz of Food in the Last Year: Not on ANTONETTE Casas Needs:     Lack of Transportation (Medical): Not on file    Lack of Transportation (Non-Medical): Not on file   Physical Activity:     Days of Exercise per Week: Not on file    Minutes of Exercise per Session: Not on file   Stress:     Feeling of Stress : Not on file   Social Connections:     Frequency of Communication with Friends and Family: Not on file    Frequency of Social Gatherings with Friends and Family: Not on file    Attends Worship Services: Not on file    Active Member of [address] [place] [address] or Organizations: Not on file    Attends Club or Organization Meetings: Not on file    Marital Status: Not on file   Intimate Partner Violence:     Fear of Current or Ex-Partner: Not on file    Emotionally Abused: Not on file    Physically Abused: Not on file    Sexually Abused: Not on file   Housing Stability:     Unable to Pay for Housing in the Last Year: Not on file    Number of [place] in the Last Year: Not on file    Unstable Housing in the Last Year: Not on file       Allergies   Allergen Reactions    Other Food Cough   [place] Other (comments)     Mucous    [place]hala Magnair Starter [Glycopyrrol-Nebulizer-Accessor] Other (comments)     Blisters on the legs    Mold Other (comments)    Peanut Other (comments)    Pollen Extracts Other (comments)    Sulfamethoprim Nausea Only, Other (comments) and Unknown (comments)     Headache, Joint pain, loss of appetite     Sulfamethoxazole-Trimethoprim Other (comments)       Current Outpatient Medications   Medication Sig Dispense Refill    azithromycin (ZITHROMAX) 250 mg tablet Take 2 tabs today then 1 tab daily for 4 days 6 Tablet 0    doxycycline (MONODOX) 100 mg capsule Take 1 Capsule by mouth two (2) times a day for 10 days. 20 Capsule 0    rOPINIRole (REQUIP) 0.5 mg tablet Take 1 Tablet by mouth nightly.  90 Tablet 1    nystatin (MYCOSTATIN) powder Apply to rash 4 times a day 180 g 2    vitamin B complex (B COMPLEX 1 PO) as directed.  guaiFENesin ER (Mucinex) 600 mg ER tablet Take 600 mg by mouth two (2) times a day.  ferrous gluconate 324 mg (38 mg iron) tablet Take 1 Tablet by mouth daily. 90 Tablet 1    guaiFENesin (ROBITUSSIN) 100 mg/5 mL liquid Take 10 mL by mouth four (4) times daily as needed for Cough. 473 mL 2    montelukast (SINGULAIR) 10 mg tablet TAKE 1 TABLET BY MOUTH DAILY 90 Tablet 1    multivit-min-iron-FA-lutein (Centrum Silver Women) 8 mg iron-400 mcg-300 mcg tab 1 tab(s)      calcium citrate 250 mg calcium tab tablet 1 tab(s)      loratadine (CLARITIN) 10 mg tablet Take 1 Tablet by mouth daily. 90 Tablet 1    Spiriva Respimat 1.25 mcg/actuation inhaler INHALE 2 PUFFS BY MOUTH DAILY      albuterol (PROVENTIL HFA, VENTOLIN HFA, PROAIR HFA) 90 mcg/actuation inhaler Take 2 Puffs by inhalation every four (4) hours as needed for Cough. 1 Inhaler 5    Lactobac no.41/Bifidobact no.7 (PROBIOTIC-10 PO) Take 1 Tab by mouth daily.  multivitamin (ONE A DAY) tablet Take 1 Tab by mouth daily.  calcium-cholecalciferol, d3, (CALCIUM 600 + D) 600-125 mg-unit tab Take 2 Tabs by mouth.  cholecalciferol (VITAMIN D3) (5000 Units/125 mcg) tab tablet Take  by mouth daily.  latanoprost (XALATAN) 0.005 % ophthalmic solution Administer 1 Drop to both eyes nightly.  gabapentin (NEURONTIN) 100 mg capsule TAKE 2 CAPSULES BY MOUTH THREE TIMES DAILY (Patient taking differently: two (2) times daily (with meals).  Take one tablet twice a day) 540 Capsule 0     Patient Care Team:  Patient Care Team:  Collette Pao, NP as PCP - General (Nurse Practitioner)  Collette Pao, NP as PCP - [address] [place] [address]  [facility] Provider  Gina Kohler MD as Physician (Urology)  Guerline Goncalves MD as Physician (Obstetrics & Gynecology)  Manuel Larsen NP as Nurse Practitioner (Nurse Practitioner)  Marlyn Zamora DPM as Physician (Podiatry)  Ofelia Kaur MD as Physician (Neurosurgery)  Sheryl Colorado MD as Physician (Infectious Disease)  Mara Gao MD as Physician (Vascular Surgery)  Sylvia Bravo MD (Pulmonary Disease)  Zulma Young MD (Cardiology)    Physical Exam  Patient appears well, she is pleasant, alert, oriented x 3, in no distress. ENT normal.  Neck supple. No adenopathy or thyromegaly. LAVELL. Lungs are clear, good air entry, + for expiratory right low lung, wheezes, negative rhonchi or rales. Cardiovascular, S1 and S2 normal, no murmurs, regular rate and rhythm. No LAD. Chest wall negative for tenderness  Abdomen is soft without tenderness  /Anorectal, deferred. Muscleskeletal, no swelling  Extremities show no edema  Neurological is normal without focal findings. Skin: no concerning lesions. Psych: normal affect. Mood good. Oriented x 3. Judgement and insight intact. Vitals:    04/11/22 1306 04/11/22 1308 04/11/22 1332   BP: (!) 105/49 (!) 109/47 (!) 119/57   Pulse: 72  76   Resp: 16     Temp: 97.6 °F (36.4 °C)     SpO2: 94%     Weight: 101 lb 9.6 oz (46.1 kg)     Height: 5' (1.524 m)     PainSc:   0 - No pain         Assessment and Plan    Diagnoses and all orders for this visit:    Bronchiectasis with acute exacerbation (HCC)  -     azithromycin (ZITHROMAX) 250 mg tablet; Take 2 tabs today then 1 tab daily for 4 days, Normal, Disp-6 Tablet, R-0  -     XR CHEST PA LAT; Future    *Plan of care reviewed with patient. Patient in agreement with plan and expresses understanding. All questions answered and patient encouraged to call or RTO if further questions or concerns. 50% of 29 minutes spent on counseling and managing her care. Follow-up and Dispositions    · Return in about 2 weeks (around 4/25/2022) for for results chest xray and completion of antibiotic .        EFRAIN Ring

## 2022-04-11 NOTE — PROGRESS NOTES
Edmar Honeycutt is a 64 y.o. female (: 1945) presenting to address:    Chief Complaint   Patient presents with    Follow-up    URI     pt still having sxs        Vitals:    22 1306   BP: (!) 105/49   Pulse: 72   Resp: 16   Temp: 97.6 °F (36.4 °C)   SpO2: 94%   Weight: 101 lb 9.6 oz (46.1 kg)   Height: 5' (1.524 m)   PainSc:   0 - No pain       Hearing/Vision:   No exam data present    Learning Assessment:     Learning Assessment 2021   PRIMARY LEARNER Patient   HIGHEST LEVEL OF EDUCATION - PRIMARY LEARNER  GRADUATED HIGH SCHOOL OR GED   PRIMARY LANGUAGE ENGLISH   LEARNER PREFERENCE PRIMARY VIDEOS   ANSWERED BY Patient   RELATIONSHIP SELF     Depression Screening:     3 most recent PHQ Screens 2022   Little interest or pleasure in doing things Not at all   Feeling down, depressed, irritable, or hopeless Not at all   Total Score PHQ 2 0     Fall Risk Assessment:     Fall Risk Assessment, last 12 mths 2022   Able to walk? Yes   Fall in past 12 months? 0   Do you feel unsteady? 0   Are you worried about falling 0   Is TUG test greater than 12 seconds? -   Is the gait abnormal? -     Abuse Screening:     Abuse Screening Questionnaire 2022   Do you ever feel afraid of your partner? N   Are you in a relationship with someone who physically or mentally threatens you? N   Is it safe for you to go home? Y     ADL Assessment:   No flowsheet data found. Coordination of Care Questionaire:   1. \"Have you been to the ER, urgent care clinic since your last visit? Hospitalized since your last visit? \" No    2. \"Have you seen or consulted any other health care providers outside of the [address] [place] [address] since your last visit? \" No     3. For patients aged 39-70: Has the patient had a colonoscopy? NA - based on age     If the patient is female:    4. For patients aged 41-77: Has the patient had a mammogram within the past 2 years? NA - based on age    11.  For patients aged 21-65: Has the patient had a pap smear? NA - based on age    Advanced Directive:   1. Do you have an Advanced Directive? YES    2. Would you like information on Advanced Directives?  NO

## 2022-04-20 ENCOUNTER — TELEPHONE (OUTPATIENT)
Dept: FAMILY MEDICINE CLINIC | Age: 77
End: 2022-04-20

## 2022-04-20 NOTE — TELEPHONE ENCOUNTER
[address] [facility] to make appointment for patient to have xray. Waited on the phone for 10 min, know one ever picked up was unable to make appointment.

## 2022-04-22 ENCOUNTER — OFFICE VISIT (OUTPATIENT)
Dept: FAMILY MEDICINE CLINIC | Age: 77
End: 2022-04-22
Payer: MEDICARE

## 2022-04-22 VITALS
HEART RATE: 75 BPM | RESPIRATION RATE: 16 BRPM | BODY MASS INDEX: 19.83 KG/M2 | SYSTOLIC BLOOD PRESSURE: 136 MMHG | WEIGHT: 101 LBS | DIASTOLIC BLOOD PRESSURE: 59 MMHG | OXYGEN SATURATION: 94 % | HEIGHT: 60 IN | TEMPERATURE: 98.1 F

## 2022-04-22 DIAGNOSIS — Z87.09: Primary | ICD-10-CM

## 2022-04-22 PROCEDURE — 1101F PT FALLS ASSESS-DOCD LE1/YR: CPT | Performed by: NURSE PRACTITIONER

## 2022-04-22 PROCEDURE — 99213 OFFICE O/P EST LOW 20 MIN: CPT | Performed by: NURSE PRACTITIONER

## 2022-04-22 PROCEDURE — G9717 DOC PT DX DEP/BP F/U NT REQ: HCPCS | Performed by: NURSE PRACTITIONER

## 2022-04-22 PROCEDURE — G8399 PT W/DXA RESULTS DOCUMENT: HCPCS | Performed by: NURSE PRACTITIONER

## 2022-04-22 PROCEDURE — G8427 DOCREV CUR MEDS BY ELIG CLIN: HCPCS | Performed by: NURSE PRACTITIONER

## 2022-04-22 PROCEDURE — 1090F PRES/ABSN URINE INCON ASSESS: CPT | Performed by: NURSE PRACTITIONER

## 2022-04-22 PROCEDURE — G8420 CALC BMI NORM PARAMETERS: HCPCS | Performed by: NURSE PRACTITIONER

## 2022-04-22 PROCEDURE — G8536 NO DOC ELDER MAL SCRN: HCPCS | Performed by: NURSE PRACTITIONER

## 2022-04-22 NOTE — PROGRESS NOTES
Gabe Alicea is a 64 y.o. female (: 1945) presenting to address:    Chief Complaint   Patient presents with    Results       Vitals:    22 1304   BP: (!) 136/59   Pulse: 75   Resp: 16   Temp: 98.1 °F (36.7 °C)   SpO2: 94%   Weight: 101 lb (45.8 kg)   Height: 5' (1.524 m)       Hearing/Vision:   No exam data present    Learning Assessment:     Learning Assessment 2021   PRIMARY LEARNER Patient   HIGHEST LEVEL OF EDUCATION - PRIMARY LEARNER  GRADUATED HIGH SCHOOL OR GED   PRIMARY LANGUAGE ENGLISH   LEARNER PREFERENCE PRIMARY VIDEOS   ANSWERED BY Patient   RELATIONSHIP SELF     Depression Screening:     3 most recent PHQ Screens 2022   Little interest or pleasure in doing things Not at all   Feeling down, depressed, irritable, or hopeless Not at all   Total Score PHQ 2 0     Fall Risk Assessment:     Fall Risk Assessment, last 12 mths 2022   Able to walk? Yes   Fall in past 12 months? 0   Do you feel unsteady? 0   Are you worried about falling 0   Is TUG test greater than 12 seconds? -   Is the gait abnormal? -     Abuse Screening:     Abuse Screening Questionnaire 2022   Do you ever feel afraid of your partner? N   Are you in a relationship with someone who physically or mentally threatens you? N   Is it safe for you to go home? Y     ADL Assessment:   No flowsheet data found. Coordination of Care Questionaire:   1. \"Have you been to the ER, urgent care clinic since your last visit? Hospitalized since your last visit? \" No    2. \"Have you seen or consulted any other health care providers outside of the [address] [place] [address] since your last visit? \" No     3. For patients aged 39-70: Has the patient had a colonoscopy? NA - based on age     If the patient is female:    4. For patients aged 41-77: Has the patient had a mammogram within the past 2 years? NA - based on age    11. For patients aged 21-65: Has the patient had a pap smear? NA - based on age    Advanced Directive:   1. Do you have an Advanced Directive? YES    2. Would you like information on Advanced Directives?  NO

## 2022-04-22 NOTE — PROGRESS NOTES
OFFICE NOTE    Rossi Miller is a 64 y.o. female presenting today for office visit. 4/21/2022  9:22 AM    Chief Complaint   Patient presents with    Results       HPI:   Patient in office following up related to bronchiectasis exacerbation. Patient looks well and she says she feels okay. Patient appears to be less short of breath than previously. Patient says she is using her albuterol with the spacer and noticed improvement with each dose and her breathing. Patient wanting to know if she could use her albuterol before she exerts herself. I advised yes she can use it as needed but not more than the prescription says. Patient purchased a spirometer as advised and feels like it is helping with her chronic lung condition and coughing up mucus. Patient had recent chest x-ray similar to her previous x-ray in 2/2022. She sees Dr. Isabel Diaz next month related to her bronchiectasis. Patient has an appointment with allergy related to suspected food allergy items. Patient would like to gain weight but feels she is so allergic to many foods it is difficult for her to put on weight because of her limited diet. Patient reports appetite is good, no urinary issues, sleeps well and regular bowel movements. Patient denies fever, chills, chest pain, shortness of breath, abdomen pain or dark tarry stool. ROS:    · General: negative for - chills, fever, weight changes or malaise  · HEENT: no sore throat, nasal congestion, vision problems or ear problems  · Respiratory: no cough, shortness of breath, or wheezing  · Cardiovascular: no chest pain, palpitations, or dyspnea on exertion  · Gastrointestinal: no abdominal pain, N/V, change in bowel habits, or black or bloody stools  · Musculoskeletal: no back pain, joint pain, joint stiffness, muscle pain or muscle weakness  · Neurological: no numbness, tingling, headache or dizziness  · Endo:  No polyuria or polydipsia.    · : no hematuria, dysuria, frequency, hesitancy, or nocturia.     · Skin: No itching or rash, no open skin, no unusual lesions   · Psychological: negative for - anxiety, depression, sleep disturbances, suicidal or homicidal ideations     PHQ Screening   3 most recent PHQ Screens 4/22/2022   Little interest or pleasure in doing things Not at all   Feeling down, depressed, irritable, or hopeless Not at all   Total Score PHQ 2 0       History  Past Medical History:   Diagnosis Date    Abnormality of gait and mobility     Ambulatory with use of Wheeled Walker Outside the home (as of 3/05/2021)    Aortic regurgitation 06/25/2018    Mild to Moderate by TTE on 6/25/2018    Arthritis     [facility]    Asthma Sept. 2018    Asthma Dr. Imelda Ely; Questionably Cough-Variant Asthma    Blindness of left eye     2/2 Blood Clot in Left Eye    Bron[facility]ctasis ([facility]     CAP (community acquired pneumonia) 3/11/2016    Colon polyps 1-22-20    From colonoscopy     Compression fracture of L5 vertebra ([facility]     DVT (deep venous thrombosis) (HCC)     Provoked (Immobility, Spinal Infection) RLE DVT S/P IVC Filter (2016)    Environmental allergies     Female genital prolapse     Former smoker     1-1.5 PPD x6 years; Quit 1970    Glaucoma 2017    MuIdypdoriCksqometffdjgHqBmwwmghfHdoXmymfajkBxmsrZxaga3WTMZ    Grade I diastolic dysfunction 25/19/6441    by TTE on 6/25/2018    Hemoptysis 3/5/2021    History of Clostridium difficile colitis 07/06/2016    History of transfusion 03/2016    Transfused x4 units (ostensibly of) PRBCs over March of 2016    Hypertension October 2018    Dr. Pradhan Sirdavon diagnosis    Iron deficiency anemia 03/2016    Kidney stone 07/2019    -KidneyStoneMidLedeepika kidney-NoActionTaken; Bladder Stones, also    LBBB (left bundle branch block)     Osteoporosis 10/07/2016    Most recently by DEXA Scan on 12/18/2018    Psoas muscle abscess ([facility] 03/12/2016    History of Right Psoas Muscle Abscess S/P Drainage  Recurrent UTI [id number]    likely 2/2 Indwelling-Catheter x2 years    Small right kidney     Spinal abscess ([address] 2016    Caused paraplegia. patient ambulatory Summer 2018    SVT (supraventricular tachycardia) ([address]     Umbilical hernia     Supraumbilical Ventral Hernia    Urine retention     Voiding Dysfunction with Hypocontractile Bladder    Uterus prolapse     grade 5. Surgically corrected 2018    Vaginal candida 2016       Past Surgical History:   Procedure Laterality Date    COLONOSCOPY N/A 2020    COLONOSCOPY performed by Joseluis Lo MD at [address] [facility]      x 4 after having miscarriages.  HX GYN  2018    CYSTOURETHROSCOPY; POSTERIOR COLPORRHAPHY, CYSTOCELE REPAIR; COLPOPEXY VAGINAL INTRA-PERITONEAL APPROACH;    HX TONSILLECTOMY      HX VITRECTOMY Left 2017    x2 (2017, 2017)    VASCULAR SURGERY PROCEDURE UNLIST  2016    IVC filter.         Social History     Socioeconomic History    Marital status:      Spouse name: Not on file    Number of children: Not on file    Years of education: Not on file    Highest education level: Not on file   Occupational History    Not on file   Tobacco Use    Smoking status: Former Smoker     Packs/day: 1.00     Years: 8.00     Pack years: 8.00     Types: Cigarettes     Quit date: 4/15/1970     Years since quittin.0    Smokeless tobacco: Never Used    Tobacco comment: 1-1.5 PPD x7-8 years; Quit    Substance and Sexual Activity    Alcohol use: No     Alcohol/week: 0.0 standard drinks    Drug use: Never    Sexual activity: Not Currently   Other Topics Concern     Service Not Asked    Blood Transfusions Not Asked    Caffeine Concern Not Asked    Occupational Exposure Not Asked    Hobby Hazards Not Asked    Sleep Concern Not Asked    Stress Concern Not Asked    Weight Concern Not Asked    Special Diet Not Asked    Back Care Not Asked    Exercise Not Asked    Bike Helmet Not Asked   [address] Not Asked    Self-Exams Not Asked   Social History Narrative    Not on file     Social Determinants of Health     Financial Resource Strain:     Difficulty of Paying Living Expenses: Not on file   Food Insecurity:     Worried About Running Out of Food in the Last Year: Not on file    Luz of Food in the Last Year: Not on file   Transportation Needs:     Lack of Transportation (Medical): Not on file    Lack of Transportation (Non-Medical):  Not on file   Physical Activity:     Days of Exercise per Week: Not on file    Minutes of Exercise per Session: Not on file   Stress:     Feeling of Stress : Not on file   Social Connections:     Frequency of Communication with Friends and Family: Not on file    Frequency of Social Gatherings with Friends and Family: Not on file    Attends Roman [employer] Services: Not on file    Active Member of [address] [place] [address] or Organizations: Not on file    Attends Club or Organization Meetings: Not on file    Marital Status: Not on file   Intimate Partner Violence:     Fear of Current or Ex-Partner: Not on file    Emotionally Abused: Not on file    Physically Abused: Not on file    Sexually Abused: Not on file   Housing Stability:     Unable to Pay for Housing in the Last Year: Not on file    Number of [place] in the Last Year: Not on file    Unstable Housing in the Last Year: Not on file       Allergies   Allergen Reactions    Other Food Cough   [place] Other (comments)     Mucous    Lonhala Magnair Starter [Glycopyrrol-Nebulizer-Accessor] Other (comments)     Blisters on the legs    Mold Other (comments)    Peanut Other (comments)    Pollen Extracts Other (comments)    Sulfamethoprim Nausea Only, Other (comments) and Unknown (comments)     Headache, Joint pain, loss of appetite     Sulfamethoxazole-Trimethoprim Other (comments)       Current Outpatient Medications   Medication Sig Dispense Refill    rOPINIRole (REQUIP) 0.5 mg tablet Take 1 Tablet by mouth nightly. 90 Tablet 1    nystatin (MYCOSTATIN) powder Apply to rash 4 times a day 180 g 2    vitamin B complex (B COMPLEX 1 PO) as directed.  guaiFENesin ER (Mucinex) 600 mg ER tablet Take 600 mg by mouth two (2) times a day.  ferrous gluconate 324 mg (38 mg iron) tablet Take 1 Tablet by mouth daily. 90 Tablet 1    guaiFENesin (ROBITUSSIN) 100 mg/5 mL liquid Take 10 mL by mouth four (4) times daily as needed for Cough. 473 mL 2    montelukast (SINGULAIR) 10 mg tablet TAKE 1 TABLET BY MOUTH DAILY 90 Tablet 1    multivit-min-iron-FA-lutein (Centrum Silver Women) 8 mg iron-400 mcg-300 mcg tab 1 tab(s)      calcium citrate 250 mg calcium tab tablet 1 tab(s)      loratadine (CLARITIN) 10 mg tablet Take 1 Tablet by mouth daily. 90 Tablet 1    Spiriva Respimat 1.25 mcg/actuation inhaler INHALE 2 PUFFS BY MOUTH DAILY      albuterol (PROVENTIL HFA, VENTOLIN HFA, PROAIR HFA) 90 mcg/actuation inhaler Take 2 Puffs by inhalation every four (4) hours as needed for Cough. 1 Inhaler 5    Lactobac no.41/Bifidobact no.7 (PROBIOTIC-10 PO) Take 1 Tab by mouth daily.  multivitamin (ONE A DAY) tablet Take 1 Tab by mouth daily.  calcium-cholecalciferol, d3, (CALCIUM 600 + D) 600-125 mg-unit tab Take 2 Tabs by mouth.  cholecalciferol (VITAMIN D3) (5000 Units/125 mcg) tab tablet Take  by mouth daily.  latanoprost (XALATAN) 0.005 % ophthalmic solution Administer 1 Drop to both eyes nightly.  gabapentin (NEURONTIN) 100 mg capsule TAKE 2 CAPSULES BY MOUTH THREE TIMES DAILY (Patient taking differently: two (2) times daily (with meals).  Take one tablet twice a day) 540 Capsule 0       Patient Care Team:  Patient Care Team:  Malorie Wong NP as PCP - General (Nurse Practitioner)  Malorie Wong NP as PCP - [facility] Empaneled Provider  Shayne Bowen MD as Physician (Urology)  Reba Aldridge MD as Physician (Obstetrics & Gynecology)  Nila Vasques NP as Nurse Practitioner (Nurse Practitioner)  Jamey Macdonald DPM as Physician (Podiatry)  James Brady MD as Physician (Neurosurgery)  Colin Koch MD as Physician (Infectious Disease)  Berta Herrera MD as Physician (Vascular Surgery)  Meggan Randle MD (Pulmonary Disease)  Guilherme Lopez MD (Cardiology)    RADIOLOGY:  2/5/2022 chest x-ray PA and lateral  IMPRESSION   Similar consolidations right middle lobe and lingula. 4/19/2022 chest x-ray PA and lateral  IMPRESSION  Chronic middle lobe and lingular infiltrates with mild bronchiectasis. Emphysematous changes. Physical Exam  Patient appears well, she is pleasant, alert, oriented x 3, in no distress. ENT normal.  Neck supple. No adenopathy or thyromegaly. LAVELL. Lungs are clear, good air entry, no wheezes, rhonchi or rales. Cardiovascular, S1 and S2 normal, no murmurs, regular rate and rhythm. No LAD. Chest wall negative for tenderness  Abdomen is soft without tenderness  /Anorectal, deferred. Muscleskeletal, no swelling  Extremities show no edema  Neurological is normal without focal findings. Skin: no concerning lesions. Psych: normal affect. Mood good. Oriented x 3. Judgement and insight intact. Vitals:    04/22/22 1304   BP: (!) 136/59   Pulse: 75   Resp: 16   Temp: 98.1 °F (36.7 °C)   SpO2: 94%   Weight: 101 lb (45.8 kg)   Height: 5' (1.524 m)       Assessment and Plan    Diagnoses and all orders for this visit:    Personal history of bronchiectasis    *Plan of care reviewed with patient. Patient in agreement with plan and expresses understanding. All questions answered and patient encouraged to call or RTO if further questions or concerns. 50% of 29 minutes spent on counseling and managing her care. Follow-up and Dispositions    · Return in about 3 months (around 7/22/2022), or if symptoms worsen or fail to improve.        EFRAIN Tomlinson

## 2022-05-05 ENCOUNTER — OFFICE VISIT (OUTPATIENT)
Dept: CARDIOLOGY CLINIC | Age: 77
End: 2022-05-05
Payer: MEDICARE

## 2022-05-05 VITALS
TEMPERATURE: 98.7 F | OXYGEN SATURATION: 92 % | BODY MASS INDEX: 19.14 KG/M2 | HEART RATE: 69 BPM | WEIGHT: 98 LBS | DIASTOLIC BLOOD PRESSURE: 59 MMHG | SYSTOLIC BLOOD PRESSURE: 116 MMHG

## 2022-05-05 DIAGNOSIS — I44.7 LBBB (LEFT BUNDLE BRANCH BLOCK): Primary | ICD-10-CM

## 2022-05-05 DIAGNOSIS — R94.31 ABNORMAL EKG: ICD-10-CM

## 2022-05-05 PROCEDURE — 1090F PRES/ABSN URINE INCON ASSESS: CPT | Performed by: INTERNAL MEDICINE

## 2022-05-05 PROCEDURE — G8428 CUR MEDS NOT DOCUMENT: HCPCS | Performed by: INTERNAL MEDICINE

## 2022-05-05 PROCEDURE — G8536 NO DOC ELDER MAL SCRN: HCPCS | Performed by: INTERNAL MEDICINE

## 2022-05-05 PROCEDURE — 99213 OFFICE O/P EST LOW 20 MIN: CPT | Performed by: INTERNAL MEDICINE

## 2022-05-05 PROCEDURE — G9717 DOC PT DX DEP/BP F/U NT REQ: HCPCS | Performed by: INTERNAL MEDICINE

## 2022-05-05 PROCEDURE — G8399 PT W/DXA RESULTS DOCUMENT: HCPCS | Performed by: INTERNAL MEDICINE

## 2022-05-05 PROCEDURE — G8420 CALC BMI NORM PARAMETERS: HCPCS | Performed by: INTERNAL MEDICINE

## 2022-05-05 PROCEDURE — 1101F PT FALLS ASSESS-DOCD LE1/YR: CPT | Performed by: INTERNAL MEDICINE

## 2022-05-05 NOTE — PROGRESS NOTES
Identified pt with two pt identifiers(name and ). Reviewed record in preparation for visit and have obtained necessary documentation. Heidy Nunez presents today for   Chief Complaint   Patient presents with    Follow-up     9m       Pt c/o SOB. Heidy Nunez preferred language for health care discussion is english/other. Personal Protective Equipment:   Personal Protective Equipment was used including: mask-surgical and hands-gloves. Patient was placed on no precaution(s). Patient was masked. Precautions:   Patient currently on None  Patient currently roomed with door closed. Is someone accompanying this pt? sister    Is the patient using any DME equipment during OV?walker    Depression Screening:  3 most recent PHQ Screens 2022   Little interest or pleasure in doing things Not at all   Feeling down, depressed, irritable, or hopeless Not at all   Total Score PHQ 2 0       Learning Assessment:  Learning Assessment 2021   PRIMARY LEARNER Patient   HIGHEST LEVEL OF EDUCATION - PRIMARY LEARNER  GRADUATED HIGH SCHOOL OR GED   PRIMARY LANGUAGE ENGLISH   LEARNER PREFERENCE PRIMARY VIDEOS   ANSWERED BY Patient   RELATIONSHIP SELF       Abuse Screening:  Abuse Screening Questionnaire 2022   Do you ever feel afraid of your partner? N   Are you in a relationship with someone who physically or mentally threatens you? N   Is it safe for you to go home? Y       Fall Risk  Fall Risk Assessment, last 12 mths 2022   Able to walk? Yes   Fall in past 12 months? 0   Do you feel unsteady? 0   Are you worried about falling 0   Is TUG test greater than 12 seconds? -   Is the gait abnormal? -       Pt currently taking Anticoagulant therapy? no  Pt currently taking Antiplatelet therapy? no    Coordination of Care:  1. Have you been to the ER, urgent care clinic since your last visit? Hospitalized since your last visit? yes    2.  Have you seen or consulted any other health care providers outside of the [address] since your last visit? Include any pap smears or colon screening. no      Please see Red banners under Allergies and Med Rec to remove outside inquires. All correct information has been verified with patient and added to chart.      Medication's patient's would liked removed has been marked not taking to be removed per Verbal order and read back per Meryle Ran, MD

## 2022-05-05 NOTE — LETTER
5/5/2022    Patient: Gabriele De Luna   YOB: 1945   Date of Visit: 5/5/2022     Young Hager NP  [address] [address]  [place]    Dear Young Hager NP,      Thank you for referring Ms. Gabriele De Luna to [facility] for evaluation. My notes for this consultation are attached. If you have questions, please do not hesitate to call me. I look forward to following your patient along with you.       Sincerely,    Morales Chen MD

## 2022-05-05 NOTE — PROGRESS NOTES
[facility]    Ms. Madison Sultana is a 64 y.o. female with a history of left bundle branch block, uterine prolapse, other multiple medical problems. Ms. Madison Sultana is here today for follow-up appointment. She denies any chest pain or chest tightness. She unfortunately continues to have excessive coughing because of her bronchiectasis. This is being managed by Dr. Le Jay. She denies presyncope or syncope. She has dyspnea because of bronchiectasis. She uses 1 pillow at night.   No significant edema    Past Medical History:   Diagnosis Date    Abnormality of gait and mobility     Ambulatory with use of Wheeled Walker Outside the home (as of 3/05/2021)    Aortic regurgitation 06/25/2018    Mild to Moderate by TTE on 6/25/2018    [facility]    Asthma Sept. 2018    Asthma Dr. Niko Salguero; Questionably Cough-Variant Asthma    Blindness of left eye     2/2 Blood Clot in Left Eye    Bronchiectasis ([address]     CAP (community acquired pneumonia) 3/11/2016    Colon polyps 1-22-20    From colonoscopy     Compression fracture of L5 vertebra ([address]     DVT (deep venous thrombosis) (HCC)     Provoked (Immobility, Spinal Infection) RLE DVT S/P IVC Filter (2016)    Environmental allergies     Female genital prolapse     Former smoker     1-1.5 PPD x6 years; Quit 1970    Glaucoma 2017    FrPniiixvqByifglqeqvfbkMjEiswoumlYtdHxxpghhsJievpZtaxu3KCKJ    Grade I diastolic dysfunction 96/88/7515    by TTE on 6/25/2018    Hemoptysis 3/5/2021    History of Clostridium difficile colitis 07/06/2016    History of transfusion 03/2016    Transfused x4 units (ostensibly of) PRBCs over March of 2016    Hypertension October 2018    Dr. Maury Wesley diagnosis    Iron deficiency anemia 03/2016    Kidney stone 07/2019    -KidneyStonMichel kidney-NoActionTaken; Bladder Stones, also    LBBB (left bundle branch block)     Osteoporosis 10/07/2016    Most recently by DEXA Scan on 12/18/2018    Psoas muscle abscess ([facility] 03/12/2016    History of Right Psoas Muscle Abscess S/P Drainage    Recurrent UTI [id number]    likely 2/2 Indwelling-Catheter x2 years    Small right kidney     Spinal abscess ([address] 03/2016    Caused paraplegia. patient ambulatory Summer 2018    SVT (supraventricular tachycardia) ([facility]     Umbilical hernia 70/75/9291    Supraumbilical Ventral Hernia    Urine retention     Voiding Dysfunction with Hypocontractile Bladder    Uterus prolapse     grade 5. Surgically corrected 7/2018    Vaginal candida 03/16/2016         Past Surgical History:   Procedure Laterality Date    COLONOSCOPY N/A 1/22/2020    COLONOSCOPY performed by Den Cordero MD at [address] [facility]      x 4 after having miscarriages.  HX GYN  07/2018    CYSTOURETHROSCOPY; POSTERIOR COLPORRHAPHY, CYSTOCELE REPAIR; COLPOPEXY VAGINAL INTRA-PERITONEAL APPROACH;    HX TONSILLECTOMY      HX VITRECTOMY Left 2017    x2 (4/20/2017, 7/20/2017)    VASCULAR SURGERY PROCEDURE UNLIST  march 2016    IVC filter. Current Outpatient Medications   Medication Sig    rOPINIRole (REQUIP) 0.5 mg tablet Take 1 Tablet by mouth nightly.  nystatin (MYCOSTATIN) powder Apply to rash 4 times a day    vitamin B complex (B COMPLEX 1 PO) as directed.  guaiFENesin ER (Mucinex) 600 mg ER tablet Take 600 mg by mouth two (2) times a day.  ferrous gluconate 324 mg (38 mg iron) tablet Take 1 Tablet by mouth daily.  guaiFENesin (ROBITUSSIN) 100 mg/5 mL liquid Take 10 mL by mouth four (4) times daily as needed for Cough.  montelukast (SINGULAIR) 10 mg tablet TAKE 1 TABLET BY MOUTH DAILY    multivit-min-iron-FA-lutein (Centrum Silver Women) 8 mg iron-400 mcg-300 mcg tab 1 tab(s)    calcium citrate 250 mg calcium tab tablet 1 tab(s)    loratadine (CLARITIN) 10 mg tablet Take 1 Tablet by mouth daily.     gabapentin (NEURONTIN) 100 mg capsule TAKE 2 CAPSULES BY MOUTH THREE TIMES DAILY (Patient taking differently: two (2) times daily (with meals). Take one tablet twice a day)    Spiriva Respimat 1.25 mcg/actuation inhaler INHALE 2 PUFFS BY MOUTH DAILY    albuterol (PROVENTIL HFA, VENTOLIN HFA, PROAIR HFA) 90 mcg/actuation inhaler Take 2 Puffs by inhalation every four (4) hours as needed for Cough.  Lactobac no.41/Bifidobact no.7 (PROBIOTIC-10 PO) Take 1 Tab by mouth daily.  multivitamin (ONE A DAY) tablet Take 1 Tab by mouth daily.  calcium-cholecalciferol, d3, (CALCIUM 600 + D) 600-125 mg-unit tab Take 2 Tabs by mouth.  cholecalciferol (VITAMIN D3) (5000 Units/125 mcg) tab tablet Take  by mouth daily.  latanoprost (XALATAN) 0.005 % ophthalmic solution Administer 1 Drop to both eyes nightly. No current facility-administered medications for this visit.        Allergies and Sensitivities:  Allergies   Allergen Reactions    Other Food Cough    Milk Containing Products Other (comments)     Mucous    Lonhala Magnair Starter [Glycopyrrol-Nebulizer-Accessor] Other (comments)     Blisters on the legs    Mold Other (comments)    Peanut Other (comments)    Pollen Extracts Other (comments)    Sulfamethoprim Nausea Only, Other (comments) and Unknown (comments)     Headache, Joint pain, loss of appetite     Sulfamethoxazole-Trimethoprim Other (comments)       Family History:  Family History   Problem Relation Age of Onset    Cancer Mother         Stomach Cancer-     Heart Disease Mother         [email or web address]    Cancer Father         Stomach Cancer-     Asthma Father        Social History:  Social History     Tobacco Use    Smoking status: Former Smoker     Packs/day: 1.00     Years: 8.00     Pack years: 8.00     Types: Cigarettes     Quit date: 4/15/1970     Years since quittin.0    Smokeless tobacco: Never Used    Tobacco comment: 1-1.5 PPD x7-8 years; Quit 1970   Substance Use Topics    Alcohol use: No     Alcohol/week: 0.0 standard drinks    Drug use: Never     She  reports that she quit smoking about 52 years ago. Her smoking use included cigarettes. She has a 8.00 pack-year smoking history. She has never used smokeless tobacco.  She  reports no history of alcohol use. Review of Systems:  Cardiac symptoms as noted above in HPI. All others negative. Physical Exam:  BP Readings from Last 3 Encounters:   05/05/22 (!) 116/59   04/22/22 (!) 136/59   04/11/22 (!) 119/57         Pulse Readings from Last 3 Encounters:   05/05/22 69   04/22/22 75   04/11/22 76          Wt Readings from Last 3 Encounters:   05/05/22 44.5 kg (98 lb)   04/22/22 45.8 kg (101 lb)   04/11/22 46.1 kg (101 lb 9.6 oz)       Constitutional: Oriented to person, place, and time. HENT: Head: Normocephalic and atraumatic. Neck: No JVD present. Carotid bruit is not appreciated. Cardiovascular: Regular rhythm. No murmur, gallop or rubs appreciated  Lung: Breath sounds normal. No respiratory distress. No ronchi or rales appreciated  Abdominal: No tenderness. No rebound and no guarding. Musculoskeletal: There is no lower extremity edema.     Review of Data  LABS:   Lab Results   Component Value Date/Time    Sodium 139 03/28/2022 01:15 PM    Potassium 3.8 03/28/2022 01:15 PM    Chloride 102 03/28/2022 01:15 PM    CO2 30 03/28/2022 01:15 PM    Glucose 80 03/28/2022 01:15 PM    BUN 30 (H) 03/28/2022 01:15 PM    Creatinine 1.05 03/28/2022 01:15 PM     Lipids Latest Ref Rng & Units 4/29/2019   Chol, Total <200 MG/   HDL 40 - 60 MG/DL 95(H)   LDL 0 - 100 MG/DL 82.2   Trig <150 MG/DL 64   Chol/HDL Ratio 0 - 5.0   2.0   Some recent data might be hidden     Lab Results   Component Value Date/Time    ALT (SGPT) 23 03/28/2022 01:15 PM     No results found for: HBA1C, NNM3LMRF, XOA7CGNM, GKP5JENX    EKG  (2018) LBBB, sinus rhythm    ECHO (04/2021)  Normal left heart chamber sizes and left ventricular wall thickness.     * Left ventricular systolic function is normal with an ejection fraction of 65 % by Bragg's biplane.     * The left ventricular diastolic function is indeterminate.     * Left ventricular segmental wall motion is normal.     * Abnormal interventricular septal motion.     * Mildly dilated right heart chambers with preserved ventricular systolic function.     * Sclerotic aortic valve without stenosis.     * Mild aortic, mitral, and tricuspid regurgitation.     * Normal inferior vena cava with >50% collapse upon inspiration. Right atrial pressure, 3 mmHg.     * Estimated pulmonary arterial systolic pressure is 35 mmHg.     * No mass, shunts, or thrombi. IMPRESSION & PLAN:  Ms. Jorge Martin is a 64 y.o. female with multiple medical problems. Left bundle branch block:    Chronic and asymptomatic   Echo with normal EF in 2018 and again in 04/2021    Currently patient does not appear to have any symptoms that is concerning for angina or heart failure. There is no evidence of fluid overload on exam.    Thank you for allowing me to participate in patient care. Please feel free to call me if you have any question or concern. Jordi Francis MD  Please note: This document has been produced using voice recognition software. Unrecognized errors in transcription may be present.

## 2022-05-26 ENCOUNTER — HOSPITAL ENCOUNTER (OUTPATIENT)
Dept: LAB | Age: 77
Discharge: HOME | End: 2022-05-26
Payer: MEDICARE

## 2022-05-26 PROCEDURE — 87116 MYCOBACTERIA CULTURE: CPT

## 2022-06-15 DIAGNOSIS — J30.89 NON-SEASONAL ALLERGIC RHINITIS DUE TO OTHER ALLERGIC TRIGGER: ICD-10-CM

## 2022-06-16 RX ORDER — LORATADINE 10 MG/1
10 TABLET ORAL DAILY
Qty: 90 TABLET | Refills: 1 | Status: SHIPPED | OUTPATIENT
Start: 2022-06-16 | End: 2022-10-06 | Stop reason: SDUPTHER

## 2022-07-11 ENCOUNTER — APPOINTMENT (OUTPATIENT)
Dept: INFUSION THERAPY | Age: 77
End: 2022-07-11

## 2022-07-11 LAB
ACID FAST STN SPEC: NEGATIVE
ACID FAST STN SPEC: NEGATIVE
MYCOBACTERIUM SPEC QL CULT: NEGATIVE
MYCOBACTERIUM SPEC QL CULT: NEGATIVE
SPECIMEN PREPARATION: NORMAL
SPECIMEN PREPARATION: NORMAL
SPECIMEN SOURCE: NORMAL
SPECIMEN SOURCE: NORMAL

## 2022-07-18 ENCOUNTER — APPOINTMENT (OUTPATIENT)
Dept: INFUSION THERAPY | Age: 77
End: 2022-07-18

## 2022-07-22 ENCOUNTER — APPOINTMENT (OUTPATIENT)
Dept: INFUSION THERAPY | Age: 77
End: 2022-07-22
Attending: INTERNAL MEDICINE

## 2022-08-09 ENCOUNTER — HOSPITAL ENCOUNTER (OUTPATIENT)
Dept: INFUSION THERAPY | Age: 77
Discharge: HOME | End: 2022-08-09
Attending: INTERNAL MEDICINE
Payer: MEDICARE

## 2022-08-09 VITALS — TEMPERATURE: 96.9 F

## 2022-08-09 DIAGNOSIS — D50.9 IRON DEFICIENCY ANEMIA, UNSPECIFIED IRON DEFICIENCY ANEMIA TYPE: ICD-10-CM

## 2022-08-09 DIAGNOSIS — D72.810 LYMPHOPENIA: ICD-10-CM

## 2022-08-09 DIAGNOSIS — J47.0 BRONCHIECTASIS WITH ACUTE LOWER RESPIRATORY INFECTION (HCC): ICD-10-CM

## 2022-08-09 DIAGNOSIS — E53.8 VITAMIN B 12 DEFICIENCY: ICD-10-CM

## 2022-08-09 DIAGNOSIS — E55.9 VITAMIN D DEFICIENCY: ICD-10-CM

## 2022-08-09 LAB
25(OH)D3 SERPL-MCNC: 82.5 NG/ML (ref 30–100)
ALBUMIN SERPL-MCNC: 3.8 G/DL (ref 3.4–5)
ALBUMIN/GLOB SERPL: 0.9 {RATIO} (ref 0.8–1.7)
ALP SERPL-CCNC: 79 U/L (ref 45–117)
ALT SERPL-CCNC: 24 U/L (ref 13–56)
ANION GAP SERPL CALC-SCNC: 6 MMOL/L (ref 3–18)
AST SERPL-CCNC: 21 U/L (ref 10–38)
BASO+EOS+MONOS # BLD AUTO: 0.4 K/UL (ref 0–2.3)
BASO+EOS+MONOS NFR BLD AUTO: 11 % (ref 0.1–17)
BILIRUB SERPL-MCNC: 0.3 MG/DL (ref 0.2–1)
BUN SERPL-MCNC: 34 MG/DL (ref 7–18)
BUN/CREAT SERPL: 32 (ref 12–20)
CALCIUM SERPL-MCNC: 9.6 MG/DL (ref 8.5–10.1)
CHLORIDE SERPL-SCNC: 103 MMOL/L (ref 100–111)
CO2 SERPL-SCNC: 31 MMOL/L (ref 21–32)
CREAT SERPL-MCNC: 1.05 MG/DL (ref 0.6–1.3)
DIFFERENTIAL METHOD BLD: ABNORMAL
ERYTHROCYTE [DISTWIDTH] IN BLOOD BY AUTOMATED COUNT: 13.3 % (ref 11.5–14.5)
FERRITIN SERPL-MCNC: 452 NG/ML (ref 8–388)
FOLATE SERPL-MCNC: >20 NG/ML (ref 3.1–17.5)
GLOBULIN SER CALC-MCNC: 4.1 G/DL (ref 2–4)
GLUCOSE SERPL-MCNC: 64 MG/DL (ref 74–99)
HCT VFR BLD AUTO: 37 % (ref 36–48)
HGB BLD-MCNC: 11.8 G/DL (ref 12–16)
IGA SERPL-MCNC: 480 MG/DL (ref 70–400)
IGG SERPL-MCNC: 1370 MG/DL (ref 700–1600)
IGM SERPL-MCNC: 38 MG/DL (ref 40–230)
IRON SATN MFR SERPL: 21 % (ref 20–50)
IRON SERPL-MCNC: 69 UG/DL (ref 50–175)
LYMPHOCYTES # BLD: 0.8 K/UL (ref 1.1–5.9)
LYMPHOCYTES NFR BLD: 20 % (ref 14–44)
MCH RBC QN AUTO: 31.3 PG (ref 25–35)
MCHC RBC AUTO-ENTMCNC: 31.9 G/DL (ref 31–37)
MCV RBC AUTO: 98.1 FL (ref 78–102)
NEUTS SEG # BLD: 2.6 K/UL (ref 1.8–9.5)
NEUTS SEG NFR BLD: 70 % (ref 40–70)
PLATELET # BLD AUTO: 171 K/UL (ref 140–440)
POTASSIUM SERPL-SCNC: 3.6 MMOL/L (ref 3.5–5.5)
PROT SERPL-MCNC: 7.9 G/DL (ref 6.4–8.2)
RBC # BLD AUTO: 3.77 M/UL (ref 4.1–5.1)
SODIUM SERPL-SCNC: 140 MMOL/L (ref 136–145)
TIBC SERPL-MCNC: 333 UG/DL (ref 250–450)
VIT B12 SERPL-MCNC: 1451 PG/ML (ref 211–911)
WBC # BLD AUTO: 3.8 K/UL (ref 4.5–13)

## 2022-08-09 PROCEDURE — 83540 ASSAY OF IRON: CPT

## 2022-08-09 PROCEDURE — 82784 ASSAY IGA/IGD/IGG/IGM EACH: CPT

## 2022-08-09 PROCEDURE — 84165 PROTEIN E-PHORESIS SERUM: CPT

## 2022-08-09 PROCEDURE — 36415 COLL VENOUS BLD VENIPUNCTURE: CPT

## 2022-08-09 PROCEDURE — 82306 VITAMIN D 25 HYDROXY: CPT

## 2022-08-09 PROCEDURE — 82728 ASSAY OF FERRITIN: CPT

## 2022-08-09 PROCEDURE — 80053 COMPREHEN METABOLIC PANEL: CPT

## 2022-08-09 PROCEDURE — 82607 VITAMIN B-12: CPT

## 2022-08-09 PROCEDURE — 85025 COMPLETE CBC W/AUTO DIFF WBC: CPT

## 2022-08-09 NOTE — PROGRESS NOTES
[facility] Progress Note    Date: 2022    Name: Ana Sanchez    MRN: [id number]         : 1945    Peripheral Lab Draw      Ms. Patterson to [facility], ambulatory at 1400 accompanied by self. Pt was assessed and education was provided. Ms. Corina Galvan vitals were reviewed and patient was observed for 5 minutes prior to treatment. Visit Vitals  Temp 96.9 °F (36.1 °C)     Recent Results (from the past 12 hour(s))   CBC WITH 3 PART DIFF    Collection Time: 22  2:10 PM   Result Value Ref Range    WBC 3.8 (L) 4.5 - 13.0 K/uL    RBC 3.77 (L) 4.10 - 5.10 M/uL    HGB 11.8 (L) 12.0 - 16.0 g/dL    HCT 37.0 36 - 48 %    MCV 98.1 78 - 102 FL    MCH 31.3 25.0 - 35.0 PG    MCHC 31.9 31 - 37 g/dL    RDW 13.3 11.5 - 14.5 %    PLATELET 458 579 - 865 K/uL    NEUTROPHILS 70 40 - 70 %    Mixed cells 11 0.1 - 17 %    LYMPHOCYTES 20 14 - 44 %    ABS. NEUTROPHILS 2.6 1.8 - 9.5 K/UL    ABS. MIXED CELLS 0.4 0.0 - 2.3 K/uL    ABS. LYMPHOCYTES 0.8 (L) 1.1 - 5.9 K/UL    DF AUTOMATED         Blood obtained peripherally from left arm x 1 attempt with butterfly needle and sent to lab per written orders. No bleeding or hematoma noted at site. Gauze and coban applied. Ms. Megan Khan tolerated the phlebotomy, and had no complaints. Patient armband removed and shredded. Ms. Megan Khan was discharged from [place] [address] in stable condition at 1410.      Ally Ambrose Phlebotomist PCT  2022  2:40 PM

## 2022-08-10 LAB
ALBUMIN SERPL ELPH-MCNC: 3.7 G/DL (ref 2.9–4.4)
ALBUMIN/GLOB SERPL: 1.1 {RATIO} (ref 0.7–1.7)
ALPHA1 GLOB SERPL ELPH-MCNC: 0.3 G/DL (ref 0–0.4)
ALPHA2 GLOB SERPL ELPH-MCNC: 0.8 G/DL (ref 0.4–1)
B-GLOBULIN SERPL ELPH-MCNC: 1.1 G/DL (ref 0.7–1.3)
GAMMA GLOB SERPL ELPH-MCNC: 1.4 G/DL (ref 0.4–1.8)
GLOBULIN SER CALC-MCNC: 3.5 G/DL (ref 2.2–3.9)
M PROTEIN SERPL ELPH-MCNC: NORMAL G/DL
PROT SERPL-MCNC: 7.2 G/DL (ref 6–8.5)

## 2022-08-15 ENCOUNTER — VIRTUAL VISIT (OUTPATIENT)
Age: 77
End: 2022-08-15
Payer: MEDICARE

## 2022-08-15 DIAGNOSIS — N28.9 RENAL INSUFFICIENCY: ICD-10-CM

## 2022-08-15 DIAGNOSIS — E53.8 VITAMIN B 12 DEFICIENCY: ICD-10-CM

## 2022-08-15 DIAGNOSIS — D50.9 IRON DEFICIENCY ANEMIA, UNSPECIFIED IRON DEFICIENCY ANEMIA TYPE: Primary | ICD-10-CM

## 2022-08-15 DIAGNOSIS — E55.9 VITAMIN D DEFICIENCY: ICD-10-CM

## 2022-08-15 DIAGNOSIS — J47.9 BRONCHIECTASIS WITHOUT COMPLICATION (HCC): ICD-10-CM

## 2022-08-15 PROCEDURE — 99443 PR PHYS/QHP TELEPHONE EVALUATION 21-30 MIN: CPT | Performed by: INTERNAL MEDICINE

## 2022-08-15 RX ORDER — BUDESONIDE, GLYCOPYRROLATE, AND FORMOTEROL FUMARATE 160; 9; 4.8 UG/1; UG/1; UG/1
AEROSOL, METERED RESPIRATORY (INHALATION)
COMMUNITY
Start: 2022-06-13 | End: 2022-10-06 | Stop reason: ALTCHOICE

## 2022-08-15 NOTE — PROGRESS NOTES
Tiarra Egan is a 76 y.o. female, evaluated via audio-only technology on 8/15/2022 for Anemia  . Iron has maintained at 69 since patient is taking iron supplement daily. She is tolerating these well. Hemoglobin is 11.8 stable   Immunoglobulins are polyclonal without M-Jasmeet consistent with patients chronic bronchiectasis  We shall continue current daily iron      We had a long discussion reviewing the laboratory tests patient asked excellent questions    Follow up with labs in 4 months     Patient is in agreement    Assessment & Plan:   Diagnoses and all orders for this visit:    1. Iron deficiency anemia, unspecified iron deficiency anemia type  -     CBC WITH AUTOMATED DIFF; Future  -     FERRITIN; Future  -     VITAMIN D, 25 HYDROXY; Future  -     VITAMIN B12 & FOLATE; Future  -     METABOLIC PANEL, COMPREHENSIVE; Future  -     IRON PROFILE; Future    2. Bronchiectasis without complication ([address]  -     CBC WITH AUTOMATED DIFF; Future  -     FERRITIN; Future  -     VITAMIN D, 25 HYDROXY; Future  -     VITAMIN B12 & FOLATE; Future  -     METABOLIC PANEL, COMPREHENSIVE; Future  -     IRON PROFILE; Future    3. Renal insufficiency  -     CBC WITH AUTOMATED DIFF; Future  -     FERRITIN; Future  -     VITAMIN D, 25 HYDROXY; Future  -     VITAMIN B12 & FOLATE; Future  -     METABOLIC PANEL, COMPREHENSIVE; Future  -     IRON PROFILE; Future    4. Vitamin B 12 deficiency  -     CBC WITH AUTOMATED DIFF; Future  -     FERRITIN; Future  -     VITAMIN D, 25 HYDROXY; Future  -     VITAMIN B12 & FOLATE; Future  -     METABOLIC PANEL, COMPREHENSIVE; Future  -     IRON PROFILE; Future    5. Vitamin D deficiency  -     CBC WITH AUTOMATED DIFF; Future  -     FERRITIN; Future  -     VITAMIN D, 25 HYDROXY; Future  -     VITAMIN B12 & FOLATE; Future  -     METABOLIC PANEL, COMPREHENSIVE; Future  -     IRON PROFILE; Future      12  Subjective:       Prior to Admission medications    Medication Sig Start Date End Date Taking? Authorizing Provider   Mukultri Aerosphere 160-9-4.8 mcg/actuation HFAA INHALE 2 PUFFS BY MOUTH TWICE DAILY IN THE MORNING AND IN THE EVENING. RINSE YOUR MOUTH AFTER [facility] USE 6/13/22   Provider, Historical   loratadine (CLARITIN) 10 mg tablet Take 1 Tablet by mouth daily. 6/16/22   Niecy Brown NP   rOPINIRole (REQUIP) 0.5 mg tablet Take 1 Tablet by mouth nightly. 3/24/22   Niecy Brown NP   nystatin (MYCOSTATIN) powder Apply to rash 4 times a day 3/24/22   Schuyler FONSECA NP   vitamin B complex (B COMPLEX 1 PO) as directed. Provider, Historical   guaiFENesin ER (Mucinex) 600 mg ER tablet Take 600 mg by mouth two (2) times a day. Provider, Historical   ferrous gluconate 324 mg (38 mg iron) tablet Take 1 Tablet by mouth daily. 3/3/22   Niecy Brown NP   guaiFENesin (ROBITUSSIN) 100 mg/5 mL liquid Take 10 mL by mouth four (4) times daily as needed for Cough. 3/3/22   Niecy Brown NP   montelukast (SINGULAIR) 10 mg tablet TAKE 1 TABLET BY MOUTH DAILY 3/3/22   Niecy Brown NP   multivit-min-iron-FA-lutein (Centrum Silver Women) 8 mg iron-400 mcg-300 mcg tab 1 tab(s)    Provider, Historical   calcium citrate 250 mg calcium tab tablet 1 tab(s)    Provider, Historical   gabapentin (NEURONTIN) 100 mg capsule TAKE 2 CAPSULES BY MOUTH THREE TIMES DAILY  Patient taking differently: two (2) times daily (with meals). Take one tablet twice a day 7/15/21   Dawson Bell MD   Spiriva Respimat 1.25 mcg/actuation inhaler INHALE 2 PUFFS BY MOUTH DAILY 1/25/21   Provider, Historical   albuterol (PROVENTIL HFA, VENTOLIN HFA, PROAIR HFA) 90 mcg/actuation inhaler Take 2 Puffs by inhalation every four (4) hours as needed for Cough. 2/5/21   Dawson Bell MD   Lactobac no.41/Bifidobact no.7 (PROBIOTIC-10 PO) Take 1 Tab by mouth daily. Provider, Historical   multivitamin (ONE A DAY) tablet Take 1 Tab by mouth daily.     Provider, Historical   calcium-cholecalciferol, d3, (CALCIUM 600 + D) 600-125 mg-unit tab Take 2 Tabs by mouth. Provider, Historical   cholecalciferol (VITAMIN D3) (5000 Units/125 mcg) tab tablet Take  by mouth daily. Provider, Historical   latanoprost (XALATAN) 0.005 % ophthalmic solution Administer 1 Drop to both eyes nightly.     Provider, Historical     Past Medical History:   Diagnosis Date    Abnormality of gait and mobility     Ambulatory with use of Wheeled Walker Outside the home (as of 3/05/2021)    Aortic regurgitation 06/25/2018    Mild to Moderate by TTE on 6/25/2018    Arthritis     in [facility]    Asthma Sept. 2018    Asthma Dr. Lela Wing; Questionably Cough-Variant Asthma    Blindness of left eye     2/2 Blood Clot in Left Eye    Bronchiectasis ([address]     CAP (community acquired pneumonia) 3/11/2016    Colon polyps 1-22-20    From colonoscopy     Compression fracture of L5 vertebra (HCC)     DVT (deep venous thrombosis) (HCC)     Provoked (Immobility, Spinal Infection) RLE DVT S/P IVC Filter (2016)    Environmental allergies     Female genital prolapse     Former smoker     1-1.5 PPD x6 years; Quit 1970    Glaucoma 2017    DqKrqbackgTvtgvgbakfivhBd[id number]    Grade I diastolic dysfunction 73/33/7123    by TTE on 6/25/2018    Hemoptysis 3/5/2021    History of Clostridium difficile colitis 07/06/2016    History of transfusion 03/2016    Transfused x4 units (ostensibly of) PRBCs over March of 2016    Hypertension October 2018    Dr. Bogdan Riley diagnosis    Iron deficiency anemia 03/2016    Kidney stone 07/2019    -KidneyStoneMidRicarda kidney-NoActionTaken; Bladder Stones, also    LBBB (left bundle branch block)     Osteoporosis 10/07/2016    Most recently by DEXA Scan on 12/18/2018    Psoas muscle abscess ([address] 03/12/2016    History of Right Psoas Muscle Abscess S/P Drainage    Recurrent UTI [id number]    likely 2/2 Indwelling-Catheter x2 years    Small right kidney     Spinal abscess ([address] 03/2016    Caused paraplegia. patient ambulatory Summer 2018    SVT (supraventricular tachycardia) (HCC)     Umbilical hernia 26/43/9761    Supraumbilical Ventral Hernia    Urine retention     Voiding Dysfunction with Hypocontractile Bladder    Uterus prolapse     grade 5. Surgically corrected 7/2018    Vaginal candida 03/16/2016           Patient-Reported Weight: 100       Jhonathan Kline was evaluated through a patient-initiated, synchronous (real-time) audio only encounter. She (or guardian if applicable) is aware that it is a billable service, which includes applicable co-pays, with coverage as determined by her insurance carrier. This visit was conducted with the patient's (and/or Cholo Mckinley guardian's) verbal consent. She has not had a related appointment within my department in the past 7 days or scheduled within the next 24 hours. The patient was located in a state where the provider was licensed to provide care. The patient was located at: Home: [place] [phone]  The provider was located at:  Facility (Appt Department): [place]    Total Time: minutes: 21-30 minutes    Elian Holland MD

## 2022-08-30 DIAGNOSIS — J45.30 MILD PERSISTENT REACTIVE AIRWAY DISEASE WITHOUT COMPLICATION: ICD-10-CM

## 2022-08-30 RX ORDER — MONTELUKAST SODIUM 10 MG/1
TABLET ORAL
Qty: 90 TABLET | Refills: 1 | Status: SHIPPED | OUTPATIENT
Start: 2022-08-30 | End: 2022-10-06 | Stop reason: SDUPTHER

## 2022-10-06 ENCOUNTER — OFFICE VISIT (OUTPATIENT)
Dept: FAMILY MEDICINE CLINIC | Age: 77
End: 2022-10-06
Payer: MEDICARE

## 2022-10-06 ENCOUNTER — HOSPITAL ENCOUNTER (OUTPATIENT)
Dept: LAB | Age: 77
Discharge: HOME | End: 2022-10-06
Payer: MEDICARE

## 2022-10-06 VITALS
DIASTOLIC BLOOD PRESSURE: 53 MMHG | SYSTOLIC BLOOD PRESSURE: 122 MMHG | WEIGHT: 100 LBS | OXYGEN SATURATION: 93 % | HEART RATE: 75 BPM | BODY MASS INDEX: 19.63 KG/M2 | RESPIRATION RATE: 16 BRPM | HEIGHT: 60 IN | TEMPERATURE: 98.2 F

## 2022-10-06 DIAGNOSIS — Z13.220 SCREENING FOR CHOLESTEROL LEVEL: ICD-10-CM

## 2022-10-06 DIAGNOSIS — Z71.89 ADVANCED DIRECTIVES, COUNSELING/DISCUSSION: ICD-10-CM

## 2022-10-06 DIAGNOSIS — B37.2 YEAST INFECTION OF THE SKIN: Primary | ICD-10-CM

## 2022-10-06 DIAGNOSIS — J30.89 NON-SEASONAL ALLERGIC RHINITIS DUE TO OTHER ALLERGIC TRIGGER: ICD-10-CM

## 2022-10-06 DIAGNOSIS — D50.9 MICROCYTIC ANEMIA: ICD-10-CM

## 2022-10-06 DIAGNOSIS — Z78.9 FULL CODE STATUS: ICD-10-CM

## 2022-10-06 DIAGNOSIS — J45.30 MILD PERSISTENT REACTIVE AIRWAY DISEASE WITHOUT COMPLICATION: ICD-10-CM

## 2022-10-06 DIAGNOSIS — Z00.00 PREVENTATIVE HEALTH CARE: ICD-10-CM

## 2022-10-06 DIAGNOSIS — J47.9 BRONCHIECTASIS WITHOUT COMPLICATION (HCC): ICD-10-CM

## 2022-10-06 DIAGNOSIS — Z00.00 MEDICARE ANNUAL WELLNESS VISIT, SUBSEQUENT: ICD-10-CM

## 2022-10-06 LAB
APPEARANCE UR: CLEAR
BILIRUB UR QL: NEGATIVE
CHOLEST SERPL-MCNC: 197 MG/DL
COLOR UR: YELLOW
GLUCOSE UR STRIP.AUTO-MCNC: NEGATIVE MG/DL
HDLC SERPL-MCNC: 89 MG/DL (ref 40–60)
HDLC SERPL: 2.2 {RATIO} (ref 0–5)
HGB UR QL STRIP: NEGATIVE
KETONES UR QL STRIP.AUTO: NEGATIVE MG/DL
LDLC SERPL CALC-MCNC: 99 MG/DL (ref 0–100)
LEUKOCYTE ESTERASE UR QL STRIP.AUTO: NEGATIVE
LIPID PROFILE,FLP: ABNORMAL
NITRITE UR QL STRIP.AUTO: NEGATIVE
PH UR STRIP: 6.5 [PH] (ref 5–8)
PROT UR STRIP-MCNC: NEGATIVE MG/DL
SP GR UR REFRACTOMETRY: 1.01 (ref 1–1.03)
TRIGL SERPL-MCNC: 45 MG/DL (ref ?–150)
UROBILINOGEN UR QL STRIP.AUTO: 0.2 EU/DL (ref 0.2–1)
VLDLC SERPL CALC-MCNC: 9 MG/DL

## 2022-10-06 PROCEDURE — G8399 PT W/DXA RESULTS DOCUMENT: HCPCS | Performed by: NURSE PRACTITIONER

## 2022-10-06 PROCEDURE — G8420 CALC BMI NORM PARAMETERS: HCPCS | Performed by: NURSE PRACTITIONER

## 2022-10-06 PROCEDURE — 99214 OFFICE O/P EST MOD 30 MIN: CPT | Performed by: NURSE PRACTITIONER

## 2022-10-06 PROCEDURE — 1101F PT FALLS ASSESS-DOCD LE1/YR: CPT | Performed by: NURSE PRACTITIONER

## 2022-10-06 PROCEDURE — 1090F PRES/ABSN URINE INCON ASSESS: CPT | Performed by: NURSE PRACTITIONER

## 2022-10-06 PROCEDURE — G8536 NO DOC ELDER MAL SCRN: HCPCS | Performed by: NURSE PRACTITIONER

## 2022-10-06 PROCEDURE — 36415 COLL VENOUS BLD VENIPUNCTURE: CPT

## 2022-10-06 PROCEDURE — G9717 DOC PT DX DEP/BP F/U NT REQ: HCPCS | Performed by: NURSE PRACTITIONER

## 2022-10-06 PROCEDURE — 81003 URINALYSIS AUTO W/O SCOPE: CPT

## 2022-10-06 PROCEDURE — 80061 LIPID PANEL: CPT

## 2022-10-06 PROCEDURE — 99497 ADVNCD CARE PLAN 30 MIN: CPT | Performed by: NURSE PRACTITIONER

## 2022-10-06 PROCEDURE — G0439 PPPS, SUBSEQ VISIT: HCPCS | Performed by: NURSE PRACTITIONER

## 2022-10-06 PROCEDURE — G8427 DOCREV CUR MEDS BY ELIG CLIN: HCPCS | Performed by: NURSE PRACTITIONER

## 2022-10-06 RX ORDER — MONTELUKAST SODIUM 10 MG/1
TABLET ORAL
Qty: 90 TABLET | Refills: 3 | Status: SHIPPED | OUTPATIENT
Start: 2022-10-06

## 2022-10-06 RX ORDER — ALBUTEROL SULFATE 2.5 MG/.5ML
SOLUTION RESPIRATORY (INHALATION) ONCE
COMMUNITY

## 2022-10-06 RX ORDER — DEXTROMETHORPHAN POLISTIREX 30 MG/5ML
SUSPENSION ORAL
COMMUNITY
Start: 2022-09-14

## 2022-10-06 RX ORDER — KETOCONAZOLE 20 MG/G
CREAM TOPICAL DAILY
Qty: 15 G | Refills: 3 | Status: SHIPPED | OUTPATIENT
Start: 2022-10-06

## 2022-10-06 RX ORDER — FERROUS GLUCONATE 324(38)MG
324 TABLET ORAL DAILY
Qty: 90 TABLET | Refills: 3 | Status: SHIPPED | OUTPATIENT
Start: 2022-10-06

## 2022-10-06 RX ORDER — LORATADINE 10 MG/1
10 TABLET ORAL DAILY
Qty: 90 TABLET | Refills: 3 | Status: SHIPPED | OUTPATIENT
Start: 2022-10-06

## 2022-10-06 NOTE — PROGRESS NOTES
Cristy Huber presents today for   Chief Complaint   Patient presents with    Follow-up       Is someone accompanying this pt? Patient sister    Is the patient using any DME equipment during OV? Walker    Depression Screening:  3 most recent PHQ Screens 10/6/2022   PHQ Not Done -   Little interest or pleasure in doing things Not at all   Feeling down, depressed, irritable, or hopeless Not at all   Total Score PHQ 2 0       Learning Assessment:  Learning Assessment 12/27/2021   PRIMARY LEARNER Patient   HIGHEST LEVEL OF EDUCATION - PRIMARY LEARNER  GRADUATED HIGH SCHOOL OR GED   PRIMARY LANGUAGE ENGLISH   LEARNER PREFERENCE PRIMARY VIDEOS   ANSWERED BY Patient   RELATIONSHIP SELF       Abuse Screening:  Abuse Screening Questionnaire 10/6/2022   Do you ever feel afraid of your partner? N   Are you in a relationship with someone who physically or mentally threatens you? N   Is it safe for you to go home? Y       Fall Risk  Fall Risk Assessment, last 12 mths 5/5/2022   Able to walk? Yes   Fall in past 12 months? -   Do you feel unsteady? -   Are you worried about falling -   Is TUG test greater than 12 seconds? -   Is the gait abnormal? -       Health Maintenance reviewed and discussed and ordered per Provider. Health Maintenance Due   Topic Date Due    Hepatitis C Screening  Never done    DTaP/Tdap/Td series (1 - Tdap) Never done    Shingrix Vaccine Age 50> (1 of 2) Never done    COVID-19 Vaccine (2 - Moderna series) 03/02/2022    Flu Vaccine (1) 08/01/2022    Medicare Yearly Exam  08/18/2022   .      1. Have you been to the ER, urgent care clinic since your last visit? Hospitalized since your last visit? No    2. Have you seen or consulted any other health care providers outside of the [address] [place] [address] since your last visit? No    3. For patients aged 39-70: Has the patient had a colonoscopy / FIT/ Cologuard? Aged out      If the patient is female:    4.  For patients aged 41-77: Has the patient had a mammogram within the past 2 years? Aged out      5. For patients aged 21-65: Has the patient had a pap smear?   Aged out

## 2022-10-06 NOTE — ACP (ADVANCE CARE PLANNING)
Advance Care Planning     General Advance Care Planning (ACP) Conversation      Date of Conversation: 10/6/2022  Conducted with: Patient with Decision Making Capacity    Healthcare Decision Maker:     Primary Decision Maker: Blu Beck -  - [phone]    Secondary Decision Maker: Chris Ballard - [phone]  Click here to complete [address] including selection of the Healthcare Decision Maker Relationship (ie \"Primary\")    Today we documented Decision Maker(s) consistent with Legal Next of Kin hierarchy.     Content/Action Overview:   Has NO ACP documents/care preferences - requested patient complete ACP documents  Reviewed DNR/DNI and patient elects Full Code (Attempt Resuscitation)  Topics discussed: treatment goals, ventilation preferences, and resuscitation preferences  Additional Comments: yes to CPR and her sister will make decisions about life support       Length of Voluntary ACP Conversation in minutes:  27 minutes    Jose Francisco Stephenson NP

## 2022-10-06 NOTE — PROGRESS NOTES
Eduardo Faith is a 76 y.o. female  established patient, here for evaluation of the following chief complaint(s):  Chief Complaint   Patient presents with    Follow-up        Assessment and Plan  1. Yeast infection of the skin  -     ketoconazole (NIZORAL) 2 % topical cream; Apply  to affected area daily. , Normal, Disp-15 g, R-3  2. Microcytic anemia  -     ferrous gluconate 324 mg (38 mg iron) tablet; Take 1 Tablet by mouth daily. , Normal, Disp-90 Tablet, R-3  3. Mild persistent reactive airway disease without complication  -     montelukast (SINGULAIR) 10 mg tablet; TAKE 1 TABLET BY MOUTH DAILY, Normal, Disp-90 Tablet, R-3**Patient requests 90 days supply**  4. Non-seasonal allergic rhinitis due to other allergic trigger  -     loratadine (CLARITIN) 10 mg tablet; Take 1 Tablet by mouth daily. , Normal, Disp-90 Tablet, R-3  5. Medicare annual wellness visit, subsequent  6. Advanced directives, counseling/discussion  -     FULL CODE  7. Full code status  8. Bronchiectasis without complication ([facility] [address]  9. Screening for cholesterol level  -     LIPID PANEL; Future  10. Preventative health care  -     URINALYSIS W/MICROSCOPIC; Future     HPI:   In office visit. Patient is well. Patient in clinic with her sister. Patient says she has not had any flares of her bronchiectasis since February 2022. She feels well. Patient sees Dr. Amber Mina with pulmonology regularly. Patient says she may also have a nebulizer and oxygen at home.     ROS:    General: negative for - chills, fever, weight changes or malaise  HEENT: no sore throat, nasal congestion, vision problems or ear problems  Respiratory: no cough, shortness of breath, or wheezing  Cardiovascular: no chest pain, palpitations, or dyspnea on exertion  Gastrointestinal: no abdominal pain, N/V, change in bowel habits  Musculoskeletal: no back pain or joint pain  Neurological: no headache or dizziness  Endo:  No polyuria or polydipsia  : no urinary  Psychological: negative for - anxiety, depression, sleeps issues    Prior to Admission medications    Medication Sig Start Date End Date Taking? Authorizing Provider   dextromethorphan (DELSYM) 30 mg/5 mL liquid TAKE 5 ML BY MOUTH EVERY 6 TO 8 HOURS 9/14/22  Yes Provider, Historical   NEBULIZER by Does Not Apply route. Yes Provider, Historical   albuterol sulfate (PROVENTIL;VENTOLIN) 2.5 mg/0.5 mL nebu nebulizer solution by Nebulization route once. 3 x daily   Yes Provider, Historical   ferrous gluconate 324 mg (38 mg iron) tablet Take 1 Tablet by mouth daily. 10/6/22  Yes Chio Brower NP   montelukast (SINGULAIR) 10 mg tablet TAKE 1 TABLET BY MOUTH DAILY 10/6/22  Yes Chio Brower NP   loratadine (CLARITIN) 10 mg tablet Take 1 Tablet by mouth daily. 10/6/22  Yes Chio Brower NP   ketoconazole (NIZORAL) 2 % topical cream Apply  to affected area daily. 10/6/22  Yes Chio Brower NP   rOPINIRole (REQUIP) 0.5 mg tablet Take 1 Tablet by mouth nightly. 3/24/22  Yes Chio Brower NP   vitamin B complex (B COMPLEX 1 PO) as directed. Yes Provider, Historical   guaiFENesin ER (MUCINEX) 600 mg ER tablet Take 600 mg by mouth two (2) times a day. Yes Provider, Historical   guaiFENesin (ROBITUSSIN) 100 mg/5 mL liquid Take 10 mL by mouth four (4) times daily as needed for Cough. 3/3/22  Yes Garfield Brower NP   multivit-min-iron-FA-lutein (Centrum Silver Women) 8 mg iron-400 mcg-300 mcg tab 1 tab(s)   Yes Provider, Historical   calcium citrate 250 mg calcium tab tablet 1 tab(s)   Yes Provider, Historical   Spiriva Respimat 1.25 mcg/actuation inhaler INHALE 2 PUFFS BY MOUTH DAILY 1/25/21  Yes Provider, Historical   albuterol (PROVENTIL HFA, VENTOLIN HFA, PROAIR HFA) 90 mcg/actuation inhaler Take 2 Puffs by inhalation every four (4) hours as needed for Cough. 2/5/21  Yes Nandini Talbert MD   Lactobac no.41/Bifidobact no.7 (PROBIOTIC-10 PO) Take 1 Tab by mouth daily.    Yes Provider, Historical   multivitamin (ONE A DAY) tablet Take 1 Tab by mouth daily. Yes Provider, Historical   calcium-cholecalciferol, d3, 600-125 mg-unit tab Take 2 Tabs by mouth. Yes Provider, Historical   cholecalciferol (VITAMIN D3) (5000 Units/125 mcg) tab tablet Take  by mouth daily. Yes Provider, Historical   latanoprost (XALATAN) 0.005 % ophthalmic solution Administer 1 Drop to both eyes nightly. Yes Provider, Historical   montelukast (SINGULAIR) 10 mg tablet TAKE 1 TABLET BY MOUTH DAILY 8/30/22 10/6/22  Aranza Trinidad NP   Breztri Aerosphere 160-9-4.8 mcg/actuation HFAA INHALE 2 PUFFS BY MOUTH TWICE DAILY IN THE MORNING AND IN THE EVENING. RINSE YOUR MOUTH AFTER EACH USE  Patient not taking: Reported on 10/6/2022 6/13/22   Provider, Historical   loratadine (CLARITIN) 10 mg tablet Take 1 Tablet by mouth daily. 6/16/22 10/6/22  Aranza Trinidad NP   nystatin (MYCOSTATIN) powder Apply to rash 4 times a day 3/24/22 10/6/22  Aranza Trinidad NP   ferrous gluconate 324 mg (38 mg iron) tablet Take 1 Tablet by mouth daily. 3/3/22 10/6/22  Aranza Trinidad NP   gabapentin (NEURONTIN) 100 mg capsule TAKE 2 CAPSULES BY MOUTH THREE TIMES DAILY  Patient taking differently: two (2) times daily (with meals). Take one tablet twice a day 7/15/21   Franky Siegel MD        Results for orders placed or performed during the hospital encounter of 08/09/22   IMMUNOGLOBULINS, G/A/M, QT.    Result Value Ref Range    Immunoglobulin G 1,370 700 - 1,600 mg/dL    Immunoglobulin A 480 (H) 70 - 400 mg/dL    Immunoglobulin M 38 (L) 40 - 230 mg/dL   PROTEIN ELECTROPHORESIS   Result Value Ref Range    Protein, total 7.2 6.0 - 8.5 g/dL    Albumin 3.7 2.9 - 4.4 g/dL    Alpha-1-globulin 0.3 0.0 - 0.4 g/dL    ALPHA-2 GLOBULIN 0.8 0.4 - 1.0 g/dL    Beta globulin 1.1 0.7 - 1.3 g/dL    Gamma globulin 1.4 0.4 - 1.8 g/dL    M-Jasmeet Not Observed Not Observed g/dL    Globulin, total 3.5 2.2 - 3.9 g/dL    A/G ratio 1.1 0.7 - 1.7     VITAMIN D, 25 HYDROXY   Result Value Ref Range    Vitamin D 25-Hydroxy 82.5 30 - 100 ng/mL   FERRITIN   Result Value Ref Range    Ferritin 452 (H) 8 - 388 NG/ML   IRON PROFILE   Result Value Ref Range    Iron 69 50 - 175 ug/dL    TIBC 333 250 - 450 ug/dL    Iron % saturation 21 20 - 50 %   METABOLIC PANEL, COMPREHENSIVE   Result Value Ref Range    Sodium 140 136 - 145 mmol/L    Potassium 3.6 3.5 - 5.5 mmol/L    Chloride 103 100 - 111 mmol/L    CO2 31 21 - 32 mmol/L    Anion gap 6 3.0 - 18 mmol/L    Glucose 64 (L) 74 - 99 mg/dL    BUN 34 (H) 7.0 - 18 MG/DL    Creatinine 1.05 0.6 - 1.3 MG/DL    BUN/Creatinine ratio 32 (H) 12 - 20      GFR est AA >60 >60 ml/min/1.73m2    GFR est non-AA 51 (L) >60 ml/min/1.73m2    Calcium 9.6 8.5 - 10.1 MG/DL    Bilirubin, total 0.3 0.2 - 1.0 MG/DL    ALT (SGPT) 24 13 - 56 U/L    AST (SGOT) 21 10 - 38 U/L    Alk. phosphatase 79 45 - 117 U/L    Protein, total 7.9 6.4 - 8.2 g/dL    Albumin 3.8 3.4 - 5.0 g/dL    Globulin 4.1 (H) 2.0 - 4.0 g/dL    A-G Ratio 0.9 0.8 - 1.7     VITAMIN B12 & FOLATE   Result Value Ref Range    Vitamin B12 1,451 (H) 211 - 911 pg/mL    Folate >20.0 (H) 3.10 - 17.50 ng/mL   CBC WITH 3 PART DIFF   Result Value Ref Range    WBC 3.8 (L) 4.5 - 13.0 K/uL    RBC 3.77 (L) 4.10 - 5.10 M/uL    HGB 11.8 (L) 12.0 - 16.0 g/dL    HCT 37.0 36 - 48 %    MCV 98.1 78 - 102 FL    MCH 31.3 25.0 - 35.0 PG    MCHC 31.9 31 - 37 g/dL    RDW 13.3 11.5 - 14.5 %    PLATELET 449 109 - 607 K/uL    NEUTROPHILS 70 40 - 70 %    Mixed cells 11 0.1 - 17 %    LYMPHOCYTES 20 14 - 44 %    ABS. NEUTROPHILS 2.6 1.8 - 9.5 K/UL    ABS. MIXED CELLS 0.4 0.0 - 2.3 K/uL    ABS. LYMPHOCYTES 0.8 (L) 1.1 - 5.9 K/UL    DF AUTOMATED          Physical Exam  Patient appears well, she is pleasant, alert, oriented x 3, in no distress. Uses a walker   ENT normal.  Neck supple. No adenopathy or thyromegaly. LAVELL. Lungs are clear, good air entry, no wheezes  Cardiovascular, S1 and S2 normal, no murmurs, regular rate and rhythm.    Chest wall negative for tenderness  Abdomen is soft without tenderness, guarding  /Anorectal, deferred. Muscleskeletal, no swelling, no tenderness, no injury. Extremities show no edema  Neurological is normal without focal findings. Skin: no concerning lesions. Psych: normal affect. Mood good. Oriented x 3. Vitals:    10/06/22 1323   BP: (!) 122/53   Pulse: 75   Resp: 16   Temp: 98.2 °F (36.8 °C)   TempSrc: Temporal   SpO2: 93%   Weight: 100 lb (45.4 kg)   Height: 5' (1.524 m)   PainSc:   0 - No pain       *Plan of care reviewed with patient. Patient in agreement with plan and expresses understanding. All questions answered and patient encouraged to call or RTO if further questions or concerns. On this date 10/06/2022 I have spent 9 minutes reviewing previous notes, test results and face to face with the patient discussing the diagnosis and importance of compliance with the treatment plan as well as documenting on the day of the visit. EFRAIN Ryan            This is the Subsequent Medicare Annual Wellness Exam, performed 12 months or more after the Initial AWV or the last Subsequent AWV    I have reviewed the patient's medical history in detail and updated the computerized patient record. Assessment/Plan   Education and counseling provided:  Are appropriate based on today's review and evaluation  End-of-Life planning (with patient's consent)    1. Yeast infection of the skin  -     ketoconazole (NIZORAL) 2 % topical cream; Apply  to affected area daily. , Normal, Disp-15 g, R-3  2. Microcytic anemia  -     ferrous gluconate 324 mg (38 mg iron) tablet; Take 1 Tablet by mouth daily. , Normal, Disp-90 Tablet, R-3  3.  Mild persistent reactive airway disease without complication  -     montelukast (SINGULAIR) 10 mg tablet; TAKE 1 TABLET BY MOUTH DAILY, Normal, Disp-90 Tablet, R-3**Patient requests 90 days supply**  4. Non-seasonal allergic rhinitis due to other allergic trigger  -     loratadine (CLARITIN) 10 mg tablet; Take 1 Tablet by mouth daily. , Normal, Disp-90 Tablet, R-3  5. Medicare annual wellness visit, subsequent  6. Advanced directives, counseling/discussion  -     FULL CODE  7. Full code status  8. Bronchiectasis without complication ([facility] [address]  9. Screening for cholesterol level  -     LIPID PANEL; Future  10. Preventative health care  -     URINALYSIS W/MICROSCOPIC; Future       Depression Risk Factor Screening     3 most recent PHQ Screens 10/6/2022   PHQ Not Done -   Little interest or pleasure in doing things Not at all   Feeling down, depressed, irritable, or hopeless Not at all   Total Score PHQ 2 0       Alcohol & Drug Abuse Risk Screen    Do you average more than 1 drink per night or more than 7 drinks a week:  No    On any one occasion in the past three months have you have had more than 3 drinks containing alcohol:  No          Functional Ability and Level of Safety    Hearing: Hearing is good. Activities of Daily Living: The home contains: no safety equipment. Patient does total self care      Ambulation: with mild difficulty     Fall Risk:  Fall Risk Assessment, last 12 mths 5/5/2022   Able to walk? Yes   Fall in past 12 months? -   Do you feel unsteady? -   Are you worried about falling -   Is TUG test greater than 12 seconds?  -   Is the gait abnormal? -      Abuse Screen:  Patient is not abused       Cognitive Screening    Has your family/caregiver stated any concerns about your memory: no     Cognitive Screening: no issues     Health Maintenance Due     Health Maintenance Due   Topic Date Due    Hepatitis C Screening  Never done    DTaP/Tdap/Td series (1 - Tdap) Never done    Shingrix Vaccine Age 50> (1 of 2) Never done    COVID-19 Vaccine (2 - Moderna series) 03/02/2022    Flu Vaccine (1) 08/01/2022       Patient Care Team   Patient Care Team:  Bryanna Gil NP as PCP - General (Nurse Practitioner)  Bryanna Gil NP as PCP - REHABILITATION [facility] Empaneled Provider  Diomedes Manzano MD as Physician (Urology)  J Carlos Pond MD as Physician (Obstetrics & Gynecology)  Joselin Beckham NP as Nurse Practitioner (Nurse Practitioner)  Montel Gaucher, DPM as Physician (Podiatry)  Shirley Nielsen MD as Physician (Neurosurgery)  Junior Farooq MD as Physician (Infectious Disease Physician)  Pascual Holland MD as Physician (Vascular Surgery)  Yuniel Angulo MD (Pulmonary Disease)  Anneliese Abdi MD (Cardiovascular Disease Physician)    History     Patient Active Problem List   Diagnosis Code    Back pain M54.9    RLS (restless legs syndrome) G25.81    Functional urinary incontinence R39.81    LBBB (left bundle branch block) I44.7    Leukopenia D72.819    Iron deficiency anemia D50.9    Bronchiectasis (HCC) J47.9    Asthma J45.909    Grade I diastolic dysfunction L45.98    Aortic regurgitation I35.1    History of supraventricular tachycardia Z86.79    History of Clostridium difficile colitis Z86.19    Renal insufficiency N28.9    Lymphopenia D72.810    Polyp of colon K63.5    Internal hemorrhoids K64.8    Depression F32. A     Past Medical History:   Diagnosis Date    Abnormality of gait and mobility     Ambulatory with use of Wheeled Walker Outside the home (as of 3/05/2021)    Aortic regurgitation 06/25/2018    Mild to Moderate by TTE on 6/25/2018    Arthritis     in [facility]    Asthma Sept. 2018    Asthma Dr. Lucita Wong; Questionably Cough-Variant Asthma    Blindness of left eye     2/2 Blood Clot in Left Eye    Bronchiectasis ([address]     CAP (community acquired pneumonia) 3/11/2016    Colon polyps 1-22-20    From colonoscopy     Compression fracture of L5 vertebra ([facility]     DVT (deep venous thrombosis) ([facility])     Provoked (Immobility, Spinal Infection) RLE DVT S/P IVC Filter (2016)    Environmental allergies     Female genital prolapse     Former smoker     1-1.5 PPD x6 years; Quit 1970    Glaucoma 2017 [id number]    Grade I diastolic dysfunction 07/42/1358    by TTE on 6/25/2018    Hemoptysis 3/5/2021    History of Clostridium difficile colitis 07/06/2016    History of transfusion 03/2016    Transfused x4 units (ostensibly of) PRBCs over March of 2016    Hypertension October 2018    Dr. 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 patient ambulatory Summer 2018    SVT (supraventricular tachycardia) (HCC)     Umbilical hernia 98/08/7637    Supraumbilical Ventral Hernia    Urine retention     Voiding Dysfunction with Hypocontractile Bladder    Uterus prolapse     grade 5. Surgically corrected 7/2018    Vaginal candida 03/16/2016      Past Surgical History:   Procedure Laterality Date    COLONOSCOPY N/A 1/22/2020    COLONOSCOPY performed by Helga Lopez MD at [address] [facility]      x 4 after having miscarriages. HX GYN  07/2018    CYSTOURETHROSCOPY; POSTERIOR COLPORRHAPHY, CYSTOCELE REPAIR; COLPOPEXY VAGINAL INTRA-PERITONEAL APPROACH;    HX TONSILLECTOMY      HX VITRECTOMY Left 2017    x2 (4/20/2017, 7/20/2017)    VASCULAR SURGERY PROCEDURE UNLIST  march 2016    IVC filter. Current Outpatient Medications   Medication Sig Dispense Refill    dextromethorphan (DELSYM) 30 mg/5 mL liquid TAKE 5 ML BY MOUTH EVERY 6 TO 8 HOURS      NEBULIZER by Does Not Apply route. albuterol sulfate (PROVENTIL;VENTOLIN) 2.5 mg/0.5 mL nebu nebulizer solution by Nebulization route once.  3 x daily ferrous gluconate 324 mg (38 mg iron) tablet Take 1 Tablet by mouth daily. 90 Tablet 3    montelukast (SINGULAIR) 10 mg tablet TAKE 1 TABLET BY MOUTH DAILY 90 Tablet 3    loratadine (CLARITIN) 10 mg tablet Take 1 Tablet by mouth daily. 90 Tablet 3    ketoconazole (NIZORAL) 2 % topical cream Apply  to affected area daily. 15 g 3    rOPINIRole (REQUIP) 0.5 mg tablet Take 1 Tablet by mouth nightly. 90 Tablet 1    vitamin B complex (B COMPLEX 1 PO) as directed. guaiFENesin ER (MUCINEX) 600 mg ER tablet Take 600 mg by mouth two (2) times a day. guaiFENesin (ROBITUSSIN) 100 mg/5 mL liquid Take 10 mL by mouth four (4) times daily as needed for Cough. 473 mL 2    multivit-min-iron-FA-lutein (Centrum Silver Women) 8 mg iron-400 mcg-300 mcg tab 1 tab(s)      calcium citrate 250 mg calcium tab tablet 1 tab(s)      Spiriva Respimat 1.25 mcg/actuation inhaler INHALE 2 PUFFS BY MOUTH DAILY      albuterol (PROVENTIL HFA, VENTOLIN HFA, PROAIR HFA) 90 mcg/actuation inhaler Take 2 Puffs by inhalation every four (4) hours as needed for Cough. 1 Inhaler 5    Lactobac no.41/Bifidobact no.7 (PROBIOTIC-10 PO) Take 1 Tab by mouth daily. multivitamin (ONE A DAY) tablet Take 1 Tab by mouth daily. calcium-cholecalciferol, d3, 600-125 mg-unit tab Take 2 Tabs by mouth. cholecalciferol (VITAMIN D3) (5000 Units/125 mcg) tab tablet Take  by mouth daily. latanoprost (XALATAN) 0.005 % ophthalmic solution Administer 1 Drop to both eyes nightly. Breztri Aerosphere 160-9-4.8 mcg/actuation HFAA INHALE 2 PUFFS BY MOUTH TWICE DAILY IN THE MORNING AND IN THE EVENING. RINSE YOUR MOUTH AFTER EACH USE (Patient not taking: Reported on 10/6/2022)      gabapentin (NEURONTIN) 100 mg capsule TAKE 2 CAPSULES BY MOUTH THREE TIMES DAILY (Patient taking differently: two (2) times daily (with meals).  Take one tablet twice a day) 540 Capsule 0     Allergies   Allergen Reactions    Other Food Cough    Milk Containing Products Other (comments)     Mucous    Downs Freud Starter [Glycopyrrol-Nebulizer-Accessor] Other (comments)     Blisters on the legs    Mold Other (comments)    Peanuts [Peanut] Other (comments)    Pollen Extracts Other (comments)    Sulfamethoprim Nausea Only, Other (comments) and Unknown (comments)     Headache, Joint pain, loss of appetite     Sulfamethoxazole-Trimethoprim Other (comments)       Family History   Problem Relation Age of Onset    Cancer Mother         Stomach Cancer-     Heart Disease Mother         [email or web address]    Cancer Father         Stomach Cancer-     Asthma Father      Social History     Tobacco Use    Smoking status: Former     Packs/day: 1.00     Years: 8.00     Pack years: 8.00     Types: Cigarettes     Quit date: 4/15/1970     Years since quittin.5    Smokeless tobacco: Never    Tobacco comments:     1-1.5 PPD x7-8 years; Quit    Substance Use Topics    Alcohol use: No     Alcohol/week: 0.0 standard drinks         Sophia Stacy, NP

## 2022-10-06 NOTE — PATIENT INSTRUCTIONS

## 2022-10-07 ENCOUNTER — TELEPHONE (OUTPATIENT)
Dept: FAMILY MEDICINE CLINIC | Age: 77
End: 2022-10-07

## 2022-10-07 NOTE — TELEPHONE ENCOUNTER
Pt called regarding test results, please call back at earliest convenience. Call back number is [phone]. Thank you.

## 2022-12-07 ENCOUNTER — TELEPHONE (OUTPATIENT)
Dept: FAMILY MEDICINE CLINIC | Age: 77
End: 2022-12-07

## 2022-12-07 NOTE — TELEPHONE ENCOUNTER
----- Message from Javi Romano sent at 11/29/2022  2:53 PM EST -----  Subject: Message to Provider    QUESTIONS  Information for Provider? Sandrine Rivera is a patient of John Donald and   was wanting to know if her office will be moving at the beginning of the   year   ---------------------------------------------------------------------------  --------------  [address]  [phone]; OK to leave message on voicemail  ---------------------------------------------------------------------------  --------------  SCRIPT ANSWERS  Relationship to Patient?  Self

## 2022-12-07 NOTE — PROGRESS NOTES
Marco Sanchez is a 66 y.o.  female and presents with    Chief Complaint   Patient presents with    Establish Care       Subjective:  Previous patient of Dr. Lindsay Thompson. Ms. Yobany Carpio presents today to establish care. She has several questions about her medications and the affects it has on her body. She is requesting a refill on requip and gabapentin. Both have been effective with her restless leg syndrome. 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 She has been slowly tapering herself off of gabapentin. Instead of taking gabapentin three times a day she is taking it twice a day. She would still like it prescribed for three times a day as there are days when she needs to take the third dose. 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 90 Tab 1    montelukast (SINGULAIR) 10 mg tablet TAKE 1 TABLET BY MOUTH DAILY 90 Tab 1    multivitamin (ONE A DAY) tablet Take 1 Tab by mouth daily.  calcium-cholecalciferol, d3, (CALCIUM 600 + D) 600-125 mg-unit tab Take 2 Tabs by mouth.  cholecalciferol (VITAMIN D3) (5000 Units/125 mcg) tab tablet Take  by mouth daily.  b complex vitamins tablet Take 1 Tab by mouth daily.  ferrous gluconate 324 mg (38 mg iron) tablet Take 1 Tab by mouth daily.  TAKE 1 TABLET BY MOUTH EVERY MONDAY, WEDNESDAY, AND SUNDAY 90 Tab 1    rOPINIRole (REQUIP) 0.5 mg tablet TAKE 1 TABLET BY MOUTH EVERY NIGHT 90 Tab 0    gabapentin (NEURONTIN) 100 mg capsule TAKE 2 CAPSULES BY MOUTH THREE TIMES DAILY 540 Cap 0    latanoprost (XALATAN) 0.005 % ophthalmic solution Administer 2 Drops to both eyes nightly. Allergies   Allergen Reactions    Milk Containing Products Other (comments)     Mucous    Bactrim [Sulfamethoprim] Nausea Only and Other (comments)     Headache, Joint pain, loss of appetite     Mold Other (comments)    Pollen Extracts Other (comments)    Sulfamethoxazole-Trimethoprim Other (comments)     Past Medical History:   Diagnosis Date    Anemia 03/2016    in hospital - [address]     [facility]    Bladder stones     DVT (deep venous thrombosis) ([facility] [address]     S/P IVC filter 2016. In setting of paraplagia from Spinal abcess    Environmental allergies     Female genital prolapse     Hypocontractile bladder     Ill-defined condition     Leak in valve. Followed by Dr. Mendez Cord LBBB (left bundle branch block)     Leaky heart valve     Osteopenia     Small right kidney     Spinal abscess ([address] 03/2016    Caused paraplegia. patient ambulatory Summer 2018    SVT (supraventricular tachycardia) (HCC)     Umbilical hernia     Urine retention     Uterus prolapse     grade 5. Surgically corrected 7/2018    Vaginal candida 03/16/2016    Voiding dysfunction      Past Surgical History:   Procedure Laterality Date    COLONOSCOPY N/A 1/22/2020    COLONOSCOPY performed by Shana Kim MD at [address] [facility]      x 4 after having miscarriages.  HX GYN  07/2018    CYSTOURETHROSCOPY; POSTERIOR COLPORRHAPHY, CYSTOCELE REPAIR; COLPOPEXY VAGINAL INTRA-PERITONEAL APPROACH;    HX TONSILLECTOMY      HX VITRECTOMY Left 2017    VASCULAR SURGERY PROCEDURE UNLIST  march 2016    IVC filter. History reviewed. No pertinent family history.   Social History     Tobacco Use    Smoking status: Former Smoker     Packs/day: 1.00     Years: 15.00     Pack years: 15.00     Types: Cigarettes    Smokeless tobacco: Never Used    Tobacco comment: quit in the 70's   Substance Use Topics    Alcohol use: No     Alcohol/week: 0.0 standard drinks     Frequency: Never       ROS   History obtained from the patient  General ROS: negative for - chills or fever  Respiratory ROS: no cough, shortness of breath, or wheezing  Cardiovascular ROS: no chest pain or dyspnea on exertion  Gastrointestinal ROS: no abdominal pain, change in bowel habits, or black or bloody stools  Genito-Urinary ROS: no dysuria, trouble voiding, or hematuria  Neurological ROS: positive for - neuropathy     All other systems reviewed and are negative.       Objective:  Vitals:    02/27/20 1247   BP: 133/77   Pulse: 64   Resp: 16   Temp: 97.8 °F (36.6 °C)   TempSrc: Oral   SpO2: 98%   Weight: 125 lb (56.7 kg)   Height: 5' (1.524 m)   PainSc:   0 - No pain       PE  General appearance - alert, well appearing, and in no distress  Mental status - normal mood, behavior, speech, dress, motor activity, and thought processes  Chest - clear to auscultation, no wheezes, rales or rhonchi, symmetric air entry  Heart - normal rate and regular rhythm  Neurological - alert, oriented, normal speech, no focal findings or movement disorder noted, normal muscle tone, no tremors, strength 5/5  Musculoskeletal - no joint tenderness, deformity or swelling  Extremities - peripheral pulses normal, no pedal edema, no clubbing or cyanosis    LABS   Lab Results   Component Value Date/Time    WBC 3.4 (L) 10/28/2019 01:47 PM    HGB 11.4 (L) 10/28/2019 01:47 PM    HCT 36.3 10/28/2019 01:47 PM    PLATELET 087 79/48/8237 01:47 PM    MCV 95.8 10/28/2019 01:47 PM     Lab Results   Component Value Date/Time    Sodium 140 10/28/2019 01:47 PM    Potassium 4.1 10/28/2019 01:47 PM    Chloride 102 10/28/2019 01:47 PM    CO2 31 10/28/2019 01:47 PM    Anion gap 7 10/28/2019 01:47 PM Glucose 108 (H) 10/28/2019 01:47 PM    BUN 24 (H) 10/28/2019 01:47 PM    Creatinine 1.11 10/28/2019 01:47 PM    BUN/Creatinine ratio 22 (H) 10/28/2019 01:47 PM    GFR est AA 58 (L) 10/28/2019 01:47 PM    GFR est non-AA 48 (L) 10/28/2019 01:47 PM    Calcium 9.9 10/28/2019 01:47 PM    Bilirubin, total 0.3 10/28/2019 01:47 PM    ALT (SGPT) 27 10/28/2019 01:47 PM    AST (SGOT) 22 10/28/2019 01:47 PM    Alk. phosphatase 85 10/28/2019 01:47 PM    Protein, total 8.2 10/28/2019 01:47 PM    Albumin 4.2 10/28/2019 01:47 PM    Globulin 4.0 10/28/2019 01:47 PM    A-G Ratio 1.1 10/28/2019 01:47 PM      Lab Results   Component Value Date/Time    Iron 62 10/28/2019 01:47 PM    TIBC 311 10/28/2019 01:47 PM    Iron % saturation 20 10/28/2019 01:47 PM    Ferritin 341 10/28/2019 01:47 PM           Assessment/Plan:    1. Leukopenia, unspecified type  -     CBC W/O DIFF; Future    2. RLS (restless legs syndrome)  -     rOPINIRole (REQUIP) 0.5 mg tablet; TAKE 1 TABLET BY MOUTH EVERY NIGHT  -     gabapentin (NEURONTIN) 100 mg capsule; TAKE 2 CAPSULES BY MOUTH THREE TIMES DAILY    3. Paraspinal abscess (HCC)  -     gabapentin (NEURONTIN) 100 mg capsule; TAKE 2 CAPSULES BY MOUTH THREE TIMES DAILY        Lab review: orders written for new lab studies as appropriate; see orders        Health Maintenance:     I have discussed the diagnosis with the patient and the intended plan as seen in the above orders. The patient has received an after-visit summary and questions were answered concerning future plans. I have discussed medication side effects and warnings with the patient as well. I have reviewed the plan of care with the patient, accepted their input and they are in agreement with the treatment goals. Follow-up and Dispositions    · Return in about 6 months (around 8/27/2020) for MWE. More than 1/2 of this 25 minute visit was spent in counseling and coordination of care, as described above.     Gerhardt Hard, NANCY, FNP-C 8

## 2022-12-13 ENCOUNTER — APPOINTMENT (OUTPATIENT)
Dept: INFUSION THERAPY | Age: 77
End: 2022-12-13

## 2023-03-06 NOTE — TELEPHONE ENCOUNTER
Pt calling in regards to Rx refill, states she has been waiting over a week has not heard anything back.  Please advise

## 2023-03-06 NOTE — TELEPHONE ENCOUNTER
----- Message from Chase Cr sent at 3/2/2023 11:58 AM EST -----  Subject: Refill Request    QUESTIONS  Name of Medication? guaiFENesin (ROBITUSSIN) 100 MG/5ML liquid  Patient-reported dosage and instructions? once a day   How many days do you have left? 0  Preferred Pharmacy? [facility] [place] [id number]  Pharmacy phone number (if available)? [phone]  ---------------------------------------------------------------------------  --------------  CALL BACK INFO  What is the best way for the office to contact you? OK to leave message on   voicemail  Preferred Call Back Phone Number? [phone]  ---------------------------------------------------------------------------  --------------  SCRIPT ANSWERS  Relationship to Patient?  Self

## 2023-03-06 NOTE — TELEPHONE ENCOUNTER
This patient contacted the office for the following prescriptions to be refilled:    Medication requested :   Requested Prescriptions     Pending Prescriptions Disp Refills    guaiFENesin (ROBITUSSIN) 100 MG/5ML liquid       Sig: Take 10 mLs by mouth 4 times daily as needed      PCP: MAINE Lin CNP  LOV:           10/22an IN OFFICE  NOV DMA: 3/13/2023  FUTURE APPT:   Future Appointments   Date Time Provider Glenn Madyson   3/13/2023  1:40 PM MAINE Cueva CNP [facility] BS AMB   5/9/2023  1:00 PM Rey Vaughn MD [facility] BS AMB   10/12/2023  1:00 PM [facility] [place] Sched   10/12/2023  1:40 PM MAINE Guillory NP [facility] Sched         Thank you.

## 2023-03-08 RX ORDER — GUAIFENESIN 200 MG/10ML
200 LIQUID ORAL 4 TIMES DAILY PRN
Qty: 473 ML | Refills: 2 | Status: SHIPPED | OUTPATIENT
Start: 2023-03-08

## 2023-06-23 ENCOUNTER — TELEPHONE (OUTPATIENT)
Facility: CLINIC | Age: 78
End: 2023-06-23

## 2023-10-20 NOTE — ED TRIAGE NOTES
Patient arrives via EMS with a c/c of SOB and hemoptysis x 2 days. Patient has an extensive cancer, respiratory and cardiac hx. Patient is ano x 4 on arrival, satting 98% on room air with increased work of breathing and active hemoptysis. Labile Constricted

## (undated) DEVICE — DEVICE INFL 60ML 12ATM CONVENIENT LOK REL HNDL HI PRSS FLX

## (undated) DEVICE — BITE BLK ENDOSCP AD 54FR GRN POLYETH ENDOSCP W STRP SLD

## (undated) DEVICE — FORCEPS BX L240CM JAW DIA2.8MM L CAP W/ NDL MIC MESH TOOTH

## (undated) DEVICE — BASIN EMESIS 500CC ROSE 250/CS 60/PLT: Brand: MEDEGEN MEDICAL PRODUCTS, LLC

## (undated) DEVICE — KENDALL 500 SERIES DIAPHORETIC FOAM MONITORING ELECTRODE - TEAR DROP SHAPE ( 30/PK): Brand: KENDALL

## (undated) DEVICE — CONTAINER PREFIL FRMLN 15ML --

## (undated) DEVICE — Device: Brand: DEFENDO VALVE AND CONNECTOR KIT

## (undated) DEVICE — MEDI-VAC NON-CONDUCTIVE SUCTION TUBING: Brand: CARDINAL HEALTH

## (undated) DEVICE — SYR 50ML SLIP TIP NSAF LF STRL --

## (undated) DEVICE — TUBING, SUCTION, 3/16" X 6', STRAIGHT: Brand: MEDLINE

## (undated) DEVICE — KIT COLON W/ 1.1OZ LUB AND 2 END

## (undated) DEVICE — BLOCK BITE BLOX W DENTAL RIM --

## (undated) DEVICE — FLEX ADVANTAGE 1500CC: Brand: FLEX ADVANTAGE